# Patient Record
Sex: MALE | Race: WHITE | NOT HISPANIC OR LATINO | Employment: OTHER | URBAN - METROPOLITAN AREA
[De-identification: names, ages, dates, MRNs, and addresses within clinical notes are randomized per-mention and may not be internally consistent; named-entity substitution may affect disease eponyms.]

---

## 2017-01-23 ENCOUNTER — PATIENT OUTREACH (OUTPATIENT)
Dept: HEALTH INFORMATION MANAGEMENT | Facility: OTHER | Age: 67
End: 2017-01-23

## 2017-02-28 DIAGNOSIS — E11.42 DIABETIC POLYNEUROPATHY ASSOCIATED WITH TYPE 2 DIABETES MELLITUS (HCC): ICD-10-CM

## 2017-02-28 RX ORDER — GABAPENTIN 300 MG/1
300 CAPSULE ORAL 3 TIMES DAILY
Qty: 270 CAP | Refills: 3 | Status: SHIPPED | OUTPATIENT
Start: 2017-02-28 | End: 2017-03-27 | Stop reason: SDUPTHER

## 2017-03-09 ENCOUNTER — TELEPHONE (OUTPATIENT)
Dept: MEDICAL GROUP | Facility: MEDICAL CENTER | Age: 67
End: 2017-03-09

## 2017-03-09 NOTE — TELEPHONE ENCOUNTER
Future Appointments       Provider Department    3/27/2017 9:00 AM Augie Andrew M.D.; [facility]      ANNUAL WELLNESS VISIT PRE-VISIT PLANNING     1.  Reviewed last PCP office visit assessment and plan notes: Yes    2.  If any orders were placed last visit do we have Results/Consult Notes?        •  Labs? Yes completed       •  Imaging? Yes completed       •  Referrals? Yes, pt saw the Eye MD (Pt. Will bring the information)    3.  Patient Care Coordination Note was updated with diagnosis information:  Note sent to the PCC nurse    4.  Patient is due for these Health Maintenance Topics:   Health Maintenance Due   Topic Date Due   • Annual Wellness Visit  1950   • IMM DTaP/Tdap/Td Vaccine (1 - Tdap) 02/19/1970     5.  Immunizations were updated in [employer] using [employer]?: Yes       •  [employer] Recommendations:  Tdap       •  Is patient due for Tdap/Shingles? Yes.  If yes, was patient alerted of copay? Yes    6.  Patient has:       •   Diabetes: Yes       •   COPD: No       •   CHF: No       •   Depression: No    7.  Updated Care Team with [employer] and all specialists?        •   Gait devices, O2, CPAP, etc: no        •   Eye professional: yes       •   Other specialists (GYN, cardiology, endo, etc): yes    8.  Is patient in need of any refills prior to office visit? No       •    Separate refill encounter created?: N\A    9.  Patient was informed to arrive 15 min prior to their scheduled appointment and bring in their medication bottles? yes    10.  Patient was advised: “This is a free wellness visit. The provider will screen for medical conditions to help you stay healthy. If you have other concerns to address you may be asked to discuss these at a separate visit or there may be an additional fee.”  Yes

## 2017-03-15 ENCOUNTER — ANTICOAGULATION MONITORING (OUTPATIENT)
Dept: VASCULAR LAB | Facility: MEDICAL CENTER | Age: 67
End: 2017-03-15

## 2017-03-15 DIAGNOSIS — I48.0 PAROXYSMAL ATRIAL FIBRILLATION (HCC): ICD-10-CM

## 2017-03-15 NOTE — PROGRESS NOTES
Discharged from [facility].  Pt refusing anticoagulation 4-  Andree Reardon, PHARMD

## 2017-03-27 ENCOUNTER — OFFICE VISIT (OUTPATIENT)
Dept: MEDICAL GROUP | Facility: MEDICAL CENTER | Age: 67
End: 2017-03-27
Payer: MEDICARE

## 2017-03-27 VITALS
BODY MASS INDEX: 35.36 KG/M2 | OXYGEN SATURATION: 94 % | HEART RATE: 61 BPM | TEMPERATURE: 98.9 F | RESPIRATION RATE: 20 BRPM | DIASTOLIC BLOOD PRESSURE: 74 MMHG | WEIGHT: 247 LBS | SYSTOLIC BLOOD PRESSURE: 136 MMHG | HEIGHT: 70 IN

## 2017-03-27 DIAGNOSIS — Z12.5 ENCOUNTER FOR PROSTATE CANCER SCREENING: ICD-10-CM

## 2017-03-27 DIAGNOSIS — E78.5 DYSLIPIDEMIA: ICD-10-CM

## 2017-03-27 DIAGNOSIS — Z79.4 TYPE 2 DIABETES MELLITUS WITH DIABETIC NEUROPATHY, WITH LONG-TERM CURRENT USE OF INSULIN (HCC): ICD-10-CM

## 2017-03-27 DIAGNOSIS — Z23 NEED FOR TDAP VACCINATION: ICD-10-CM

## 2017-03-27 DIAGNOSIS — E11.40 TYPE 2 DIABETES MELLITUS WITH DIABETIC NEUROPATHY, WITH LONG-TERM CURRENT USE OF INSULIN (HCC): ICD-10-CM

## 2017-03-27 DIAGNOSIS — Z00.00 MEDICARE ANNUAL WELLNESS VISIT, INITIAL: ICD-10-CM

## 2017-03-27 DIAGNOSIS — I10 ESSENTIAL HYPERTENSION: ICD-10-CM

## 2017-03-27 DIAGNOSIS — I27.20 PULMONARY HYPERTENSION (HCC): ICD-10-CM

## 2017-03-27 DIAGNOSIS — I48.0 PAROXYSMAL ATRIAL FIBRILLATION (HCC): Chronic | ICD-10-CM

## 2017-03-27 DIAGNOSIS — E11.42 DIABETIC POLYNEUROPATHY ASSOCIATED WITH TYPE 2 DIABETES MELLITUS (HCC): ICD-10-CM

## 2017-03-27 PROCEDURE — G8510 SCR DEP NEG, NO PLAN REQD: HCPCS | Performed by: FAMILY MEDICINE

## 2017-03-27 PROCEDURE — 90715 TDAP VACCINE 7 YRS/> IM: CPT | Performed by: FAMILY MEDICINE

## 2017-03-27 PROCEDURE — G0438 PPPS, INITIAL VISIT: HCPCS | Mod: 25 | Performed by: FAMILY MEDICINE

## 2017-03-27 PROCEDURE — 3046F HEMOGLOBIN A1C LEVEL >9.0%: CPT | Mod: 8P | Performed by: FAMILY MEDICINE

## 2017-03-27 PROCEDURE — 90471 IMMUNIZATION ADMIN: CPT | Performed by: FAMILY MEDICINE

## 2017-03-27 PROCEDURE — 1036F TOBACCO NON-USER: CPT | Performed by: FAMILY MEDICINE

## 2017-03-27 RX ORDER — LISINOPRIL 10 MG/1
10 TABLET ORAL DAILY
Qty: 90 TAB | Refills: 3 | Status: SHIPPED | OUTPATIENT
Start: 2017-03-27 | End: 2018-04-12 | Stop reason: SDUPTHER

## 2017-03-27 RX ORDER — FENOFIBRATE 160 MG/1
160 TABLET ORAL DAILY
Qty: 90 TAB | Refills: 3 | Status: SHIPPED | OUTPATIENT
Start: 2017-03-27 | End: 2017-09-26 | Stop reason: SDUPTHER

## 2017-03-27 RX ORDER — GABAPENTIN 300 MG/1
1800 CAPSULE ORAL 2 TIMES DAILY
Qty: 540 CAP | Refills: 3 | Status: SHIPPED | OUTPATIENT
Start: 2017-03-27 | End: 2018-04-12 | Stop reason: SDUPTHER

## 2017-03-27 ASSESSMENT — PATIENT HEALTH QUESTIONNAIRE - PHQ9
CLINICAL INTERPRETATION OF PHQ2 SCORE: 0
SUM OF ALL RESPONSES TO PHQ QUESTIONS 1-9: 0

## 2017-03-27 NOTE — PROGRESS NOTES
Chief Complaint   Patient presents with   • Annual Wellness Visit         HPI:  Richard is a 67 y.o. male here for Medicare Annual Wellness Visit        Patient Active Problem List    Diagnosis Date Noted   • Primary osteoarthritis of left knee 11/21/2016   • Observed sleep apnea    • Type 2 diabetes mellitus with diabetic neuropathy (CMS-HCC) 03/07/2016   • Diabetes type 2, uncontrolled (CMS-HCC) 03/07/2016   • Knee osteomyelitis, left (CMS-HCC) 03/01/2016   • Pulmonary hypertension (CMS-HCC) 03/01/2016   • Normocytic anemia 03/01/2016   • Anticoagulated 03/01/2016   • S/P PICC central line placement 03/01/2016   • Arthritis, infective, knee (CMS-HCC) 02/28/2016   • Paroxysmal atrial fibrillation (CMS-HCC) 02/27/2016   • Other meniscus derangements, other medial meniscus, left knee 02/18/2016   • Localized primary osteoarthritis of left lower leg 02/18/2016   • Chronic pain of left knee 11/24/2015   • Hyperlipidemia 11/24/2015   • Essential hypertension 11/24/2015       Current Outpatient Prescriptions   Medication Sig Dispense Refill   • gabapentin (NEURONTIN) 300 MG Cap Take 1 Cap by mouth 3 times a day. (Patient taking differently: Take 1,800 mg by mouth 2 Times a Day.) 270 Cap 3   • aspirin  MG Tablet Delayed Response Take 1 Tab by mouth 2 Times a Day. (Patient taking differently: Take 81 mg by mouth every day.) 30 Tab 0   • lisinopril (PRINIVIL) 10 MG Tab Take 1 Tab by mouth every day. 90 Tab 1   • sitagliptin (JANUVIA) 100 MG Tab Take 1 Tab by mouth every day. 30 Tab 6   • metformin (GLUCOPHAGE) 1000 MG tablet Take 1 Tab by mouth 2 Times a Day. 180 Tab 3   • insulin glargine (LANTUS) 100 UNIT/ML Solution Pen-injector injection Inject 54 Units as instructed every evening. (Patient taking differently: Inject 35 Units as instructed every evening.) 15 mL 6   • fenofibrate (TRIGLIDE) 160 MG tablet Take 1 Tab by mouth every day. 30 Tab 6   • omeprazole (PRILOSEC) 20 MG delayed-release capsule Take 20 mg by mouth  every day.     • Potassium Gluconate 595 MG Cap Take 99 mg by mouth every day.     • oxycodone immediate-release (ROXICODONE) 5 MG Tab Take 1-2 Tabs by mouth every 6 hours as needed (for pain. Alternate with Tramadol for pain control). (Patient not taking: Reported on 3/27/2017) 60 Tab 0   • tramadol (ULTRAM) 50 MG Tab Take 1-2 Tabs by mouth every 6 hours as needed for Mild Pain or Moderate Pain (Alternate with Oxycodone for pain control.). 80 Tab 1   • docusate sodium 100 MG Cap Take 100 mg by mouth 2 times a day. 60 Cap 3   • diazepam (VALIUM) 5 MG Tab Take 1 Tab by mouth every 6 hours as needed (muscle spasms). (Patient not taking: Reported on 3/27/2017) 30 Tab 0   • meloxicam (MOBIC) 7.5 MG Tab Take 7.5 mg by mouth every evening.     • sildenafil citrate (VIAGRA) 100 MG tablet Take 1 Tab by mouth as needed for Erectile Dysfunction. 10 Tab 3     No current facility-administered medications for this visit.        Patient is taking medications as noted in medication list.  Current supplements as per medication list.   Chronic narcotic pain medicines: no    Allergies: Review of patient's allergies indicates no known allergies.    Current social contact/activities: Pt. Enjoys spending time with his wife, enjoys family, pt. Enjoys hunting and skiing,      Is patient current with immunizations?  No, due for TDAP. Patient is interested in receiving TDAP today.     He  reports that he has never smoked. He has never used smokeless tobacco. He reports that he drinks about 1.8 oz of alcohol per week. He reports that he does not use illicit drugs.  Counseling given: Yes        DPA/Advanced Directive:  Patient has Advanced Directive, but it is not on file. Instructed to bring in a copy to scan into their chart.    ROS:    Gait: Uses no assistive device   Ostomy: no   Other tubes: no   Amputations: no   Chronic oxygen use no   Last eye exam 2016   Wears hearing aids:No  : Denies incontinence.       Screening:    DIABETES  1.  Records requested for overdue HM topics specifically for diabetes? yes      a. If yes, specifically requested: Colonoscopy report     2. Has patient ever had diabetes education? Yes      a. If so, when? 2016      b. If not, is patient interested? Pt.    Depression Screening    Little interest or pleasure in doing things?  0 - not at all  Feeling down, depressed, or hopeless?  0 - not at all  Patient Health Questionnaire Score: 0  If depressive symptoms identified deferred to follow up visit unless specifically addressed in assessment and plan.    Screening for Cognitive Impairment    Three Minute Recall (banana, sunrise, fence)  3/3    Draw clock face with all 12 numbers set to the hand to show 10 minutes past 11 o'clock  1 5/5  If cognitive concerns identified deferred to follow up visit unless specifically addressed in assessment and plan.    Fall Risk Assessment    Has the patient had two or more falls in the last year or any fall with injury in the last year?  No  If Fall Risk identified deferred to follow up visit unless specifically addressed in assessment and plan.    Safety Assessment    Throw rugs on floor.  Yes  Handrails on all stairs.  Yes  Good lighting in all hallways.  Yes  Difficulty hearing.  Yes, Rt. Ear only  Patient counseled about all safety risks that were identified.    Functional Assessment ADLs    Are there any barriers preventing you from cooking for yourself or meeting nutritional needs?  No.    Are there any barriers preventing you from driving safely or obtaining transportation?  No.    Are there any barriers preventing you from using a telephone or calling for help?  No.    Are there any barriers preventing you from shopping?  No.    Are there any barriers preventing you from taking care of your own finances?  No.    Are there any barriers preventing you from managing your medications?  No.    Are currently engaging any exercise or physical activity?  Yes.  Pt. Walks 5 miles a day and   his bike a couple of miles a day.  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    Health Maintenance Summary                Annual Wellness Visit Overdue 1950     IMM DTaP/Tdap/Td Vaccine Overdue 2/19/1970     RETINAL SCREENING Postponed 1/23/2018 Originally 2/19/1969. Patient Refused    COLONOSCOPY Postponed 1/23/2018 Originally 6/20/2015. Patient Refused     Done 6/20/2008 REFERRAL TO GI FOR COLONOSCOPY    A1C SCREENING Next Due 5/14/2017      Done 11/14/2016 HEMOGLOBIN A1C (A)     Patient has more history with this topic...    DIABETES MONOFILAMENT / LE EXAM Next Due 6/27/2017      Done 6/27/2016 AMB DIABETIC MONOFILAMENT LOWER EXTREMITY EXAM     Patient has more history with this topic...    FASTING LIPID PROFILE Next Due 9/15/2017      Done 9/15/2016 LIPID PROFILE     Patient has more history with this topic...    URINE ACR / MICROALBUMIN Next Due 9/15/2017      Done 9/15/2016 MICROALBUMIN CREAT RATIO URINE    SERUM CREATININE Next Due 11/14/2017      Done 11/14/2016 BASIC METABOLIC PANEL (A)     Patient has more history with this topic...          Patient Care Team:  Augie Andrew M.D. as PCP - General (Family Medicine)  Manjit Pelletier M.D. as Consulting Physician (Cardiology)  Amparo James M.D. as Consulting Physician (Orthopaedics)  [facility] as Consulting Physician (Ophthalmology)    Social History   Substance Use Topics   • Smoking status: Never Smoker    • Smokeless tobacco: Never Used   • Alcohol Use: 1.8 oz/week     0 Standard drinks or equivalent, 3 Shots of liquor per week      Comment: 3 per day.     Family History   Problem Relation Age of Onset   • Diabetes     • Hypertension       He  has a past medical history of Hiatus hernia syndrome; Alcohol use ([facility]) (2/10/2016); Arthritis (2016); Diabetes (2005); Hypertension; High cholesterol; Paroxysmal atrial fibrillation (CMS-HCC) (2/27/2016); Dental disorder; Observed sleep apnea; Pain;  "and Heart burn.   Past Surgical History   Procedure Laterality Date   • Hand surgery Left 1970     trauma, amputation index and middle finger   • Cystoscopy  1986   • Tonsillectomy  as child   • Dental extraction(s)  1976     wisdom teeth   • Knee arthroscopy Left 2/18/2016     Procedure: KNEE ARTHROSCOPY, SAMUELS;  Surgeon: Marquise Cline M.D.;  Location: [facility];  Service:    • Medial meniscectomy  2/18/2016     Procedure: MEDIAL MENISCECTOMY - PARTIAL;  Surgeon: Marquise Cline M.D.;  Location: SURGERY [facility];  Service:    • Knee arthroscopy Left 2/27/2016     Procedure: KNEE ARTHROSCOPY- I&D KNEE;  Surgeon: Corby Reyes M.D.;  Location: [facility];  Service:    • Knee arthroplasty total Left 11/21/2016     Procedure: KNEE ARTHROPLASTY TOTAL;  Surgeon: Amparo James M.D.;  Location: [facility];  Service:    • Knee replacement, total Left 11/2016     Dr. James           Exam:     Blood pressure 136/74, pulse 61, temperature 37.2 °C (98.9 °F), resp. rate 20, height 1.784 m (5' 10.25\"), weight 112.038 kg (247 lb), SpO2 94 %. Body mass index is 35.2 kg/(m^2).    Hearing good.    Dentition good  Alert, oriented in no acute distress.  Eye contact is good, speech goal directed, affect calm      Assessment and Plan. The following treatment and monitoring plan is recommended:    Chief Complaint   Patient presents with   • Annual Wellness Visit         HISTORY OF PRESENT ILLNESS: Patient is a 67 y.o. male established patient who presents today for the following.  HRA      He  has a past medical history of Hiatus hernia syndrome; Alcohol use ([facility]) (2/10/2016); Arthritis (2016); Diabetes (2005); Hypertension; High cholesterol; Paroxysmal atrial fibrillation (CMS-[facility]) (2/27/2016); Dental disorder; Observed sleep apnea; Pain; and Heart burn.    Patient Active Problem List    Diagnosis Date Noted   • Primary osteoarthritis of left knee 11/21/2016   • Observed sleep " apnea    • Type 2 diabetes mellitus with diabetic neuropathy (CMS-HCC) 03/07/2016   • Diabetes type 2, uncontrolled (CMS-HCC) 03/07/2016   • Pulmonary hypertension (CMS-HCC) 03/01/2016   • Paroxysmal atrial fibrillation (CMS-HCC) 02/27/2016   • Hyperlipidemia 11/24/2015   • Essential hypertension 11/24/2015       Allergies:Review of patient's allergies indicates no known allergies.    Current Outpatient Prescriptions   Medication Sig Dispense Refill   • gabapentin (NEURONTIN) 300 MG Cap Take 6 Caps by mouth 2 Times a Day. 540 Cap 3   • lisinopril (PRINIVIL) 10 MG Tab Take 1 Tab by mouth every day. 90 Tab 3   • sitagliptin (JANUVIA) 100 MG Tab Take 1 Tab by mouth every day. 90 Tab 3   • metformin (GLUCOPHAGE) 1000 MG tablet Take 1 Tab by mouth 2 Times a Day. 180 Tab 3   • insulin glargine (LANTUS) 100 UNIT/ML Solution Pen-injector injection Inject 35 Units as instructed every evening. 15 mL 6   • fenofibrate (TRIGLIDE) 160 MG tablet Take 1 Tab by mouth every day. 90 Tab 3   • aspirin  MG Tablet Delayed Response Take 1 Tab by mouth 2 Times a Day. (Patient taking differently: Take 81 mg by mouth every day.) 30 Tab 0   • omeprazole (PRILOSEC) 20 MG delayed-release capsule Take 20 mg by mouth every day.     • Potassium Gluconate 595 MG Cap Take 99 mg by mouth every day.     • oxycodone immediate-release (ROXICODONE) 5 MG Tab Take 1-2 Tabs by mouth every 6 hours as needed (for pain. Alternate with Tramadol for pain control). (Patient not taking: Reported on 3/27/2017) 60 Tab 0   • tramadol (ULTRAM) 50 MG Tab Take 1-2 Tabs by mouth every 6 hours as needed for Mild Pain or Moderate Pain (Alternate with Oxycodone for pain control.). 80 Tab 1   • docusate sodium 100 MG Cap Take 100 mg by mouth 2 times a day. 60 Cap 3   • diazepam (VALIUM) 5 MG Tab Take 1 Tab by mouth every 6 hours as needed (muscle spasms). (Patient not taking: Reported on 3/27/2017) 30 Tab 0   • meloxicam (MOBIC) 7.5 MG Tab Take 7.5 mg by mouth every  "evening.     • sildenafil citrate (VIAGRA) 100 MG tablet Take 1 Tab by mouth as needed for Erectile Dysfunction. 10 Tab 3     No current facility-administered medications for this visit.       Social History   Substance Use Topics   • Smoking status: Never Smoker    • Smokeless tobacco: Never Used   • Alcohol Use: 1.8 oz/week     0 Standard drinks or equivalent, 3 Shots of liquor per week      Comment: 3 per day.       Family History   Problem Relation Age of Onset   • Diabetes     • Hypertension           Exam:  Blood pressure 136/74, pulse 61, temperature 37.2 °C (98.9 °F), resp. rate 20, height 1.784 m (5' 10.25\"), weight 112.038 kg (247 lb), SpO2 94 %. Body mass index is 35.2 kg/(m^2).  Constitutional:  NAD, well appearing.  HEENT:   NC/AT, PERRLA, EOMI, OP clear, no lymphadenopathy, no thyromegaly.    Cardiovascular: RRR.   No m/r/g. No carotid bruits.       Lungs:   CTAB, no w/r/r, no respiratory distress.  Abdomen: Soft, NT/ND + BS, no masses, no suprapubic tenderness, no hepatomegaly.  Extremities:  2+ DP and radial pulses bilaterally.  No c/c/e.  Skin:  Warm and dry.    Neurologic: Alert & oriented x 3, CN II-XII grossly intact, grossly intact.  No focal deficits noted.  Psychiatric:  Affect normal, mood normal, judgment normal.    Assessment/Plan:     1. Medicare annual wellness visit, initial    - Initial Wellness Visit - Includes PPPS ()    2. Need for Tdap vaccination    - TDAP VACCINE =>8YO IM  - Initial Wellness Visit - Includes PPPS ()    3. Type 2 diabetes mellitus with diabetic neuropathy, with long-term current use of insulin (CMS-[facility])  Reviewed labs with the patient. Diabetes is well controlled. Takes medications as prescribed. Positive for lower extremity tingling and pain with decreased sensation. Retinal exam up-to-date. No concerning sores on the feet. Gets regular foot exams.  Discussed medications along with risks of hypoglycemia and control of hypoglycemia.      - CBC WITHOUT " DIFFERENTIAL; Future  - COMP METABOLIC PANEL; Future  - HEMOGLOBIN A1C; Future  - MICROALBUMIN CREAT RATIO URINE; Future  - LIPID PROFILE; Future  - gabapentin (NEURONTIN) 300 MG Cap; Take 6 Caps by mouth 2 Times a Day.  Dispense: 540 Cap; Refill: 3  - sitagliptin (JANUVIA) 100 MG Tab; Take 1 Tab by mouth every day.  Dispense: 90 Tab; Refill: 3  - metformin (GLUCOPHAGE) 1000 MG tablet; Take 1 Tab by mouth 2 Times a Day.  Dispense: 180 Tab; Refill: 3  - Initial Wellness Visit - Includes PPPS ()    4. Diabetic polyneuropathy associated with type 2 diabetes mellitus (CMS-HCC)  Reviewed labs with the patient. Diabetes is well controlled. Takes medications as prescribed. Positive for lower extremity tingling and pain with decreased sensation. Retinal exam up-to-date. No concerning sores on the feet. Gets regular foot exams.  Discussed medications along with risks of hypoglycemia and control of hypoglycemia.    - Initial Wellness Visit - Includes PPPS ()    5. Essential hypertension  Well-controlled. Labs as indicated. Continue antihypertensive medications. Discussed decreasing salt intake. Discussed benefits of exercise and diet. Continue to monitor.  Followup as indicated    - lisinopril (PRINIVIL) 10 MG Tab; Take 1 Tab by mouth every day.  Dispense: 90 Tab; Refill: 3  - Initial Wellness Visit - Includes PPPS ()    6. Dyslipidemia  Well controlled. Labs as indicated. Continue statin medication. Continue to monitor.    - fenofibrate (TRIGLIDE) 160 MG tablet; Take 1 Tab by mouth every day.  Dispense: 90 Tab; Refill: 3  - Initial Wellness Visit - Includes PPPS ()    7. Encounter for prostate cancer screening    - PROSTATE SPECIFIC AG SCREENING; Future  - Initial Wellness Visit - Includes PPPS ()    8. Paroxysmal atrial fibrillation (CMS-HCC)  Stable. Taking medications as prescribed. No chest pain palpitations or shortness of breath.  No bleeding events.  Continue medications and  anticoagulation.    - Initial Wellness Visit - Includes PPPS ()    9. Pulmonary hypertension (CMS-HCC)  Chronic and ongoing problem. Continue to monitor.  - Initial Wellness Visit - Includes PPPS ()      Followup: Return in about 6 months (around 9/27/2017) for DM RN.    Please note that this dictation was created using voice recognition software. I have made every reasonable attempt to correct obvious errors, but I expect that there are errors of grammar and possibly content that I did not discover before finalizing the note.      1. Need for Tdap vaccination  TDAP VACCINE =>8YO IM         Services suggested: No services needed at this time  Health Care Screening recommendations as per orders if indicated.  Referrals offered: PT/OT/Nutrition counseling/Behavioral Health/Smoking cessation as per orders if indicated.    Discussion today about general wellness and lifestyle habits:    · Prevent falls and reduce trip hazards; Cautioned about securing or removing rugs.  · Have a working fire alarm and carbon monoxide detector;   · Engage in regular physical activity and social activities       Follow-up: No Follow-up on file.

## 2017-03-27 NOTE — MR AVS SNAPSHOT
"        Richard Chery   3/27/2017 9:00 AM   Office Visit   MRN: [id number]    Department:  [facility]   Dept Phone:  [phone]    Description:  Male : 1950   Provider:  Augie Andrew M.D.; [facility]            Reason for Visit     Annual Wellness Visit           Allergies as of 3/27/2017     No Known Allergies      You were diagnosed with     Need for Tdap vaccination   [121554]       Type 2 diabetes mellitus with diabetic neuropathy, with long-term current use of insulin (CMS-HCC)   [1084638]       Diabetic polyneuropathy associated with type 2 diabetes mellitus (CMS-HCC)   [1430489]       Essential hypertension   [0533210]       Type 2 diabetes mellitus without complication, with long-term current use of insulin (CMS-HCC)   [7260246]       Dyslipidemia   [968966]       Encounter for prostate cancer screening   [370719]         Vital Signs     Blood Pressure Pulse Temperature Respirations Height Weight    136/74 mmHg 61 37.2 °C (98.9 °F) 20 1.784 m (5' 10.25\") 112.038 kg (247 lb)    Body Mass Index Oxygen Saturation Smoking Status             35.20 kg/m2 94% Never Smoker          Basic Information     Date Of Birth Sex Race Ethnicity Preferred Language    1950 Male White Non- English      Your appointments     Sep 15, 2017  9:00 AM   Diabetes Care Visit with Augie Andrew M.D., [facility] RN   [facility] (--)    [address]  [address] [place] [id number] [phone]           You will be receiving a confirmation call a few days before your appointment from our automated call confirmation system.              Problem List              ICD-10-CM Priority Class Noted - Resolved    Chronic pain of left knee M25.562, G89.29   2015 - Present    Hyperlipidemia E78.5   2015 - Present    Essential hypertension I10   2015 - Present    Localized primary osteoarthritis of left lower leg M17.12   2016 - Present   "    Paroxysmal atrial fibrillation (CMS-HCC) (Chronic) I48.0   2/27/2016 - Present    Arthritis, infective, knee (CMS-HCC) M00.9   2/28/2016 - Present    Knee osteomyelitis, left (CMS-HCC) M86.9   3/1/2016 - Present    Pulmonary hypertension (CMS-HCC) I27.2   3/1/2016 - Present    Normocytic anemia D64.9   3/1/2016 - Present    Anticoagulated Z79.01   3/1/2016 - Present    S/P PICC central line placement Z95.828   3/1/2016 - Present    Type 2 diabetes mellitus with diabetic neuropathy (CMS-HCC) E11.40   3/7/2016 - Present    Diabetes type 2, uncontrolled (CMS-HCC) E11.65   3/7/2016 - Present    Observed sleep apnea (Chronic) G47.30   Unknown - Present    Primary osteoarthritis of left knee M17.12   11/21/2016 - Present      Health Maintenance        Date Due Completion Dates    IMM DTaP/Tdap/Td Vaccine (1 - Tdap) 2/19/1970 ---    RETINAL SCREENING 1/23/2018 (Originally 2/19/1969) ---    COLONOSCOPY 1/23/2018 (Originally 6/20/2015) 6/20/2008    A1C SCREENING 5/14/2017 11/14/2016, 9/15/2016, 6/27/2016, 4/20/2016, 2/27/2016    DIABETES MONOFILAMENT / LE EXAM 6/27/2017 6/27/2016, 2/1/2016    FASTING LIPID PROFILE 9/15/2017 9/15/2016, 4/20/2016    URINE ACR / MICROALBUMIN 9/15/2017 9/15/2016    SERUM CREATININE 11/14/2017 11/14/2016, 9/15/2016, 5/31/2016, 5/2/2016, 4/18/2016, 4/11/2016, 4/4/2016, 3/28/2016, 3/21/2016, 3/14/2016, 3/7/2016, 3/1/2016, 2/29/2016, 2/28/2016, 2/27/2016, 2/10/2016            Current Immunizations     13-VALENT PCV PREVNAR 3/1/2016  1:20 PM, 3/1/2016    Influenza Vaccine Adult HD 11/8/2016    Influenza Vaccine Quad Inj (Pf) 11/24/2014    Influenza Vaccine Quad Inj (Preserved) 10/1/2015, 10/1/2015    Pneumococcal polysaccharide vaccine (PPSV-23) 11/8/2016    SHINGLES VACCINE 6/27/2016, 6/27/2016    Tdap Vaccine  Incomplete      Below and/or attached are the medications your provider expects you to take. Review all of your home medications and newly ordered medications with your provider and/or  pharmacist. Follow medication instructions as directed by your provider and/or pharmacist. Please keep your medication list with you and share with your provider. Update the information when medications are discontinued, doses are changed, or new medications (including over-the-counter products) are added; and carry medication information at all times in the event of emergency situations     Allergies:  No Known Allergies          Medications  Valid as of: March 27, 2017 -  9:54 AM    Generic Name Brand Name Tablet Size Instructions for use    Aspirin (Tablet Delayed Response) Aspirin 325 MG Take 1 Tab by mouth 2 Times a Day.        DiazePAM (Tab) VALIUM 5 MG Take 1 Tab by mouth every 6 hours as needed (muscle spasms).        Docusate Sodium (Cap)  MG Take 100 mg by mouth 2 times a day.        Fenofibrate (Tab) TRIGLIDE 160 MG Take 1 Tab by mouth every day.        Gabapentin (Cap) NEURONTIN 300 MG Take 6 Caps by mouth 2 Times a Day.        Insulin Glargine (Solution Pen-injector) LANTUS 100 UNIT/ML Inject 35 Units as instructed every evening.        Lisinopril (Tab) PRINIVIL 10 MG Take 1 Tab by mouth every day.        Meloxicam (Tab) MOBIC 7.5 MG Take 7.5 mg by mouth every evening.        MetFORMIN HCl (Tab) GLUCOPHAGE 1000 MG Take 1 Tab by mouth 2 Times a Day.        Omeprazole (CAPSULE DELAYED RELEASE) PRILOSEC 20 MG Take 20 mg by mouth every day.        OxyCODONE HCl (Tab) ROXICODONE 5 MG Take 1-2 Tabs by mouth every 6 hours as needed (for pain. Alternate with Tramadol for pain control).        Potassium Gluconate (Cap) Potassium Gluconate 595 MG Take 99 mg by mouth every day.        Sildenafil Citrate (Tab) VIAGRA 100 MG Take 1 Tab by mouth as needed for Erectile Dysfunction.        SITagliptin Phosphate (Tab) JANUVIA 100 MG Take 1 Tab by mouth every day.        TraMADol HCl (Tab) ULTRAM 50 MG Take 1-2 Tabs by mouth every 6 hours as needed for Mild Pain or Moderate Pain (Alternate with Oxycodone for pain  control.).        .                 Medicines prescribed today were sent to:     [facility] [address] [place] [address] (), [place] [address]    [address] [place] [address] () [place] [address]    Phone: [phone] Fax: [phone]    Open 24 Hours?: No      Medication refill instructions:       If your prescription bottle indicates you have medication refills left, it is not necessary to call your provider’s office. Please contact your pharmacy and they will refill your medication.    If your prescription bottle indicates you do not have any refills left, you may request refills at any time through one of the following ways: The online Wellkeeper system (except Urgent Care), by calling your provider’s office, or by asking your pharmacy to contact your provider’s office with a refill request. Medication refills are processed only during regular business hours and may not be available until the next business day. Your provider may request additional information or to have a follow-up visit with you prior to refilling your medication.   *Please Note: Medication refills are assigned a new Rx number when refilled electronically. Your pharmacy may indicate that no refills were authorized even though a new prescription for the same medication is available at the pharmacy. Please request the medicine by name with the pharmacy before contacting your provider for a refill.        Your To Do List     Future Labs/Procedures Complete By Expires    CBC WITHOUT DIFFERENTIAL  As directed 9/27/2017    COMP METABOLIC PANEL  As directed 3/28/2018    HEMOGLOBIN A1C  As directed 3/28/2018    LIPID PROFILE  As directed 3/28/2018    MICROALBUMIN CREAT RATIO URINE  As directed 3/28/2018    PROSTATE SPECIFIC AG SCREENING  As directed 3/28/2018         Wellkeeper Access Code: [email or web address]  Expires: 4/24/2017 10:14 AM    Wellkeeper  A secure, online tool to manage your health information     [employer]’s Wellkeeper® is a secure, online tool that  connects you to your personalized health information from the privacy of your home -- day or night - making it very easy for you to manage your healthcare. Once the activation process is completed, you can even access your medical information using the [employer] andrew, which is available for free in the Apple Andrew store or Google Play store.     [employer] provides the following levels of access (as shown below):   My Chart Features   [facility] Doctor [facility]  Specialists [facility]  Urgent  Care Non-[facility]  Primary Care  Doctor   Email your healthcare team securely and privately 24/7 X X X    Manage appointments: schedule your next appointment; view details of past/upcoming appointments X      Request prescription refills. X      View recent personal medical records, including lab and immunizations X X X X   View health record, including health history, allergies, medications X X X X   Read reports about your outpatient visits, procedures, consult and ER notes X X X X   See your discharge summary, which is a recap of your hospital and/or ER visit that includes your diagnosis, lab results, and care plan. X X       How to register for [employer]:  1. Go to  [email or web address].  2. Click on the Sign Up Now box, which takes you to the New Member Sign Up page. You will need to provide the following information:  a. Enter your [employer] Access Code exactly as it appears at the top of this page. (You will not need to use this code after you’ve completed the sign-up process. If you do not sign up before the expiration date, you must request a new code.)   b. Enter your date of birth.   c. Enter your home email address.   d. Click Submit, and follow the next screen’s instructions.  3. Create a [employer] ID. This will be your [employer] login ID and cannot be changed, so think of one that is secure and easy to remember.  4. Create a [employer] password. You can change your password at any time.  5. Enter your Password Reset Question  and Answer. This can be used at a later time if you forget your password.   6. Enter your e-mail address. This allows you to receive e-mail notifications when new information is available in [employer].  7. Click Sign Up. You can now view your health information.    For assistance activating your [employer] account, call [phone]

## 2017-09-05 ENCOUNTER — OFFICE VISIT (OUTPATIENT)
Dept: MEDICAL GROUP | Facility: MEDICAL CENTER | Age: 67
End: 2017-09-05
Payer: MEDICARE

## 2017-09-05 ENCOUNTER — HOSPITAL ENCOUNTER (OUTPATIENT)
Dept: LAB | Facility: MEDICAL CENTER | Age: 67
End: 2017-09-05
Attending: FAMILY MEDICINE
Payer: MEDICARE

## 2017-09-05 VITALS
BODY MASS INDEX: 35.36 KG/M2 | HEART RATE: 64 BPM | DIASTOLIC BLOOD PRESSURE: 70 MMHG | OXYGEN SATURATION: 97 % | RESPIRATION RATE: 20 BRPM | TEMPERATURE: 98 F | SYSTOLIC BLOOD PRESSURE: 116 MMHG | WEIGHT: 247 LBS | HEIGHT: 70 IN

## 2017-09-05 DIAGNOSIS — I27.20 PULMONARY HYPERTENSION (HCC): ICD-10-CM

## 2017-09-05 DIAGNOSIS — Z79.4 TYPE 2 DIABETES MELLITUS WITH DIABETIC NEUROPATHY, WITH LONG-TERM CURRENT USE OF INSULIN (HCC): ICD-10-CM

## 2017-09-05 DIAGNOSIS — E78.01 FAMILIAL HYPERCHOLESTEROLEMIA: ICD-10-CM

## 2017-09-05 DIAGNOSIS — Z87.39 HISTORY OF OSTEOARTHRITIS: ICD-10-CM

## 2017-09-05 DIAGNOSIS — E11.40 TYPE 2 DIABETES MELLITUS WITH DIABETIC NEUROPATHY, WITH LONG-TERM CURRENT USE OF INSULIN (HCC): ICD-10-CM

## 2017-09-05 DIAGNOSIS — I48.0 PAROXYSMAL ATRIAL FIBRILLATION (HCC): Chronic | ICD-10-CM

## 2017-09-05 DIAGNOSIS — Z12.5 ENCOUNTER FOR PROSTATE CANCER SCREENING: ICD-10-CM

## 2017-09-05 DIAGNOSIS — G47.30 OBSERVED SLEEP APNEA: Chronic | ICD-10-CM

## 2017-09-05 DIAGNOSIS — I10 ESSENTIAL HYPERTENSION: ICD-10-CM

## 2017-09-05 DIAGNOSIS — E78.5 DYSLIPIDEMIA: ICD-10-CM

## 2017-09-05 LAB
ALBUMIN SERPL BCP-MCNC: 3.8 G/DL (ref 3.2–4.9)
ALBUMIN/GLOB SERPL: 1.5 G/DL
ALP SERPL-CCNC: 51 U/L (ref 30–99)
ALT SERPL-CCNC: 35 U/L (ref 2–50)
ANION GAP SERPL CALC-SCNC: 7 MMOL/L (ref 0–11.9)
AST SERPL-CCNC: 34 U/L (ref 12–45)
BILIRUB SERPL-MCNC: 0.6 MG/DL (ref 0.1–1.5)
BUN SERPL-MCNC: 22 MG/DL (ref 8–22)
CALCIUM SERPL-MCNC: 9.2 MG/DL (ref 8.5–10.5)
CHLORIDE SERPL-SCNC: 106 MMOL/L (ref 96–112)
CHOLEST SERPL-MCNC: 118 MG/DL (ref 100–199)
CO2 SERPL-SCNC: 27 MMOL/L (ref 20–33)
CREAT SERPL-MCNC: 0.96 MG/DL (ref 0.5–1.4)
CREAT UR-MCNC: 121.3 MG/DL
ERYTHROCYTE [DISTWIDTH] IN BLOOD BY AUTOMATED COUNT: 44.8 FL (ref 35.9–50)
EST. AVERAGE GLUCOSE BLD GHB EST-MCNC: 148 MG/DL
FASTING STATUS PATIENT QL REPORTED: NORMAL
GFR SERPL CREATININE-BSD FRML MDRD: >60 ML/MIN/1.73 M 2
GLOBULIN SER CALC-MCNC: 2.6 G/DL (ref 1.9–3.5)
GLUCOSE SERPL-MCNC: 84 MG/DL (ref 65–99)
HBA1C MFR BLD: 6.5 % (ref ?–5.8)
HBA1C MFR BLD: 6.8 % (ref 0–5.6)
HCT VFR BLD AUTO: 40.3 % (ref 42–52)
HDLC SERPL-MCNC: 39 MG/DL
HGB BLD-MCNC: 13.4 G/DL (ref 14–18)
INT CON NEG: NEGATIVE
INT CON POS: POSITIVE
LDLC SERPL CALC-MCNC: 57 MG/DL
MCH RBC QN AUTO: 31.5 PG (ref 27–33)
MCHC RBC AUTO-ENTMCNC: 33.3 G/DL (ref 33.7–35.3)
MCV RBC AUTO: 94.8 FL (ref 81.4–97.8)
MICROALBUMIN UR-MCNC: 1.1 MG/DL
MICROALBUMIN/CREAT UR: 9 MG/G (ref 0–30)
PLATELET # BLD AUTO: 255 K/UL (ref 164–446)
PMV BLD AUTO: 10.5 FL (ref 9–12.9)
POTASSIUM SERPL-SCNC: 4.4 MMOL/L (ref 3.6–5.5)
PROT SERPL-MCNC: 6.4 G/DL (ref 6–8.2)
PSA SERPL-MCNC: 0.39 NG/ML (ref 0–4)
RBC # BLD AUTO: 4.25 M/UL (ref 4.7–6.1)
SODIUM SERPL-SCNC: 140 MMOL/L (ref 135–145)
TRIGL SERPL-MCNC: 110 MG/DL (ref 0–149)
WBC # BLD AUTO: 5.5 K/UL (ref 4.8–10.8)

## 2017-09-05 PROCEDURE — 84153 ASSAY OF PSA TOTAL: CPT

## 2017-09-05 PROCEDURE — 36415 COLL VENOUS BLD VENIPUNCTURE: CPT

## 2017-09-05 PROCEDURE — 80061 LIPID PANEL: CPT

## 2017-09-05 PROCEDURE — 99214 OFFICE O/P EST MOD 30 MIN: CPT | Performed by: FAMILY MEDICINE

## 2017-09-05 PROCEDURE — 85027 COMPLETE CBC AUTOMATED: CPT

## 2017-09-05 PROCEDURE — 83036 HEMOGLOBIN GLYCOSYLATED A1C: CPT

## 2017-09-05 PROCEDURE — 83036 HEMOGLOBIN GLYCOSYLATED A1C: CPT | Performed by: FAMILY MEDICINE

## 2017-09-05 PROCEDURE — 80053 COMPREHEN METABOLIC PANEL: CPT

## 2017-09-05 PROCEDURE — 82043 UR ALBUMIN QUANTITATIVE: CPT

## 2017-09-05 PROCEDURE — 82570 ASSAY OF URINE CREATININE: CPT

## 2017-09-05 NOTE — ASSESSMENT & PLAN NOTE
The patient was last seen by cardiology on 10/6/2016. At that visit to the cardiologist described paroxysmal atrial fibrillation with runs seen on Holter monitor. They recommended anticoagulation, however, the patient elected to stop it. They also recommended that he continue diltiazem but the patient elected to stop it. Today the patient is only on a baby aspirin with no rate control or anticoagulation. He states that he is doing well. He denies chest pain, dyspnea on exertion and palpitations. We discussed the risk of stroke and arrhythmia. Despite this, the patient does not want to proceed with anticoagulation or rate control medication. He is amenable, however, to increasing his aspirin to full dose.

## 2017-09-05 NOTE — ASSESSMENT & PLAN NOTE
The patient is currently taking fenofibrate 160 mg daily. He does not believe he has ever been on a statin medicine. His 10 year ACC risk today is 20%. We did have a risk-benefit discussion regarding statin use and the prevention of heart attacks and strokes. Nonetheless, the patient would like to continue his fibrate medicine and not start a statin.

## 2017-09-05 NOTE — ASSESSMENT & PLAN NOTE
The patient currently takes aspirin, ACE inhibitor, metformin 1000 mg twice a day, Januvia 100 mg daily and Lantus 35 units in the evening. He states that his fasting blood sugars are in the low to mid 90s. He is never less than 80. She denies hypoglycemic events. Patient is not currently on a statin (see discussion regarding hyperlipidemia). Microalbumin to creatinine ratio is 9.

## 2017-09-05 NOTE — ASSESSMENT & PLAN NOTE
Patient isn't using CPAP. The patient also has pulmonary hypertension. Despite discussion of risks, the patient is not interested in going back on CPAP or seeing a pulmonologist.

## 2017-09-05 NOTE — ASSESSMENT & PLAN NOTE
Patient has a history of osteoarthritis of the left knee status post total knee replacement. He is now doing well.

## 2017-09-05 NOTE — PROGRESS NOTES
[facility]  Progress Note  Established Patient    Subjective:   Richard Chery is a 67 y.o. male here today with a chief complaint of  EMILY, DM II and issues discussed below.     Observed sleep apnea  Patient isn't using CPAP. The patient also has pulmonary hypertension. Despite discussion of risks, the patient is not interested in going back on CPAP or seeing a pulmonologist.    Type 2 diabetes mellitus with diabetic neuropathy  The patient currently takes aspirin, ACE inhibitor, metformin 1000 mg twice a day, Januvia 100 mg daily and Lantus 35 units in the evening. He states that his fasting blood sugars are in the low to mid 90s. He is never less than 80. She denies hypoglycemic events. Patient is not currently on a statin (see discussion regarding hyperlipidemia). Microalbumin to creatinine ratio is 9.    Paroxysmal atrial fibrillation  The patient was last seen by cardiology on 10/6/2016. At that visit to the cardiologist described paroxysmal atrial fibrillation with runs seen on Holter monitor. They recommended anticoagulation, however, the patient elected to stop it. They also recommended that he continue diltiazem but the patient elected to stop it. Today the patient is only on a baby aspirin with no rate control or anticoagulation. He states that he is doing well. He denies chest pain, dyspnea on exertion and palpitations. We discussed the risk of stroke and arrhythmia. Despite this, the patient does not want to proceed with anticoagulation or rate control medication. He is amenable, however, to increasing his aspirin to full dose.    Hyperlipidemia  The patient is currently taking fenofibrate 160 mg daily. He does not believe he has ever been on a statin medicine. His 10 year ACC risk today is 20%. We did have a risk-benefit discussion regarding statin use and the prevention of heart attacks and strokes. Nonetheless, the patient would like to continue his fibrate medicine and not start a  statin.    Essential hypertension  Patient's blood pressure is well controlled on lisinopril 10 mg daily.    Pulmonary hypertension (CMS-[facility])  Patient is not interested in continuing CPAP use, despite his pulmonary hypertension. He also would not like to see a pulmonologist or cardiologist at the present time. He denies dyspnea on exertion.    History of osteoarthritis  Patient has a history of osteoarthritis of the left knee status post total knee replacement. He is now doing well.      Current Outpatient Prescriptions on File Prior to Visit   Medication Sig Dispense Refill   • gabapentin (NEURONTIN) 300 MG Cap Take 6 Caps by mouth 2 Times a Day. 540 Cap 3   • lisinopril (PRINIVIL) 10 MG Tab Take 1 Tab by mouth every day. 90 Tab 3   • sitagliptin (JANUVIA) 100 MG Tab Take 1 Tab by mouth every day. 90 Tab 3   • metformin (GLUCOPHAGE) 1000 MG tablet Take 1 Tab by mouth 2 Times a Day. 180 Tab 3   • fenofibrate (TRIGLIDE) 160 MG tablet Take 1 Tab by mouth every day. 90 Tab 3   • sildenafil citrate (VIAGRA) 100 MG tablet Take 1 Tab by mouth as needed for Erectile Dysfunction. 10 Tab 3   • omeprazole (PRILOSEC) 20 MG delayed-release capsule Take 20 mg by mouth every day.     • Potassium Gluconate 595 MG Cap Take 99 mg by mouth every day.       No current facility-administered medications on file prior to visit.        Past Medical History:   Diagnosis Date   • Paroxysmal atrial fibrillation (CMS-[facility]) 2/27/2016 11/14/16-resolved now   • Alcohol use 2/10/2016    3 per day   • Arthritis 2016    left knee   • Diabetes 2005    insulin and oral agent   • Dental disorder     dentures; partial lower   • Heart burn    • Hiatus hernia syndrome    • High cholesterol    • Hypertension    • Observed sleep apnea     no study done yet   • Pain     left knee       Allergies: Review of patient's allergies indicates no known allergies.    Surgical History:  has a past surgical history that includes hand surgery (Left, 1970); cystoscopy  "(1986); tonsillectomy (as child); dental extraction(s) (1976); knee arthroscopy (Left, 2/18/2016); medial meniscectomy (2/18/2016); knee arthroscopy (Left, 2/27/2016); knee arthroplasty total (Left, 11/21/2016); and knee replacement, total (Left, 11/2016).    Family History: family history is not on file.    Social History:  reports that he has never smoked. He has never used smokeless tobacco. He reports that he drinks about 1.8 oz of alcohol per week . He reports that he does not use drugs.    ROS:  Denies dizziness, lightheadedness, RIGGS, CP, palpitations.        Objective:     Vitals:    09/05/17 1307   BP: 116/70   Pulse: 64   Resp: 20   Temp: 36.7 °C (98 °F)   SpO2: 97%   Weight: 112 kg (247 lb)   Height: 1.778 m (5' 10\")       Physical Exam:  General: alert in no apparent distress.   Cardio: regular rate and rhythm, no murmurs, rubs or gallops.   Resp: CTAB no w/r/r.     Monofilament testing with a 10 gram force: sensation intact: decreased bilaterally  Visual Inspection: Feet with maceration, ulcers, fissures. Very mild right great toe healing ulcer without associated redness, swelling or warmth and patient states they are healing.   Pedal pulses: intact bilaterally        Assessment and Plan:     1. Paroxysmal atrial fibrillation (CMS-HCC)  The patient denies chest pain, palpitations, dyspnea on exertion. We did have a conversation regarding the risks versus benefits of anticoagulation and rate control medicine, including hoping to decrease the risk of stroke. I did recommend that the patient be on anticoagulation and rate control medicine. He is not willing to do this. He is, however, willing to increase his aspirin dose to 325 mg daily from a baby aspirin. He is not interested in seeing cardiology.   - aspirin EC (ECOTRIN) 325 MG Tablet Delayed Response; Take 1 Tab by mouth every day.  Dispense: 40 Tab; Refill: 0    2. Observed sleep apnea  Patient not willing to see pulmonology or use CPAP despite pHTN. "     3. Familial hypercholesterolemia  Patient not interested in statin despite discussion of 10 yr ACC risk. Will continue fibrate.     4. Essential hypertension  Established, stable. Continue present regimen.     5. Pulmonary hypertension (CMS-HCC)  Patient not interested in CPAP, cardiology or pulmonology appointment despite my recommendation.     6. Type 2 diabetes mellitus with diabetic neuropathy, with long-term current use of insulin (CMS-HCC)  No reported hypoglycemia. Has neuropathy. Mild healing right great toe ulcer. Patient will watch and call if it doesn't continue to heal.   Continue ASA, ACE.   Patient declines statin.   Continue Januvia, metformin. Decrease lantus to 32 units nightly considering A1c.   - POCT Hemoglobin A1C  - Diabetic Monofilament Lower Extremity Exam    7. History of osteoarthritis  S/p TKR and doing well.     8. Dyslipidemia  Declines statin. Will continue fibrate.       Followup: Return in about 4 months (around 1/5/2018) for Diabetes, A fib.

## 2017-09-05 NOTE — ASSESSMENT & PLAN NOTE
Patient is not interested in continuing CPAP use, despite his pulmonary hypertension. He also would not like to see a pulmonologist or cardiologist at the present time. He denies dyspnea on exertion.

## 2017-09-06 ENCOUNTER — TELEPHONE (OUTPATIENT)
Dept: MEDICAL GROUP | Facility: MEDICAL CENTER | Age: 67
End: 2017-09-06

## 2017-09-06 NOTE — TELEPHONE ENCOUNTER
----- Message from Olesya Latif P.A.-C. sent at 9/6/2017  3:54 PM PDT -----  Please call patient. Lab results reviewed. HgA1C has improved slightly from last check and all other labs are stable. Please remind him to establish with a new PCP. He should be seen every 3 months. Thanks! Olesya Latif PA-C

## 2017-09-07 ENCOUNTER — TELEPHONE (OUTPATIENT)
Dept: MEDICAL GROUP | Facility: MEDICAL CENTER | Age: 67
End: 2017-09-07

## 2017-09-08 NOTE — TELEPHONE ENCOUNTER
Left pt's wife a mess to have pt give us a call reg test results. Pt.'s wife stated that Dr. Yuen asked that he returns in 4 mo for [facility]. I asked pt's wife to discuss with pt; she will.

## 2017-09-26 ENCOUNTER — TELEPHONE (OUTPATIENT)
Dept: MEDICAL GROUP | Facility: MEDICAL CENTER | Age: 67
End: 2017-09-26

## 2017-09-26 DIAGNOSIS — E78.5 DYSLIPIDEMIA: ICD-10-CM

## 2017-09-26 RX ORDER — FENOFIBRATE 160 MG/1
160 TABLET ORAL DAILY
Qty: 90 TAB | Refills: 3 | Status: SHIPPED | OUTPATIENT
Start: 2017-09-26 | End: 2018-09-12 | Stop reason: SDUPTHER

## 2017-09-26 NOTE — TELEPHONE ENCOUNTER
Was the patient seen in the last year in this department? Yes     Does patient have an active prescription for medications requested? no    Received Request Via: Pharmacy

## 2017-10-30 ENCOUNTER — OFFICE VISIT (OUTPATIENT)
Dept: MEDICAL GROUP | Facility: MEDICAL CENTER | Age: 67
End: 2017-10-30
Payer: MEDICARE

## 2017-10-30 VITALS
HEART RATE: 71 BPM | HEIGHT: 70 IN | DIASTOLIC BLOOD PRESSURE: 60 MMHG | TEMPERATURE: 98.6 F | SYSTOLIC BLOOD PRESSURE: 120 MMHG | RESPIRATION RATE: 16 BRPM | BODY MASS INDEX: 34.99 KG/M2 | OXYGEN SATURATION: 95 % | WEIGHT: 244.4 LBS

## 2017-10-30 DIAGNOSIS — E66.9 OBESITY (BMI 30-39.9): ICD-10-CM

## 2017-10-30 DIAGNOSIS — R42 VERTIGO: ICD-10-CM

## 2017-10-30 DIAGNOSIS — E11.40 TYPE 2 DIABETES MELLITUS WITH DIABETIC NEUROPATHY, WITH LONG-TERM CURRENT USE OF INSULIN (HCC): ICD-10-CM

## 2017-10-30 DIAGNOSIS — H91.92 ACQUIRED DEAFNESS OF LEFT EAR: ICD-10-CM

## 2017-10-30 DIAGNOSIS — Z79.4 TYPE 2 DIABETES MELLITUS WITH DIABETIC NEUROPATHY, WITH LONG-TERM CURRENT USE OF INSULIN (HCC): ICD-10-CM

## 2017-10-30 DIAGNOSIS — I10 ESSENTIAL HYPERTENSION: ICD-10-CM

## 2017-10-30 DIAGNOSIS — R42 DIZZINESS: ICD-10-CM

## 2017-10-30 LAB
GLUCOSE BLD-MCNC: 134 MG/DL (ref 70–100)
HBA1C MFR BLD: 6.9 % (ref ?–5.8)
INT CON NEG: NEGATIVE
INT CON POS: POSITIVE

## 2017-10-30 PROCEDURE — 82962 GLUCOSE BLOOD TEST: CPT | Performed by: PHYSICIAN ASSISTANT

## 2017-10-30 PROCEDURE — 99214 OFFICE O/P EST MOD 30 MIN: CPT | Performed by: PHYSICIAN ASSISTANT

## 2017-10-30 PROCEDURE — 83036 HEMOGLOBIN GLYCOSYLATED A1C: CPT | Performed by: PHYSICIAN ASSISTANT

## 2017-10-30 RX ORDER — MECLIZINE HYDROCHLORIDE 25 MG/1
25 TABLET ORAL 3 TIMES DAILY PRN
Qty: 30 TAB | Refills: 0 | Status: SHIPPED | OUTPATIENT
Start: 2017-10-30 | End: 2018-07-13

## 2017-10-30 NOTE — ASSESSMENT & PLAN NOTE
Pt c/o room spinning/dizzy sensation associated with nausea on and off,  for the past month. 2 really bad episodes, both times sleeping on left side, woke up to go to bathroom, getting up felt room spinning, had to hold on to wall, nausea but no vomiting. No chest pain, SOB or difficulty breathing. Lasted few minutes. Other than those 2 severe episodes he has a slight feeling of spinning when he moves his head quickly. No head injury. No headache. No vision change. No LOC. No recent illness. No weakness. No numbness/tingling of extremities.

## 2017-10-31 NOTE — PROGRESS NOTES
Subjective:   Richard Chery is a 67 y.o. male here today for same day appointment for h/o dizziness.    Vertigo  Pt c/o room spinning/dizzy sensation associated with nausea on and off,  for the past month. 2 really bad episodes, both times sleeping on left side, woke up to go to bathroom, getting up felt room spinning, had to hold on to wall, nausea but no vomiting. No chest pain, SOB or difficulty breathing. Lasted few minutes. Other than those 2 severe episodes he has a slight feeling of spinning when he moves his head quickly. No head injury. No headache. No vision change. No LOC. No recent illness. No weakness. No numbness/tingling of extremities.     Type 2 diabetes mellitus with diabetic neuropathy  Taking medications as prescribed. This morning fasting 233. Current HgA1C 6.9 today.    Acquired deafness of left ear  Chronic. 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 He denies that this is a problem. Says it happened once during hospitalization after he drank a bottle of Fireball whiskey. Had f/u with cardiology but declined medication or further evaluation. Denies that a fib is the cause of his dizziness.     No change in exercise tolerance. No leg swelling.     Current medicines (including changes today)  Current Outpatient Prescriptions   Medication Sig Dispense Refill   • meclizine (ANTIVERT) 25 MG Tab Take 1 Tab by mouth 3 times a day as needed. 30 Tab 0   • fenofibrate (TRIGLIDE) 160 MG tablet Take 1 Tab by mouth every day. 90 Tab 3   • insulin glargine (LANTUS) 100 UNIT/ML Solution Pen-injector injection Inject 32 Units as instructed every evening. 15 mL 6   • aspirin EC (ECOTRIN) 325 MG Tablet Delayed Response Take 1 Tab by mouth every day. 40 Tab 0   • gabapentin (NEURONTIN) 300 MG Cap Take 6 Caps by mouth 2 Times  "a Day. 540 Cap 3   • lisinopril (PRINIVIL) 10 MG Tab Take 1 Tab by mouth every day. 90 Tab 3   • sitagliptin (JANUVIA) 100 MG Tab Take 1 Tab by mouth every day. 90 Tab 3   • metformin (GLUCOPHAGE) 1000 MG tablet Take 1 Tab by mouth 2 Times a Day. 180 Tab 3   • sildenafil citrate (VIAGRA) 100 MG tablet Take 1 Tab by mouth as needed for Erectile Dysfunction. 10 Tab 3   • omeprazole (PRILOSEC) 20 MG delayed-release capsule Take 20 mg by mouth every day.     • Potassium Gluconate 595 MG Cap Take 99 mg by mouth every day.       No current facility-administered medications for this visit.      He  has a past medical history of Alcohol use (2/10/2016); Arthritis (2016); Dental disorder; Diabetes (2005); Heart burn; Hiatus hernia syndrome; High cholesterol; Hypertension; Observed sleep apnea; Pain; and Paroxysmal atrial fibrillation (CMS-HCC) (2/27/2016).    ROS   No fever/chills. No weight change.  No focal weakness. No sore throat, nasal congestion, ear pain. No chest pain, no shortness of breath, difficulty breathing. No n/v/d/c or abdominal pain. No urinary complaint. No rash or skin lesion. No joint pain or swelling.       Objective:     Blood pressure 120/60, pulse 71, temperature 37 °C (98.6 °F), resp. rate 16, height 1.778 m (5' 10\"), weight 110.9 kg (244 lb 6.4 oz), SpO2 95 %. Body mass index is 35.07 kg/m².   Physical Exam:  Constitutional: WDWN, NAD  Skin: Warm, dry, good turgor, no rashes in visible areas.  Eye: Equal, round and reactive, conjunctiva clear, lids normal. EOMs intact. I was unable to see nystagmus with patient looking far right or far left  ENMT: Lips without lesions, good dentition, oropharynx clear.  Neuro: cranial nerves 2-12 tested in office, intact. Gait normal. Romberg negative. Rapid alternating movements intact. Sensation of vertigo recreated in office with movement of head from neutral to left or right  Neck: Trachea midline, no masses, no thyromegaly. No bruit appreciated  Respiratory: " Unlabored respiratory effort, lungs clear to auscultation, no wheezes, no ronchi.  Cardiovascular: Normal S1, S2, no murmur, no leg edema.  Psych: Alert and oriented x3, normal affect and mood.    POCT glucose: 134  POCT HgA1C 6.9  EKG: see scanned. Normal sinus rhythm. No ST elevation. No T wave inversion.     Assessment and Plan:   The following treatment plan was discussed    1. Dizziness. Discussed with patient and wife multiple possible causes for dizziness  Paroxysmal a fib or other heart arrhythmia - patient denies this is a problem, declines further cardiac testing or f/u with cardiology  Stroke or carotid artery stenosis - patient declines carotid ultrasound at this point but agrees to consider if symptoms persist  CNS disorder - mass, or other neuro disorder  Peripheral vertigo - meniere's less likely, possible BPV or labryngthitis  DM2 - unlikely low BS given consistent medication, diet but advised patient to keep track of numbers  HTN - unlikely hypotension however discussed staying well hydrated, keep track of BP a t home    Followup: will try meclizine, will try PT for vertigo. ER precautions discussed at length. Requested patient return for follow up in one week

## 2017-10-31 NOTE — ASSESSMENT & PLAN NOTE
Chronic, stable on lisinopril 10mg daily, no hypotensive events known, no dizziness going from sitting to standing position

## 2017-11-07 ENCOUNTER — OFFICE VISIT (OUTPATIENT)
Dept: MEDICAL GROUP | Facility: MEDICAL CENTER | Age: 67
End: 2017-11-07
Payer: MEDICARE

## 2017-11-07 VITALS
HEIGHT: 70 IN | RESPIRATION RATE: 20 BRPM | SYSTOLIC BLOOD PRESSURE: 128 MMHG | TEMPERATURE: 97.5 F | DIASTOLIC BLOOD PRESSURE: 78 MMHG | HEART RATE: 78 BPM | OXYGEN SATURATION: 97 % | BODY MASS INDEX: 34.73 KG/M2 | WEIGHT: 242.6 LBS

## 2017-11-07 DIAGNOSIS — R42 VERTIGO: ICD-10-CM

## 2017-11-07 PROCEDURE — 99213 OFFICE O/P EST LOW 20 MIN: CPT | Performed by: FAMILY MEDICINE

## 2017-11-07 NOTE — ASSESSMENT & PLAN NOTE
On 11/30/2017 the patient presented to our clinic with complaints of dizziness. He was diagnosed with vertigo. The patient describes these symptoms as dizziness, as if the world is spinning, worsened with movement of his head. This dizziness was improved with meclizine use. The patient also used the Epley maneuver, which was taught to him by his son-in-law who is a physical therapist, with good affect. He has not had any episodes of dizziness for 5 days. He denies chest pain, shortness of breath, palpitations. He states that his lowest blood sugar has been in the high 90s. He does not get lightheaded from sitting to standing.

## 2017-11-07 NOTE — PROGRESS NOTES
[facility]  Progress Note  Established Patient    Subjective:   Richard Chery is a 67 y.o. male here today with a chief complaint of vertigo. The patient is alone.     Vertigo  On 11/30/2017 the patient presented to our clinic with complaints of dizziness. He was diagnosed with vertigo. The patient describes these symptoms as dizziness, as if the world is spinning, worsened with movement of his head. This dizziness was improved with meclizine use. The patient also used the Epley maneuver, which was taught to him by his son-in-law who is a physical therapist, with good affect. He has not had any episodes of dizziness for 5 days. He denies chest pain, shortness of breath, palpitations. He states that his lowest blood sugar has been in the high 90s. He does not get lightheaded from sitting to standing.      Current Outpatient Prescriptions on File Prior to Visit   Medication Sig Dispense Refill   • meclizine (ANTIVERT) 25 MG Tab Take 1 Tab by mouth 3 times a day as needed. 30 Tab 0   • fenofibrate (TRIGLIDE) 160 MG tablet Take 1 Tab by mouth every day. 90 Tab 3   • insulin glargine (LANTUS) 100 UNIT/ML Solution Pen-injector injection Inject 32 Units as instructed every evening. 15 mL 6   • aspirin EC (ECOTRIN) 325 MG Tablet Delayed Response Take 1 Tab by mouth every day. 40 Tab 0   • gabapentin (NEURONTIN) 300 MG Cap Take 6 Caps by mouth 2 Times a Day. 540 Cap 3   • lisinopril (PRINIVIL) 10 MG Tab Take 1 Tab by mouth every day. 90 Tab 3   • sitagliptin (JANUVIA) 100 MG Tab Take 1 Tab by mouth every day. 90 Tab 3   • metformin (GLUCOPHAGE) 1000 MG tablet Take 1 Tab by mouth 2 Times a Day. 180 Tab 3   • sildenafil citrate (VIAGRA) 100 MG tablet Take 1 Tab by mouth as needed for Erectile Dysfunction. 10 Tab 3   • omeprazole (PRILOSEC) 20 MG delayed-release capsule Take 20 mg by mouth every day.     • Potassium Gluconate 595 MG Cap Take 99 mg by mouth every day.       No current facility-administered  "medications on file prior to visit.        Past Medical History:   Diagnosis Date   • Alcohol use 2/10/2016    3 per day   • Arthritis 2016    left knee   • Dental disorder     dentures; partial lower   • Diabetes 2005    insulin and oral agent   • Heart burn    • Hiatus hernia syndrome    • High cholesterol    • Hypertension    • Observed sleep apnea     no study done yet   • Pain     left knee   • Paroxysmal atrial fibrillation (CMS-[facility]) 2/27/2016 11/14/16-resolved now       Allergies: Review of patient's allergies indicates no known allergies.    Surgical History:  has a past surgical history that includes hand surgery (Left, 1970); cystoscopy (1986); tonsillectomy (as child); dental extraction(s) (1976); knee arthroscopy (Left, 2/18/2016); medial meniscectomy (2/18/2016); knee arthroscopy (Left, 2/27/2016); knee arthroplasty total (Left, 11/21/2016); and knee replacement, total (Left, 11/2016).    Family History: family history is not on file.    Social History:  reports that he has never smoked. He has never used smokeless tobacco. He reports that he drinks about 1.8 oz of alcohol per week . He reports that he does not use drugs.    ROS: See HPI.        Objective:     Vitals:    11/07/17 1009   BP: 128/78   Pulse: 78   Resp: 20   Temp: 36.4 °C (97.5 °F)   SpO2: 97%   Weight: 110 kg (242 lb 9.6 oz)   Height: 1.778 m (5' 10\")       Physical Exam:  General: alert in no apparent distress.   Cardio: regular rate and rhythm, no murmurs, rubs or gallops.   Resp: CTAB no w/r/r.   Neck: no carotid bruit.   Neuro: CN II - XII intact, finger to nose normal, gait normal, strength 5/5 BUE.         Assessment and Plan:     1. Vertigo  The patient's description of his symptoms is, indeed, consistent with vertigo. He does have a history of paroxysmal atrial fibrillation the patient has refused rate control and the EKG at the last visit was normal. Rhythm normal on exam today. Exam does not support an intracranial lesion or " stroke. Hypertension does not seem to be contributing.  -Recommended use of the Epley maneuver.  -Advised the patient to call if he has any more episodes.  -Driving precautions discussed.    Followup: Return in about 2 months (around 1/7/2018), or if symptoms worsen or fail to improve. I offered to make an appointment for the patient, he states he would like to call to make this appointment.

## 2018-03-21 ENCOUNTER — OFFICE VISIT (OUTPATIENT)
Dept: MEDICAL GROUP | Facility: MEDICAL CENTER | Age: 68
End: 2018-03-21
Payer: MEDICARE

## 2018-03-21 VITALS
HEIGHT: 70 IN | WEIGHT: 252.1 LBS | RESPIRATION RATE: 16 BRPM | HEART RATE: 62 BPM | OXYGEN SATURATION: 94 % | SYSTOLIC BLOOD PRESSURE: 136 MMHG | BODY MASS INDEX: 36.09 KG/M2 | DIASTOLIC BLOOD PRESSURE: 68 MMHG | TEMPERATURE: 98.1 F

## 2018-03-21 DIAGNOSIS — D64.9 ANEMIA, UNSPECIFIED TYPE: ICD-10-CM

## 2018-03-21 DIAGNOSIS — Z12.11 COLON CANCER SCREENING: ICD-10-CM

## 2018-03-21 DIAGNOSIS — Z72.89 OTHER PROBLEMS RELATED TO LIFESTYLE: ICD-10-CM

## 2018-03-21 DIAGNOSIS — I10 ESSENTIAL HYPERTENSION: ICD-10-CM

## 2018-03-21 DIAGNOSIS — Z79.4 TYPE 2 DIABETES MELLITUS WITH DIABETIC NEUROPATHY, WITH LONG-TERM CURRENT USE OF INSULIN (HCC): ICD-10-CM

## 2018-03-21 DIAGNOSIS — R42 VERTIGO: ICD-10-CM

## 2018-03-21 DIAGNOSIS — E11.40 TYPE 2 DIABETES MELLITUS WITH DIABETIC NEUROPATHY, WITH LONG-TERM CURRENT USE OF INSULIN (HCC): ICD-10-CM

## 2018-03-21 DIAGNOSIS — Z00.00 HEALTHCARE MAINTENANCE: ICD-10-CM

## 2018-03-21 LAB
HBA1C MFR BLD: 7.1 % (ref ?–5.8)
INT CON NEG: NEGATIVE
INT CON POS: POSITIVE

## 2018-03-21 PROCEDURE — 83036 HEMOGLOBIN GLYCOSYLATED A1C: CPT | Performed by: FAMILY MEDICINE

## 2018-03-21 PROCEDURE — 99214 OFFICE O/P EST MOD 30 MIN: CPT | Performed by: FAMILY MEDICINE

## 2018-03-21 NOTE — ASSESSMENT & PLAN NOTE
The patient's vertigo has resolved. He denies any dizziness. He thinks this was related to melatonin.

## 2018-03-21 NOTE — PROGRESS NOTES
[facility]  Progress Note  Established Patient    Subjective:   Richard Chery is a 68 y.o. male here today with a chief complaint of diabetes. The patient is alone.     Healthcare maintenance  Lipids: patient warrants statin but declines. He would like to continue fibrate.   Fasting Glucose: has diabetes.   Hepatitis C Screen: ordered.  Colonoscopy: referred.    Immunizations up to date.    Anemia  Noted on past labs.     Essential hypertension  The patient's blood pressure is well controlled on lisinopril 10 mg daily.    Type 2 diabetes mellitus with diabetic neuropathy  Hypoglycemic therapy: Metformin 1000 mg twice daily, Januvia 100 mg daily.  Last A1c: 03/21/18 is 7.1.   Aspirin: taking.   Statin: declines.   ACE: taking.   Urine Microalbumin: normal September 2017.   Monofilament Exam: normal 03/21/18.   Retinal Screening: will discuss in f/u.   Pneumonia vaccine: UTD.    Vertigo  The patient's vertigo has resolved. He denies any dizziness. He thinks this was related to melatonin.      Current Outpatient Prescriptions on File Prior to Visit   Medication Sig Dispense Refill   • meclizine (ANTIVERT) 25 MG Tab Take 1 Tab by mouth 3 times a day as needed. 30 Tab 0   • fenofibrate (TRIGLIDE) 160 MG tablet Take 1 Tab by mouth every day. 90 Tab 3   • insulin glargine (LANTUS) 100 UNIT/ML Solution Pen-injector injection Inject 32 Units as instructed every evening. 15 mL 6   • aspirin EC (ECOTRIN) 325 MG Tablet Delayed Response Take 1 Tab by mouth every day. 40 Tab 0   • gabapentin (NEURONTIN) 300 MG Cap Take 6 Caps by mouth 2 Times a Day. 540 Cap 3   • lisinopril (PRINIVIL) 10 MG Tab Take 1 Tab by mouth every day. 90 Tab 3   • sitagliptin (JANUVIA) 100 MG Tab Take 1 Tab by mouth every day. 90 Tab 3   • metformin (GLUCOPHAGE) 1000 MG tablet Take 1 Tab by mouth 2 Times a Day. 180 Tab 3   • sildenafil citrate (VIAGRA) 100 MG tablet Take 1 Tab by mouth as needed for Erectile Dysfunction. 10 Tab 3   •  "omeprazole (PRILOSEC) 20 MG delayed-release capsule Take 20 mg by mouth every day.     • Potassium Gluconate 595 MG Cap Take 99 mg by mouth every day.       No current facility-administered medications on file prior to visit.        Past Medical History:   Diagnosis Date   • Alcohol use 2/10/2016    3 per day   • Arthritis 2016    left knee   • Dental disorder     dentures; partial lower   • Diabetes 2005    insulin and oral agent   • Heart burn    • Hiatus hernia syndrome    • High cholesterol    • Hypertension    • Observed sleep apnea     no study done yet   • Pain     left knee   • Paroxysmal atrial fibrillation (CMS-HCC) 2/27/2016 11/14/16-resolved now       Allergies: Patient has no known allergies.    Surgical History:  has a past surgical history that includes hand surgery (Left, 1970); cystoscopy (1986); tonsillectomy (as child); dental extraction(s) (1976); knee arthroscopy (Left, 2/18/2016); medial meniscectomy (2/18/2016); knee arthroscopy (Left, 2/27/2016); knee arthroplasty total (Left, 11/21/2016); and knee replacement, total (Left, 11/2016).    Family History: family history is not on file.    Social History:  reports that he has never smoked. He has never used smokeless tobacco. He reports that he drinks about 1.8 oz of alcohol per week . He reports that he does not use drugs.    ROS: no CP or SOB. No dizziness.        Objective:     Vitals:    03/21/18 0750   BP: 136/68   Pulse: 62   Resp: 16   Temp: 36.7 °C (98.1 °F)   SpO2: 94%   Weight: 114.4 kg (252 lb 1.5 oz)   Height: 1.778 m (5' 10\")       Physical Exam:  General: alert in no apparent distress.     Monofilament testing with a 10 gram force: sensation intact: intact bilaterally  Visual Inspection: Feet without maceration, ulcers, fissures.  Pedal pulses: intact bilaterally     Foot ulcer has resolved.     POC A1c: 7.1.     Assessment and Plan:     1. Essential hypertension  This is an established and stable problem.  - continue lisinopril. "   - BASIC METABOLIC PANEL; Future    2. Type 2 diabetes mellitus with diabetic neuropathy, with long-term current use of insulin (CMS-HCC)  This is an established and stable problem.  - Continue metformin, Januvia, lisinopril, aspirin.  - Patient declined statin, despite my recommendation.  - discussed diet and exercise.   - BASIC METABOLIC PANEL; Future    3. Vertigo  Resolved.     4. Healthcare maintenance  - see HPI.     5. Colon cancer screening  - REFERRAL TO GASTROENTEROLOGY    6. Anemia, unspecified type  - HEMOGLOBIN AND HEMATOCRIT; Future    7. Other problems related to lifestyle  - HEP C VIRUS ANTIBODY; Future        Followup: Return in about 3 months (around 6/21/2018), or if symptoms worsen or fail to improve, for DM.

## 2018-03-21 NOTE — ASSESSMENT & PLAN NOTE
Hypoglycemic therapy: Metformin 1000 mg twice daily, Januvia 100 mg daily.  Last A1c: 03/21/18 is 7.1.   Aspirin: taking.   Statin: declines.   ACE: taking.   Urine Microalbumin: normal September 2017.   Monofilament Exam: normal 03/21/18.   Retinal Screening: will discuss in f/u.   Pneumonia vaccine: UTD.

## 2018-03-21 NOTE — ASSESSMENT & PLAN NOTE
Lipids: patient warrants statin but declines. He would like to continue fibrate.   Fasting Glucose: has diabetes.   Hepatitis C Screen: ordered.  Colonoscopy: referred.    Immunizations up to date.

## 2018-04-12 DIAGNOSIS — Z79.4 TYPE 2 DIABETES MELLITUS WITH DIABETIC NEUROPATHY, WITH LONG-TERM CURRENT USE OF INSULIN (HCC): ICD-10-CM

## 2018-04-12 DIAGNOSIS — I10 ESSENTIAL HYPERTENSION: ICD-10-CM

## 2018-04-12 DIAGNOSIS — E11.40 TYPE 2 DIABETES MELLITUS WITH DIABETIC NEUROPATHY, WITH LONG-TERM CURRENT USE OF INSULIN (HCC): ICD-10-CM

## 2018-04-12 RX ORDER — GABAPENTIN 300 MG/1
CAPSULE ORAL
Qty: 270 CAP | Refills: 3 | Status: SHIPPED | OUTPATIENT
Start: 2018-04-12 | End: 2018-09-12 | Stop reason: SDUPTHER

## 2018-04-12 RX ORDER — LISINOPRIL 10 MG/1
10 TABLET ORAL DAILY
Qty: 90 TAB | Refills: 3 | Status: SHIPPED | OUTPATIENT
Start: 2018-04-12 | End: 2018-04-19 | Stop reason: SDUPTHER

## 2018-04-18 DIAGNOSIS — I10 ESSENTIAL HYPERTENSION: ICD-10-CM

## 2018-04-18 DIAGNOSIS — E11.40 TYPE 2 DIABETES MELLITUS WITH DIABETIC NEUROPATHY, WITH LONG-TERM CURRENT USE OF INSULIN (HCC): ICD-10-CM

## 2018-04-18 DIAGNOSIS — Z79.4 TYPE 2 DIABETES MELLITUS WITH DIABETIC NEUROPATHY, WITH LONG-TERM CURRENT USE OF INSULIN (HCC): ICD-10-CM

## 2018-04-18 NOTE — TELEPHONE ENCOUNTER
I called and spoke with patient and he does not know what rx needs to be sent in to pharmacy.  Requested I call his wife at [phone].    Phone Number Called: [phone]    Message: Grisel did not answer - LVM to call back.     Left Message for patient to call back: yes

## 2018-04-18 NOTE — TELEPHONE ENCOUNTER
VOICEMAIL  1. Caller Name: Grisel - wife                   Call Back Number: [phone] (home)     2. Message: Grisel states patient is missing rx at pharmacy and has been there 3 times.  She requested I call and speak with patient.     3. Patient approves office to leave a detailed voicemail/MyChart message: yes

## 2018-04-19 RX ORDER — LISINOPRIL 10 MG/1
10 TABLET ORAL DAILY
Qty: 90 TAB | Refills: 3 | Status: SHIPPED | OUTPATIENT
Start: 2018-04-19 | End: 2018-09-12 | Stop reason: SDUPTHER

## 2018-04-20 NOTE — TELEPHONE ENCOUNTER
I spoke with @ [facility] pharmacy and confirmed both rxs were received.  Confirmed with Marybeth they received rxs on 4/12/18 as well.  Pharmacy will notify patient when ready to .  In process of filling right now.

## 2018-05-05 ENCOUNTER — TELEPHONE (OUTPATIENT)
Dept: MEDICAL GROUP | Facility: MEDICAL CENTER | Age: 68
End: 2018-05-05

## 2018-05-05 DIAGNOSIS — Z12.11 SCREENING FOR COLON CANCER: ICD-10-CM

## 2018-05-05 NOTE — TELEPHONE ENCOUNTER
Dr. Yuen there was a VM that was saved on my phone. The patients wife called and states that he has left 3 Vm for his colonoscopy and no one is returning the call. She wants to know what else can they do for this. Please send response to Rom[facility]. Thank you

## 2018-05-07 NOTE — TELEPHONE ENCOUNTER
Please call the patient and provide him with [facility] number to schedule the colonoscopy. If they are having trouble scheduling with [facility], we can refer them to [facility].

## 2018-05-07 NOTE — TELEPHONE ENCOUNTER
Pt said that he would like a referral to [facility] if possible. He does not want to go to [facility].

## 2018-05-10 ENCOUNTER — OFFICE VISIT (OUTPATIENT)
Dept: MEDICAL GROUP | Facility: MEDICAL CENTER | Age: 68
End: 2018-05-10
Payer: MEDICARE

## 2018-05-10 VITALS
RESPIRATION RATE: 16 BRPM | DIASTOLIC BLOOD PRESSURE: 67 MMHG | HEART RATE: 64 BPM | TEMPERATURE: 98.4 F | BODY MASS INDEX: 35.48 KG/M2 | SYSTOLIC BLOOD PRESSURE: 120 MMHG | HEIGHT: 70 IN | WEIGHT: 247.8 LBS | OXYGEN SATURATION: 97 %

## 2018-05-10 DIAGNOSIS — L02.91 ABSCESS: ICD-10-CM

## 2018-05-10 PROCEDURE — 10060 I&D ABSCESS SIMPLE/SINGLE: CPT | Performed by: PHYSICIAN ASSISTANT

## 2018-05-10 RX ORDER — CEPHALEXIN 500 MG/1
500 CAPSULE ORAL 2 TIMES DAILY
Qty: 20 CAP | Refills: 0 | Status: SHIPPED | OUTPATIENT
Start: 2018-05-10 | End: 2018-05-20

## 2018-05-10 ASSESSMENT — PATIENT HEALTH QUESTIONNAIRE - PHQ9: CLINICAL INTERPRETATION OF PHQ2 SCORE: 0

## 2018-05-10 NOTE — PROGRESS NOTES
Subjective:      Richard Chery is a 68 y.o. male who presents with Cyst (on lower cheek, noticed it last week)            HPI    ROS    Active Ambulatory Problems     Diagnosis Date Noted   • Hyperlipidemia 11/24/2015   • Essential hypertension 11/24/2015   • Paroxysmal atrial fibrillation (HCC) 02/27/2016   • Pulmonary hypertension (HCC) 03/01/2016   • Type 2 diabetes mellitus with diabetic neuropathy (HCC) 03/07/2016   • Observed sleep apnea    • History of osteoarthritis 11/21/2016   • Vertigo 10/30/2017   • Acquired deafness of left ear 10/30/2017   • Obesity (BMI 30-39.9) 10/30/2017   • Healthcare maintenance 03/21/2018   • Anemia 03/21/2018     Resolved Ambulatory Problems     Diagnosis Date Noted   • Chronic pain of left knee 11/24/2015   • Type 2 diabetes mellitus without complication (HCC) 11/24/2015   • Other meniscus derangements, other medial meniscus, left knee 02/18/2016   • Localized primary osteoarthritis of left lower leg 02/18/2016   • Arthritis, infective, knee 02/28/2016   • Knee osteomyelitis, left (HCC) 03/01/2016   • Normocytic anemia 03/01/2016   • Anticoagulated 03/01/2016   • S/P PICC central line placement 03/01/2016   • Diabetes type 2, uncontrolled (CMS-[facility]) 03/07/2016   • Diabetic polyneuropathy associated with type 2 diabetes mellitus ([facility]) 03/27/2017     Past Medical History:   Diagnosis Date   • Alcohol use 2/10/2016   • Arthritis 2016   • Dental disorder    • Diabetes 2005   • Heart burn    • Hiatus hernia syndrome    • High cholesterol    • Hypertension    • Observed sleep apnea    • Pain    • Paroxysmal atrial fibrillation (HCC) 2/27/2016     Current Outpatient Prescriptions   Medication Sig Dispense Refill   • cephALEXin (KEFLEX) 500 MG Cap Take 1 Cap by mouth 2 times a day for 10 days. 20 Cap 0   • lisinopril (PRINIVIL) 10 MG Tab Take 1 Tab by mouth every day. 90 Tab 3   • metformin (GLUCOPHAGE) 1000 MG tablet Take 1 Tab by mouth 2 Times a Day. 180 Tab 3   • SITagliptin  "(JANUVIA) 100 MG Tab Take 1 Tab by mouth every day. 90 Tab 3   • gabapentin (NEURONTIN) 300 MG Cap Take 2 PO q am and 1 PO q pm 270 Cap 3   • meclizine (ANTIVERT) 25 MG Tab Take 1 Tab by mouth 3 times a day as needed. 30 Tab 0   • fenofibrate (TRIGLIDE) 160 MG tablet Take 1 Tab by mouth every day. 90 Tab 3   • insulin glargine (LANTUS) 100 UNIT/ML Solution Pen-injector injection Inject 32 Units as instructed every evening. 15 mL 6   • aspirin EC (ECOTRIN) 325 MG Tablet Delayed Response Take 1 Tab by mouth every day. 40 Tab 0   • sildenafil citrate (VIAGRA) 100 MG tablet Take 1 Tab by mouth as needed for Erectile Dysfunction. 10 Tab 3   • omeprazole (PRILOSEC) 20 MG delayed-release capsule Take 20 mg by mouth every day.     • Potassium Gluconate 595 MG Cap Take 99 mg by mouth every day.       No current facility-administered medications for this visit.    nkda  Non-smoker   Objective:     /67   Pulse 64   Temp 36.9 °C (98.4 °F)   Resp 16   Ht 1.778 m (5' 10\")   Wt 112.4 kg (247 lb 12.8 oz)   SpO2 97%   BMI 35.56 kg/m²      Physical Exam   Constitutional: He is oriented to person, place, and time. He appears well-developed and well-nourished. No distress.   Neurological: He is alert and oriented to person, place, and time.   Skin: Skin is warm and dry. He is not diaphoretic.        2X3CM abscess with central area of fluctuance   Psychiatric: He has a normal mood and affect. His behavior is normal. Judgment and thought content normal.   Vitals reviewed.              Assessment/Plan:     1. Abscess    - cephALEXin (KEFLEX) 500 MG Cap; Take 1 Cap by mouth 2 times a day for 10 days.  Dispense: 20 Cap; Refill: 0    Procedure: Incision and Drainage  -Risks, benefits, and alternatives discussed. Risks including infection, bleeding, nerve damage, and poor cosmetic outcome  -Sterile technique throughout  -Local anesthesia with 2% lidocaine with epinephrine  -Incision with #11 blade into fluctuant area with " purulent material expressed  -Cavity probed and any loculations bluntly taken down with hemostat  -Irrigated copiously with NS  -Minimal bleeding with good hemostasis achieved  -The patient tolerated the procedure well    After care instructions given. f/u prn

## 2018-05-10 NOTE — PATIENT INSTRUCTIONS
Abscess  Care After  An abscess (also called a boil or furuncle) is an infected area that contains a collection of pus. Signs and symptoms of an abscess include pain, tenderness, redness, or hardness, or you may feel a moveable soft area under your skin. An abscess can occur anywhere in the body. The infection may spread to surrounding tissues causing cellulitis. A cut (incision) by the surgeon was made over your abscess and the pus was drained out. Gauze may have been packed into the space to provide a drain that will allow the cavity to heal from the inside outwards. The boil may be painful for 5 to 7 days. Most people with a boil do not have high fevers. Your abscess, if seen early, may not have localized, and may not have been lanced. If not, another appointment may be required for this if it does not get better on its own or with medications.  HOME CARE INSTRUCTIONS   · Only take over-the-counter or prescription medicines for pain, discomfort, or fever as directed by your caregiver.  · When you bathe, soak and then remove gauze or iodoform packs at least daily or as directed by your caregiver. You may then wash the wound gently with mild soapy water. Repack with gauze or do as your caregiver directs.  SEEK IMMEDIATE MEDICAL CARE IF:   · You develop increased pain, swelling, redness, drainage, or bleeding in the wound site.  · You develop signs of generalized infection including muscle aches, chills, fever, or a general ill feeling.  · An oral temperature above 102° F (38.9° C) develops, not controlled by medication.  See your caregiver for a recheck if you develop any of the symptoms described above. If medications (antibiotics) were prescribed, take them as directed.  Document Released: 07/06/2006 Document Revised: 03/11/2013 Document Reviewed: 03/02/2009  [employer]® Patient Information ©2014 [employer].

## 2018-05-14 ENCOUNTER — PATIENT OUTREACH (OUTPATIENT)
Dept: HEALTH INFORMATION MANAGEMENT | Facility: OTHER | Age: 68
End: 2018-05-14

## 2018-05-14 NOTE — PROGRESS NOTES
1. Attempt #: 1    2. HealthConnect Verified: yes    3. Verify PCP: yes    4. Care Team Updated:       •   DME Company (gait device, O2, CPAP, etc.): N\A       •   Other Specialists (eye doctor, derm, GYN, cardiology, endo, etc): N\A    5.  Reviewed/Updated the following with patient:       •   Communication Preference Obtained? NO       •   Preferred Pharmacy? NO       •   Preferred Lab? NO       •   Family History (document living status of immediate family members and if + hx of cancer, diabetes, hypertension, hyperlipidemia, heart attack, stroke) NO    6. Pocketbook Activation: declined    7. Pocketbook Andrew: no    8. Annual Wellness Visit Scheduling  Scheduling Status:Not Scheduled. Patient states they are not interested - Wants to wait until after he has his Colonoscopy in July. Offered to schedule and he declined.      9. Care Gap Scheduling (Attempt to Schedule EACH Overdue Care Gap!)     Health Maintenance Due   Topic Date Due   • RETINAL SCREENING  02/19/1969   • COLONOSCOPY  06/20/2015   • Annual Wellness Visit  03/28/2018     10. Patient was NOT advised: “This is a free wellness visit. The provider will screen for medical conditions to help you stay healthy. If you have other concerns to address you may be asked to discuss these at a separate visit or there may be an additional fee.”     11. Patient was NOT informed to arrive 15 min prior to their scheduled appointment and bring in their medication bottles.

## 2018-07-10 ENCOUNTER — OFFICE VISIT (OUTPATIENT)
Dept: MEDICAL GROUP | Facility: MEDICAL CENTER | Age: 68
End: 2018-07-10
Payer: MEDICARE

## 2018-07-10 VITALS
SYSTOLIC BLOOD PRESSURE: 130 MMHG | HEART RATE: 84 BPM | HEIGHT: 70 IN | BODY MASS INDEX: 35.35 KG/M2 | WEIGHT: 246.91 LBS | DIASTOLIC BLOOD PRESSURE: 70 MMHG | RESPIRATION RATE: 20 BRPM | OXYGEN SATURATION: 97 % | TEMPERATURE: 97.3 F

## 2018-07-10 DIAGNOSIS — D64.9 ANEMIA, UNSPECIFIED TYPE: ICD-10-CM

## 2018-07-10 DIAGNOSIS — E11.40 TYPE 2 DIABETES MELLITUS WITH DIABETIC NEUROPATHY, WITH LONG-TERM CURRENT USE OF INSULIN (HCC): ICD-10-CM

## 2018-07-10 DIAGNOSIS — I27.20 PULMONARY HYPERTENSION (HCC): ICD-10-CM

## 2018-07-10 DIAGNOSIS — I10 ESSENTIAL HYPERTENSION: ICD-10-CM

## 2018-07-10 DIAGNOSIS — I48.0 PAROXYSMAL ATRIAL FIBRILLATION (HCC): Chronic | ICD-10-CM

## 2018-07-10 DIAGNOSIS — Z79.4 TYPE 2 DIABETES MELLITUS WITH DIABETIC NEUROPATHY, WITH LONG-TERM CURRENT USE OF INSULIN (HCC): ICD-10-CM

## 2018-07-10 DIAGNOSIS — E78.01 FAMILIAL HYPERCHOLESTEROLEMIA: ICD-10-CM

## 2018-07-10 PROCEDURE — 99214 OFFICE O/P EST MOD 30 MIN: CPT | Performed by: FAMILY MEDICINE

## 2018-07-10 NOTE — LETTER
July 10, 2018        To: Amparo James M.D.         Re:  Richard Chery          Dear Dr. James,     It was a pleasure seeing your patient, Richard Chery, on 7/10/2018 for a preoperative exam.     Please find enclosed a copy of this visit encounter which includes the history I obtained from Mr. Chery, my physical examination findings, my impression and recommendations.      Briefly, I would estimate that Mr. Chery has an intermediate risk of cardiac death and nonfatal MI from this procedure and the patient states he's willing to accept this risk.      Of note, the patient has not permitted adequate treatment of his sleep apnea or atrial fibrillation and has significant pulmonary hypertension on 2016 echocardiogram. For these reasons, I recommended that he see cardiology before any surgery. I have placed this consultation.     I also discussed with the patient that his age and comorbidities places him at higher risk of perioperative complications and recommended that he exhaust all non-surgical modalities before considering surgery.     Once again, it was a pleasure participating in your patient's care.  Please feel free to contact me if you have any questions or if I can be of any further assistance to your patients.        Sincerely,          Wayne Yuen M.D.

## 2018-07-11 ENCOUNTER — HOSPITAL ENCOUNTER (OUTPATIENT)
Dept: RADIOLOGY | Facility: MEDICAL CENTER | Age: 68
End: 2018-07-11
Attending: FAMILY MEDICINE
Payer: MEDICARE

## 2018-07-11 DIAGNOSIS — I27.20 PULMONARY HYPERTENSION (HCC): ICD-10-CM

## 2018-07-11 PROCEDURE — 71046 X-RAY EXAM CHEST 2 VIEWS: CPT

## 2018-07-11 NOTE — ASSESSMENT & PLAN NOTE
The patient was last seen by cardiology on 10/6/2016 where karan described paroxysmal atrial fibrillation with runs seen on Holter monitor. They recommended anticoagulation, however, the patient elected to stop it. They also recommended that he continue diltiazem but the patient elected to stop it. Today the patient is taking a full dose aspirin. He doesn't believe he has atrial fibrillation any longer. He has been counseled on the risk of stroke but continues to decline anticoagulation.

## 2018-07-11 NOTE — ASSESSMENT & PLAN NOTE
Hypoglycemic therapy: Metformin 1000 mg twice daily, Januvia 100 mg daily and Lantus 32 units nightly.   Last A1c: 03/21/18 is 7.1.   Aspirin: taking.   Statin: declines.   ACE: taking.   Urine Microalbumin: normal September 2017.   Monofilament Exam: normal 03/21/18.   Pneumonia vaccine: UTD.

## 2018-07-11 NOTE — ASSESSMENT & PLAN NOTE
The patient is currently taking fenofibrate 160 mg daily. He has declines stopping this and starting a statin, despite explanation of risks.

## 2018-07-11 NOTE — ASSESSMENT & PLAN NOTE
The patient has significant pulmonary HTN and has been unwilling to see either a pulmonologist or cardiologist. Echo from 2016 RVSP was 75 mm Hg.

## 2018-07-12 ENCOUNTER — TELEPHONE (OUTPATIENT)
Dept: MEDICAL GROUP | Facility: MEDICAL CENTER | Age: 68
End: 2018-07-12

## 2018-07-12 NOTE — TELEPHONE ENCOUNTER
----- Message from Wayne Yuen M.D. sent at 7/12/2018  8:01 AM PDT -----  Please call and inform this patient of the following:  His chest x-ray shows no lung issues and a prominent cardiovascular silhouette, possibly suggesting a slight enlargement of the heart. He should follow up with cardiology as scheduled.

## 2018-07-12 NOTE — TELEPHONE ENCOUNTER
Called pt regarding his test results but he is at the GI office now (Per his wife). I'll try to reach pt later on the day.

## 2018-07-13 ENCOUNTER — OFFICE VISIT (OUTPATIENT)
Dept: CARDIOLOGY | Facility: MEDICAL CENTER | Age: 68
End: 2018-07-13
Payer: MEDICARE

## 2018-07-13 VITALS
WEIGHT: 240 LBS | HEIGHT: 70 IN | SYSTOLIC BLOOD PRESSURE: 138 MMHG | BODY MASS INDEX: 34.36 KG/M2 | OXYGEN SATURATION: 97 % | DIASTOLIC BLOOD PRESSURE: 78 MMHG | HEART RATE: 94 BPM

## 2018-07-13 DIAGNOSIS — Z01.810 PRE-OPERATIVE CARDIOVASCULAR EXAMINATION: Primary | ICD-10-CM

## 2018-07-13 DIAGNOSIS — I27.20 PULMONARY HYPERTENSION (HCC): ICD-10-CM

## 2018-07-13 DIAGNOSIS — I48.0 PAROXYSMAL ATRIAL FIBRILLATION (HCC): Chronic | ICD-10-CM

## 2018-07-13 DIAGNOSIS — I10 ESSENTIAL HYPERTENSION: ICD-10-CM

## 2018-07-13 LAB — EKG IMPRESSION: NORMAL

## 2018-07-13 PROCEDURE — 99213 OFFICE O/P EST LOW 20 MIN: CPT | Mod: 25 | Performed by: INTERNAL MEDICINE

## 2018-07-13 PROCEDURE — 93000 ELECTROCARDIOGRAM COMPLETE: CPT | Performed by: INTERNAL MEDICINE

## 2018-07-13 ASSESSMENT — ENCOUNTER SYMPTOMS: CARDIOVASCULAR NEGATIVE: 1

## 2018-07-13 NOTE — LETTER
Name:          Richard Chery   YOB: 1950  Date:     07/13/2018      Wayne Yuen M.D.  [address]  [place] [address]     Dwayne Resendez MD  [address], [address]  [place] [address]  Phone: [phone]  Back Line: [phone]  Fax: [phone]  E-mail: [email or web address]   Dear Dr. Yuen,    We had the pleasure of seeing your patient, Richard Chery, in Cardiology Clinic at [facility] today.    As you know, he is a 68-year-old man followed in cardiology previously for paroxysmal atrial fibrillation having declined an oral anticoagulant, and pulmonary hypertension without overt right heart failure.    He has no cardiovascular complaints today, but comes in in rate controlled atrial fibrillation despite not being on romain blockers.    From the standpoint of his upcoming joint arthroplasty his only major risk factor is pulmonary hypertension previously seen on his last echocardiogram from 2016.  I did order repeat test of that.  Provided that he does not have severe right ventricular dilation and right ventricular systolic dysfunction I would proceed to surgery.  On the basis of his pulmonary hypertension I would estimate his risk to be intermediate, and again would not pursue further testing.  He of course will likely be in atrial fibrillation in the perioperative period.  I would recommend monitoring on telemetry, and cardiology consultation if he is not rate controlled at that time.    I discussed again his stroke risk and told him unequivocally that he does not fact have paroxysmal atrial fibrillation menstruating that on his EKG which I gave to him for his medical records.  I again recommended an oral anticoagulant, and we will continue the conversation at future visits.  I detailed with him his previously estimated stroke risk.    Return in about 6 months (around 1/13/2019).    Thank you for the referral and please do not hesitate to contact  me at any time. My contact information is listed above.    This note was dictated using Dragon speech recognition software.     A full note including my physical examination and a full list of rectified medications is available in our medical record, and can be faxed as well.    Dwayne Resendez MD  Cardiologist  [facility]    cc: Amparo James MD

## 2018-07-13 NOTE — PROGRESS NOTES
Chief Complaint   Patient presents with   • Hypertension       Subjective:   Richard Chery is a 68 -year-old man followed in cardiology previously for paroxysmal atrial fibrillation having declined an oral anticoagulant, and pulmonary hypertension without overt right heart failure.    He has no cardiovascular complaints today, and comes in for preoperative evaluation prior to knee arthroplasty planned with no specific date at this time awaiting this appointment.    He relates his concerns about polypharmacy mostly driving his decision to decline oral anticoagulation in the past, and seems to have been somewhat incredulous about the diagnosis of paroxysmal atrial fibrillation.    He gives a history consistent of inability to do 4 metabolic equivalents without limitation from the standpoint of dyspnea and has no chest discomfort.    Past Medical History:   Diagnosis Date   • Alcohol use 2/10/2016    3 per day   • Arthritis 2016    left knee   • Dental disorder     dentures; partial lower   • Diabetes 2005    insulin and oral agent   • Heart burn    • Hiatus hernia syndrome    • High cholesterol    • Hypertension    • Observed sleep apnea     no study done yet   • Pain     left knee   • Paroxysmal atrial fibrillation (HCC) 2/27/2016 11/14/16-resolved now     Past Surgical History:   Procedure Laterality Date   • KNEE ARTHROPLASTY TOTAL Left 11/21/2016    Procedure: KNEE ARTHROPLASTY TOTAL;  Surgeon: Amparo James M.D.;  Location: SURGERY [facility];  Service:    • KNEE REPLACEMENT, TOTAL Left 11/2016    Dr. James   • KNEE ARTHROSCOPY Left 2/27/2016    Procedure: KNEE ARTHROSCOPY- I&D KNEE;  Surgeon: Corby Reyes M.D.;  Location: [facility];  Service:    • KNEE ARTHROSCOPY Left 2/18/2016    Procedure: KNEE ARTHROSCOPY, SAMUELS;  Surgeon: Marquise Cline M.D.;  Location: [facility];  Service:    • MEDIAL MENISCECTOMY  2/18/2016    Procedure: MEDIAL MENISCECTOMY - PARTIAL;   Surgeon: Marquise Cline M.D.;  Location: SURGERY [facility];  Service:    • CYSTOSCOPY  1986   • DENTAL EXTRACTION(S)  1976    wisdom teeth   • HAND SURGERY Left 1970    trauma, amputation index and middle finger   • TONSILLECTOMY  as child     Family History   Problem Relation Age of Onset   • Diabetes     • Hypertension       Social History     Social History   • Marital status:      Spouse name: N/A   • Number of children: N/A   • Years of education: N/A     Occupational History   • Not on file.     Social History Main Topics   • Smoking status: Never Smoker   • Smokeless tobacco: Never Used   • Alcohol use 1.8 oz/week     3 Shots of liquor per week      Comment: 2-3 / day   • Drug use: No   • Sexual activity: Yes     Partners: Female     Other Topics Concern   • Not on file     Social History Narrative   • No narrative on file     No Known Allergies  Outpatient Encounter Prescriptions as of 7/13/2018   Medication Sig Dispense Refill   • aspirin EC (ECOTRIN) 81 MG Tablet Delayed Response Take 81 mg by mouth every day.     • lisinopril (PRINIVIL) 10 MG Tab Take 1 Tab by mouth every day. 90 Tab 3   • metformin (GLUCOPHAGE) 1000 MG tablet Take 1 Tab by mouth 2 Times a Day. 180 Tab 3   • SITagliptin (JANUVIA) 100 MG Tab Take 1 Tab by mouth every day. 90 Tab 3   • gabapentin (NEURONTIN) 300 MG Cap Take 2 PO q am and 1 PO q pm 270 Cap 3   • fenofibrate (TRIGLIDE) 160 MG tablet Take 1 Tab by mouth every day. 90 Tab 3   • insulin glargine (LANTUS) 100 UNIT/ML Solution Pen-injector injection Inject 32 Units as instructed every evening. 15 mL 6   • sildenafil citrate (VIAGRA) 100 MG tablet Take 1 Tab by mouth as needed for Erectile Dysfunction. 10 Tab 3   • omeprazole (PRILOSEC) 20 MG delayed-release capsule Take 20 mg by mouth every day.     • Potassium Gluconate 595 MG Cap Take 99 mg by mouth every day.     • [DISCONTINUED] meclizine (ANTIVERT) 25 MG Tab Take 1 Tab by mouth 3 times a day as needed. 30  "Tab 0   • [DISCONTINUED] aspirin EC (ECOTRIN) 325 MG Tablet Delayed Response Take 1 Tab by mouth every day. 40 Tab 0     No facility-administered encounter medications on file as of 7/13/2018.      Review of Systems   Cardiovascular: Negative.    Musculoskeletal: Positive for joint pain (Pending knee arthroplasty).   All other systems reviewed and are negative.       Objective:   /78   Pulse 94   Ht 1.778 m (5' 10\")   Wt 108.9 kg (240 lb)   SpO2 97%   BMI 34.44 kg/m²     Physical Exam   Constitutional: He is oriented to person, place, and time. He appears well-developed and well-nourished. No distress.   Pleasant, centrally obese, elderly man accompanied by his wife in no distress   Eyes: EOM are normal. Pupils are equal, round, and reactive to light.   Neck: No JVD present.   Cardiovascular: Normal rate.  An irregularly irregular rhythm present. Exam reveals no gallop and no friction rub.    No murmur heard.  Pulmonary/Chest: Effort normal and breath sounds normal. No respiratory distress. He has no wheezes. He has no rales.   Abdominal: Soft. Bowel sounds are normal. He exhibits no distension.   Musculoskeletal: He exhibits edema (1+ bilateral lower extremity edema).   Neurological: He is alert and oriented to person, place, and time.   Skin: Skin is warm and dry. No rash noted. He is not diaphoretic. No erythema. No pallor.   Psychiatric: He has a normal mood and affect. Judgment and thought content normal.   Vitals reviewed.    Lab Results   Component Value Date/Time    WBC 5.5 09/05/2017 06:33 AM    RBC 4.25 (L) 09/05/2017 06:33 AM    HEMOGLOBIN 13.4 (L) 09/05/2017 06:33 AM    HEMATOCRIT 40.3 (L) 09/05/2017 06:33 AM    MCV 94.8 09/05/2017 06:33 AM    MCH 31.5 09/05/2017 06:33 AM    MCHC 33.3 (L) 09/05/2017 06:33 AM    MPV 10.5 09/05/2017 06:33 AM        Lab Results   Component Value Date/Time    SODIUM 140 09/05/2017 06:33 AM    POTASSIUM 4.4 09/05/2017 06:33 AM    CHLORIDE 106 09/05/2017 06:33 AM    " CO2 27 2017 06:33 AM    GLUCOSE 84 2017 06:33 AM    BUN 22 2017 06:33 AM    CREATININE 0.96 2017 06:33 AM        Lab Results   Component Value Date/Time    ASTSGOT 34 2017 06:33 AM    ALTSGPT 35 2017 06:33 AM        Lab Results   Component Value Date/Time    CHOLSTRLTOT 118 2017 06:33 AM    LDL 57 2017 06:33 AM    HDL 39 (A) 2017 06:33 AM    TRIGLYCERIDE 110 2017 06:33 AM         Results for orders placed or performed in visit on 18   EKG   Result Value Ref Range    Report       [facility]    Test Date:  2018  Pt Name:    [facility]               Department: [facility]  MRN:        [id number]                      Room:  Gender:     Male                         Technician: KAVITA  :        1950                   Requested By:DWAYNE CHÁVEZ  Order #:    [id number]                    Reading MD: Dwayne Chávez MD    Measurements  Intervals                                Axis  Rate:       89                           P:  IN:                                      QRS:        64  QRSD:       78                           T:          54  QT:         376  QTc:        458    Interpretive Statements  ATRIAL FIBRILLATION  EARLY PRECORDIAL R/S TRANSITION  Compared to ECG 2016 09:03:59  Sinus rhythm no longer present  Electronically Signed On 2018 9:02:53 PDT by Dwayne Chávez MD     Results for orders placed or performed in visit on 16   EKG   Result Value Ref Range    Report       [facility]    Test Date:  2016  Pt Name:    [facility]               Department: [facility]  MRN:        [id number]                      Room:  Gender:     M                            Technician: FJ  :        1950                   Requested By:ANGELINE MONSALVE  Order #:    [id number]                    Reading MD: Conrado Britton MD    Measurements  Intervals                                Axis  Rate:       71               "             P:          64  WI:         172                          QRS:        60  QRSD:       78                           T:          52  QT:         380  QTc:        413    Interpretive Statements  SINUS RHYTHM  EARLY PRECORDIAL R/S TRANSITION  No previous ECG available for comparison    Electronically Signed On 5- 6:08:20 PDT by Conrado Britton MD       Echocardiogram, 2/29/2016:  \"CONCLUSIONS  Technically difficult study.   Tachycardic during the study with hyperdynamic left ventricular   systolic function.  Structurally normal mitral valve without significant stenosis or   regurgitation.  The aortic valve and tricuspid valves were not well visualized.  Moderate tricuspid regurgitation.  Right ventricular systolic pressure is estimated to be 75 mmHg.  Consider IRENE if endocarditis is clinically suspected\"    Assessment:     1. Pre-operative cardiovascular examination  Echocardiogram Comp w/o Cont   2. Essential hypertension  EKG   3. Pulmonary hypertension (HCC)  Echocardiogram Comp w/o Cont   4. Paroxysmal atrial fibrillation (HCC)         Medical Decision Making:  Today's Assessment / Status / Plan:     He has no cardiovascular complaints today, but comes in in rate controlled atrial fibrillation despite not being on romain blockers.    From the standpoint of his upcoming joint arthroplasty his only major risk factor is pulmonary hypertension previously seen on his last echocardiogram from 2016.  I did order repeat test of that.  Provided that he does not have severe right ventricular dilation and right ventricular systolic dysfunction I would proceed to surgery.  On the basis of his pulmonary hypertension I would estimate his risk to be intermediate, and again would not pursue further testing.  He of course will likely be in atrial fibrillation in the perioperative period.  I would recommend monitoring on telemetry, and cardiology consultation if he is not rate controlled at that time.    I " discussed again his stroke risk and told him unequivocally that he does not fact have paroxysmal atrial fibrillation menstruating that on his EKG which I gave to him for his medical records.  I again recommended an oral anticoagulant, and we will continue the conversation at future visits.  I detailed with him his previously estimated stroke risk.    Dwayne Resendez MD  Cardiologist, [facility] [place]     Return in about 6 months (around 1/13/2019).

## 2018-07-24 ENCOUNTER — HOSPITAL ENCOUNTER (OUTPATIENT)
Dept: CARDIOLOGY | Facility: MEDICAL CENTER | Age: 68
End: 2018-07-24
Attending: INTERNAL MEDICINE
Payer: MEDICARE

## 2018-07-24 DIAGNOSIS — I27.20 PULMONARY HYPERTENSION (HCC): ICD-10-CM

## 2018-07-24 DIAGNOSIS — Z01.810 PRE-OPERATIVE CARDIOVASCULAR EXAMINATION: ICD-10-CM

## 2018-07-24 PROCEDURE — 93306 TTE W/DOPPLER COMPLETE: CPT

## 2018-07-24 PROCEDURE — 93306 TTE W/DOPPLER COMPLETE: CPT | Mod: 26 | Performed by: INTERNAL MEDICINE

## 2018-07-25 ENCOUNTER — TELEPHONE (OUTPATIENT)
Dept: CARDIOLOGY | Facility: MEDICAL CENTER | Age: 68
End: 2018-07-25

## 2018-07-25 NOTE — TELEPHONE ENCOUNTER
----- Message from Isidoro Rapp sent at 7/25/2018  2:15 PM PDT -----  Regarding: calling for echo results   Contact: [phone]  Cara Rebolledo is calling for results of Echo. He can be reached at [phone].    ========================================================    To Dr Resendez, please review Echo and advice, Thank You!

## 2018-07-25 NOTE — LETTER
PROCEDURE/SURGERY CLEARANCE FORM      Encounter Date: 7/25/2018    Patient: Richard Chery  YOB: 1950    CARDIOLOGIST:  Dwayne Resendez M.D.   REFERRING DOCTOR:  Amparo James M.D.    Patient does not have AICD or PPM.     The above patient is low risk from cardiovascular standpoint to have the following procedure/surgery: Knee Surgery                                                                MD Signature Dwayne Resendez M.D.

## 2018-07-26 LAB
LV EJECT FRACT  99904: 60
LV EJECT FRACT MOD 2C 99903: 54.92
LV EJECT FRACT MOD 4C 99902: 51.01
LV EJECT FRACT MOD BP 99901: 51.77

## 2018-07-26 NOTE — TELEPHONE ENCOUNTER
Discussed w/ Dr Resendez, per Dr Resendez,pt's RSVP is actually improved compared to previous Echo. Please clear pt on his upcoming knee surgery, low risk, please send last OV notes also.     Clearance letter drafted w/ Dr Resnedez recommendations along with his last OV notes, faxed to Dr Soto, F#[phone]    Attempted to call pt to notify, no answer, left vm to call back

## 2018-07-26 NOTE — TELEPHONE ENCOUNTER
Pt called back, discussed Echo per Dr Resendez and he already cleared him for his upcoming surgery, pt appreciative and verbalizes understanding

## 2018-08-03 ENCOUNTER — HOSPITAL ENCOUNTER (OUTPATIENT)
Dept: LAB | Facility: MEDICAL CENTER | Age: 68
End: 2018-08-03
Attending: FAMILY MEDICINE
Payer: MEDICARE

## 2018-08-03 DIAGNOSIS — I10 ESSENTIAL HYPERTENSION: ICD-10-CM

## 2018-08-03 DIAGNOSIS — Z79.4 TYPE 2 DIABETES MELLITUS WITH DIABETIC NEUROPATHY, WITH LONG-TERM CURRENT USE OF INSULIN (HCC): ICD-10-CM

## 2018-08-03 DIAGNOSIS — Z01.812 PRE-OPERATIVE LABORATORY EXAMINATION: ICD-10-CM

## 2018-08-03 DIAGNOSIS — E11.40 TYPE 2 DIABETES MELLITUS WITH DIABETIC NEUROPATHY, WITH LONG-TERM CURRENT USE OF INSULIN (HCC): ICD-10-CM

## 2018-08-03 DIAGNOSIS — D64.9 ANEMIA, UNSPECIFIED TYPE: ICD-10-CM

## 2018-08-03 DIAGNOSIS — Z72.89 OTHER PROBLEMS RELATED TO LIFESTYLE: ICD-10-CM

## 2018-08-03 DIAGNOSIS — E78.01 FAMILIAL HYPERCHOLESTEROLEMIA: ICD-10-CM

## 2018-08-03 LAB
ALBUMIN SERPL BCP-MCNC: 4.6 G/DL (ref 3.2–4.9)
ALBUMIN SERPL BCP-MCNC: 4.6 G/DL (ref 3.2–4.9)
ALBUMIN/GLOB SERPL: 1.9 G/DL
ALBUMIN/GLOB SERPL: 1.9 G/DL
ALP SERPL-CCNC: 54 U/L (ref 30–99)
ALP SERPL-CCNC: 56 U/L (ref 30–99)
ALT SERPL-CCNC: 27 U/L (ref 2–50)
ALT SERPL-CCNC: 27 U/L (ref 2–50)
ANION GAP SERPL CALC-SCNC: 8 MMOL/L (ref 0–11.9)
ANION GAP SERPL CALC-SCNC: 9 MMOL/L (ref 0–11.9)
APPEARANCE UR: CLEAR
APPEARANCE UR: CLEAR
APTT PPP: 26.3 SEC (ref 24.7–36)
AST SERPL-CCNC: 24 U/L (ref 12–45)
AST SERPL-CCNC: 24 U/L (ref 12–45)
BASOPHILS # BLD AUTO: 0.6 % (ref 0–1.8)
BASOPHILS # BLD AUTO: 0.6 % (ref 0–1.8)
BASOPHILS # BLD: 0.05 K/UL (ref 0–0.12)
BASOPHILS # BLD: 0.05 K/UL (ref 0–0.12)
BILIRUB SERPL-MCNC: 0.5 MG/DL (ref 0.1–1.5)
BILIRUB SERPL-MCNC: 0.5 MG/DL (ref 0.1–1.5)
BILIRUB UR QL STRIP.AUTO: NEGATIVE
BILIRUB UR QL STRIP.AUTO: NEGATIVE
BUN SERPL-MCNC: 19 MG/DL (ref 8–22)
BUN SERPL-MCNC: 19 MG/DL (ref 8–22)
CALCIUM SERPL-MCNC: 9.6 MG/DL (ref 8.5–10.5)
CALCIUM SERPL-MCNC: 9.7 MG/DL (ref 8.5–10.5)
CHLORIDE SERPL-SCNC: 104 MMOL/L (ref 96–112)
CHLORIDE SERPL-SCNC: 104 MMOL/L (ref 96–112)
CO2 SERPL-SCNC: 24 MMOL/L (ref 20–33)
CO2 SERPL-SCNC: 25 MMOL/L (ref 20–33)
COLOR UR: YELLOW
COLOR UR: YELLOW
CREAT SERPL-MCNC: 0.95 MG/DL (ref 0.5–1.4)
CREAT SERPL-MCNC: 0.97 MG/DL (ref 0.5–1.4)
EOSINOPHIL # BLD AUTO: 0.12 K/UL (ref 0–0.51)
EOSINOPHIL # BLD AUTO: 0.15 K/UL (ref 0–0.51)
EOSINOPHIL NFR BLD: 1.5 % (ref 0–6.9)
EOSINOPHIL NFR BLD: 1.8 % (ref 0–6.9)
ERYTHROCYTE [DISTWIDTH] IN BLOOD BY AUTOMATED COUNT: 41.3 FL (ref 35.9–50)
ERYTHROCYTE [DISTWIDTH] IN BLOOD BY AUTOMATED COUNT: 41.5 FL (ref 35.9–50)
EST. AVERAGE GLUCOSE BLD GHB EST-MCNC: 160 MG/DL
GLOBULIN SER CALC-MCNC: 2.4 G/DL (ref 1.9–3.5)
GLOBULIN SER CALC-MCNC: 2.4 G/DL (ref 1.9–3.5)
GLUCOSE SERPL-MCNC: 181 MG/DL (ref 65–99)
GLUCOSE SERPL-MCNC: 186 MG/DL (ref 65–99)
GLUCOSE UR STRIP.AUTO-MCNC: NEGATIVE MG/DL
GLUCOSE UR STRIP.AUTO-MCNC: NEGATIVE MG/DL
HBA1C MFR BLD: 7.2 % (ref 0–5.6)
HCT VFR BLD AUTO: 42.5 % (ref 42–52)
HCT VFR BLD AUTO: 42.8 % (ref 42–52)
HCV AB SER QL: NEGATIVE
HGB BLD-MCNC: 14.6 G/DL (ref 14–18)
HGB BLD-MCNC: 14.8 G/DL (ref 14–18)
HIV 1+2 AB+HIV1 P24 AG SERPL QL IA: NON REACTIVE
IMM GRANULOCYTES # BLD AUTO: 0.03 K/UL (ref 0–0.11)
IMM GRANULOCYTES # BLD AUTO: 0.03 K/UL (ref 0–0.11)
IMM GRANULOCYTES NFR BLD AUTO: 0.4 % (ref 0–0.9)
IMM GRANULOCYTES NFR BLD AUTO: 0.4 % (ref 0–0.9)
INR PPP: 0.99 (ref 0.87–1.13)
KETONES UR STRIP.AUTO-MCNC: NEGATIVE MG/DL
KETONES UR STRIP.AUTO-MCNC: NEGATIVE MG/DL
LEUKOCYTE ESTERASE UR QL STRIP.AUTO: NEGATIVE
LEUKOCYTE ESTERASE UR QL STRIP.AUTO: NEGATIVE
LYMPHOCYTES # BLD AUTO: 2.95 K/UL (ref 1–4.8)
LYMPHOCYTES # BLD AUTO: 3.05 K/UL (ref 1–4.8)
LYMPHOCYTES NFR BLD: 37.2 % (ref 22–41)
LYMPHOCYTES NFR BLD: 37.8 % (ref 22–41)
MCH RBC QN AUTO: 31.1 PG (ref 27–33)
MCH RBC QN AUTO: 31.8 PG (ref 27–33)
MCHC RBC AUTO-ENTMCNC: 34.1 G/DL (ref 33.7–35.3)
MCHC RBC AUTO-ENTMCNC: 34.8 G/DL (ref 33.7–35.3)
MCV RBC AUTO: 91.1 FL (ref 81.4–97.8)
MCV RBC AUTO: 91.2 FL (ref 81.4–97.8)
MICRO URNS: NORMAL
MICRO URNS: NORMAL
MONOCYTES # BLD AUTO: 0.67 K/UL (ref 0–0.85)
MONOCYTES # BLD AUTO: 0.67 K/UL (ref 0–0.85)
MONOCYTES NFR BLD AUTO: 8.2 % (ref 0–13.4)
MONOCYTES NFR BLD AUTO: 8.6 % (ref 0–13.4)
NEUTROPHILS # BLD AUTO: 3.99 K/UL (ref 1.82–7.42)
NEUTROPHILS # BLD AUTO: 4.25 K/UL (ref 1.82–7.42)
NEUTROPHILS NFR BLD: 51.1 % (ref 44–72)
NEUTROPHILS NFR BLD: 51.8 % (ref 44–72)
NITRITE UR QL STRIP.AUTO: NEGATIVE
NITRITE UR QL STRIP.AUTO: NEGATIVE
NRBC # BLD AUTO: 0 K/UL
NRBC # BLD AUTO: 0 K/UL
NRBC BLD-RTO: 0 /100 WBC
NRBC BLD-RTO: 0 /100 WBC
PH UR STRIP.AUTO: 6 [PH]
PH UR STRIP.AUTO: 6 [PH]
PLATELET # BLD AUTO: 245 K/UL (ref 164–446)
PLATELET # BLD AUTO: 275 K/UL (ref 164–446)
PMV BLD AUTO: 10.5 FL (ref 9–12.9)
PMV BLD AUTO: 10.6 FL (ref 9–12.9)
POTASSIUM SERPL-SCNC: 4.3 MMOL/L (ref 3.6–5.5)
POTASSIUM SERPL-SCNC: 4.3 MMOL/L (ref 3.6–5.5)
PROT SERPL-MCNC: 7 G/DL (ref 6–8.2)
PROT SERPL-MCNC: 7 G/DL (ref 6–8.2)
PROT UR QL STRIP: NEGATIVE MG/DL
PROT UR QL STRIP: NEGATIVE MG/DL
PROTHROMBIN TIME: 12.8 SEC (ref 12–14.6)
RBC # BLD AUTO: 4.66 M/UL (ref 4.7–6.1)
RBC # BLD AUTO: 4.7 M/UL (ref 4.7–6.1)
RBC UR QL AUTO: NEGATIVE
RBC UR QL AUTO: NEGATIVE
SCCMEC + MECA PNL NOSE NAA+PROBE: NEGATIVE
SCCMEC + MECA PNL NOSE NAA+PROBE: POSITIVE
SODIUM SERPL-SCNC: 136 MMOL/L (ref 135–145)
SODIUM SERPL-SCNC: 138 MMOL/L (ref 135–145)
SP GR UR STRIP.AUTO: 1.01
SP GR UR STRIP.AUTO: 1.01
UROBILINOGEN UR STRIP.AUTO-MCNC: 0.2 MG/DL
UROBILINOGEN UR STRIP.AUTO-MCNC: 0.2 MG/DL
WBC # BLD AUTO: 7.8 K/UL (ref 4.8–10.8)
WBC # BLD AUTO: 8.2 K/UL (ref 4.8–10.8)

## 2018-08-03 PROCEDURE — 36415 COLL VENOUS BLD VENIPUNCTURE: CPT

## 2018-08-03 PROCEDURE — 80053 COMPREHEN METABOLIC PANEL: CPT

## 2018-08-03 PROCEDURE — 86803 HEPATITIS C AB TEST: CPT

## 2018-08-03 PROCEDURE — 85025 COMPLETE CBC W/AUTO DIFF WBC: CPT | Mod: 91

## 2018-08-03 PROCEDURE — 85730 THROMBOPLASTIN TIME PARTIAL: CPT

## 2018-08-03 PROCEDURE — 85610 PROTHROMBIN TIME: CPT

## 2018-08-03 PROCEDURE — 83036 HEMOGLOBIN GLYCOSYLATED A1C: CPT

## 2018-08-03 PROCEDURE — 81003 URINALYSIS AUTO W/O SCOPE: CPT

## 2018-08-03 PROCEDURE — 87640 STAPH A DNA AMP PROBE: CPT

## 2018-08-03 PROCEDURE — 87641 MR-STAPH DNA AMP PROBE: CPT

## 2018-08-03 PROCEDURE — 80053 COMPREHEN METABOLIC PANEL: CPT | Mod: 91

## 2018-08-03 PROCEDURE — 87389 HIV-1 AG W/HIV-1&-2 AB AG IA: CPT

## 2018-08-03 PROCEDURE — 85025 COMPLETE CBC W/AUTO DIFF WBC: CPT

## 2018-08-03 PROCEDURE — 81003 URINALYSIS AUTO W/O SCOPE: CPT | Mod: 91

## 2018-08-03 NOTE — OR NURSING
"TOTAL JOINT REPLACEMENT SURGERY   PreAdmit Appointment  Pre-Operative Education Note     Scheduled Procedure:    1) Did you take a Total Joint Replacement Pre-Operative Education class?  Where?  Answer: Yes, with previous total knee surgery 2016 [facility]    2) If you did not take a class, did you receive pre-op education in the form of a book or through an online class?   Answer:       For patients who answered \"No\" to the above questions:     3) Was patient given information on [facility]'s Pre-Op Education class through the Pre-Op Education Class flyer or the Alternative Pre-op Education flyer?   Answer:     Was a [facility] Total Joint Replacement Patient Guide binder handed out?   Answer:      "

## 2018-08-03 NOTE — OR NURSING
Pre admit apt: Pt. Instructed to continue regularly prescribed medications through day before surgery.  Instructed to take the following medications, the day of surgery, with a sip of water per anesthesia protocol: omeprazole, gabapentin

## 2018-08-09 ENCOUNTER — TELEPHONE (OUTPATIENT)
Dept: MEDICAL GROUP | Facility: MEDICAL CENTER | Age: 68
End: 2018-08-09

## 2018-08-09 NOTE — TELEPHONE ENCOUNTER
----- Message from Wayne Yuen M.D. sent at 8/6/2018  4:37 PM PDT -----  Please call and inform this patient of the following:  His labs are reassuring. His A1c is 7.2. He should continue his current medication regimen.

## 2018-08-10 ENCOUNTER — PATIENT OUTREACH (OUTPATIENT)
Dept: HEALTH INFORMATION MANAGEMENT | Facility: OTHER | Age: 68
End: 2018-08-10

## 2018-08-15 ENCOUNTER — HOSPITAL ENCOUNTER (OUTPATIENT)
Facility: MEDICAL CENTER | Age: 68
End: 2018-08-16
Attending: ORTHOPAEDIC SURGERY | Admitting: ORTHOPAEDIC SURGERY
Payer: MEDICARE

## 2018-08-15 ENCOUNTER — APPOINTMENT (OUTPATIENT)
Dept: RADIOLOGY | Facility: MEDICAL CENTER | Age: 68
End: 2018-08-15
Attending: ORTHOPAEDIC SURGERY
Payer: MEDICARE

## 2018-08-15 DIAGNOSIS — M17.11 PRIMARY OSTEOARTHRITIS OF RIGHT KNEE: ICD-10-CM

## 2018-08-15 DIAGNOSIS — G89.18 POST-OP PAIN: ICD-10-CM

## 2018-08-15 LAB
GLUCOSE BLD-MCNC: 159 MG/DL (ref 65–99)
GLUCOSE BLD-MCNC: 243 MG/DL (ref 65–99)
GLUCOSE BLD-MCNC: 343 MG/DL (ref 65–99)
GLUCOSE BLD-MCNC: 390 MG/DL (ref 65–99)
HBV SURFACE AG SER QL: NEGATIVE
HCV AB SER QL: NEGATIVE
HIV 1+2 AB+HIV1 P24 AG SERPL QL IA: NON REACTIVE

## 2018-08-15 PROCEDURE — 700111 HCHG RX REV CODE 636 W/ 250 OVERRIDE (IP)

## 2018-08-15 PROCEDURE — 96365 THER/PROPH/DIAG IV INF INIT: CPT

## 2018-08-15 PROCEDURE — 160009 HCHG ANES TIME/MIN: Performed by: ORTHOPAEDIC SURGERY

## 2018-08-15 PROCEDURE — 502000 HCHG MISC OR IMPLANTS RC 0278: Performed by: ORTHOPAEDIC SURGERY

## 2018-08-15 PROCEDURE — 700102 HCHG RX REV CODE 250 W/ 637 OVERRIDE(OP): Performed by: ORTHOPAEDIC SURGERY

## 2018-08-15 PROCEDURE — 82962 GLUCOSE BLOOD TEST: CPT | Mod: 91

## 2018-08-15 PROCEDURE — 700111 HCHG RX REV CODE 636 W/ 250 OVERRIDE (IP): Performed by: PHYSICIAN ASSISTANT

## 2018-08-15 PROCEDURE — A9270 NON-COVERED ITEM OR SERVICE: HCPCS | Performed by: PHYSICIAN ASSISTANT

## 2018-08-15 PROCEDURE — A9270 NON-COVERED ITEM OR SERVICE: HCPCS

## 2018-08-15 PROCEDURE — 87389 HIV-1 AG W/HIV-1&-2 AB AG IA: CPT

## 2018-08-15 PROCEDURE — 501838 HCHG SUTURE GENERAL: Performed by: ORTHOPAEDIC SURGERY

## 2018-08-15 PROCEDURE — 36415 COLL VENOUS BLD VENIPUNCTURE: CPT

## 2018-08-15 PROCEDURE — 700102 HCHG RX REV CODE 250 W/ 637 OVERRIDE(OP): Performed by: PHYSICIAN ASSISTANT

## 2018-08-15 PROCEDURE — 700105 HCHG RX REV CODE 258: Performed by: PHYSICIAN ASSISTANT

## 2018-08-15 PROCEDURE — 700101 HCHG RX REV CODE 250

## 2018-08-15 PROCEDURE — 160002 HCHG RECOVERY MINUTES (STAT): Performed by: ORTHOPAEDIC SURGERY

## 2018-08-15 PROCEDURE — 700101 HCHG RX REV CODE 250: Performed by: PHYSICIAN ASSISTANT

## 2018-08-15 PROCEDURE — 502579 HCHG PACK, TOTAL KNEE: Performed by: ORTHOPAEDIC SURGERY

## 2018-08-15 PROCEDURE — 96376 TX/PRO/DX INJ SAME DRUG ADON: CPT

## 2018-08-15 PROCEDURE — 73560 X-RAY EXAM OF KNEE 1 OR 2: CPT | Mod: RT

## 2018-08-15 PROCEDURE — A9272 DISP WOUND SUCT, DRSG/ACCESS: HCPCS | Performed by: PHYSICIAN ASSISTANT

## 2018-08-15 PROCEDURE — L8699 PROSTHETIC IMPLANT NOS: HCPCS | Performed by: ORTHOPAEDIC SURGERY

## 2018-08-15 PROCEDURE — 160041 HCHG SURGERY MINUTES - EA ADDL 1 MIN LEVEL 4: Performed by: ORTHOPAEDIC SURGERY

## 2018-08-15 PROCEDURE — 700112 HCHG RX REV CODE 229: Performed by: PHYSICIAN ASSISTANT

## 2018-08-15 PROCEDURE — 160035 HCHG PACU - 1ST 60 MINS PHASE I: Performed by: ORTHOPAEDIC SURGERY

## 2018-08-15 PROCEDURE — G0378 HOSPITAL OBSERVATION PER HR: HCPCS

## 2018-08-15 PROCEDURE — 94760 N-INVAS EAR/PLS OXIMETRY 1: CPT

## 2018-08-15 PROCEDURE — 160048 HCHG OR STATISTICAL LEVEL 1-5: Performed by: ORTHOPAEDIC SURGERY

## 2018-08-15 PROCEDURE — 96372 THER/PROPH/DIAG INJ SC/IM: CPT

## 2018-08-15 PROCEDURE — 302135 SEQUENTIAL COMPRESSION MACHINE: Performed by: ORTHOPAEDIC SURGERY

## 2018-08-15 PROCEDURE — 160022 HCHG BLOCK: Performed by: ORTHOPAEDIC SURGERY

## 2018-08-15 PROCEDURE — 700102 HCHG RX REV CODE 250 W/ 637 OVERRIDE(OP)

## 2018-08-15 PROCEDURE — 96375 TX/PRO/DX INJ NEW DRUG ADDON: CPT

## 2018-08-15 PROCEDURE — A9270 NON-COVERED ITEM OR SERVICE: HCPCS | Performed by: ORTHOPAEDIC SURGERY

## 2018-08-15 PROCEDURE — 160029 HCHG SURGERY MINUTES - 1ST 30 MINS LEVEL 4: Performed by: ORTHOPAEDIC SURGERY

## 2018-08-15 DEVICE — IMPLANTABLE DEVICE: Type: IMPLANTABLE DEVICE | Site: KNEE | Status: FUNCTIONAL

## 2018-08-15 DEVICE — CEMENT ORTHOPEDIC HV US  (10/PK): Type: IMPLANTABLE DEVICE | Site: KNEE | Status: FUNCTIONAL

## 2018-08-15 DEVICE — INSERT BCS XLPE ARIGHT JOUNEY II  SIZE 5-6 RIGHT 10MM (1EA): Type: IMPLANTABLE DEVICE | Site: KNEE | Status: FUNCTIONAL

## 2018-08-15 DEVICE — BASE TIBIAL NONPOROUS JOURNEY II RIGHT SIZE 5 (1EA): Type: IMPLANTABLE DEVICE | Site: KNEE | Status: FUNCTIONAL

## 2018-08-15 DEVICE — IMPLANT GII OVAL RESURFACING PAT 32MM (1EA): Type: IMPLANTABLE DEVICE | Site: KNEE | Status: FUNCTIONAL

## 2018-08-15 DEVICE — FEMUR BCS OXINIUM JOURNEY II RIGHT SIZE 6 (1EA): Type: IMPLANTABLE DEVICE | Site: KNEE | Status: FUNCTIONAL

## 2018-08-15 RX ORDER — TRANEXAMIC ACID
POWDER (GRAM) MISCELLANEOUS
Status: DISCONTINUED | OUTPATIENT
Start: 2018-08-15 | End: 2018-08-15 | Stop reason: HOSPADM

## 2018-08-15 RX ORDER — EPINEPHRINE 1 MG/ML(1)
AMPUL (ML) INJECTION
Status: DISCONTINUED | OUTPATIENT
Start: 2018-08-15 | End: 2018-08-15 | Stop reason: HOSPADM

## 2018-08-15 RX ORDER — LISINOPRIL 5 MG/1
10 TABLET ORAL DAILY
Status: DISCONTINUED | OUTPATIENT
Start: 2018-08-15 | End: 2018-08-16 | Stop reason: HOSPADM

## 2018-08-15 RX ORDER — KETOROLAC TROMETHAMINE 30 MG/ML
INJECTION, SOLUTION INTRAMUSCULAR; INTRAVENOUS
Status: DISCONTINUED | OUTPATIENT
Start: 2018-08-15 | End: 2018-08-15 | Stop reason: HOSPADM

## 2018-08-15 RX ORDER — INSULIN GLARGINE 100 [IU]/ML
32 INJECTION, SOLUTION SUBCUTANEOUS EVERY EVENING
Status: DISCONTINUED | OUTPATIENT
Start: 2018-08-15 | End: 2018-08-16 | Stop reason: HOSPADM

## 2018-08-15 RX ORDER — ACETAMINOPHEN 500 MG
500 TABLET ORAL EVERY 6 HOURS
Status: DISCONTINUED | OUTPATIENT
Start: 2018-08-15 | End: 2018-08-16 | Stop reason: HOSPADM

## 2018-08-15 RX ORDER — POLYETHYLENE GLYCOL 3350 17 G/17G
1 POWDER, FOR SOLUTION ORAL 2 TIMES DAILY PRN
Status: DISCONTINUED | OUTPATIENT
Start: 2018-08-15 | End: 2018-08-16 | Stop reason: HOSPADM

## 2018-08-15 RX ORDER — GABAPENTIN 300 MG/1
300 CAPSULE ORAL 2 TIMES DAILY
Status: DISCONTINUED | OUTPATIENT
Start: 2018-08-15 | End: 2018-08-16 | Stop reason: HOSPADM

## 2018-08-15 RX ORDER — DEXAMETHASONE SODIUM PHOSPHATE 4 MG/ML
8 INJECTION, SOLUTION INTRA-ARTICULAR; INTRALESIONAL; INTRAMUSCULAR; INTRAVENOUS; SOFT TISSUE ONCE
Status: COMPLETED | OUTPATIENT
Start: 2018-08-16 | End: 2018-08-16

## 2018-08-15 RX ORDER — ENEMA 19; 7 G/133ML; G/133ML
1 ENEMA RECTAL
Status: DISCONTINUED | OUTPATIENT
Start: 2018-08-15 | End: 2018-08-16 | Stop reason: HOSPADM

## 2018-08-15 RX ORDER — OMEPRAZOLE 20 MG/1
20 CAPSULE, DELAYED RELEASE ORAL DAILY
Status: DISCONTINUED | OUTPATIENT
Start: 2018-08-16 | End: 2018-08-16 | Stop reason: HOSPADM

## 2018-08-15 RX ORDER — KETOROLAC TROMETHAMINE 30 MG/ML
15 INJECTION, SOLUTION INTRAMUSCULAR; INTRAVENOUS EVERY 6 HOURS
Status: COMPLETED | OUTPATIENT
Start: 2018-08-15 | End: 2018-08-16

## 2018-08-15 RX ORDER — BISACODYL 10 MG
10 SUPPOSITORY, RECTAL RECTAL
Status: DISCONTINUED | OUTPATIENT
Start: 2018-08-15 | End: 2018-08-16 | Stop reason: HOSPADM

## 2018-08-15 RX ORDER — DEXAMETHASONE SODIUM PHOSPHATE 4 MG/ML
4 INJECTION, SOLUTION INTRA-ARTICULAR; INTRALESIONAL; INTRAMUSCULAR; INTRAVENOUS; SOFT TISSUE
Status: COMPLETED | OUTPATIENT
Start: 2018-08-15 | End: 2018-08-15

## 2018-08-15 RX ORDER — TRAMADOL HYDROCHLORIDE 50 MG/1
50 TABLET ORAL EVERY 4 HOURS PRN
Status: DISCONTINUED | OUTPATIENT
Start: 2018-08-15 | End: 2018-08-16 | Stop reason: HOSPADM

## 2018-08-15 RX ORDER — SODIUM CHLORIDE 9 MG/ML
INJECTION, SOLUTION INTRAVENOUS CONTINUOUS
Status: DISCONTINUED | OUTPATIENT
Start: 2018-08-15 | End: 2018-08-16 | Stop reason: HOSPADM

## 2018-08-15 RX ORDER — DIPHENHYDRAMINE HYDROCHLORIDE 50 MG/ML
25 INJECTION INTRAMUSCULAR; INTRAVENOUS EVERY 6 HOURS PRN
Status: DISCONTINUED | OUTPATIENT
Start: 2018-08-15 | End: 2018-08-16 | Stop reason: HOSPADM

## 2018-08-15 RX ORDER — ONDANSETRON 2 MG/ML
4 INJECTION INTRAMUSCULAR; INTRAVENOUS EVERY 4 HOURS PRN
Status: DISCONTINUED | OUTPATIENT
Start: 2018-08-15 | End: 2018-08-16 | Stop reason: HOSPADM

## 2018-08-15 RX ORDER — ROPIVACAINE HYDROCHLORIDE 5 MG/ML
INJECTION, SOLUTION EPIDURAL; INFILTRATION; PERINEURAL
Status: DISCONTINUED | OUTPATIENT
Start: 2018-08-15 | End: 2018-08-15 | Stop reason: HOSPADM

## 2018-08-15 RX ORDER — AMOXICILLIN 250 MG
1 CAPSULE ORAL NIGHTLY
Status: DISCONTINUED | OUTPATIENT
Start: 2018-08-15 | End: 2018-08-16 | Stop reason: HOSPADM

## 2018-08-15 RX ORDER — FENOFIBRATE 134 MG/1
134 CAPSULE ORAL DAILY
Status: DISCONTINUED | OUTPATIENT
Start: 2018-08-15 | End: 2018-08-16 | Stop reason: HOSPADM

## 2018-08-15 RX ORDER — TRANEXAMIC ACID 100 MG/ML
INJECTION, SOLUTION INTRAVENOUS
Status: COMPLETED
Start: 2018-08-15 | End: 2018-08-15

## 2018-08-15 RX ORDER — ACETAMINOPHEN 500 MG
TABLET ORAL
Status: COMPLETED
Start: 2018-08-15 | End: 2018-08-15

## 2018-08-15 RX ORDER — MIDAZOLAM HYDROCHLORIDE 1 MG/ML
INJECTION INTRAMUSCULAR; INTRAVENOUS
Status: COMPLETED
Start: 2018-08-15 | End: 2018-08-15

## 2018-08-15 RX ORDER — TAMSULOSIN HYDROCHLORIDE 0.4 MG/1
0.4 CAPSULE ORAL
Status: DISCONTINUED | OUTPATIENT
Start: 2018-08-15 | End: 2018-08-16 | Stop reason: HOSPADM

## 2018-08-15 RX ORDER — DEXTROSE MONOHYDRATE 25 G/50ML
25 INJECTION, SOLUTION INTRAVENOUS
Status: DISCONTINUED | OUTPATIENT
Start: 2018-08-15 | End: 2018-08-16 | Stop reason: HOSPADM

## 2018-08-15 RX ORDER — SODIUM CHLORIDE, SODIUM LACTATE, POTASSIUM CHLORIDE, CALCIUM CHLORIDE 600; 310; 30; 20 MG/100ML; MG/100ML; MG/100ML; MG/100ML
1000 INJECTION, SOLUTION INTRAVENOUS
Status: DISCONTINUED | OUTPATIENT
Start: 2018-08-15 | End: 2018-08-16 | Stop reason: HOSPADM

## 2018-08-15 RX ORDER — CELECOXIB 200 MG/1
200 CAPSULE ORAL 2 TIMES DAILY WITH MEALS
Status: DISCONTINUED | OUTPATIENT
Start: 2018-08-16 | End: 2018-08-16 | Stop reason: HOSPADM

## 2018-08-15 RX ORDER — OXYCODONE HYDROCHLORIDE 5 MG/1
5 TABLET ORAL
Status: DISCONTINUED | OUTPATIENT
Start: 2018-08-15 | End: 2018-08-16 | Stop reason: HOSPADM

## 2018-08-15 RX ORDER — OXYCODONE HYDROCHLORIDE 10 MG/1
10 TABLET ORAL
Status: DISCONTINUED | OUTPATIENT
Start: 2018-08-15 | End: 2018-08-16 | Stop reason: HOSPADM

## 2018-08-15 RX ORDER — ROPIVACAINE HYDROCHLORIDE 5 MG/ML
INJECTION, SOLUTION EPIDURAL; INFILTRATION; PERINEURAL
Status: COMPLETED
Start: 2018-08-15 | End: 2018-08-15

## 2018-08-15 RX ORDER — LIDOCAINE HYDROCHLORIDE 10 MG/ML
INJECTION, SOLUTION EPIDURAL; INFILTRATION; INTRACAUDAL; PERINEURAL
Status: COMPLETED
Start: 2018-08-15 | End: 2018-08-15

## 2018-08-15 RX ORDER — CELECOXIB 200 MG/1
CAPSULE ORAL
Status: COMPLETED
Start: 2018-08-15 | End: 2018-08-15

## 2018-08-15 RX ORDER — SCOLOPAMINE TRANSDERMAL SYSTEM 1 MG/1
1 PATCH, EXTENDED RELEASE TRANSDERMAL
Status: DISCONTINUED | OUTPATIENT
Start: 2018-08-15 | End: 2018-08-16 | Stop reason: HOSPADM

## 2018-08-15 RX ORDER — HYDROMORPHONE HYDROCHLORIDE 2 MG/ML
0.5 INJECTION, SOLUTION INTRAMUSCULAR; INTRAVENOUS; SUBCUTANEOUS
Status: DISCONTINUED | OUTPATIENT
Start: 2018-08-15 | End: 2018-08-16 | Stop reason: HOSPADM

## 2018-08-15 RX ORDER — AMOXICILLIN 250 MG
1 CAPSULE ORAL
Status: DISCONTINUED | OUTPATIENT
Start: 2018-08-15 | End: 2018-08-16 | Stop reason: HOSPADM

## 2018-08-15 RX ORDER — HALOPERIDOL 5 MG/ML
1 INJECTION INTRAMUSCULAR EVERY 6 HOURS PRN
Status: DISCONTINUED | OUTPATIENT
Start: 2018-08-15 | End: 2018-08-16 | Stop reason: HOSPADM

## 2018-08-15 RX ORDER — EMTRICITABINE AND TENOFOVIR DISOPROXIL FUMARATE 200; 300 MG/1; MG/1
1 TABLET, FILM COATED ORAL ONCE
Status: COMPLETED | OUTPATIENT
Start: 2018-08-15 | End: 2018-08-15

## 2018-08-15 RX ORDER — ONDANSETRON 4 MG/1
4 TABLET, ORALLY DISINTEGRATING ORAL EVERY 4 HOURS PRN
Status: DISCONTINUED | OUTPATIENT
Start: 2018-08-15 | End: 2018-08-16 | Stop reason: HOSPADM

## 2018-08-15 RX ORDER — DIPHENHYDRAMINE HCL 25 MG
25 TABLET ORAL NIGHTLY PRN
Status: DISCONTINUED | OUTPATIENT
Start: 2018-08-16 | End: 2018-08-16 | Stop reason: HOSPADM

## 2018-08-15 RX ORDER — DOCUSATE SODIUM 100 MG/1
100 CAPSULE, LIQUID FILLED ORAL 2 TIMES DAILY
Status: DISCONTINUED | OUTPATIENT
Start: 2018-08-15 | End: 2018-08-16 | Stop reason: HOSPADM

## 2018-08-15 RX ADMIN — OXYCODONE HYDROCHLORIDE 10 MG: 10 TABLET ORAL at 21:27

## 2018-08-15 RX ADMIN — LIDOCAINE HYDROCHLORIDE: 10 INJECTION, SOLUTION EPIDURAL; INFILTRATION; INTRACAUDAL; PERINEURAL at 06:15

## 2018-08-15 RX ADMIN — ACETAMINOPHEN 500 MG: 500 TABLET, FILM COATED ORAL at 12:13

## 2018-08-15 RX ADMIN — OXYCODONE HYDROCHLORIDE 10 MG: 10 TABLET ORAL at 15:27

## 2018-08-15 RX ADMIN — ASPIRIN 81 MG: 81 TABLET, COATED ORAL at 18:02

## 2018-08-15 RX ADMIN — SODIUM CHLORIDE, SODIUM LACTATE, POTASSIUM CHLORIDE, CALCIUM CHLORIDE 1000 ML: 600; 310; 30; 20 INJECTION, SOLUTION INTRAVENOUS at 06:15

## 2018-08-15 RX ADMIN — INSULIN GLARGINE 32 UNITS: 100 INJECTION, SOLUTION SUBCUTANEOUS at 21:31

## 2018-08-15 RX ADMIN — METFORMIN HYDROCHLORIDE 1000 MG: 500 TABLET, FILM COATED ORAL at 12:13

## 2018-08-15 RX ADMIN — SODIUM CHLORIDE: 9 INJECTION, SOLUTION INTRAVENOUS at 11:11

## 2018-08-15 RX ADMIN — ACETAMINOPHEN 1000 MG: 500 TABLET, COATED ORAL at 06:15

## 2018-08-15 RX ADMIN — GABAPENTIN 300 MG: 300 CAPSULE ORAL at 18:02

## 2018-08-15 RX ADMIN — Medication 1 G: at 07:19

## 2018-08-15 RX ADMIN — LISINOPRIL 10 MG: 5 TABLET ORAL at 12:13

## 2018-08-15 RX ADMIN — DEXAMETHASONE SODIUM PHOSPHATE 4 MG: 4 INJECTION, SOLUTION INTRAMUSCULAR; INTRAVENOUS at 10:56

## 2018-08-15 RX ADMIN — KETOROLAC TROMETHAMINE 15 MG: 30 INJECTION, SOLUTION INTRAMUSCULAR at 11:14

## 2018-08-15 RX ADMIN — INSULIN HUMAN 6 UNITS: 100 INJECTION, SOLUTION PARENTERAL at 17:54

## 2018-08-15 RX ADMIN — FENOFIBRATE 134 MG: 134 CAPSULE ORAL at 12:12

## 2018-08-15 RX ADMIN — DOCUSATE SODIUM 100 MG: 100 CAPSULE, LIQUID FILLED ORAL at 18:01

## 2018-08-15 RX ADMIN — EMTRICITABINE AND TENOFOVIR DISOPROXIL FUMARATE 1 TABLET: 200; 300 TABLET, FILM COATED ORAL at 18:05

## 2018-08-15 RX ADMIN — INSULIN HUMAN 8 UNITS: 100 INJECTION, SOLUTION PARENTERAL at 21:31

## 2018-08-15 RX ADMIN — STANDARDIZED SENNA CONCENTRATE AND DOCUSATE SODIUM 1 TABLET: 8.6; 5 TABLET, FILM COATED ORAL at 21:27

## 2018-08-15 RX ADMIN — INSULIN HUMAN 3 UNITS: 100 INJECTION, SOLUTION PARENTERAL at 11:19

## 2018-08-15 RX ADMIN — CELECOXIB 200 MG: 200 CAPSULE ORAL at 06:15

## 2018-08-15 RX ADMIN — ACETAMINOPHEN 500 MG: 500 TABLET, FILM COATED ORAL at 18:02

## 2018-08-15 RX ADMIN — TRANEXAMIC ACID 1000 MG: 100 INJECTION, SOLUTION INTRAVENOUS at 09:46

## 2018-08-15 RX ADMIN — METFORMIN HYDROCHLORIDE 1000 MG: 500 TABLET, FILM COATED ORAL at 18:02

## 2018-08-15 RX ADMIN — TAMSULOSIN HYDROCHLORIDE 0.4 MG: 0.4 CAPSULE ORAL at 12:12

## 2018-08-15 RX ADMIN — SODIUM CHLORIDE 2 G: 9 INJECTION, SOLUTION INTRAVENOUS at 15:28

## 2018-08-15 RX ADMIN — DOCUSATE SODIUM 100 MG: 100 CAPSULE, LIQUID FILLED ORAL at 12:12

## 2018-08-15 RX ADMIN — RALTEGRAVIR 400 MG: 400 TABLET, FILM COATED ORAL at 18:05

## 2018-08-15 RX ADMIN — KETOROLAC TROMETHAMINE 15 MG: 30 INJECTION, SOLUTION INTRAMUSCULAR at 18:03

## 2018-08-15 ASSESSMENT — COGNITIVE AND FUNCTIONAL STATUS - GENERAL
HELP NEEDED FOR BATHING: A LITTLE
WALKING IN HOSPITAL ROOM: A LITTLE
DRESSING REGULAR LOWER BODY CLOTHING: A LITTLE
SUGGESTED CMS G CODE MODIFIER DAILY ACTIVITY: CK
TOILETING: A LITTLE
CLIMB 3 TO 5 STEPS WITH RAILING: A LOT
DAILY ACTIVITIY SCORE: 17
EATING MEALS: A LOT
MOBILITY SCORE: 17
PERSONAL GROOMING: A LITTLE
MOVING TO AND FROM BED TO CHAIR: A LITTLE
STANDING UP FROM CHAIR USING ARMS: A LITTLE
DRESSING REGULAR UPPER BODY CLOTHING: A LITTLE
TURNING FROM BACK TO SIDE WHILE IN FLAT BAD: A LITTLE
SUGGESTED CMS G CODE MODIFIER MOBILITY: CK
MOVING FROM LYING ON BACK TO SITTING ON SIDE OF FLAT BED: A LITTLE

## 2018-08-15 ASSESSMENT — PAIN SCALES - GENERAL
PAINLEVEL_OUTOF10: 4
PAINLEVEL_OUTOF10: 3
PAINLEVEL_OUTOF10: 5
PAINLEVEL_OUTOF10: ASSUMED PAIN PRESENT
PAINLEVEL_OUTOF10: ASSUMED PAIN PRESENT
PAINLEVEL_OUTOF10: 5
PAINLEVEL_OUTOF10: 2
PAINLEVEL_OUTOF10: 3
PAINLEVEL_OUTOF10: 5
PAINLEVEL_OUTOF10: 2
PAINLEVEL_OUTOF10: 2
PAINLEVEL_OUTOF10: 5

## 2018-08-15 ASSESSMENT — LIFESTYLE VARIABLES
EVER_SMOKED: NEVER
TOTAL SCORE: 0
TOTAL SCORE: 0
ALCOHOL_USE: YES
HAVE PEOPLE ANNOYED YOU BY CRITICIZING YOUR DRINKING: NO
CONSUMPTION TOTAL: NEGATIVE
ON A TYPICAL DAY WHEN YOU DRINK ALCOHOL HOW MANY DRINKS DO YOU HAVE: 2
EVER HAD A DRINK FIRST THING IN THE MORNING TO STEADY YOUR NERVES TO GET RID OF A HANGOVER: NO
HAVE YOU EVER FELT YOU SHOULD CUT DOWN ON YOUR DRINKING: NO
TOTAL SCORE: 0
AVERAGE NUMBER OF DAYS PER WEEK YOU HAVE A DRINK CONTAINING ALCOHOL: 4
EVER FELT BAD OR GUILTY ABOUT YOUR DRINKING: NO
HOW MANY TIMES IN THE PAST YEAR HAVE YOU HAD 5 OR MORE DRINKS IN A DAY: 0

## 2018-08-15 ASSESSMENT — PATIENT HEALTH QUESTIONNAIRE - PHQ9
SUM OF ALL RESPONSES TO PHQ9 QUESTIONS 1 AND 2: 0
1. LITTLE INTEREST OR PLEASURE IN DOING THINGS: NOT AT ALL
2. FEELING DOWN, DEPRESSED, IRRITABLE, OR HOPELESS: NOT AT ALL

## 2018-08-15 NOTE — FLOWSHEET NOTE
08/15/18 1355   Incentive Spirometry Group   Incentive Spirometry Instruction Yes   Incentive Spirometer Volume 2000 mL   Chest Exam   Respiration 16   Pulse (!) 104   Breath Sounds   RUL Breath Sounds Clear   RML Breath Sounds Clear   RLL Breath Sounds Diminished   MARGARETH Breath Sounds Clear   LLL Breath Sounds Diminished   Oximetry   Continuous Oximetry Yes   Oxygen   Home O2 Use Prior To Admission? No   Pulse Oximetry 94 %   O2 (LPM) 0   O2 Daily Delivery Respiratory  Room Air with O2 Available

## 2018-08-15 NOTE — OP REPORT
Date of Surgery:  8-15-18  Pre-operative Diagnosis:  right knee arthritis    Post-operative Diagnosis:  right knee arthritis    Procedure:  right knee replacement    Implants used:  A [employer] II BCS total knee arthroplasty system with the following components  Femur: 6 right femur  Tibia: 5 right  Polyethylene: 10 mm BCS  Patella: 32 oval  Fixation: 2 packages simplex HV    Surgeon:  Amparo James MD    1st Assistant:   GREGORIO Dunn    Anesthesiologist:   CARLOS Story    EBL:  minimal    Specimen:  none    Findings:   Severe tricompartmental cartilage wear     Indications for procedure:  This patient is a 68 y.o. male with a long standing history of right knee arthritis. The patient had failed conservative therapy including anti-inflammatory medications, activity modifications, injections, physical therapy and assistive devices. He was indicated for a right total knee replacement. The patient expressed an understanding of the risks of pain, bleeding, infection, dislocation, malalignment, need for further surgery, damage to surrounding structures, neurovascular compromise, blood clots, leg-length discrepancy both apparent and actual, anesthetic complications, and serious medical consequences including but not limited to heart attack, stroke or even death. It was explained that the implants are man-made products and thus subject to possible failure.  The patient expressed an understanding and wished to proceed. Specific risks based on the patient's medical history were addressed. A clearance was obtained by the medical physicians and the patient was taken to the operating room on the day of surgery for the above named procedure.     Description of procedure:  The patient was met in the pre-operative holding area. The right knee was marked as the appropriate surgical site with indelible ink. They were taken to the operating room where the anesthesia department started a adductor for intraoperative and  "post-operative anesthesia and pain control. The patient was placed on the OR table and a tourniquet was placed high on the operative thigh. All bony prominences were padded appropriately. The right knee was prepped and draped in a standard sterile surgical fashion. A time out procedure was called to verify the side and site of surgery, the proposed surgical procedure, and the administration of pre-operative antibiotics. After successful completion of the time out procedure, our attention was turned to the right knee.  The tourniquet was inflated after exsanguination with an Esmarch bandage. The knee was flexed and a straight incision was made just medial to the midline. The capsule of the knee was cleared and a midvastus arthrotomy was performed. The patella was subluxated laterally and a medial release was performed to properly visualize the posteromedial aspect of the tibial plateau. The knee was extended, the patellar tendon was protected and the infrapatellar fat pad was excised. A lateral release was performed. The patella was turned on its side and held in place with two penetrating towel clips. A free-hand patellar cut was made, the patella was sized and the appropriate lug holes were drilled. The patella was subluxed, the knee was flexed. The tourniquet was released. Hemostasis was obtained. Each hemijoint was cleared of soft tissue. The ACL was removed. The femoral canal was entered with the step drill and the distal femoral cutting guide was pinned in place in 5 degrees of valgus. The distal cut was made and the bony pieces were removed. The femur was sized to a size 6 and the appropriate cutting jig was pinned in place in 3 degrees of external rotation. The bony cuts were made, we noted a \"grand piano sign\" anteriorly. The box for the PS post was cut in the standard fashion.    Now the femur was retracted posteriorly with a lap sponge to protect the intercondylar area. The proximal tibia was exposed, the " depth of our resection was based on taking 2 mm off the low side of the bone. We used the intramedullary drill to enter the tibial canal and set our alignment based on the patient's anatomy.  We first set our depth and then our checked our varus/valgus alignment with the extramedullary guide shade. We made our bony cuts and removed the tibial piece en bloc. The laminar spreaders were placed and the hypercurved osteotomes were used to remove posterior femoral osteophytes. The ligaments were balanced in flexion and extension.   The tibia was then sized to a size 5 and the trial component was placed in the proper amount of external rotation. A trial femoral component was placed and with the 10 mm BCS polyethylene we noted full extension without recurvatum and greater than 125 degrees of flexion with appropriate patellar tracking. At this point we removed the trial components and thoroughly irrigated the wound. Osteophytes were removed. The tourniquet was reinflated.  The real components were opened in a sterile fashion on the back table and then cemented into place sequentially, first the tibia, then the femur, then the patella. The cement was allowed to cure with the trial polyethylene component in place. After the cement had hardened and all excess cement was removed, the knee was taken through a range of motion. Again we noted full extension and greater than 125 degrees of flexion with appropriate varus/valgus stability and patellar tracking. Therefore the real polyethylene was opened and inserted into the tibial tray. An audible click was heard as it engaged the tibia. Now a dilute betadine solution with 3 grams of tranexamic acid was instilled into the knee for 3 minutes before being pulse-lavaged out. The knee was flexed, the arthrotomy was closed with #1 Quill, followed by 2-0 Vicryl in the deep dermal layer in a buried interrupted fashion. The skin was closed with 3-0 monocryl in a running subcuticular fashion,  and a sterile Prevena dressing was applied. The patient is currently in the operating room awaiting reversal of anesthesia.

## 2018-08-15 NOTE — PROGRESS NOTES
2 RN skin assessment complete, skin not WDL. See wound flowsheet. Surgical incision R knee, proveena dressing in place, dressing CDI

## 2018-08-15 NOTE — PROGRESS NOTES
Received report from Rosanne CANELA, assumed pt care. Pt transferred from PACU to [facility] room 217. Pt A&Ox4, reports pain in R knee. Dressing to knee CDI, +CMS, cap refill less than 3 seconds, pt able to move leg w/o difficulty. Polar ice to knee. Pt d/t void post-op by 1620. Pt taught to inform nurse if experiencing pain above comfort goal, SOB, chest pain, ambulating out of bed, or for any further assistance. Fall precautions in place, call light within reach. Will continue with care.

## 2018-08-15 NOTE — OR NURSING
"0909 Pt to PACU from OR via hospital bed, respirations spontaneous and non-labored via nasal airway. Right knee surgical sites clean, dry and intact, pt arousable on calling. Ice pack placed on right knee surgical sight, 2+ right pedal pulse noted and cap refill < 2 seconds to right toes. Will keep pt for 1 hour in recovery per [facility] protocol.     0920 Pt calm resting, VSS.   0921 Nasal airway D/C'd   0935 Pt denies nausea reports mild pain declines analgesia at this time.   0950 Pt resting denies nausea and reports mild pain, declines analgesia, VSS.  1005 Pt reports being \"warm\" ice pack and cold towels applied for comfort. Pt denies chest pain, anxiety, pain and nausea.     1009 Pt meets criteria for transfer to hospital room.   1014 Report to ROLA Yusuf.  1019 Pt transported to hospital room.         "

## 2018-08-15 NOTE — CARE PLAN
Problem: Safety  Goal: Will remain free from injury    Intervention: Provide assistance with mobility  Bed in low position, traded socks on, call light within reach. Pt instructed to call nurse for any further needs.         Problem: Knowledge Deficit  Goal: Knowledge of disease process/condition, treatment plan, diagnostic tests, and medications will improve    Intervention: Assess knowledge level of disease process/condition, treatment plan, diagnostic tests, and medications  POC discussed with pt, pt verbalized understanding.        Problem: Pain Management  Goal: Pain level will decrease to patient's comfort goal    Intervention: Follow pain managment plan developed in collaboration with patient and Interdisciplinary Team  Pain assessed throughout shift, medicated as needed per MD order, see MAR.

## 2018-08-16 VITALS
OXYGEN SATURATION: 98 % | SYSTOLIC BLOOD PRESSURE: 120 MMHG | HEIGHT: 71 IN | TEMPERATURE: 98 F | HEART RATE: 93 BPM | BODY MASS INDEX: 34.86 KG/M2 | RESPIRATION RATE: 18 BRPM | DIASTOLIC BLOOD PRESSURE: 57 MMHG | WEIGHT: 249 LBS

## 2018-08-16 PROBLEM — M17.11 PRIMARY OSTEOARTHRITIS OF RIGHT KNEE: Status: ACTIVE | Noted: 2018-08-16

## 2018-08-16 LAB
HCT VFR BLD AUTO: 34.6 % (ref 42–52)
HGB BLD-MCNC: 11.7 G/DL (ref 14–18)

## 2018-08-16 PROCEDURE — 700105 HCHG RX REV CODE 258: Performed by: PHYSICIAN ASSISTANT

## 2018-08-16 PROCEDURE — 85018 HEMOGLOBIN: CPT

## 2018-08-16 PROCEDURE — 36415 COLL VENOUS BLD VENIPUNCTURE: CPT

## 2018-08-16 PROCEDURE — 700112 HCHG RX REV CODE 229: Performed by: PHYSICIAN ASSISTANT

## 2018-08-16 PROCEDURE — 85014 HEMATOCRIT: CPT

## 2018-08-16 PROCEDURE — A9270 NON-COVERED ITEM OR SERVICE: HCPCS | Performed by: PHYSICIAN ASSISTANT

## 2018-08-16 PROCEDURE — 700111 HCHG RX REV CODE 636 W/ 250 OVERRIDE (IP): Performed by: PHYSICIAN ASSISTANT

## 2018-08-16 PROCEDURE — G0378 HOSPITAL OBSERVATION PER HR: HCPCS

## 2018-08-16 PROCEDURE — 96372 THER/PROPH/DIAG INJ SC/IM: CPT

## 2018-08-16 PROCEDURE — 700102 HCHG RX REV CODE 250 W/ 637 OVERRIDE(OP): Performed by: PHYSICIAN ASSISTANT

## 2018-08-16 PROCEDURE — 96376 TX/PRO/DX INJ SAME DRUG ADON: CPT

## 2018-08-16 PROCEDURE — 82962 GLUCOSE BLOOD TEST: CPT

## 2018-08-16 RX ORDER — ASPIRIN 81 MG/1
81 TABLET ORAL 2 TIMES DAILY
Qty: 60 TAB | Refills: 0 | Status: SHIPPED | OUTPATIENT
Start: 2018-08-16 | End: 2018-09-12 | Stop reason: SDUPTHER

## 2018-08-16 RX ORDER — PSEUDOEPHEDRINE HCL 30 MG
100 TABLET ORAL 2 TIMES DAILY
Qty: 60 CAP | Refills: 0 | Status: SHIPPED | OUTPATIENT
Start: 2018-08-16 | End: 2018-09-12 | Stop reason: SDUPTHER

## 2018-08-16 RX ORDER — ACETAMINOPHEN 500 MG
500 TABLET ORAL EVERY 6 HOURS
Qty: 30 TAB | Refills: 0 | Status: SHIPPED | OUTPATIENT
Start: 2018-08-16 | End: 2019-07-30

## 2018-08-16 RX ORDER — OXYCODONE HYDROCHLORIDE 5 MG/1
5 TABLET ORAL EVERY 6 HOURS PRN
Qty: 60 TAB | Refills: 0 | Status: SHIPPED | OUTPATIENT
Start: 2018-08-16 | End: 2018-08-30

## 2018-08-16 RX ORDER — TRAMADOL HYDROCHLORIDE 50 MG/1
50-100 TABLET ORAL EVERY 6 HOURS PRN
Qty: 80 TAB | Refills: 1 | Status: SHIPPED | OUTPATIENT
Start: 2018-08-16 | End: 2018-08-30

## 2018-08-16 RX ADMIN — DOCUSATE SODIUM 100 MG: 100 CAPSULE, LIQUID FILLED ORAL at 05:16

## 2018-08-16 RX ADMIN — GABAPENTIN 300 MG: 300 CAPSULE ORAL at 05:16

## 2018-08-16 RX ADMIN — FENOFIBRATE 134 MG: 134 CAPSULE ORAL at 05:16

## 2018-08-16 RX ADMIN — DEXAMETHASONE SODIUM PHOSPHATE 8 MG: 4 INJECTION, SOLUTION INTRAMUSCULAR; INTRAVENOUS at 05:15

## 2018-08-16 RX ADMIN — ACETAMINOPHEN 500 MG: 500 TABLET, FILM COATED ORAL at 00:20

## 2018-08-16 RX ADMIN — KETOROLAC TROMETHAMINE 15 MG: 30 INJECTION, SOLUTION INTRAMUSCULAR at 05:16

## 2018-08-16 RX ADMIN — ASPIRIN 81 MG: 81 TABLET, COATED ORAL at 05:17

## 2018-08-16 RX ADMIN — OMEPRAZOLE 20 MG: 20 CAPSULE, DELAYED RELEASE ORAL at 05:17

## 2018-08-16 RX ADMIN — KETOROLAC TROMETHAMINE 15 MG: 30 INJECTION, SOLUTION INTRAMUSCULAR at 00:20

## 2018-08-16 RX ADMIN — SODIUM CHLORIDE 2 G: 9 INJECTION, SOLUTION INTRAVENOUS at 00:20

## 2018-08-16 RX ADMIN — ACETAMINOPHEN 500 MG: 500 TABLET, FILM COATED ORAL at 05:17

## 2018-08-16 RX ADMIN — INSULIN HUMAN 2 UNITS: 100 INJECTION, SOLUTION PARENTERAL at 05:23

## 2018-08-16 RX ADMIN — OXYCODONE HYDROCHLORIDE 10 MG: 10 TABLET ORAL at 07:02

## 2018-08-16 ASSESSMENT — PAIN SCALES - GENERAL: PAINLEVEL_OUTOF10: 1

## 2018-08-16 NOTE — DISCHARGE SUMMARY
Richard Chery was admitted on 8/15/2018 for UNILATERAL PRIMARY OA AND PAIN RIGHT KNEE  Osteoarthritis of right knee  Osteoarthritis of right knee  Patient was diagnosed with severe degenerative arthritis in the right knee and underwent a right total knee arthroplasty by Dr. Amparo James on the date of admission. Please see dictated operative note for further information.    Hospital course: standard    The patient has done well, with no complications.  Patient denies chest pain, calf pain or shortness of breath.   Pain is well-controlled at present.  Patient is ambulating well with the use of an assistive device, and progressing in physical therapy.   Patient is neurologically and vascularly intact with palpable pedal pulses bilaterally.   Narcotic dosages were chosen by taking into account the the patient's previous history of opoid use and to ensure proper pain control after surgery    Discharge date: 8-16-18    Patient is being discharged to home after am physical therapy.     Allergies:  Patient has no known allergies.       Medication List      START taking these medications      Instructions   acetaminophen 500 MG Tabs  Commonly known as:  TYLENOL   Take 1 Tab by mouth every 6 hours.  Dose:  500 mg     docusate sodium 100 MG Caps   Take 100 mg by mouth 2 Times a Day.  Dose:  100 mg     oxyCODONE immediate-release 5 MG Tabs  Commonly known as:  ROXICODONE   Take 1 Tab by mouth every 6 hours as needed (for pain; can be alternated with Tramadol for pain control) for up to 14 days.  Dose:  5 mg     tramadol 50 MG Tabs  Commonly known as:  ULTRAM   Take 1-2 Tabs by mouth every 6 hours as needed (for pain; can be alternated with Oxycodone for pain control) for up to 14 days.  Dose:   mg        CHANGE how you take these medications      Instructions   aspirin 81 MG EC tablet  What changed:  when to take this   Take 1 Tab by mouth 2 times a day.  Dose:  81 mg        CONTINUE taking these medications       Instructions   fenofibrate 160 MG tablet  Commonly known as:  TRIGLIDE   Take 1 Tab by mouth every day.  Dose:  160 mg     gabapentin 300 MG Caps  Commonly known as:  NEURONTIN   Take 2 PO q am and 1 PO q pm     insulin glargine 100 UNIT/ML Sopn injection  Commonly known as:  LANTUS   Inject 32 Units as instructed every evening.  Dose:  32 Units     lisinopril 10 MG Tabs  Commonly known as:  PRINIVIL   Take 1 Tab by mouth every day.  Dose:  10 mg     metformin 1000 MG tablet  Commonly known as:  GLUCOPHAGE   Take 1 Tab by mouth 2 Times a Day.  Dose:  1000 mg     omeprazole 20 MG delayed-release capsule  Commonly known as:  PRILOSEC   Take 20 mg by mouth every day.  Dose:  20 mg     Potassium Gluconate 595 MG Caps   Take 99 mg by mouth every day.  Dose:  99 mg     SITagliptin 100 MG Tabs  Commonly known as:  JANUVIA   Take 1 Tab by mouth every day.  Dose:  100 mg            Discharge Instructions:     Patient is instructed to ambulate and weight bear as tolerated with the use of an assistive device, and to continue physical therapy exercises given during this hospital stay. Strict posterior hip precautions are to be observed for patients who underwent a total hip replacement.   Patient is to ice and elevate the surgical leg regularly, with pillows under the ankle, nothing is to be placed under the knee.   Patient was given detailed wound care instructions, and will leave the silver dressing on until first post-op visit.   ASA twice daily for DVT prophylaxis.  Patient is to follow up with Dr. James's office in 1-2 weeks.

## 2018-08-16 NOTE — PROGRESS NOTES
"Received report from Paramjit at 1855. Patient awake, alert, oriented with clear speech, sitting in cardiac chair. He is pleasant and conversational regarding his care for tonight. He expressed readiness to get up from chair and back to bed \"when you all have time, no rush.\" Assured him we would return shortly to help him back to bed. Patient has already walked, voided and sat in chair. He is saving his dinner plate for a bit later.   "

## 2018-08-16 NOTE — PROGRESS NOTES
Patient will be discharged today per Dr. James. Patient asked to be able to discharge without working with PT/OT since he has his appts set up already as well as a recent history of previous knee replacement. Dr. James gave the patient permission to leave the hospital without working with therapies.

## 2018-08-16 NOTE — CARE PLAN
Problem: Safety  Goal: Will remain free from falls  Outcome: PROGRESSING AS EXPECTED  Patient educated and compliant in use of call light when needing assistance.     Problem: Pain Management  Goal: Pain level will decrease to patient's comfort goal  Outcome: PROGRESSING AS EXPECTED  Patient reporting pain as tolerable, not requiring any pain medication for breakthrough pain. Block still providing good coverage.

## 2018-08-16 NOTE — PROGRESS NOTES
Bedside report received by NOC RN. Pt A&OX4, VS stable. CMS intact to RLE. Pt able to wiggle toes, dorsi/plantar flex. Denies numbness/tingling. Dressing CDI. Pt in no acute distress. Bed rails up X 2, call light and belongings within reach, patient encouraged to call for assistance. No needs at this time. Per Dr. Anton and RADHA RN, pt may d/c w/out therapy. Pt reminded and educated that PT/OT would be beneficial. Pt still refuses and states has a PT appointment tomorrow. Will d/c.

## 2018-08-16 NOTE — PROGRESS NOTES
S:  s/p right TKA  Pain controlled  No N/V  No Chest Pain/SOB  Afebrile  Exam:    NAD A& O x3    RLE: +DF/PF/EHL SILT L4/L5/S1  LLE:  +DF/PF/EHL SILT L4/L5/S1    Plan:  Continue standard plan of care  Weight bearing: as tolerated with walker/cane  DVT prophylaxis: SCD/Teds + ASA  Dispo: home today

## 2018-08-16 NOTE — DISCHARGE INSTRUCTIONS
Dr. James's Discharge Instructions:   The first week after your joint replacement is a time to recover from the surgery. We expect light exercise to keep you active and mobile. Dr. James requires a walker or cane for most patients in the first few days following surgery to try to prevent falls or complications.     Most patients are prescribed two medications for pain control: a narcotic such as Oxycodone or Hydrocodone and a milder medication called Tramadol. These can be alternated for pain control, and the priority is to decrease use of the stronger narcotic as soon as tolerated.   Take your pain medication as appropriate to ensure that your pain is not limiting your recovery. You'll be seen in clinic in 7-10 days (this appointment has already been made, call our office if you're unsure of the time). At that time, we'll prescribe your physical therapy to help with the recovery phase.     -Keep dressing clean and dry. Leave dressing in place until follow up. If you have an incisional vac dressing, the battery may die before your first post operative appointment, but you can leave the surgical dressing in place and it will be removed at your clinic visit. Call the office if you notice drainage from the surgical dressing.     -OK to shower, keep dressing in place. Pat dressing dry, do not rub incision     -Do not soak incision in bath, hot tub or pool     -Follow up with Dr. James at regularly scheduled time     -Call [facility] office at [phone] for questions     -Weight bearing status: as tolerated with walker/cane for support     -Take medication as prescribed for DVT prophylaxis  Discharge Instructions    Discharged to home by car with relative. Discharged via wheelchair, hospital escort: Yes.  Special equipment needed: Walker    Be sure to schedule a follow-up appointment with your primary care doctor or any specialists as instructed.     Discharge Plan:   Influenza Vaccine Indication: Not indicated:  Previously immunized this influenza season and > 8 years of age    I understand that a diet low in cholesterol, fat, and sodium is recommended for good health. Unless I have been given specific instructions below for another diet, I accept this instruction as my diet prescription.   Other diet: reg    Special Instructions: Discharge instructions for the Orthopedic Patient    Follow up with Primary Care Physician within 2 weeks of discharge to home, regarding:  Review of medications and diagnostic testing.  Surveillance for medical complications.  Workup and treatment of osteoporosis, if appropriate.     -Is this a Joint Replacement patient? Yes Total Joint Knee Replacement Discharge Instructions    Pain  - The goal is to slowly wean off the prescription pain medicine.  - Ice can be used for pain control.  20 minutes at a time is recommended, and never directly against your skin or incision.  - Most patients are off the pain pills by 3 weeks; others may require a low level of pain medications for many months.  If your pain continues to be severe, follow up with your physician.  Infection    Knee joint infections; occur in fewer than 2% of patients. The most common causes of infection following total knee replacement surgery are from bacteria that enter the bloodstream during dental procedures, urinary tract infections, or skin infections. These bacteria can lodge around your knee replacement and cause an infection.  - Keep the incision as clean and dry as possible.  - Always wash your hands before touching your incision.  - Skin infections tend to develop around 7-10 days after surgery; most can be treated with oral antibiotics.  - Dental Care should be delayed for 3 months after surgery, your surgeon recommends taking a dose of antibiotics 1 hour prior to any dental procedure. After 2 years, most surgeons recommend antibiotics only before an extensive procedure.  Ask your surgeon what he recommends.  - Signs and  symptoms of infection can include:  low grade fever, redness, pain, swelling and drainage from your incision.  Notify your surgeon immediately if you develop any of these symptoms.  Other instructions  - Bowel habits - constipation is extremely common and is caused by a combination of anesthesia, lack of mobility and pain medicine.  Use stool softeners or laxatives if necessary. It is important not to ignore this problem, as bowel obstructions can be a serious complication after joint replacement surgery.  - Mood/Energy Level - Many patients experience a lack of energy and endurance for up to 2-3 months after surgery.  Some may also feel down and can even become depressed.  This is likely due to the postoperative anemia, change in activity level, lack of sleep, pain medicine and just the emotional reaction to the surgery itself that is a big disruption in a person’s life.  This usually passes.  If symptoms persist, follow up with your primary physician.  - Returning to work - Your surgeon will give you more specific instructions. Depending on the type of activities you perform, it may be 6 to 8 weeks before you return to work.   Generally, if you work a sedentary job requiring little standing or walking, most patients may return within 2-6 weeks.  Manual labor jobs involving walking, lifting and standing may take longer. Your surgeon’s office can provide a release to part-time or light duty work early on in your recovery and progress you to full duty as able.    - Driving - If your left knee was replaced and you have an automatic transmission, you may be able to begin driving in a week or so, provided you are no longer taking narcotic pain medication. If your right knee was replaced, avoid driving for 6 to 8 weeks. Remember that your reflexes may not be as sharp as before your surgery. Ask your surgeon for specific instructions.   - Avoiding falls - A fall during the first few weeks after surgery can damage your new  knee and may result in a need for further surgery.   throw rugs and tack down loose carpeting.  Be aware of floor hazards such as pets, small objects or uneven surfaces.    - [employer] Metal Detectors - The sensitivity of metal detectors varies and it is likely that your prosthesis will cause an alarm.  Inform the  of your artificial joint.  Diet  - Resume your normal diet as tolerated.  - It is important to achieve a healthy nutritional status by eating a well balanced diet on a regular basis.  - Your physician may recommend that you take iron and vitamin supplements.   - Continue to drink plenty of fluids.  Shower/Bathing  - You may shower as soon as you get home from the hospital unless otherwise instructed.  - Keep your incision out of water.  To keep the incision dry when showering, cover it with a plastic bag or plastic wrap.  - Pat incision dry if it gets wet.  Don’t rub.  - Do not submerge in a bath until staples are out and the incision is completely healed. (Approximately 6-8 weeks)  Dressing Change:  Procedure (if recommended by your physician)  - Wash hands.  - Open all new dressing change materials.  - Remove old dressing and discard.  - Inspect incision for redness, increase in clear drainage, yellow/green drainage, odor and surrounding skin hot to touch.  -  ABD (large gauze) pad or “island dressing” by one corner and lay over the incision.  Be careful not to touch the inside of the dressing that will lay over the incision.  - Secure in place as instructed (Ace wrap or tape).    Swelling/Bruising    - Swelling can last from 3-6 months.  - Elevate your leg higher than your heart while reclining.   The first week you are home you should elevate your leg an equal amount of time, as you are active.    - Anti-inflammatory pills can be taken once you have stopped the blood thinners.  - The swelling is usually worse after you go home since you are upright for longer periods of  time.  - Bruising is common and can involve the entire leg including the thigh, calf and even foot. Bruising often does not appear until after you arrive home and it can be quite dramatic- purple, black, and green.  The bruising you can see is not usually concerning and will subside without any treatment.      Blood Clot Prevention  Blood clots in the legs and the less common, but frightening, clots that travel to the lungs are a real focus of our preventative. Most patients are at standard risk for them, but those patients who are at higher risk include people who have had previous clots, a family history of clotting, smoking, diabetes, obesity, advanced age, use of estrogen and a sedentary lifestyle.    - Signs of blood clots in legs - Swelling in thigh, calf or ankle that does not go down with elevation.  Pain, heat and tenderness in calf, back of calf or groin area.  NOTE: blood clots can occur in either leg.  - You have been receiving anticoagulant therapy (blood thinners) in the hospital and you may be instructed to continue at home depending on your risk factors.  - Your risk for developing a clot continues for up to 2-3 months after surgery.  You should avoid prolonged sitting and dehydration during that time (long air trips and car trips).  If you do take a trip during this time, please get up and move around every 1- 1.5 hours.  - If you are prescribed blood-thinning medication for home, follow instructions as directed. (Handouts provided if applicable).      Activity  Once home, you should continue to stay active. The key is to remember not to overdo it! While you can expect some good days and some bad days, you should notice a gradual improvement and a gradual increase in your endurance over the next 6 to 12 months. Exercise is a critical component of home care, particularly during the first few weeks after surgery.     - Normal activities of daily living You should be able to resume most within 3 to 6  weeks following surgery. Some pain with activity and at night is common for several weeks after surgery  -  Physical Therapy Exercises - Continue to do the exercises prescribed for at least two months after surgery. Riding a stationary bicycle can help maintain muscle tone and keep your knee flexible. Try to achieve the maximum degree of bending and extension possible. (handout provided by Therapist).  - Sexual Activity -. Your surgeon can tell you when it safe to resume sexual activity.    - Sleeping Positions - You can safely sleep on your back, on either side, or on your stomach.   - Other Activities - Walk as much as you like, but remember that walking is no substitute for the exercises your doctor and physical therapist will prescribe. Lower impact activities are preferred.  If you have specific questions, consult your Surgeon.    When to Call the Doctor   Call the physician if:   - Fever over 100.5? F  - Increased pain, drainage, redness, odor or heat around the incision area  - Shaking chills  - Increased knee pain with activity and rest  - Increased pain in calf, tenderness or redness above or below the knee  - Increased swelling of calf, ankle, foot  - Sudden increased shortness of breath, sudden onset of chest pain, localized chest pain with coughing  - Incision opening  Or, if there are any questions or concerns about medications or care.       -Is this patient being discharged with medication to prevent blood clots?  Yes, Aspirin Aspirin, ASA oral tablets  What is this medicine?  ASPIRIN (AS pir in) is a pain reliever. It is used to treat mild pain and fever. This medicine is also used as directed by a doctor to prevent and to treat heart attacks, to prevent strokes, and to treat arthritis or inflammation.  This medicine may be used for other purposes; ask your health care provider or pharmacist if you have questions.  COMMON BRAND NAME(S): Aspir-Low, Aspir-Katheryn, Aspirtab, Lupillo Advanced Aspirin, Lupillo  Aspirin, Lupillo Aspirin Extra Strength, Lupillo Aspirin Plus, Lupillo Extra Strength, Lupillo Extra Strength Plus, Lupillo Genuine Aspirin, Lupillo Womens Aspirin, Bufferin, Bufferin Extra Strength, Bufferin Low Dose  What should I tell my health care provider before I take this medicine?  They need to know if you have any of these conditions:  -anemia  -asthma  -bleeding problems  -child with chickenpox, the flu, or other viral infection  -diabetes  -gout  -if you frequently drink alcohol containing drinks  -kidney disease  -liver disease  -low level of vitamin K  -lupus  -smoke tobacco  -stomach ulcers or other problems  -an unusual or allergic reaction to aspirin, tartrazine dye, other medicines, dyes, or preservatives  -pregnant or trying to get pregnant  -breast-feeding  How should I use this medicine?  Take this medicine by mouth with a glass of water. Follow the directions on the package or prescription label. You can take this medicine with or without food. If it upsets your stomach, take it with food. Do not take your medicine more often than directed.  Talk to your pediatrician regarding the use of this medicine in children. While this drug may be prescribed for children as young as 12 years of age for selected conditions, precautions do apply. Children and teenagers should not use this medicine to treat chicken pox or flu symptoms unless directed by a doctor.  Patients over 65 years old may have a stronger reaction and need a smaller dose.  Overdosage: If you think you have taken too much of this medicine contact a poison control center or emergency room at once.  NOTE: This medicine is only for you. Do not share this medicine with others.  What if I miss a dose?  If you are taking this medicine on a regular schedule and miss a dose, take it as soon as you can. If it is almost time for your next dose, take only that dose. Do not take double or extra doses.  What may interact with this medicine?  Do not take this  medicine with any of the following medications:  -cidofovir  -ketorolac  -probenecid  This medicine may also interact with the following medications:  -alcohol  -alendronate  -bismuth subsalicylate  -flavocoxid  -herbal supplements like feverfew, garlic, sarah, ginkgo biloba, horse chestnut  -medicines for diabetes or glaucoma like acetazolamide, methazolamide  -medicines for gout  -medicines that treat or prevent blood clots like enoxaparin, heparin, ticlopidine, warfarin  -other aspirin and aspirin-like medicines  -NSAIDs, medicines for pain and inflammation, like ibuprofen or naproxen  -pemetrexed  -sulfinpyrazone  -varicella live vaccine  This list may not describe all possible interactions. Give your health care provider a list of all the medicines, herbs, non-prescription drugs, or dietary supplements you use. Also tell them if you smoke, drink alcohol, or use illegal drugs. Some items may interact with your medicine.  What should I watch for while using this medicine?  If you are treating yourself for pain, tell your doctor or health care professional if the pain lasts more than 10 days, if it gets worse, or if there is a new or different kind of pain. Tell your doctor if you see redness or swelling. Also, check with your doctor if you have a fever that lasts for more than 3 days. Only take this medicine to prevent heart attacks or blood clotting if prescribed by your doctor or health care professional.  Do not take aspirin or aspirin-like medicines with this medicine. Too much aspirin can be dangerous. Always read the labels carefully.  This medicine can irritate your stomach or cause bleeding problems. Do not smoke cigarettes or drink alcohol while taking this medicine. Do not lie down for 30 minutes after taking this medicine to prevent irritation to your throat.  If you are scheduled for any medical or dental procedure, tell your healthcare provider that you are taking this medicine. You may need to stop  taking this medicine before the procedure.  This medicine may be used to treat migraines. If you take migraine medicines for 10 or more days a month, your migraines may get worse. Keep a diary of headache days and medicine use. Contact your healthcare professional if your migraine attacks occur more frequently.  What side effects may I notice from receiving this medicine?  Side effects that you should report to your doctor or health care professional as soon as possible:  -allergic reactions like skin rash, itching or hives, swelling of the face, lips, or tongue  -breathing problems  -changes in hearing, ringing in the ears  -confusion  -general ill feeling or flu-like symptoms  -pain on swallowing  -redness, blistering, peeling or loosening of the skin, including inside the mouth or nose  -signs and symptoms of bleeding such as bloody or black, tarry stools; red or dark-brown urine; spitting up blood or brown material that looks like coffee grounds; red spots on the skin; unusual bruising or bleeding from the eye, gums, or nose  -trouble passing urine or change in the amount of urine  -unusually weak or tired  -yellowing of the eyes or skin  Side effects that usually do not require medical attention (report to your doctor or health care professional if they continue or are bothersome):  -diarrhea or constipation  -headache  -nausea, vomiting  -stomach gas, heartburn  This list may not describe all possible side effects. Call your doctor for medical advice about side effects. You may report side effects to FDA at 6-951-FDA-6834.  Where should I keep my medicine?  Keep out of the reach of children.  Store at room temperature between 15 and 30 degrees C (59 and 86 degrees F). Protect from heat and moisture. Do not use this medicine if it has a strong vinegar smell. Throw away any unused medicine after the expiration date.  NOTE: This sheet is a summary. It may not cover all possible information. If you have questions  about this medicine, talk to your doctor, pharmacist, or health care provider.  © 2018 [employer]/Gold Standard (2014-08-19 11:30:31)      · Is patient discharged on Warfarin / Coumadin?   No     Depression / Suicide Risk    As you are discharged from this [facility] facility, it is important to learn how to keep safe from harming yourself.    Recognize the warning signs:  · Abrupt changes in personality, positive or negative- including increase in energy   · Giving away possessions  · Change in eating patterns- significant weight changes-  positive or negative  · Change in sleeping patterns- unable to sleep or sleeping all the time   · Unwillingness or inability to communicate  · Depression  · Unusual sadness, discouragement and loneliness  · Talk of wanting to die  · Neglect of personal appearance   · Rebelliousness- reckless behavior  · Withdrawal from people/activities they love  · Confusion- inability to concentrate     If you or a loved one observes any of these behaviors or has concerns about self-harm, here's what you can do:  · Talk about it- your feelings and reasons for harming yourself  · Remove any means that you might use to hurt yourself (examples: pills, rope, extension cords, firearm)  · Get professional help from the community (Mental Health, Substance Abuse, psychological counseling)  · Do not be alone:Call your Safe Contact- someone whom you trust who will be there for you.  · Call your local CRISIS HOTLINE 666-1985 or 162-197-4087  · Call your local Children's Mobile Crisis Response Team [place] (974) 548-5487 or www.iRx Reminder  · Call the toll free National Suicide Prevention Hotlines   · National Suicide Prevention Lifeline 488-474-FIAN (7491)  · National Hope Line Network 800-SUICIDE (854-2359)

## 2018-08-16 NOTE — PROGRESS NOTES
Paged [facility] for Dr. James, pt states they take lantus 32units at night at home. Pt BS in 300's, new orders received to order pt home dose of lantus for bedtime. Also okay to give anti-virals.

## 2018-08-16 NOTE — PROGRESS NOTES
"Total Joint Replacement Program Rounding     Inpatient bedside rounding completed to address quality of care provided by total joint replacement program IDT and overall patient experience at [facility].    Patient Satisfaction addressed. Patient/family updated on POC during hospital stay.  Thorough safety education completed including use of call light prior to all mobility throughout the entirety of the hospital stay. Also educated on ankle pumps and incentive inspirometry use to prevent post-op complications.    Patient very eager to DC to home ASAP. He and his spouse both adamantly refuse need for acute PT/OT services as he states no acute needs; he has all home equipment, is up ambulating with the FWW, steady, per nursing report and has a history of L TKA with good outcome. Pt witnessed as up steady with FWW in room during rounding. Adjusted pt's FWW for correct height. Dr. James okayed him discharging without therapy earlier this morning per report. He has his OP PT set up at [facility].  He is able to verbalize correct sequencing of stairs when asked and has a rail and cane to use to ascend/descend safely. His spouse will be with him at all times as well for 3 wks. Educated pt and spouse on home safety. No further questions/concerns. Pt requested WC and RN sent in to complete discharge.     No further questions/concerns at this time. Please see \"MyRounding\" for further details of rounding discussion. RN updated.         "

## 2018-08-17 LAB — GLUCOSE BLD-MCNC: 175 MG/DL (ref 65–99)

## 2018-09-12 ENCOUNTER — OFFICE VISIT (OUTPATIENT)
Dept: MEDICAL GROUP | Facility: MEDICAL CENTER | Age: 68
End: 2018-09-12
Payer: MEDICARE

## 2018-09-12 VITALS
WEIGHT: 240 LBS | RESPIRATION RATE: 20 BRPM | HEART RATE: 76 BPM | BODY MASS INDEX: 34.36 KG/M2 | DIASTOLIC BLOOD PRESSURE: 56 MMHG | SYSTOLIC BLOOD PRESSURE: 118 MMHG | HEIGHT: 70 IN | TEMPERATURE: 97.3 F | OXYGEN SATURATION: 95 %

## 2018-09-12 DIAGNOSIS — I10 ESSENTIAL HYPERTENSION: ICD-10-CM

## 2018-09-12 DIAGNOSIS — E78.5 DYSLIPIDEMIA: ICD-10-CM

## 2018-09-12 DIAGNOSIS — E11.40 TYPE 2 DIABETES MELLITUS WITH DIABETIC NEUROPATHY, WITH LONG-TERM CURRENT USE OF INSULIN (HCC): ICD-10-CM

## 2018-09-12 DIAGNOSIS — E78.01 FAMILIAL HYPERCHOLESTEROLEMIA: ICD-10-CM

## 2018-09-12 DIAGNOSIS — Z23 NEED FOR VACCINATION: ICD-10-CM

## 2018-09-12 DIAGNOSIS — G62.9 NEUROPATHY: ICD-10-CM

## 2018-09-12 DIAGNOSIS — K21.9 GASTROESOPHAGEAL REFLUX DISEASE WITHOUT ESOPHAGITIS: ICD-10-CM

## 2018-09-12 DIAGNOSIS — Z79.4 TYPE 2 DIABETES MELLITUS WITH DIABETIC NEUROPATHY, WITH LONG-TERM CURRENT USE OF INSULIN (HCC): ICD-10-CM

## 2018-09-12 PROCEDURE — 90662 IIV NO PRSV INCREASED AG IM: CPT | Performed by: FAMILY MEDICINE

## 2018-09-12 PROCEDURE — 99214 OFFICE O/P EST MOD 30 MIN: CPT | Mod: 25 | Performed by: FAMILY MEDICINE

## 2018-09-12 PROCEDURE — G0008 ADMIN INFLUENZA VIRUS VAC: HCPCS | Performed by: FAMILY MEDICINE

## 2018-09-12 RX ORDER — LISINOPRIL 10 MG/1
10 TABLET ORAL DAILY
Qty: 90 TAB | Refills: 4 | Status: SHIPPED | OUTPATIENT
Start: 2018-09-12 | End: 2019-08-05 | Stop reason: SDUPTHER

## 2018-09-12 RX ORDER — PSEUDOEPHEDRINE HCL 30 MG
100 TABLET ORAL 2 TIMES DAILY PRN
Qty: 180 CAP | Refills: 4 | Status: SHIPPED | OUTPATIENT
Start: 2018-09-12 | End: 2019-07-30

## 2018-09-12 RX ORDER — GABAPENTIN 300 MG/1
600 CAPSULE ORAL 2 TIMES DAILY
Qty: 360 CAP | Refills: 4 | Status: SHIPPED | OUTPATIENT
Start: 2018-09-12 | End: 2019-08-05 | Stop reason: SDUPTHER

## 2018-09-12 RX ORDER — OMEPRAZOLE 20 MG/1
20 CAPSULE, DELAYED RELEASE ORAL DAILY
Qty: 90 CAP | Refills: 4 | Status: SHIPPED | OUTPATIENT
Start: 2018-09-12 | End: 2019-08-05 | Stop reason: SDUPTHER

## 2018-09-12 RX ORDER — FENOFIBRATE 160 MG/1
160 TABLET ORAL DAILY
Qty: 90 TAB | Refills: 4 | Status: SHIPPED | OUTPATIENT
Start: 2018-09-12 | End: 2019-02-15

## 2018-09-12 RX ORDER — CAPSAICIN 0.75 MG/G
CREAM TOPICAL
Qty: 1 TUBE | Refills: 3 | Status: SHIPPED | OUTPATIENT
Start: 2018-09-12 | End: 2019-07-30

## 2018-09-12 RX ORDER — ASPIRIN 81 MG/1
81 TABLET ORAL 2 TIMES DAILY
Qty: 180 TAB | Refills: 4 | Status: SHIPPED | OUTPATIENT
Start: 2018-09-12 | End: 2019-04-09

## 2018-09-12 NOTE — ASSESSMENT & PLAN NOTE
The patient has diabetic peripheral neuropathy. He is maintained on gabapentin but has been using this more frequently lately because his symptoms have worsened. He notes sharp, shooting pains in his bilateral feet, worse in the right foot. Gabapentin does help. Past TSH, Hep C and Hep B testing was normal. He has used up to 5 tablets of gabapentin per day.

## 2018-09-12 NOTE — ASSESSMENT & PLAN NOTE
Patient is maintained on Januvia, metformin and 32 units of Lantus at night for his diabetes. His last A1c was 7.2.

## 2018-09-18 NOTE — PROGRESS NOTES
Outcome: no answer no vm    Please transfer to Patient Outreach Team at [phone] when patient returns call.    Attempt # 3

## 2018-09-19 NOTE — PROGRESS NOTES
Outcome: no answer no VM/ person hung up    Please transfer to Patient Outreach Team at [phone] when patient returns call.    WebIZ Checked & Epic Updated:  yes    [facility] Verified: yes    Attempt # 4

## 2018-09-27 NOTE — PROGRESS NOTES
Outcome: hung up    Please transfer to Patient Outreach Team at [phone] when patient returns call.    Attempt # final

## 2018-12-10 ENCOUNTER — OFFICE VISIT (OUTPATIENT)
Dept: MEDICAL GROUP | Facility: MEDICAL CENTER | Age: 68
End: 2018-12-10
Payer: MEDICARE

## 2018-12-10 VITALS
DIASTOLIC BLOOD PRESSURE: 74 MMHG | BODY MASS INDEX: 34.88 KG/M2 | RESPIRATION RATE: 18 BRPM | WEIGHT: 249.12 LBS | HEART RATE: 82 BPM | SYSTOLIC BLOOD PRESSURE: 122 MMHG | HEIGHT: 71 IN | OXYGEN SATURATION: 96 % | TEMPERATURE: 98.1 F

## 2018-12-10 DIAGNOSIS — E11.40 TYPE 2 DIABETES MELLITUS WITH DIABETIC NEUROPATHY, WITH LONG-TERM CURRENT USE OF INSULIN (HCC): ICD-10-CM

## 2018-12-10 DIAGNOSIS — Z79.4 TYPE 2 DIABETES MELLITUS WITH DIABETIC NEUROPATHY, WITH LONG-TERM CURRENT USE OF INSULIN (HCC): ICD-10-CM

## 2018-12-10 DIAGNOSIS — I10 ESSENTIAL HYPERTENSION: ICD-10-CM

## 2018-12-10 DIAGNOSIS — M54.32 SCIATICA OF LEFT SIDE: ICD-10-CM

## 2018-12-10 DIAGNOSIS — G62.9 NEUROPATHY: ICD-10-CM

## 2018-12-10 PROCEDURE — 99214 OFFICE O/P EST MOD 30 MIN: CPT | Performed by: FAMILY MEDICINE

## 2018-12-10 RX ORDER — MELOXICAM 7.5 MG/1
7.5 TABLET ORAL DAILY
Qty: 7 TAB | Refills: 0 | Status: SHIPPED | OUTPATIENT
Start: 2018-12-10 | End: 2018-12-20 | Stop reason: SDUPTHER

## 2018-12-10 NOTE — PROGRESS NOTES
[facility]  Progress Note  Established Patient    Subjective:   Richard Chery is a 68 y.o. male here today with a chief complaint of sciatica. The patient is alone.     Sciatica of left side  Patient describes a 3-week history of left back, buttock and radiating leg pain that comes and goes but can be severe.  He is unable to describe it as either sharp or dull.  He states that stretching does not alleviate his pain.  He takes Tylenol, but is not sure if this helps.  He states that the weather is actually making the pain worse.  Overall, things are getting a little bit better.  Denies bowel or bladder incontinence or retention.  He denies perineal anesthesia.    Essential hypertension  Patient's blood pressure is at goal on his current medication regimen.    Neuropathy (HCC)  Patient states that his neuropathy pain has resolved.  He is currently taking gabapentin 600 mg twice daily and as needed capsicum.    Type 2 diabetes mellitus with diabetic neuropathy  Patient's diabetes is controlled with Januvia, metformin and Lantus.      Current Outpatient Prescriptions on File Prior to Visit   Medication Sig Dispense Refill   • gabapentin (NEURONTIN) 300 MG Cap Take 2 Caps by mouth 2 Times a Day. 360 Cap 4   • SITagliptin (JANUVIA) 100 MG Tab Take 1 Tab by mouth every day. 90 Tab 3   • omeprazole (PRILOSEC) 20 MG delayed-release capsule Take 1 Cap by mouth every day. 90 Cap 4   • metformin (GLUCOPHAGE) 1000 MG tablet Take 1 Tab by mouth 2 Times a Day. 180 Tab 4   • lisinopril (PRINIVIL) 10 MG Tab Take 1 Tab by mouth every day. 90 Tab 4   • insulin glargine (LANTUS SOLOSTAR) 100 UNIT/ML Solution Pen-injector injection Inject 32 Units as instructed every evening. 15 PEN 4   • fenofibrate (TRIGLIDE) 160 MG tablet Take 1 Tab by mouth every day. 90 Tab 4   • docusate sodium 100 MG Cap Take 100 mg by mouth 2 times a day as needed (constipation). 180 Cap 4   • aspirin 81 MG EC tablet Take 1 Tab by mouth 2 times  "a day. 180 Tab 4   • capsicum (ZOSTRIX) 0.075 % topical cream Apply a thin layer to skin QID PRN nerve pain. 1 Tube 3   • Potassium Gluconate 595 MG Cap Take 1 Cap by mouth every day. 90 Cap 4   • acetaminophen (TYLENOL) 500 MG Tab Take 1 Tab by mouth every 6 hours. 30 Tab 0     No current facility-administered medications on file prior to visit.        Past Medical History:   Diagnosis Date   • Alcohol use 2/10/2016    3 per day   • Arthritis 2016    left knee   • Dental disorder     upper lower dentures   • Diabetes 2005    insulin and oral agent   • Heart burn    • Hiatus hernia syndrome    • High cholesterol    • Hypertension    • Observed sleep apnea     no study done yet   • Pain     left knee   • Paroxysmal atrial fibrillation (HCC) 2/27/2016 11/14/16-resolved now   • Snoring        Allergies: Patient has no known allergies.    Surgical History:  has a past surgical history that includes hand surgery (Left, 1970); cystoscopy (1986); tonsillectomy (as child); dental extraction(s) (1976); knee arthroscopy (Left, 2/18/2016); medial meniscectomy (2/18/2016); knee arthroscopy (Left, 2/27/2016); knee arthroplasty total (Left, 11/21/2016); knee replacement, total (Left, 11/2016); and knee arthroplasty total (Right, 8/15/2018).    Family History: family history includes Diabetes in his unknown relative; Hypertension in his unknown relative.    Social History:  reports that he has never smoked. He has never used smokeless tobacco. He reports that he drinks about 1.8 oz of alcohol per week . He reports that he does not use drugs.    ROS: see HPI.        Objective:     Vitals:    12/10/18 0801   BP: 122/74   BP Location: Left arm   Patient Position: Sitting   BP Cuff Size: Large adult   Pulse: 82   Resp: 18   Temp: 36.7 °C (98.1 °F)   TempSrc: Temporal   SpO2: 96%   Weight: 113 kg (249 lb 1.9 oz)   Height: 1.803 m (5' 11\")       Physical Exam:  General: alert in no apparent distress.   MSK: Sensation intact in " bilateral lower extremities, though with some diminishment on R, 2+ DP pulses bilaterally.  Straight leg raise negative bilaterally.  No tenderness to palpation over the spine or hips.    Monofilament testing with a 10 gram force: sensation intact: intact bilaterally, though diminished on R.   Visual Inspection: Feet without maceration, ulcers, fissures.  Pedal pulses: intact bilaterally      Assessment and Plan:     1. Sciatica of left side  No red flags. Will Rx mobic x 1 week, discussed stretches. Advised patient to get x-rays in 3 weeks if no resolution.   - meloxicam (MOBIC) 7.5 MG Tab; Take 1 Tab by mouth every day for 7 days. Take with food.  Dispense: 7 Tab; Refill: 0  - DX-LUMBAR SPINE-2 OR 3 VIEWS; Future  - DX-HIP-BILATERAL-WITH PELVIS-2 VIEWS; Future    2. Type 2 diabetes mellitus with diabetic neuropathy, with long-term current use of insulin (HCC)  - continue current.   - f/u 4 weeks for dedicated visit.   - Diabetic Monofilament LE Exam    3. Essential hypertension  - continue current regimen.     4. Neuropathy (HCC)  Decreased pain.   - continue gabapentin and as-needed capsicum.         Followup: Return in about 4 weeks (around 1/7/2019), or if symptoms worsen or fail to improve, for DM.

## 2018-12-10 NOTE — ASSESSMENT & PLAN NOTE
Patient states that his neuropathy pain has resolved.  He is currently taking gabapentin 600 mg twice daily and as needed capsicum.

## 2018-12-10 NOTE — ASSESSMENT & PLAN NOTE
Patient describes a 3-week history of left back, buttock and radiating leg pain that comes and goes but can be severe.  He is unable to describe it as either sharp or dull.  He states that stretching does not alleviate his pain.  He takes Tylenol, but is not sure if this helps.  He states that the weather is actually making the pain worse.  Overall, things are getting a little bit better.  Denies bowel or bladder incontinence or retention.  He denies perineal anesthesia.

## 2018-12-20 DIAGNOSIS — M54.32 SCIATICA OF LEFT SIDE: ICD-10-CM

## 2018-12-20 RX ORDER — MELOXICAM 7.5 MG/1
7.5 TABLET ORAL DAILY
Qty: 30 TAB | Refills: 0 | Status: SHIPPED | OUTPATIENT
Start: 2018-12-20 | End: 2019-01-19

## 2018-12-27 ENCOUNTER — HOSPITAL ENCOUNTER (OUTPATIENT)
Dept: RADIOLOGY | Facility: MEDICAL CENTER | Age: 68
End: 2018-12-27
Attending: FAMILY MEDICINE
Payer: MEDICARE

## 2018-12-27 ENCOUNTER — TELEPHONE (OUTPATIENT)
Dept: MEDICAL GROUP | Facility: MEDICAL CENTER | Age: 68
End: 2018-12-27

## 2018-12-27 DIAGNOSIS — M54.32 SCIATICA OF LEFT SIDE: ICD-10-CM

## 2018-12-27 PROCEDURE — 72100 X-RAY EXAM L-S SPINE 2/3 VWS: CPT

## 2018-12-27 PROCEDURE — 73521 X-RAY EXAM HIPS BI 2 VIEWS: CPT

## 2018-12-27 NOTE — TELEPHONE ENCOUNTER
Pt left a mess stating that he got his x-ray results.   Dr. Yuen, would you please interpret, thanks,

## 2018-12-31 ENCOUNTER — TELEPHONE (OUTPATIENT)
Dept: MEDICAL GROUP | Facility: MEDICAL CENTER | Age: 68
End: 2018-12-31

## 2018-12-31 DIAGNOSIS — M54.32 SCIATICA OF LEFT SIDE: ICD-10-CM

## 2018-12-31 NOTE — TELEPHONE ENCOUNTER
----- Message from Wayne Yuen M.D. sent at 12/31/2018 10:14 AM PST -----  Please call and inform this patient of the following:  His x-rays show arthritis and some forward movement of the spine. I have placed a referral for him to see a physiatrist (a nonsurgical pain specialist).

## 2019-01-07 ENCOUNTER — TELEPHONE (OUTPATIENT)
Dept: MEDICAL GROUP | Facility: MEDICAL CENTER | Age: 69
End: 2019-01-07

## 2019-01-07 NOTE — TELEPHONE ENCOUNTER
----- Message from Wayne Yuen M.D. sent at 12/10/2018  8:22 AM PST -----  Regarding: F/u  Needs appt for diabets.

## 2019-01-07 NOTE — TELEPHONE ENCOUNTER
Spoke with pt and he was able to get a 40 minute vilma on:  Future Appointments       Provider Department Center    2/15/2019 10:40 AM Wayne Yuen M.D. [facility]      Let me know if pt is ok to wait this long.

## 2019-01-15 DIAGNOSIS — M54.32 SCIATICA OF LEFT SIDE: ICD-10-CM

## 2019-01-15 RX ORDER — MELOXICAM 7.5 MG/1
7.5 TABLET ORAL DAILY
Qty: 30 TAB | Refills: 1 | OUTPATIENT
Start: 2019-01-15 | End: 2019-02-14

## 2019-01-15 NOTE — TELEPHONE ENCOUNTER
Was the patient seen in the last year in this department? Yes Pt with an vilma with the pain specialist on Thursday  Does patient have an active prescription for medications requested? No   Received Request Via: Patient

## 2019-01-15 NOTE — TELEPHONE ENCOUNTER
Let's see what pain management has to say before continuing. He should have enough medicine to get him to that appointment.

## 2019-02-13 ENCOUNTER — TELEPHONE (OUTPATIENT)
Dept: MEDICAL GROUP | Facility: MEDICAL CENTER | Age: 69
End: 2019-02-13

## 2019-02-13 NOTE — TELEPHONE ENCOUNTER
ESTABLISHED PATIENT PRE-VISIT PLANNING     Patient was NOT contacted to complete PVP.     Note: Patient will not be contacted if there is no indication to call.     1.  Reviewed notes from the last few office visits within the medical group: Yes    2.  If any orders were placed at last visit or intended to be done for this visit (i.e. 6 mos follow-up), do we have Results/Consult Notes?        •  Labs - Labs were not ordered at last office visit.   Note: If patient appointment is for lab review and patient did not complete labs, check with provider if OK to reschedule patient until labs completed.       •  Imaging - Imaging ordered, completed and results are in chart.       •  Referrals - Unable to get a hold of [facility] to see if pt. Has been seen.    3. Is this appointment scheduled as a Hospital Follow-Up? No    4.  Immunizations were updated in Epic using WebIZ?: Epic matches WebIZ       •  Web Iz Recommendations: TD and SHINGRIX (Shingles)    5.  Patient is due for the following Health Maintenance Topics:   Health Maintenance Due   Topic Date Due   • IMM HEP B VACCINE (1 of 3 - Risk 3-dose series) [birth year]1970   • IMM ZOSTER VACCINES (2 of 3) 08/22/2016   • Annual Wellness Visit  03/28/2018   • FASTING LIPID PROFILE  09/05/2018   • URINE ACR / MICROALBUMIN  09/05/2018   • A1C SCREENING  02/03/2019       6. Orders for overdue Health Maintenance topics pended in Pre-Charting? YES    7.  AHA (MDX) form printed for Provider? YES    8.  Patient was NOT informed to arrive 15 min prior to their scheduled appointment and bring in their medication bottles.

## 2019-02-15 ENCOUNTER — OFFICE VISIT (OUTPATIENT)
Dept: MEDICAL GROUP | Facility: MEDICAL CENTER | Age: 69
End: 2019-02-15
Payer: MEDICARE

## 2019-02-15 VITALS
BODY MASS INDEX: 34.85 KG/M2 | TEMPERATURE: 97.6 F | WEIGHT: 248.9 LBS | DIASTOLIC BLOOD PRESSURE: 68 MMHG | OXYGEN SATURATION: 98 % | RESPIRATION RATE: 16 BRPM | HEIGHT: 71 IN | SYSTOLIC BLOOD PRESSURE: 120 MMHG | HEART RATE: 84 BPM

## 2019-02-15 DIAGNOSIS — E11.40 TYPE 2 DIABETES MELLITUS WITH DIABETIC NEUROPATHY, WITH LONG-TERM CURRENT USE OF INSULIN (HCC): ICD-10-CM

## 2019-02-15 DIAGNOSIS — I10 ESSENTIAL HYPERTENSION: ICD-10-CM

## 2019-02-15 DIAGNOSIS — Z79.4 TYPE 2 DIABETES MELLITUS WITH DIABETIC NEUROPATHY, WITH LONG-TERM CURRENT USE OF INSULIN (HCC): ICD-10-CM

## 2019-02-15 DIAGNOSIS — I27.20 PULMONARY HYPERTENSION (HCC): ICD-10-CM

## 2019-02-15 DIAGNOSIS — Z23 NEED FOR VACCINATION: ICD-10-CM

## 2019-02-15 DIAGNOSIS — E78.01 FAMILIAL HYPERCHOLESTEROLEMIA: ICD-10-CM

## 2019-02-15 DIAGNOSIS — D64.9 ANEMIA, UNSPECIFIED TYPE: ICD-10-CM

## 2019-02-15 DIAGNOSIS — I48.0 PAROXYSMAL ATRIAL FIBRILLATION (HCC): Chronic | ICD-10-CM

## 2019-02-15 LAB
HBA1C MFR BLD: 7.6 % (ref ?–5.8)
INT CON NEG: NEGATIVE
INT CON POS: POSITIVE

## 2019-02-15 PROCEDURE — 83036 HEMOGLOBIN GLYCOSYLATED A1C: CPT | Performed by: FAMILY MEDICINE

## 2019-02-15 PROCEDURE — 8041 PR SCP AHA: Performed by: FAMILY MEDICINE

## 2019-02-15 PROCEDURE — 99214 OFFICE O/P EST MOD 30 MIN: CPT | Mod: 25 | Performed by: FAMILY MEDICINE

## 2019-02-15 RX ORDER — SIMVASTATIN 20 MG
20 TABLET ORAL EVERY EVENING
Qty: 90 TAB | Refills: 3 | Status: SHIPPED | OUTPATIENT
Start: 2019-02-15 | End: 2019-05-15 | Stop reason: SDUPTHER

## 2019-02-15 ASSESSMENT — PATIENT HEALTH QUESTIONNAIRE - PHQ9: CLINICAL INTERPRETATION OF PHQ2 SCORE: 0

## 2019-02-15 NOTE — ASSESSMENT & PLAN NOTE
Patient has paroxysmal atrial fibrillation.  He has seen cardiology who recommended anticoagulation.  The patient declined at that time.  He continues to decline today, despite understanding the risks of stroke.  He is rate controlled.

## 2019-02-15 NOTE — ASSESSMENT & PLAN NOTE
Hypoglycemic therapy: Lantus 32 units hs, metformin 1000 BID and januvia.   Last A1c: 02/15/19 was 7.6.   Aspirin: taking.   Statin: starting.  ACE: taking.   Urine Microalbumin: ordered.  Monofilament Exam:  Done 12/2019 and has neuropathy.   Retinal Screening:  Done 8/2018.   Pneumonia vaccine: received.

## 2019-02-15 NOTE — ASSESSMENT & PLAN NOTE
Patient has dyslipidemia.  He was previously unwilling to transition from his fibrate to a statin medicine.  He is now amenable to this change.

## 2019-02-15 NOTE — PROGRESS NOTES
Subjective:     Richard Chery is a 68 y.o. male here today for diabetes and Annual Health Assessment.    Type 2 diabetes mellitus with diabetic neuropathy  Hypoglycemic therapy: Lantus 32 units hs, metformin 1000 BID and januvia.   Last A1c: 02/15/19 was 7.6.   Aspirin: taking.   Statin: starting.  ACE: taking.   Urine Microalbumin: ordered.  Monofilament Exam:  Done 12/2019 and has neuropathy.   Retinal Screening:  Done 8/2018.   Pneumonia vaccine: received.    Anemia  Noted on labs while he was in the hospital.    Essential hypertension  Patient's blood pressure is at goal on his current medication regimen.    Hyperlipidemia  Patient has dyslipidemia.  He was previously unwilling to transition from his fibrate to a statin medicine.  He is now amenable to this change.    Paroxysmal atrial fibrillation  Patient has paroxysmal atrial fibrillation.  He has seen cardiology who recommended anticoagulation.  The patient declined at that time.  He continues to decline today, despite understanding the risks of stroke.  He is rate controlled.     Pulmonary hypertension (CMS-HCC)  Patient follows with cardiology for this issue.      Health Maintenance Summary                IMM HEP B VACCINE Overdue 2[birth year]19/1970     IMM ZOSTER VACCINES Overdue 8/22/2016      Done 6/27/2016 Imm Admin: Zoster Vaccine Live (ZVL) (Zostavax)    Annual Wellness Visit Overdue 3/28/2018      Done 3/27/2017 Visit Dx: Medicare annual wellness visit, initial    FASTING LIPID PROFILE Overdue 9/5/2018      Done 9/5/2017 LIPID PROFILE      Patient has more history with this topic...    URINE ACR / MICROALBUMIN Overdue 9/5/2018      Done 9/5/2017 MICROALBUMIN CREAT RATIO URINE     Patient has more history with this topic...    A1C SCREENING Overdue 2/3/2019      Done 8/3/2018 HEMOGLOBIN A1C      Patient has more history with this topic...    SERUM CREATININE Next Due 8/3/2019      Done 8/3/2018 COMP METABOLIC PANEL      Patient has more history with  this topic...    RETINAL SCREENING Next Due 8/8/2019      Done 8/8/2018 REFERRAL FOR RETINAL SCREENING EXAM    DIABETES MONOFILAMENT / LE EXAM Next Due 12/10/2019      Done 12/10/2018 AMB DIABETIC MONOFILAMENT LOWER EXTREMITY EXAM     Patient has more history with this topic...    IMM DTaP/Tdap/Td Vaccine Next Due 3/27/2027      Done 3/27/2017 Imm Admin: Tdap Vaccine    COLONOSCOPY Next Due 7/12/2028      Done 7/12/2018 REFERRAL TO GI FOR COLONOSCOPY     Patient has more history with this topic...           Annual Health Assessment Questions:     1.  Are you currently engaging in any exercise or physical activity? Yes     2.  How would you describe your mood or emotional well-being today? good    3.  Have you had any falls in the last year? No    4.  Have you noticed any problems with your balance or had difficulty walking? No    5.  In the last six months have you experienced any leakage of urine? Yes    6. DPA/Advanced Directive: Patient has Advanced Directive, but it is not on file. Instructed to bring in a copy to scan into their chart.    Current medicines (including changes today)  Current Outpatient Prescriptions   Medication Sig Dispense Refill   • simvastatin (ZOCOR) 20 MG Tab Take 1 Tab by mouth every evening. 90 Tab 3   • gabapentin (NEURONTIN) 300 MG Cap Take 2 Caps by mouth 2 Times a Day. 360 Cap 4   • SITagliptin (JANUVIA) 100 MG Tab Take 1 Tab by mouth every day. 90 Tab 3   • omeprazole (PRILOSEC) 20 MG delayed-release capsule Take 1 Cap by mouth every day. 90 Cap 4   • metformin (GLUCOPHAGE) 1000 MG tablet Take 1 Tab by mouth 2 Times a Day. 180 Tab 4   • lisinopril (PRINIVIL) 10 MG Tab Take 1 Tab by mouth every day. 90 Tab 4   • insulin glargine (LANTUS SOLOSTAR) 100 UNIT/ML Solution Pen-injector injection Inject 32 Units as instructed every evening. 15 PEN 4   • docusate sodium 100 MG Cap Take 100 mg by mouth 2 times a day as needed (constipation). 180 Cap 4   • aspirin 81 MG EC tablet Take 1 Tab by  "mouth 2 times a day. 180 Tab 4   • capsicum (ZOSTRIX) 0.075 % topical cream Apply a thin layer to skin QID PRN nerve pain. 1 Tube 3   • Potassium Gluconate 595 MG Cap Take 1 Cap by mouth every day. 90 Cap 4   • acetaminophen (TYLENOL) 500 MG Tab Take 1 Tab by mouth every 6 hours. 30 Tab 0     No current facility-administered medications for this visit.        He  has a past medical history of Alcohol use (2/10/2016); Arthritis (2016); Dental disorder; Diabetes (2005); Heart burn; Hiatus hernia syndrome; High cholesterol; Hypertension; Observed sleep apnea; Pain; Paroxysmal atrial fibrillation (HCC) (2/27/2016); and Snoring.    Patient has no known allergies.    He  reports that he has never smoked. He has never used smokeless tobacco. He reports that he drinks about 1.8 oz of alcohol per week . He reports that he does not use drugs.  Counseling given: Not Answered      ROS   No fever, no nausea     Objective:     Physical Exam:  Blood pressure 120/68, pulse 84, temperature 36.4 °C (97.6 °F), temperature source Temporal, resp. rate 16, height 1.803 m (5' 11\"), weight 112.9 kg (248 lb 14.4 oz), SpO2 98 %. Body mass index is 34.71 kg/m².     Cardio: irregularly irregular.   Resp: CTAB no w/r/r.     Assessment and Plan:     1. Pulmonary hypertension (HCC)  - f/u cardiology.     2. Type 2 diabetes mellitus with diabetic neuropathy, with long-term current use of insulin (HCC)  A1c not at goal. Reasonable goal for this patient would be < 7.5.   - continue januvia, metformin and insulin.   - discussed diet and exercise, will recheck in f/u.   - MICROALBUMIN CREAT RATIO URINE; Future  - Comp Metabolic Panel; Future    3. Anemia, unspecified type  - CBC WITH DIFFERENTIAL; Future    4. Essential hypertension  - continue current regimen.   - Comp Metabolic Panel; Future    5. Familial hypercholesterolemia  - stop fibrate, start simvastatin.   - Comp Metabolic Panel; Future  - Lipid Profile; Future  - simvastatin (ZOCOR) 20 MG " Tab; Take 1 Tab by mouth every evening.  Dispense: 90 Tab; Refill: 3    6. Paroxysmal atrial fibrillation (HCC)  I discussed with the patient his risk of stroke and recommended anticoagulation.  He declines.    Discussion today about general wellness and lifestyle habits:    · Engage in regular physical activity and social activities.  · Prevent falls and reduce trip hazards; using ambulatory aides, hearing and vision testing if appropriate.  · Steps to improve urinary incontinence.  · Advanced care planning.    Follow-Up: Return in about 4 months (around 6/15/2019), or if symptoms worsen or fail to improve, for DM.         PLEASE NOTE: This dictation was created using voice recognition software. I have made every reasonable attempt to correct obvious errors, but I expect that there are errors of grammar and possibly content that I did not discover before finalizing the note.

## 2019-04-09 ENCOUNTER — OFFICE VISIT (OUTPATIENT)
Dept: CARDIOLOGY | Facility: MEDICAL CENTER | Age: 69
End: 2019-04-09
Payer: MEDICARE

## 2019-04-09 VITALS
HEIGHT: 71 IN | HEART RATE: 88 BPM | SYSTOLIC BLOOD PRESSURE: 118 MMHG | OXYGEN SATURATION: 93 % | WEIGHT: 246 LBS | BODY MASS INDEX: 34.44 KG/M2 | DIASTOLIC BLOOD PRESSURE: 78 MMHG

## 2019-04-09 DIAGNOSIS — I27.20 PULMONARY HYPERTENSION (HCC): ICD-10-CM

## 2019-04-09 DIAGNOSIS — I48.20 CHRONIC ATRIAL FIBRILLATION (HCC): Primary | ICD-10-CM

## 2019-04-09 DIAGNOSIS — E78.5 DYSLIPIDEMIA: ICD-10-CM

## 2019-04-09 DIAGNOSIS — I10 ESSENTIAL HYPERTENSION: ICD-10-CM

## 2019-04-09 PROCEDURE — 99214 OFFICE O/P EST MOD 30 MIN: CPT | Performed by: INTERNAL MEDICINE

## 2019-04-09 RX ORDER — AMOXICILLIN 500 MG/1
CAPSULE ORAL
COMMUNITY
Start: 2019-02-05 | End: 2019-04-09

## 2019-04-09 RX ORDER — MELOXICAM 7.5 MG/1
TABLET ORAL
Refills: 3 | COMMUNITY
Start: 2019-03-30 | End: 2019-07-30

## 2019-04-09 ASSESSMENT — ENCOUNTER SYMPTOMS
BACK PAIN: 1
CARDIOVASCULAR NEGATIVE: 1

## 2019-04-09 NOTE — PROGRESS NOTES
Chief Complaint   Patient presents with   • Atrial Fibrillation       Subjective:   Richard Chery is a 68 -year-old man followed in cardiology for paroxysmal atrial fibrillation, and pulmonary hypertension without overt right heart failure.    Continues to do quite well with no cardiovascular complaints.  He reminds me of his difficult time with warfarin in the past.  He did quite well with Eliquis though states expense.    He comes in with his wife, both are very pleasant as usual.    He does continue to struggle quite a bit with sciatica pain mostly in his buttocks but radiating to his big toe.    Past Medical History:   Diagnosis Date   • Alcohol use 2/10/2016    3 per day   • Arthritis 2016    left knee   • Dental disorder     upper lower dentures   • Diabetes 2005    insulin and oral agent   • Heart burn    • Hiatus hernia syndrome    • High cholesterol    • Hypertension    • Observed sleep apnea     no study done yet   • Pain     left knee   • Paroxysmal atrial fibrillation (HCC) 2/27/2016 11/14/16-resolved now   • Snoring      Past Surgical History:   Procedure Laterality Date   • KNEE ARTHROPLASTY TOTAL Right 8/15/2018    Procedure: KNEE ARTHROPLASTY TOTAL;  Surgeon: Amparo James M.D.;  Location: [facility];  Service: Orthopedics   • KNEE ARTHROPLASTY TOTAL Left 11/21/2016    Procedure: KNEE ARTHROPLASTY TOTAL;  Surgeon: Amparo James M.D.;  Location: [facility];  Service:    • KNEE REPLACEMENT, TOTAL Left 11/2016    Dr. James   • KNEE ARTHROSCOPY Left 2/27/2016    Procedure: KNEE ARTHROSCOPY- I&D KNEE;  Surgeon: Corby Reyes M.D.;  Location: [facility];  Service:    • KNEE ARTHROSCOPY Left 2/18/2016    Procedure: KNEE ARTHROSCOPY, SAMUELS;  Surgeon: Marquise Cline M.D.;  Location: [facility];  Service:    • MEDIAL MENISCECTOMY  2/18/2016    Procedure: MEDIAL MENISCECTOMY - PARTIAL;  Surgeon: Marquise Cline M.D.;  Location: [facility];  Service:    • CYSTOSCOPY  1986   • DENTAL EXTRACTION(S)  1976    wisdom teeth   • HAND SURGERY Left 1970    trauma, amputation index and middle finger   • TONSILLECTOMY  as child     Family History   Problem Relation Age of Onset   • Diabetes Unknown    • Hypertension Unknown      Social History     Social History   • Marital status:      Spouse name: N/A   • Number of children: N/A   • Years of education: N/A     Occupational History   • Not on file.     Social History Main Topics   • Smoking status: Never Smoker   • Smokeless tobacco: Never Used   • Alcohol use 1.8 oz/week     3 Shots of liquor per week      Comment: 10 per week   • Drug use: No   • Sexual activity: Yes     Partners: Female     Other Topics Concern   • Not on file     Social History Narrative   • No narrative on file     No Known Allergies  Outpatient Encounter Prescriptions as of 4/9/2019   Medication Sig Dispense Refill   • meloxicam (MOBIC) 7.5 MG Tab   3   • rivaroxaban (XARELTO) 20 MG Tab tablet Take 1 Tab by mouth with dinner. 30 Tab 11   • simvastatin (ZOCOR) 20 MG Tab Take 1 Tab by mouth every evening. 90 Tab 3   • gabapentin (NEURONTIN) 300 MG Cap Take 2 Caps by mouth 2 Times a Day. 360 Cap 4   • SITagliptin (JANUVIA) 100 MG Tab Take 1 Tab by mouth every day. 90 Tab 3   • omeprazole (PRILOSEC) 20 MG delayed-release capsule Take 1 Cap by mouth every day. 90 Cap 4   • metformin (GLUCOPHAGE) 1000 MG tablet Take 1 Tab by mouth 2 Times a Day. 180 Tab 4   • lisinopril (PRINIVIL) 10 MG Tab Take 1 Tab by mouth every day. 90 Tab 4   • insulin glargine (LANTUS SOLOSTAR) 100 UNIT/ML Solution Pen-injector injection Inject 32 Units as instructed every evening. 15 PEN 4   • docusate sodium 100 MG Cap Take 100 mg by mouth 2 times a day as needed (constipation). 180 Cap 4   • capsicum (ZOSTRIX) 0.075 % topical cream Apply a thin layer to skin QID PRN nerve pain. 1 Tube 3   • Potassium Gluconate 595 MG Cap Take 1 Cap by mouth  "every day. 90 Cap 4   • acetaminophen (TYLENOL) 500 MG Tab Take 1 Tab by mouth every 6 hours. 30 Tab 0   • [DISCONTINUED] amoxicillin (AMOXIL) 500 MG Cap      • [DISCONTINUED] aspirin 81 MG EC tablet Take 1 Tab by mouth 2 times a day. 180 Tab 4     No facility-administered encounter medications on file as of 4/9/2019.      Review of Systems   Cardiovascular: Negative.    Musculoskeletal: Positive for back pain. Negative for joint pain.   All other systems reviewed and are negative.       Objective:   /78 (BP Location: Right arm, Patient Position: Sitting, BP Cuff Size: Adult)   Pulse 88   Ht 1.803 m (5' 11\")   Wt 111.6 kg (246 lb)   SpO2 93%   BMI 34.31 kg/m²     Physical Exam   Constitutional: He is oriented to person, place, and time. He appears well-developed and well-nourished. No distress.   Pleasant, centrally obese, elderly man accompanied by his wife in no distress.  Physical examination is unchanged compared to my previous on 7/13/2018 except where specified.   Eyes: Pupils are equal, round, and reactive to light. EOM are normal.   Neck: No JVD present.   Cardiovascular: Normal rate.  An irregularly irregular rhythm present. Exam reveals no gallop and no friction rub.    No murmur heard.  Pulmonary/Chest: Effort normal and breath sounds normal. No respiratory distress. He has no wheezes. He has no rales.   Abdominal: Soft. Bowel sounds are normal. He exhibits no distension.   Musculoskeletal: He exhibits edema (1+ bilateral lower extremity edema).   Neurological: He is alert and oriented to person, place, and time.   Skin: Skin is warm and dry. No rash noted. He is not diaphoretic. No erythema. No pallor.   Psychiatric: He has a normal mood and affect. Judgment and thought content normal.   Vitals reviewed.    Lab Results   Component Value Date/Time    WBC 8.2 08/03/2018 10:18 AM    RBC 4.70 08/03/2018 10:18 AM    HEMOGLOBIN 11.7 (L) 08/16/2018 07:19 AM    HEMATOCRIT 34.6 (L) 08/16/2018 07:19 AM "    MCV 91.1 2018 10:18 AM    MCH 31.1 2018 10:18 AM    MCHC 34.1 2018 10:18 AM    MPV 10.5 2018 10:18 AM        Lab Results   Component Value Date/Time    SODIUM 138 2018 10:18 AM    POTASSIUM 4.3 2018 10:18 AM    CHLORIDE 104 2018 10:18 AM    CO2 25 2018 10:18 AM    GLUCOSE 181 (H) 2018 10:18 AM    BUN 19 2018 10:18 AM    CREATININE 0.97 2018 10:18 AM        Lab Results   Component Value Date/Time    ASTSGOT 24 2018 10:18 AM    ALTSGPT 27 2018 10:18 AM        Lab Results   Component Value Date/Time    CHOLSTRLTOT 118 2017 06:33 AM    LDL 57 2017 06:33 AM    HDL 39 (A) 2017 06:33 AM    TRIGLYCERIDE 110 2017 06:33 AM         Results for orders placed or performed in visit on 18   EKG   Result Value Ref Range    Report       [facility]    Test Date:  2018  Pt Name:    [facility]               Department: [facility]  MRN:        [id number]                      Room:  Gender:     Male                         Technician: [facility]  :        1950                   Requested By:DWAYNE CHÁVEZ  Order #:    [id number]                    Reading MD: Dwayne Cháevz MD    Measurements  Intervals                                Axis  Rate:       89                           P:  FL:                                      QRS:        64  QRSD:       78                           T:          54  QT:         376  QTc:        458    Interpretive Statements  ATRIAL FIBRILLATION  EARLY PRECORDIAL R/S TRANSITION  Compared to ECG 2016 09:03:59  Sinus rhythm no longer present  Electronically Signed On 2018 9:02:53 PDT by Dwayne Chávez MD     Results for orders placed or performed in visit on 16   EKG   Result Value Ref Range    Report       [facility]    Test Date:  2016  Pt Name:    [facility]               Department: [facility]  MRN:        [id number]                       "Room:  Gender:     M                            Technician: TENZIN  :        1950                   Requested By:ANGELINERAMYA MONSALVE  Order #:    [id number]                    Reading MD: Conrado Britton MD    Measurements  Intervals                                Axis  Rate:       71                           P:          64  GA:         172                          QRS:        60  QRSD:       78                           T:          52  QT:         380  QTc:        413    Interpretive Statements  SINUS RHYTHM  EARLY PRECORDIAL R/S TRANSITION  No previous ECG available for comparison    Electronically Signed On 2016 6:08:20 PDT by Conrado Britton MD       Echocardiogram, 2016:  \"CONCLUSIONS  Technically difficult study.   Tachycardic during the study with hyperdynamic left ventricular   systolic function.  Structurally normal mitral valve without significant stenosis or   regurgitation.  The aortic valve and tricuspid valves were not well visualized.  Moderate tricuspid regurgitation.  Right ventricular systolic pressure is estimated to be 75 mmHg.  Consider IRENE if endocarditis is clinically suspected\"    Assessment:     1. Chronic atrial fibrillation (HCC)  rivaroxaban (XARELTO) 20 MG Tab tablet   2. Essential hypertension     3. Pulmonary hypertension (HCC)     4. Dyslipidemia         Medical Decision Making:  Today's Assessment / Status / Plan:     He is doing well today, and euvolemic on physical examination.  He continues to be asymptomatic and is chronic atrial fibrillation.  He was amenable at this point to initiate an oral anticoagulant and I prescribed Xarelto 20 mg p.o. nightly.  Otherwise, his blood pressure and cholesterol are reasonably controlled and I made no changes to his other medical regimen.    Dwayne Resendez MD  Cardiologist, [facility] [place]     Return in about 6 months (around 10/9/2019).      "

## 2019-04-09 NOTE — LETTER
Name:          Richard Chery   YOB: 1950  Date:     04/09/2019      Wayne Yuen M.D.  [address]  [place] [address]     Dwayne Resendez MD  [address], [address]  [place] [address]  Phone: [phone]  Back Line: [phone]  Fax: [phone]  E-mail: [email or web address]   Dear Dr. Yuen,    We had the pleasure of seeing your patient, Richard Chery, in Cardiology Clinic at [facility] today.    As you know, he is a 69-year-old man followed in cardiology for paroxysmal atrial fibrillation, and pulmonary hypertension without overt right heart failure.    He is doing well today, and euvolemic on physical examination.  He continues to be asymptomatic and is chronic atrial fibrillation.  He was amenable at this point to initiate an oral anticoagulant and I prescribed Xarelto 20 mg p.o. nightly.  Otherwise, his blood pressure and cholesterol are reasonably controlled and I made no changes to his other medical regimen.    Return in about 6 months (around 10/9/2019).    Thank you for the referral and please do not hesitate to contact me at any time. My contact information is listed above.    This note was dictated using Dragon speech recognition software.     A full note including my physical examination and a full list of rectified medications is available in our medical record, and can be faxed as well.    Dwayne Resendez MD  Cardiologist  [facility]

## 2019-04-22 ENCOUNTER — TELEPHONE (OUTPATIENT)
Dept: MEDICAL GROUP | Facility: MEDICAL CENTER | Age: 69
End: 2019-04-22

## 2019-04-22 ENCOUNTER — HOSPITAL ENCOUNTER (OUTPATIENT)
Dept: LAB | Facility: MEDICAL CENTER | Age: 69
End: 2019-04-22
Attending: FAMILY MEDICINE
Payer: MEDICARE

## 2019-04-22 DIAGNOSIS — E78.01 FAMILIAL HYPERCHOLESTEROLEMIA: ICD-10-CM

## 2019-04-22 DIAGNOSIS — Z79.4 TYPE 2 DIABETES MELLITUS WITH DIABETIC NEUROPATHY, WITH LONG-TERM CURRENT USE OF INSULIN (HCC): ICD-10-CM

## 2019-04-22 DIAGNOSIS — D64.9 ANEMIA, UNSPECIFIED TYPE: ICD-10-CM

## 2019-04-22 DIAGNOSIS — E11.40 TYPE 2 DIABETES MELLITUS WITH DIABETIC NEUROPATHY, WITH LONG-TERM CURRENT USE OF INSULIN (HCC): ICD-10-CM

## 2019-04-22 DIAGNOSIS — I10 ESSENTIAL HYPERTENSION: ICD-10-CM

## 2019-04-22 LAB
BASOPHILS # BLD AUTO: 0.5 % (ref 0–1.8)
BASOPHILS # BLD: 0.03 K/UL (ref 0–0.12)
CREAT UR-MCNC: 152.3 MG/DL
EOSINOPHIL # BLD AUTO: 0.16 K/UL (ref 0–0.51)
EOSINOPHIL NFR BLD: 2.4 % (ref 0–6.9)
ERYTHROCYTE [DISTWIDTH] IN BLOOD BY AUTOMATED COUNT: 46.8 FL (ref 35.9–50)
HCT VFR BLD AUTO: 45 % (ref 42–52)
HGB BLD-MCNC: 14.9 G/DL (ref 14–18)
IMM GRANULOCYTES # BLD AUTO: 0.01 K/UL (ref 0–0.11)
IMM GRANULOCYTES NFR BLD AUTO: 0.2 % (ref 0–0.9)
LYMPHOCYTES # BLD AUTO: 2.78 K/UL (ref 1–4.8)
LYMPHOCYTES NFR BLD: 41.8 % (ref 22–41)
MCH RBC QN AUTO: 31.7 PG (ref 27–33)
MCHC RBC AUTO-ENTMCNC: 33.1 G/DL (ref 33.7–35.3)
MCV RBC AUTO: 95.7 FL (ref 81.4–97.8)
MICROALBUMIN UR-MCNC: 0.7 MG/DL
MICROALBUMIN/CREAT UR: 5 MG/G (ref 0–30)
MONOCYTES # BLD AUTO: 0.61 K/UL (ref 0–0.85)
MONOCYTES NFR BLD AUTO: 9.2 % (ref 0–13.4)
NEUTROPHILS # BLD AUTO: 3.06 K/UL (ref 1.82–7.42)
NEUTROPHILS NFR BLD: 45.9 % (ref 44–72)
NRBC # BLD AUTO: 0 K/UL
NRBC BLD-RTO: 0 /100 WBC
PLATELET # BLD AUTO: 220 K/UL (ref 164–446)
PMV BLD AUTO: 10.4 FL (ref 9–12.9)
RBC # BLD AUTO: 4.7 M/UL (ref 4.7–6.1)
WBC # BLD AUTO: 6.7 K/UL (ref 4.8–10.8)

## 2019-04-22 PROCEDURE — 85025 COMPLETE CBC W/AUTO DIFF WBC: CPT

## 2019-04-22 PROCEDURE — 36415 COLL VENOUS BLD VENIPUNCTURE: CPT

## 2019-04-22 PROCEDURE — 80061 LIPID PANEL: CPT

## 2019-04-22 PROCEDURE — 82570 ASSAY OF URINE CREATININE: CPT

## 2019-04-22 PROCEDURE — 82043 UR ALBUMIN QUANTITATIVE: CPT

## 2019-04-22 PROCEDURE — 80053 COMPREHEN METABOLIC PANEL: CPT

## 2019-04-22 NOTE — TELEPHONE ENCOUNTER
ESTABLISHED PATIENT PRE-VISIT PLANNING     Patient was NOT contacted to complete PVP.     Note: Patient will not be contacted if there is no indication to call.     1.  Reviewed notes from the last few office visits within the medical group: Yes    2.  If any orders were placed at last visit or intended to be done for this visit (i.e. 6 mos follow-up), do we have Results/Consult Notes?        •  Labs - Labs ordered, completed on 04/22/2019 and results are in chart.   Note: If patient appointment is for lab review and patient did not complete labs, check with provider if OK to reschedule patient until labs completed.       •  Imaging - Imaging was not ordered at last office visit.       •  Referrals - No referrals were ordered at last office visit.    3. Is this appointment scheduled as a Hospital Follow-Up? No    4.  Immunizations were updated in Epic using WebIZ?: Epic matches WebIZ       •  Web Iz Recommendations: TD, VARICELLA (Chicken Pox)  and SHINGRIX (Shingles)    5.  Patient is due for the following Health Maintenance Topics:   Health Maintenance Due   Topic Date Due   • IMM HEP B VACCINE (1 of 3 - Risk 3-dose series) 02/19/1970   • IMM ZOSTER VACCINES (2 of 3) 08/22/2016   • Annual Wellness Visit  03/28/2018   • FASTING LIPID PROFILE  09/05/2018   • URINE ACR / MICROALBUMIN  09/05/2018       6. Orders for overdue Health Maintenance topics pended in Pre-Charting? NO    7.  AHA (MDX) form printed for Provider? No, already completed    8.  Patient was NOT informed to arrive 15 min prior to their scheduled appointment and bring in their medication bottles.

## 2019-04-23 LAB
ALBUMIN SERPL BCP-MCNC: 4.2 G/DL (ref 3.2–4.9)
ALBUMIN/GLOB SERPL: 1.6 G/DL
ALP SERPL-CCNC: 59 U/L (ref 30–99)
ALT SERPL-CCNC: 21 U/L (ref 2–50)
ANION GAP SERPL CALC-SCNC: 9 MMOL/L (ref 0–11.9)
AST SERPL-CCNC: 20 U/L (ref 12–45)
BILIRUB SERPL-MCNC: 0.7 MG/DL (ref 0.1–1.5)
BUN SERPL-MCNC: 16 MG/DL (ref 8–22)
CALCIUM SERPL-MCNC: 9.4 MG/DL (ref 8.5–10.5)
CHLORIDE SERPL-SCNC: 104 MMOL/L (ref 96–112)
CHOLEST SERPL-MCNC: 104 MG/DL (ref 100–199)
CO2 SERPL-SCNC: 27 MMOL/L (ref 20–33)
CREAT SERPL-MCNC: 0.92 MG/DL (ref 0.5–1.4)
FASTING STATUS PATIENT QL REPORTED: NORMAL
GLOBULIN SER CALC-MCNC: 2.7 G/DL (ref 1.9–3.5)
GLUCOSE SERPL-MCNC: 111 MG/DL (ref 65–99)
HDLC SERPL-MCNC: 52 MG/DL
LDLC SERPL CALC-MCNC: 25 MG/DL
POTASSIUM SERPL-SCNC: 4.7 MMOL/L (ref 3.6–5.5)
PROT SERPL-MCNC: 6.9 G/DL (ref 6–8.2)
SODIUM SERPL-SCNC: 140 MMOL/L (ref 135–145)
TRIGL SERPL-MCNC: 133 MG/DL (ref 0–149)

## 2019-04-25 ENCOUNTER — OFFICE VISIT (OUTPATIENT)
Dept: MEDICAL GROUP | Facility: MEDICAL CENTER | Age: 69
End: 2019-04-25
Payer: MEDICARE

## 2019-04-25 VITALS
RESPIRATION RATE: 16 BRPM | TEMPERATURE: 98.5 F | DIASTOLIC BLOOD PRESSURE: 64 MMHG | SYSTOLIC BLOOD PRESSURE: 106 MMHG | HEIGHT: 71 IN | BODY MASS INDEX: 34.3 KG/M2 | HEART RATE: 94 BPM | OXYGEN SATURATION: 96 % | WEIGHT: 245 LBS

## 2019-04-25 DIAGNOSIS — I10 ESSENTIAL HYPERTENSION: ICD-10-CM

## 2019-04-25 DIAGNOSIS — D64.9 ANEMIA, UNSPECIFIED TYPE: ICD-10-CM

## 2019-04-25 DIAGNOSIS — E78.01 FAMILIAL HYPERCHOLESTEROLEMIA: ICD-10-CM

## 2019-04-25 DIAGNOSIS — E11.40 TYPE 2 DIABETES MELLITUS WITH DIABETIC NEUROPATHY, WITH LONG-TERM CURRENT USE OF INSULIN (HCC): ICD-10-CM

## 2019-04-25 DIAGNOSIS — Z79.4 TYPE 2 DIABETES MELLITUS WITH DIABETIC NEUROPATHY, WITH LONG-TERM CURRENT USE OF INSULIN (HCC): ICD-10-CM

## 2019-04-25 PROCEDURE — 99214 OFFICE O/P EST MOD 30 MIN: CPT | Performed by: FAMILY MEDICINE

## 2019-04-25 NOTE — ASSESSMENT & PLAN NOTE
Patient's blood pressure is at goal on his current medication regimen.  He denies dizziness and lightheadedness.    negative...

## 2019-04-25 NOTE — ASSESSMENT & PLAN NOTE
Patient states his fasting blood sugars are in the high 90s.    Hypoglycemic therapy: Lantus 32 units hs, metformin 1000 BID and januvia.  Last A1c: 02/15/19 was 7.6.   Aspirin: taking.   Statin: Taking.  ACE: taking.   Urine Microalbumin: Done 2018 and normal.  Monofilament Exam:  Done 12/2019 and has neuropathy.   Retinal Screening:  Done 8/2018.   Pneumonia vaccine: received.

## 2019-04-25 NOTE — PROGRESS NOTES
[facility]  Progress Note  Established Patient    Subjective:   Richard Chery is a 69 y.o. male here today with a chief complaint of diabetes. The patient is alone.     Hyperlipidemia  Patient's cholesterol is under excellent control on simvastatin.    Essential hypertension  Patient's blood pressure is at goal on his current medication regimen.  He denies dizziness and lightheadedness.     Type 2 diabetes mellitus with diabetic neuropathy  Patient states his fasting blood sugars are in the high 90s.    Hypoglycemic therapy: Lantus 32 units hs, metformin 1000 BID and januvia.  Last A1c: 02/15/19 was 7.6.   Aspirin: taking.   Statin: Taking.  ACE: taking.   Urine Microalbumin: Done 2018 and normal.  Monofilament Exam:  Done 12/2019 and has neuropathy.   Retinal Screening:  Done 8/2018.   Pneumonia vaccine: received.    Anemia  This has resolved.      Current Outpatient Prescriptions on File Prior to Visit   Medication Sig Dispense Refill   • meloxicam (MOBIC) 7.5 MG Tab   3   • rivaroxaban (XARELTO) 20 MG Tab tablet Take 1 Tab by mouth with dinner. 30 Tab 11   • simvastatin (ZOCOR) 20 MG Tab Take 1 Tab by mouth every evening. 90 Tab 3   • gabapentin (NEURONTIN) 300 MG Cap Take 2 Caps by mouth 2 Times a Day. 360 Cap 4   • SITagliptin (JANUVIA) 100 MG Tab Take 1 Tab by mouth every day. 90 Tab 3   • omeprazole (PRILOSEC) 20 MG delayed-release capsule Take 1 Cap by mouth every day. 90 Cap 4   • metformin (GLUCOPHAGE) 1000 MG tablet Take 1 Tab by mouth 2 Times a Day. 180 Tab 4   • lisinopril (PRINIVIL) 10 MG Tab Take 1 Tab by mouth every day. 90 Tab 4   • insulin glargine (LANTUS SOLOSTAR) 100 UNIT/ML Solution Pen-injector injection Inject 32 Units as instructed every evening. 15 PEN 4   • Potassium Gluconate 595 MG Cap Take 1 Cap by mouth every day. 90 Cap 4   • docusate sodium 100 MG Cap Take 100 mg by mouth 2 times a day as needed (constipation). 180 Cap 4   • capsicum (ZOSTRIX) 0.075 % topical cream  "Apply a thin layer to skin QID PRN nerve pain. 1 Tube 3   • acetaminophen (TYLENOL) 500 MG Tab Take 1 Tab by mouth every 6 hours. 30 Tab 0     No current facility-administered medications on file prior to visit.        Past Medical History:   Diagnosis Date   • Alcohol use 2/10/2016    3 per day   • Arthritis 2016    left knee   • Dental disorder     upper lower dentures   • Diabetes 2005    insulin and oral agent   • Heart burn    • Hiatus hernia syndrome    • High cholesterol    • Hypertension    • Observed sleep apnea     no study done yet   • Pain     left knee   • Paroxysmal atrial fibrillation (HCC) 2/27/2016 11/14/16-resolved now   • Snoring        Allergies: Patient has no known allergies.    Surgical History:  has a past surgical history that includes hand surgery (Left, 1970); cystoscopy (1986); tonsillectomy (as child); dental extraction(s) (1976); knee arthroscopy (Left, 2/18/2016); medial meniscectomy (2/18/2016); knee arthroscopy (Left, 2/27/2016); knee arthroplasty total (Left, 11/21/2016); knee replacement, total (Left, 11/2016); and knee arthroplasty total (Right, 8/15/2018).    Family History: family history includes Diabetes in his unknown relative; Hypertension in his unknown relative.    Social History:  reports that he has never smoked. He has never used smokeless tobacco. He reports that he drinks about 1.8 oz of alcohol per week . He reports that he does not use drugs.    ROS: no fever or nausea.        Objective:     Vitals:    04/25/19 1545   BP: 106/64   BP Location: Left arm   Patient Position: Sitting   BP Cuff Size: Large adult   Pulse: 94   Resp: 16   Temp: 36.9 °C (98.5 °F)   TempSrc: Temporal   SpO2: 96%   Weight: 111.1 kg (245 lb)   Height: 1.803 m (5' 11\")       Physical Exam:  General: alert in no apparent distress.   Gait: normal.         Assessment and Plan:     1. Familial hypercholesterolemia  - continue statin.    2. Essential hypertension   - continue current regimen. "     3. Type 2 diabetes mellitus with diabetic neuropathy, with long-term current use of insulin (HCC)  - A1c in 2 months, continue current regimen.  - HEMOGLOBIN A1C; Future    4. Anemia, unspecified type  - resolved.         Followup: Return in about 6 months (around 10/25/2019), or if symptoms worsen or fail to improve, for DM.

## 2019-05-06 DIAGNOSIS — I48.20 CHRONIC ATRIAL FIBRILLATION (HCC): ICD-10-CM

## 2019-05-15 DIAGNOSIS — E78.01 FAMILIAL HYPERCHOLESTEROLEMIA: ICD-10-CM

## 2019-05-16 RX ORDER — SIMVASTATIN 20 MG
20 TABLET ORAL EVERY EVENING
Qty: 100 TAB | Refills: 4 | Status: SHIPPED | OUTPATIENT
Start: 2019-05-16 | End: 2019-12-10 | Stop reason: SDUPTHER

## 2019-06-14 ENCOUNTER — HOSPITAL ENCOUNTER (OUTPATIENT)
Dept: RADIOLOGY | Facility: MEDICAL CENTER | Age: 69
End: 2019-06-14
Attending: PHYSICAL MEDICINE & REHABILITATION
Payer: MEDICARE

## 2019-06-14 DIAGNOSIS — M54.16 LUMBAR RADICULOPATHY: ICD-10-CM

## 2019-06-14 PROCEDURE — 72148 MRI LUMBAR SPINE W/O DYE: CPT

## 2019-06-24 ENCOUNTER — TELEPHONE (OUTPATIENT)
Dept: CARDIOLOGY | Facility: MEDICAL CENTER | Age: 69
End: 2019-06-24

## 2019-06-24 NOTE — TELEPHONE ENCOUNTER
Received clearance request from [facility] for pt to be off Xarelto x 3 days prior to pt's L L5-S1 Select Nerve Root Block scheduled on 6/26/19.     Attempted to call Delfina Burgess MA from [facility] to clarify clearance as procedure is scheduled on 6/26/19 and they are requesting to hold Xarelto x 3 days.     Discussed w/ Dr Paul-ADD, per Dr Paul please confirm that pt does not have hx of CVA or blood clots, if not, he may hold Xarelto x 3 days prior to procedure.     Called pt, discussed above information, he denies hx of CVA or blood clots, he will hold Xarelto starting today.     Clearance form faxed back to [facility] w/ AA recommendations, F#[phone]    Copy of clearance to scanning

## 2019-07-29 ENCOUNTER — HOSPITAL ENCOUNTER (OUTPATIENT)
Facility: MEDICAL CENTER | Age: 69
End: 2019-07-30
Attending: EMERGENCY MEDICINE | Admitting: INTERNAL MEDICINE
Payer: MEDICARE

## 2019-07-29 DIAGNOSIS — R33.9 URINARY RETENTION: ICD-10-CM

## 2019-07-29 LAB
APPEARANCE UR: CLEAR
BILIRUB UR QL STRIP.AUTO: NEGATIVE
COLOR UR: YELLOW
GLUCOSE UR STRIP.AUTO-MCNC: 100 MG/DL
KETONES UR STRIP.AUTO-MCNC: NEGATIVE MG/DL
LEUKOCYTE ESTERASE UR QL STRIP.AUTO: NEGATIVE
MICRO URNS: ABNORMAL
NITRITE UR QL STRIP.AUTO: NEGATIVE
PH UR STRIP.AUTO: 6 [PH] (ref 5–8)
PROT UR QL STRIP: NEGATIVE MG/DL
RBC UR QL AUTO: NEGATIVE
SP GR UR STRIP.AUTO: 1.01
UROBILINOGEN UR STRIP.AUTO-MCNC: 1 MG/DL

## 2019-07-29 PROCEDURE — 302140 GU CART: Performed by: EMERGENCY MEDICINE

## 2019-07-29 PROCEDURE — 51702 INSERT TEMP BLADDER CATH: CPT

## 2019-07-29 PROCEDURE — 303105 HCHG CATHETER EXTRA

## 2019-07-29 PROCEDURE — 99285 EMERGENCY DEPT VISIT HI MDM: CPT

## 2019-07-29 PROCEDURE — 81003 URINALYSIS AUTO W/O SCOPE: CPT

## 2019-07-30 ENCOUNTER — HOSPITAL ENCOUNTER (OUTPATIENT)
Dept: RADIOLOGY | Facility: MEDICAL CENTER | Age: 69
End: 2019-07-30
Attending: UROLOGY
Payer: MEDICARE

## 2019-07-30 ENCOUNTER — ANESTHESIA EVENT (OUTPATIENT)
Dept: SURGERY | Facility: MEDICAL CENTER | Age: 69
End: 2019-07-30
Payer: MEDICARE

## 2019-07-30 ENCOUNTER — ANESTHESIA (OUTPATIENT)
Dept: SURGERY | Facility: MEDICAL CENTER | Age: 69
End: 2019-07-30
Payer: MEDICARE

## 2019-07-30 VITALS
WEIGHT: 242.51 LBS | RESPIRATION RATE: 18 BRPM | TEMPERATURE: 97.1 F | OXYGEN SATURATION: 95 % | HEART RATE: 93 BPM | BODY MASS INDEX: 34.72 KG/M2 | DIASTOLIC BLOOD PRESSURE: 88 MMHG | SYSTOLIC BLOOD PRESSURE: 132 MMHG | HEIGHT: 70 IN

## 2019-07-30 PROBLEM — R79.1 ELEVATED INR: Status: ACTIVE | Noted: 2019-07-30

## 2019-07-30 PROBLEM — R33.9 URINARY RETENTION: Status: ACTIVE | Noted: 2019-07-30

## 2019-07-30 LAB
ANION GAP SERPL CALC-SCNC: 9 MMOL/L (ref 0–11.9)
BASOPHILS # BLD AUTO: 0.5 % (ref 0–1.8)
BASOPHILS # BLD: 0.04 K/UL (ref 0–0.12)
BUN SERPL-MCNC: 18 MG/DL (ref 8–22)
CALCIUM SERPL-MCNC: 8.7 MG/DL (ref 8.5–10.5)
CHLORIDE SERPL-SCNC: 105 MMOL/L (ref 96–112)
CO2 SERPL-SCNC: 25 MMOL/L (ref 20–33)
CREAT SERPL-MCNC: 0.93 MG/DL (ref 0.5–1.4)
EOSINOPHIL # BLD AUTO: 0.08 K/UL (ref 0–0.51)
EOSINOPHIL NFR BLD: 0.9 % (ref 0–6.9)
ERYTHROCYTE [DISTWIDTH] IN BLOOD BY AUTOMATED COUNT: 41.8 FL (ref 35.9–50)
GLUCOSE BLD-MCNC: 101 MG/DL (ref 65–99)
GLUCOSE SERPL-MCNC: 141 MG/DL (ref 65–99)
HCT VFR BLD AUTO: 40.7 % (ref 42–52)
HGB BLD-MCNC: 14.1 G/DL (ref 14–18)
IMM GRANULOCYTES # BLD AUTO: 0.02 K/UL (ref 0–0.11)
IMM GRANULOCYTES NFR BLD AUTO: 0.2 % (ref 0–0.9)
INR PPP: 2.01 (ref 0.87–1.13)
LYMPHOCYTES # BLD AUTO: 2.93 K/UL (ref 1–4.8)
LYMPHOCYTES NFR BLD: 33.8 % (ref 22–41)
MCH RBC QN AUTO: 31.7 PG (ref 27–33)
MCHC RBC AUTO-ENTMCNC: 34.6 G/DL (ref 33.7–35.3)
MCV RBC AUTO: 91.5 FL (ref 81.4–97.8)
MONOCYTES # BLD AUTO: 0.8 K/UL (ref 0–0.85)
MONOCYTES NFR BLD AUTO: 9.2 % (ref 0–13.4)
NEUTROPHILS # BLD AUTO: 4.81 K/UL (ref 1.82–7.42)
NEUTROPHILS NFR BLD: 55.4 % (ref 44–72)
NRBC # BLD AUTO: 0 K/UL
NRBC BLD-RTO: 0 /100 WBC
PLATELET # BLD AUTO: 201 K/UL (ref 164–446)
PMV BLD AUTO: 10 FL (ref 9–12.9)
POTASSIUM SERPL-SCNC: 4 MMOL/L (ref 3.6–5.5)
PROTHROMBIN TIME: 23.5 SEC (ref 12–14.6)
RBC # BLD AUTO: 4.45 M/UL (ref 4.7–6.1)
SODIUM SERPL-SCNC: 139 MMOL/L (ref 135–145)
WBC # BLD AUTO: 8.7 K/UL (ref 4.8–10.8)

## 2019-07-30 PROCEDURE — 700111 HCHG RX REV CODE 636 W/ 250 OVERRIDE (IP): Performed by: ANESTHESIOLOGY

## 2019-07-30 PROCEDURE — 82962 GLUCOSE BLOOD TEST: CPT

## 2019-07-30 PROCEDURE — 160028 HCHG SURGERY MINUTES - 1ST 30 MINS LEVEL 3: Performed by: UROLOGY

## 2019-07-30 PROCEDURE — 700101 HCHG RX REV CODE 250: Performed by: ANESTHESIOLOGY

## 2019-07-30 PROCEDURE — 303105 HCHG CATHETER EXTRA

## 2019-07-30 PROCEDURE — 160048 HCHG OR STATISTICAL LEVEL 1-5: Performed by: UROLOGY

## 2019-07-30 PROCEDURE — 501329 HCHG SET, CYSTO IRRIG Y TUR: Performed by: UROLOGY

## 2019-07-30 PROCEDURE — 160025 RECOVERY II MINUTES (STATS): Performed by: UROLOGY

## 2019-07-30 PROCEDURE — C1769 GUIDE WIRE: HCPCS | Performed by: UROLOGY

## 2019-07-30 PROCEDURE — G0378 HOSPITAL OBSERVATION PER HR: HCPCS

## 2019-07-30 PROCEDURE — 99220 PR INITIAL OBSERVATION CARE,LEVL III: CPT | Performed by: INTERNAL MEDICINE

## 2019-07-30 PROCEDURE — 500880 HCHG PACK, CYSTO W/SEP LEGGINGS: Performed by: UROLOGY

## 2019-07-30 PROCEDURE — 700111 HCHG RX REV CODE 636 W/ 250 OVERRIDE (IP): Mod: JG | Performed by: UROLOGY

## 2019-07-30 PROCEDURE — 160002 HCHG RECOVERY MINUTES (STAT): Performed by: UROLOGY

## 2019-07-30 PROCEDURE — 160046 HCHG PACU - 1ST 60 MINS PHASE II: Performed by: UROLOGY

## 2019-07-30 PROCEDURE — 80048 BASIC METABOLIC PNL TOTAL CA: CPT

## 2019-07-30 PROCEDURE — 85610 PROTHROMBIN TIME: CPT

## 2019-07-30 PROCEDURE — 160009 HCHG ANES TIME/MIN: Performed by: UROLOGY

## 2019-07-30 PROCEDURE — 160035 HCHG PACU - 1ST 60 MINS PHASE I: Performed by: UROLOGY

## 2019-07-30 PROCEDURE — 51702 INSERT TEMP BLADDER CATH: CPT

## 2019-07-30 PROCEDURE — 700105 HCHG RX REV CODE 258: Performed by: ANESTHESIOLOGY

## 2019-07-30 PROCEDURE — 85025 COMPLETE CBC W/AUTO DIFF WBC: CPT

## 2019-07-30 PROCEDURE — C1725 CATH, TRANSLUMIN NON-LASER: HCPCS | Performed by: UROLOGY

## 2019-07-30 PROCEDURE — C9132 KCENTRA, PER I.U.: HCPCS | Mod: JG | Performed by: UROLOGY

## 2019-07-30 PROCEDURE — 51798 US URINE CAPACITY MEASURE: CPT

## 2019-07-30 RX ORDER — GABAPENTIN 300 MG/1
600 CAPSULE ORAL 2 TIMES DAILY
Status: DISCONTINUED | OUTPATIENT
Start: 2019-07-30 | End: 2019-07-30 | Stop reason: HOSPADM

## 2019-07-30 RX ORDER — HALOPERIDOL 5 MG/ML
1 INJECTION INTRAMUSCULAR
Status: DISCONTINUED | OUTPATIENT
Start: 2019-07-30 | End: 2019-07-30 | Stop reason: HOSPADM

## 2019-07-30 RX ORDER — ONDANSETRON 2 MG/ML
4 INJECTION INTRAMUSCULAR; INTRAVENOUS EVERY 4 HOURS PRN
Status: DISCONTINUED | OUTPATIENT
Start: 2019-07-30 | End: 2019-07-30 | Stop reason: HOSPADM

## 2019-07-30 RX ORDER — OXYCODONE HCL 5 MG/5 ML
10 SOLUTION, ORAL ORAL
Status: DISCONTINUED | OUTPATIENT
Start: 2019-07-30 | End: 2019-07-30 | Stop reason: HOSPADM

## 2019-07-30 RX ORDER — OMEPRAZOLE 20 MG/1
20 CAPSULE, DELAYED RELEASE ORAL DAILY
Status: DISCONTINUED | OUTPATIENT
Start: 2019-07-30 | End: 2019-07-30 | Stop reason: HOSPADM

## 2019-07-30 RX ORDER — OXYCODONE HCL 5 MG/5 ML
5 SOLUTION, ORAL ORAL
Status: DISCONTINUED | OUTPATIENT
Start: 2019-07-30 | End: 2019-07-30 | Stop reason: HOSPADM

## 2019-07-30 RX ORDER — SODIUM CHLORIDE, SODIUM LACTATE, POTASSIUM CHLORIDE, CALCIUM CHLORIDE 600; 310; 30; 20 MG/100ML; MG/100ML; MG/100ML; MG/100ML
INJECTION, SOLUTION INTRAVENOUS
Status: DISCONTINUED | OUTPATIENT
Start: 2019-07-30 | End: 2019-07-30 | Stop reason: SURG

## 2019-07-30 RX ORDER — AMOXICILLIN 250 MG
2 CAPSULE ORAL 2 TIMES DAILY
Status: DISCONTINUED | OUTPATIENT
Start: 2019-07-30 | End: 2019-07-30 | Stop reason: HOSPADM

## 2019-07-30 RX ORDER — LISINOPRIL 10 MG/1
10 TABLET ORAL DAILY
Status: DISCONTINUED | OUTPATIENT
Start: 2019-07-30 | End: 2019-07-30 | Stop reason: HOSPADM

## 2019-07-30 RX ORDER — CEFAZOLIN SODIUM 1 G/3ML
INJECTION, POWDER, FOR SOLUTION INTRAMUSCULAR; INTRAVENOUS PRN
Status: DISCONTINUED | OUTPATIENT
Start: 2019-07-30 | End: 2019-07-30 | Stop reason: SURG

## 2019-07-30 RX ORDER — SODIUM CHLORIDE, SODIUM LACTATE, POTASSIUM CHLORIDE, CALCIUM CHLORIDE 600; 310; 30; 20 MG/100ML; MG/100ML; MG/100ML; MG/100ML
INJECTION, SOLUTION INTRAVENOUS CONTINUOUS
Status: DISCONTINUED | OUTPATIENT
Start: 2019-07-30 | End: 2019-07-30 | Stop reason: HOSPADM

## 2019-07-30 RX ORDER — POLYETHYLENE GLYCOL 3350 17 G/17G
1 POWDER, FOR SOLUTION ORAL
Status: DISCONTINUED | OUTPATIENT
Start: 2019-07-30 | End: 2019-07-30 | Stop reason: HOSPADM

## 2019-07-30 RX ORDER — KETOROLAC TROMETHAMINE 30 MG/ML
INJECTION, SOLUTION INTRAMUSCULAR; INTRAVENOUS PRN
Status: DISCONTINUED | OUTPATIENT
Start: 2019-07-30 | End: 2019-07-30 | Stop reason: SURG

## 2019-07-30 RX ORDER — ONDANSETRON 2 MG/ML
4 INJECTION INTRAMUSCULAR; INTRAVENOUS
Status: DISCONTINUED | OUTPATIENT
Start: 2019-07-30 | End: 2019-07-30 | Stop reason: HOSPADM

## 2019-07-30 RX ORDER — BISACODYL 10 MG
10 SUPPOSITORY, RECTAL RECTAL
Status: DISCONTINUED | OUTPATIENT
Start: 2019-07-30 | End: 2019-07-30 | Stop reason: HOSPADM

## 2019-07-30 RX ORDER — LABETALOL HYDROCHLORIDE 5 MG/ML
5 INJECTION, SOLUTION INTRAVENOUS
Status: DISCONTINUED | OUTPATIENT
Start: 2019-07-30 | End: 2019-07-30 | Stop reason: HOSPADM

## 2019-07-30 RX ORDER — ONDANSETRON 2 MG/ML
INJECTION INTRAMUSCULAR; INTRAVENOUS PRN
Status: DISCONTINUED | OUTPATIENT
Start: 2019-07-30 | End: 2019-07-30 | Stop reason: SURG

## 2019-07-30 RX ORDER — HYDRALAZINE HYDROCHLORIDE 20 MG/ML
5 INJECTION INTRAMUSCULAR; INTRAVENOUS
Status: DISCONTINUED | OUTPATIENT
Start: 2019-07-30 | End: 2019-07-30 | Stop reason: HOSPADM

## 2019-07-30 RX ORDER — DEXAMETHASONE SODIUM PHOSPHATE 4 MG/ML
INJECTION, SOLUTION INTRA-ARTICULAR; INTRALESIONAL; INTRAMUSCULAR; INTRAVENOUS; SOFT TISSUE PRN
Status: DISCONTINUED | OUTPATIENT
Start: 2019-07-30 | End: 2019-07-30 | Stop reason: SURG

## 2019-07-30 RX ORDER — DIPHENHYDRAMINE HYDROCHLORIDE 50 MG/ML
12.5 INJECTION INTRAMUSCULAR; INTRAVENOUS
Status: DISCONTINUED | OUTPATIENT
Start: 2019-07-30 | End: 2019-07-30 | Stop reason: HOSPADM

## 2019-07-30 RX ORDER — SIMVASTATIN 40 MG
20 TABLET ORAL EVERY EVENING
Status: DISCONTINUED | OUTPATIENT
Start: 2019-07-30 | End: 2019-07-30 | Stop reason: HOSPADM

## 2019-07-30 RX ORDER — ACETAMINOPHEN 325 MG/1
650 TABLET ORAL EVERY 6 HOURS PRN
Status: DISCONTINUED | OUTPATIENT
Start: 2019-07-30 | End: 2019-07-30 | Stop reason: HOSPADM

## 2019-07-30 RX ORDER — ONDANSETRON 4 MG/1
4 TABLET, ORALLY DISINTEGRATING ORAL EVERY 4 HOURS PRN
Status: DISCONTINUED | OUTPATIENT
Start: 2019-07-30 | End: 2019-07-30 | Stop reason: HOSPADM

## 2019-07-30 RX ADMIN — KETOROLAC TROMETHAMINE 30 MG: 30 INJECTION, SOLUTION INTRAMUSCULAR at 08:24

## 2019-07-30 RX ADMIN — FENTANYL CITRATE 50 MCG: 50 INJECTION, SOLUTION INTRAMUSCULAR; INTRAVENOUS at 08:21

## 2019-07-30 RX ADMIN — PROTHROMBIN, COAGULATION FACTOR VII HUMAN, COAGULATION FACTOR IX HUMAN, COAGULATION FACTOR X HUMAN, PROTEIN C, PROTEIN S HUMAN, AND WATER 5055 UNITS: KIT at 08:01

## 2019-07-30 RX ADMIN — ONDANSETRON 4 MG: 2 INJECTION INTRAMUSCULAR; INTRAVENOUS at 08:24

## 2019-07-30 RX ADMIN — PROPOFOL 250 MG: 10 INJECTION, EMULSION INTRAVENOUS at 08:12

## 2019-07-30 RX ADMIN — CEFAZOLIN 2 G: 330 INJECTION, POWDER, FOR SOLUTION INTRAMUSCULAR; INTRAVENOUS at 08:15

## 2019-07-30 RX ADMIN — DEXAMETHASONE SODIUM PHOSPHATE 8 MG: 4 INJECTION, SOLUTION INTRA-ARTICULAR; INTRALESIONAL; INTRAMUSCULAR; INTRAVENOUS; SOFT TISSUE at 08:17

## 2019-07-30 RX ADMIN — SODIUM CHLORIDE, POTASSIUM CHLORIDE, SODIUM LACTATE AND CALCIUM CHLORIDE: 600; 310; 30; 20 INJECTION, SOLUTION INTRAVENOUS at 08:07

## 2019-07-30 ASSESSMENT — ENCOUNTER SYMPTOMS
ABDOMINAL PAIN: 0
SPUTUM PRODUCTION: 0
MYALGIAS: 0
SEIZURES: 0
FOCAL WEAKNESS: 0
NAUSEA: 0
FLANK PAIN: 0
DIARRHEA: 0
PALPITATIONS: 0
BLOOD IN STOOL: 0
VOMITING: 0
COUGH: 0
BRUISES/BLEEDS EASILY: 0
WHEEZING: 0
BLURRED VISION: 0
BACK PAIN: 0
HEADACHES: 0
FEVER: 0
NECK PAIN: 0
SORE THROAT: 0
SHORTNESS OF BREATH: 0
CHILLS: 0
DIAPHORESIS: 0
DIZZINESS: 0

## 2019-07-30 ASSESSMENT — COPD QUESTIONNAIRES
COPD SCREENING SCORE: 2
HAVE YOU SMOKED AT LEAST 100 CIGARETTES IN YOUR ENTIRE LIFE: NO/DON'T KNOW
DURING THE PAST 4 WEEKS HOW MUCH DID YOU FEEL SHORT OF BREATH: NONE/LITTLE OF THE TIME
DO YOU EVER COUGH UP ANY MUCUS OR PHLEGM?: NO/ONLY WITH OCCASIONAL COLDS OR INFECTIONS

## 2019-07-30 ASSESSMENT — LIFESTYLE VARIABLES: EVER_SMOKED: NEVER

## 2019-07-30 NOTE — ANESTHESIA TIME REPORT
Anesthesia Start and Stop Event Times     Date Time Event    7/30/2019 0807 Anesthesia Start     0839 Anesthesia Stop        Responsible Staff  07/30/19    Name Role Begin End    Lila Garcia M.D. Anesth 0807 0839        Preop Diagnosis (Free Text):  Pre-op Diagnosis     urethral stricture        Preop Diagnosis (Codes):  Diagnosis Information     Diagnosis Code(s):         Post op Diagnosis  Urethral stricture      Premium Reason  Non-Premium    Comments:

## 2019-07-30 NOTE — ED NOTES
Pt presents to the ED with complaints of urinary retention. Pt states he has been unable to urinate x24 hours. Pt denies any other symptoms. Pt has hx of diabetes, afib, and hypertension. Pt states he takes Xarelto daily.

## 2019-07-30 NOTE — ED PROVIDER NOTES
ED PROVIDER NOTE    Scribed for Anthony Sr M.D. by Yogesh Underwood. 7/30/2019, 2:37 AM.    This is an addendum to the note on Richard Chery. For further details and full chart entry, see the previously signed ED Provider Note written by Dr. Choudhury (ERP).      1:18 AM - I discussed the patient's case with Dr. Choudhury (ERP) who will transfer care of the patient to me at this time.    Discussed with Dr. Manning and unfortunately the urology cart is not fully stocked.  The cystoscope is not available despite multiple calls to central supply in the OR.  For this reason, Dr. Manning has recommended a suprapubic catheter placement by IR.  The patient is currently on Xarelto so will require reversal prior to procedure.  For this reason he will be admitted to the hospitalist.       2:37 AM I discussed the patient's case and the above findings with Dr. Villarreal (Hospitalist) who agrees to admit the patient. He was updated with plan to take patient to IR for suprapubic catheter after Dr. Manning (Urology) did not have the material available to him. Dr. Villarreal was informed the patient is currently on Xarelto and will need to have the medication reversed before the procedure.     DISPOSITION:  Patient will be admitted to Dr. Villarreal (Hospitalist) in guarded condition.    FINAL IMPRESSION   1. Urinary retention       Yogesh BEACH (Rubén), am scribing for, and in the presence of, Anthony Sr M.D..    Electronically signed by: Yogesh Underwood (Rubén), 7/30/2019    Anthony BEACH M.D. personally performed the services described in this documentation, as scribed by Yogesh Underwood in my presence, and it is both accurate and complete.    The note accurately reflects work and decisions made by me.  Anthony Sr  7/30/2019  5:37 AM

## 2019-07-30 NOTE — OP REPORT
Preop diagnosis urinary retention, inability of the staff to pass a Petty catheter and history of urethral stricture disease.  Postoperative diagnosis-urinary retention secondary to a 7 [place]ibian bulbar urethral stricture  Procedure performed-cystoscopy balloon dilatation of urethral stricture and catheter placement  Surgeon-Anthony Manning MD  Anesthesiologist Lila Garcia MD  Anesthesia type-General anesthesia  Drains 18 [place]ibian [place] tip catheter  Estimated blood loss 0 mL  Specimen-none  Complication-none    Procedure in detail:  Jay is brought to the operating room on a gurney and transferred from the gurney to the OR table.  He was then given an excellent general anesthetic by Dr. Garcia.  He is then placed into the dorsal lithotomy position.  He is prepped and draped in usual manner.  Care was taken to be sure all pressure points were well padded and that there was no strain placed on his knees.    A timeout was then performed to confirm patient identification, procedure to be performed, review patient allergies and review preoperative antibiotics given.  Once timeout was completed and agreed upon the case was started.  A 22 Namibian rigid cystoscope with a 30 degree angle lenses white balance and lubricated.  Is passed through the anterior urethra.  The posterior navicularis has a narrowing but I am able to pass a 22 Namibian scope passed this without much difficulty.  The rest the anterior urethra was unremarkable until the bulbar urethra is examined.  There is a very tight stricture that looks about 7 [place]ibian almost completely occluding the bulbar urethra.    I passed a 035 sensor guidewire through the scope and through the stricture.  I then passed a high pressure balloon over the guidewire.  The balloon is 8 mm diameter 4 cm long.  Once the balloon traverses a stricture it is dilated to 14 coral pressure.  This opens up the stricture nicely.  I then tried to pass a 22 [place]ibian scope and it really would not  pass.  I then swapped out the 22 Romanian sheath for a 20 Romanian sheath.  I was able to successfully pass that through the stricture.  The prostatic urethra does not look terribly obstructing.  The bladder shows heavy trabeculation no tumors stones or diverticular noted.  Ureteral orifice ease are in orthotopic position with clear reflux bilaterally.  I then removed the scope leaving the guidewire in the urinary bladder.  I then passed an 18 Romanian [place] tip catheter over the guidewire passes through the urethra and in the urinary bladder without difficulty.  10 cc of sterile water is placed into the balloon.  The catheter was then set up to a gravity drainage bag.    The prep was then cleaned off the patient.  Is taken out of the lithotomy position.  Transferred back to a gurney and to the recovery area in stable condition.

## 2019-07-30 NOTE — OR SURGEON
Immediate Post OP Note    PreOp Diagnosis: Urinary retention with a history of urethral stricture disease    PostOp Diagnosis: Urinary retention secondary to urethral stricture disease    Procedure(s):  CYSTOSCOPY- DILATION OF STRICTURE - Wound Class: Clean Contaminated    Surgeon(s):  Anthony Mannnig M.D.    Anesthesiologist/Type of Anesthesia:  Anesthesiologist: Lila Garcia M.D./General    Surgical Staff:  Circulator: Tessa Cisneros R.N.  Scrub Person: Tristen Tejeda    Specimens removed if any:  * No specimens in log *    Estimated Blood Loss: 0 mL    Findings: Approximately 7 Danish stricture, bulbar urethra    Complications: None        7/30/2019 8:34 AM Anthony Manning M.D.

## 2019-07-30 NOTE — DISCHARGE INSTRUCTIONS
ACTIVITY: Rest and take it easy for the first 24 hours.  A responsible adult is recommended to remain with you during that time.  It is normal to feel sleepy.  We encourage you to not do anything that requires balance, judgment or coordination.    MILD FLU-LIKE SYMPTOMS ARE NORMAL. YOU MAY EXPERIENCE GENERALIZED MUSCLE ACHES, THROAT IRRITATION, HEADACHE AND/OR SOME NAUSEA.    FOR 24 HOURS DO NOT:  Drive, operate machinery or run household appliances.  Drink beer or alcoholic beverages.   Make important decisions or sign legal documents.    SPECIAL INSTRUCTIONS:   1. Irrigate goss catheter as needed for clots or poor drainage.  2. Call Dr. Manning's office [phone] to arrange follow-up appointment in 1 week.    DIET: To avoid nausea, slowly advance diet as tolerated, avoiding spicy or greasy foods for the first day.  Add more substantial food to your diet according to your physician's instructions.  INCREASE FLUIDS AND FIBER TO AVOID CONSTIPATION.    SURGICAL DRESSING/BATHING: You may shower    FOLLOW-UP APPOINTMENT:  A follow-up appointment should be arranged with your doctor; call to schedule.    You should CALL YOUR PHYSICIAN if you develop:  Fever greater than 101 degrees F.  Pain not relieved by medication, or persistent nausea or vomiting.  Excessive bleeding (blood soaking through dressing) or unexpected drainage from the wound.  Extreme redness or swelling around the incision site, drainage of pus or foul smelling drainage.  Inability to urinate or empty your bladder within 8 hours.  Problems with breathing or chest pain.    You should call [phone] if you develop problems with breathing or chest pain.  If you are unable to contact your doctor or surgical center, you should go to the nearest emergency room or urgent care center.  Physician's telephone #: [phone]    If any questions arise, call your doctor.  If your doctor is not available, please feel free to call the Surgical Center at [phone].  The  Center is open Monday through Friday from 7AM to 7PM.  You can also call the HEALTH HOTLINE open 24 hours/day, 7 days/week and speak to a nurse at [phone], or toll free at [phone].    A registered nurse may call you a few days after your surgery to see how you are doing after your procedure.    MEDICATIONS: Resume taking daily medication.  Take prescribed pain medication with food.  If no medication is prescribed, you may take non-aspirin pain medication if needed.  PAIN MEDICATION CAN BE VERY CONSTIPATING.  Take a stool softener or laxative such as senokot, pericolace, or milk of magnesia if needed.    If your physician has prescribed pain medication that includes Acetaminophen (Tylenol), do not take additional Acetaminophen (Tylenol) while taking the prescribed medication.    Depression / Suicide Risk    As you are discharged from this [facility] facility, it is important to learn how to keep safe from harming yourself.    Recognize the warning signs:  · Abrupt changes in personality, positive or negative- including increase in energy   · Giving away possessions  · Change in eating patterns- significant weight changes-  positive or negative  · Change in sleeping patterns- unable to sleep or sleeping all the time   · Unwillingness or inability to communicate  · Depression  · Unusual sadness, discouragement and loneliness  · Talk of wanting to die  · Neglect of personal appearance   · Rebelliousness- reckless behavior  · Withdrawal from people/activities they love  · Confusion- inability to concentrate     If you or a loved one observes any of these behaviors or has concerns about self-harm, here's what you can do:  · Talk about it- your feelings and reasons for harming yourself  · Remove any means that you might use to hurt yourself (examples: pills, rope, extension cords, firearm)  · Get professional help from the community (Mental Health, Substance Abuse, psychological counseling)  · Do not be  alone:Call your Safe Contact- someone whom you trust who will be there for you.  · Call your local CRISIS HOTLINE 571-6593 or 543-661-4976  · Call your local Children's Mobile Crisis Response Team [place] (859) 859-2290 or www.Sigasi  · Call the toll free National Suicide Prevention Hotlines   · National Suicide Prevention Lifeline 062-219-PGUX (2556)  · National GetMeMedia Line Network 800-SUICIDE (012-2915)

## 2019-07-30 NOTE — ED NOTES
Per  pt is scheduled for procedure at 0730; Hospitalist notified to receive Xarelto reversal agent and print consent for surgery.

## 2019-07-30 NOTE — ANESTHESIA PREPROCEDURE EVALUATION
Relevant Problems   (+) Chronic atrial fibrillation (HCC)   (+) Essential hypertension   (+) GERD (gastroesophageal reflux disease)   (+) Observed sleep apnea   (+) Type 2 diabetes mellitus with diabetic neuropathy (HCC)   (+) Urinary retention       Physical Exam    Airway   Mallampati: II  TM distance: >3 FB  Neck ROM: full       Cardiovascular - normal exam  Rhythm: regular  Rate: normal  (-) murmur     Dental - normal exam  (+) upper dentures, lower dentures         Pulmonary - normal exam  Breath sounds clear to auscultation     Abdominal    Neurological - normal exam                 Anesthesia Plan    ASA 3 (Afib, HTN, EMILY, DM-II) - emergent (hydronephrosis due to obstruction)       Plan - general       Airway plan will be LMA        Induction: intravenous    Postoperative Plan: Postoperative administration of opioids is intended.    Pertinent diagnostic labs and testing reviewed    Informed Consent:    Anesthetic plan and risks discussed with patient.    Use of blood products discussed with: patient whom consented to blood products.       giving Kcentra per surgeon to reverse INR 2

## 2019-07-30 NOTE — CONSULTS
UROLOGY Consult Note:    Anthony Manning  Date & Time note created:    7/30/2019   1:55 AM     Referring MD:  Dr. Choudhury    Patient ID:   Name:             Richard Chery   YOB: 1950  Age:                 69 y.o.  male   MRN:               [id number]                                                             Reason for Consult:      Urinary retention    History of Present Illness:    As above since yesterday morning. Came to ER at 10:00 pm. Staff unable to pass urinary CATHETER.    Review of Systems:      Constitutional: Denies fevers, Denies weight changes  Eyes: Denies changes in vision, no eye pain  Ears/Nose/Throat/Mouth: Denies nasal congestion or sore throat   Cardiovascular: no chest pain, no palpitations   Respiratory: no shortness of breath , Denies cough  Gastrointestinal/Hepatic: Denies abdominal pain, nausea, vomiting, diarrhea, constipation or GI bleeding   Genitourinary: HISTORY OF URETHRAL STRICTURE. Has not followed up with urology.  Musculoskeletal/Rheum: Denies  joint pain and swelling, no edema  Skin: Denies rash  Neurological: Denies headache, confusion, memory loss or focal weakness/parasthesias  Psychiatric: denies mood disorder   Endocrine: Padmini thyroid problems  Heme/Oncology/Lymph Nodes: Denies enlarged lymph nodes, denies brusing or known bleeding disorder  All other systems were reviewed and are negative (AMA/CMS criteria)                Past Medical History:   Past Medical History:   Diagnosis Date   • Alcohol use 2/10/2016    3 per day   • Arthritis 2016    left knee   • Dental disorder     upper lower dentures   • Diabetes 2005    insulin and oral agent   • Heart burn    • Hiatus hernia syndrome    • High cholesterol    • Hypertension    • Observed sleep apnea     no study done yet   • Pain     left knee   • Paroxysmal atrial fibrillation (HCC) 2/27/2016 11/14/16-resolved now   • Snoring      There are no active hospital problems to display for this  patient.      Past Surgical History:  Past Surgical History:   Procedure Laterality Date   • KNEE ARTHROPLASTY TOTAL Right 8/15/2018    Procedure: KNEE ARTHROPLASTY TOTAL;  Surgeon: Amparo James M.D.;  Location: SURGERY [facility];  Service: Orthopedics   • KNEE ARTHROPLASTY TOTAL Left 11/21/2016    Procedure: KNEE ARTHROPLASTY TOTAL;  Surgeon: Amparo James M.D.;  Location: [facility];  Service:    • KNEE REPLACEMENT, TOTAL Left 11/2016    Dr. James   • KNEE ARTHROSCOPY Left 2/27/2016    Procedure: KNEE ARTHROSCOPY- I&D KNEE;  Surgeon: Corby Reyes M.D.;  Location: [facility];  Service:    • KNEE ARTHROSCOPY Left 2/18/2016    Procedure: KNEE ARTHROSCOPY, SAMUELS;  Surgeon: Marquise Cline M.D.;  Location: [facility];  Service:    • MEDIAL MENISCECTOMY  2/18/2016    Procedure: MEDIAL MENISCECTOMY - PARTIAL;  Surgeon: Marquise Cline M.D.;  Location: [facility];  Service:    • CYSTOSCOPY  1986   • DENTAL EXTRACTION(S)  1976    wisdom teeth   • HAND SURGERY Left 1970    trauma, amputation index and middle finger   • TONSILLECTOMY  as child       Hospital Medications:  No current facility-administered medications for this encounter.     Current Outpatient Prescriptions:   •  simvastatin (ZOCOR) 20 MG Tab, Take 1 Tab by mouth every evening., Disp: 100 Tab, Rfl: 4  •  rivaroxaban (XARELTO) 20 MG Tab tablet, Take 1 Tab by mouth with dinner., Disp: 30 Tab, Rfl: 11  •  meloxicam (MOBIC) 7.5 MG Tab, , Disp: , Rfl: 3  •  gabapentin (NEURONTIN) 300 MG Cap, Take 2 Caps by mouth 2 Times a Day., Disp: 360 Cap, Rfl: 4  •  SITagliptin (JANUVIA) 100 MG Tab, Take 1 Tab by mouth every day., Disp: 90 Tab, Rfl: 3  •  omeprazole (PRILOSEC) 20 MG delayed-release capsule, Take 1 Cap by mouth every day., Disp: 90 Cap, Rfl: 4  •  metformin (GLUCOPHAGE) 1000 MG tablet, Take 1 Tab by mouth 2 Times a Day., Disp: 180 Tab, Rfl: 4  •  lisinopril (PRINIVIL) 10 MG Tab, Take 1  "Tab by mouth every day., Disp: 90 Tab, Rfl: 4  •  insulin glargine (LANTUS SOLOSTAR) 100 UNIT/ML Solution Pen-injector injection, Inject 32 Units as instructed every evening., Disp: 15 PEN, Rfl: 4  •  docusate sodium 100 MG Cap, Take 100 mg by mouth 2 times a day as needed (constipation)., Disp: 180 Cap, Rfl: 4  •  capsicum (ZOSTRIX) 0.075 % topical cream, Apply a thin layer to skin QID PRN nerve pain., Disp: 1 Tube, Rfl: 3  •  Potassium Gluconate 595 MG Cap, Take 1 Cap by mouth every day., Disp: 90 Cap, Rfl: 4  •  acetaminophen (TYLENOL) 500 MG Tab, Take 1 Tab by mouth every 6 hours., Disp: 30 Tab, Rfl: 0    Current Outpatient Medications:    (Not in a hospital admission)    Medication Allergy:  No Known Allergies    Family History:  Family History   Problem Relation Age of Onset   • Diabetes Unknown    • Hypertension Unknown        Social History:  Social History     Social History   • Marital status:      Spouse name: N/A   • Number of children: N/A   • Years of education: N/A     Occupational History   • Not on file.     Social History Main Topics   • Smoking status: Never Smoker   • Smokeless tobacco: Never Used   • Alcohol use 1.8 oz/week     3 Shots of liquor per week      Comment: 10 per week   • Drug use: No   • Sexual activity: Yes     Partners: Female     Other Topics Concern   • Not on file     Social History Narrative   • No narrative on file         Physical Exam:  Vitals/ General Appearance:   Weight/BMI: Body mass index is 34.8 kg/m².  /85   Pulse 82   Temp 36 °C (96.8 °F) (Temporal)   Resp 16   Ht 1.778 m (5' 10\")   Wt 110 kg (242 lb 8.1 oz)   SpO2 98%   Vitals:    07/29/19 2204 07/29/19 2209 07/30/19 0100   BP: 143/85     Pulse: 83  82   Resp: 18  16   Temp: 36 °C (96.8 °F)     TempSrc: Temporal     SpO2: 97%  98%   Weight:  110 kg (242 lb 8.1 oz)    Height:  1.778 m (5' 10\")      Oxygen Therapy:  Pulse Oximetry: 98 %, O2 (LPM): 0, O2 Delivery: None (Room Air)    Constitutional:  "  Well developed, Well nourished, No acute distress  HENMT:  Normocephalic, Atraumatic, Oropharynx moist mucous membranes, No oral exudates, Nose normal.  No thyromegaly.  Eyes:  EOMI, Conjunctiva normal, No discharge.  Neck:  Normal range of motion, No cervical tenderness,  no JVD.  Cardiovascular:  Normal heart rate, Normal rhythm, No murmurs, No rubs, No gallops.   Extremitites with intact distal pulses, no cyanosis, or edema.  Lungs:  Normal breath sounds, breath sounds clear to auscultation bilaterally,  no crackles, no wheezing.   Abdomen: Bowel sounds normal, Soft, No tenderness, No guarding, No rebound, No masses, No hepatosplenomegaly.  : Normal external genitalia without lesion.   Skin: Warm, Dry, No erythema, No rash, no induration.  Neurologic: Alert & oriented x 3, No focal deficits noted, cranial nerves II through X are grossly intact.  Psychiatric: Affect normal, Judgment normal, Mood normal.      MDM (Data Review):     Records reviewed and summarized in current documentation    Lab Data Review:  Recent Results (from the past 24 hour(s))   URINALYSIS CULTURE, IF INDICATED    Collection Time: 07/29/19 11:41 PM   Result Value Ref Range    Color Yellow     Character Clear     Specific Gravity 1.009 <1.035    Ph 6.0 5.0 - 8.0    Glucose 100 (A) Negative mg/dL    Ketones Negative Negative mg/dL    Protein Negative Negative mg/dL    Bilirubin Negative Negative    Urobilinogen, Urine 1.0 Negative    Nitrite Negative Negative    Leukocyte Esterase Negative Negative    Occult Blood Negative Negative    Micro Urine Req see below    CBC WITH DIFFERENTIAL    Collection Time: 07/30/19 12:15 AM   Result Value Ref Range    WBC 8.7 4.8 - 10.8 K/uL    RBC 4.45 (L) 4.70 - 6.10 M/uL    Hemoglobin 14.1 14.0 - 18.0 g/dL    Hematocrit 40.7 (L) 42.0 - 52.0 %    MCV 91.5 81.4 - 97.8 fL    MCH 31.7 27.0 - 33.0 pg    MCHC 34.6 33.7 - 35.3 g/dL    RDW 41.8 35.9 - 50.0 fL    Platelet Count 201 164 - 446 K/uL    MPV 10.0 9.0 -  12.9 fL    Neutrophils-Polys 55.40 44.00 - 72.00 %    Lymphocytes 33.80 22.00 - 41.00 %    Monocytes 9.20 0.00 - 13.40 %    Eosinophils 0.90 0.00 - 6.90 %    Basophils 0.50 0.00 - 1.80 %    Immature Granulocytes 0.20 0.00 - 0.90 %    Nucleated RBC 0.00 /100 WBC    Neutrophils (Absolute) 4.81 1.82 - 7.42 K/uL    Lymphs (Absolute) 2.93 1.00 - 4.80 K/uL    Monos (Absolute) 0.80 0.00 - 0.85 K/uL    Eos (Absolute) 0.08 0.00 - 0.51 K/uL    Baso (Absolute) 0.04 0.00 - 0.12 K/uL    Immature Granulocytes (abs) 0.02 0.00 - 0.11 K/uL    NRBC (Absolute) 0.00 K/uL   BASIC METABOLIC PANEL    Collection Time: 07/30/19 12:15 AM   Result Value Ref Range    Sodium 139 135 - 145 mmol/L    Potassium 4.0 3.6 - 5.5 mmol/L    Chloride 105 96 - 112 mmol/L    Co2 25 20 - 33 mmol/L    Glucose 141 (H) 65 - 99 mg/dL    Bun 18 8 - 22 mg/dL    Creatinine 0.93 0.50 - 1.40 mg/dL    Calcium 8.7 8.5 - 10.5 mg/dL    Anion Gap 9.0 0.0 - 11.9   Prothrombin Time    Collection Time: 07/30/19 12:15 AM   Result Value Ref Range    PT 23.5 (H) 12.0 - 14.6 sec    INR 2.01 (H) 0.87 - 1.13   ESTIMATED GFR    Collection Time: 07/30/19 12:15 AM   Result Value Ref Range    GFR If African American >60 >60 mL/min/1.73 m 2    GFR If Non African American >60 >60 mL/min/1.73 m 2       Imaging/Procedures Review:    Reviewed    MDM (Assessment and Plan):     AUR  Plan  Cystoscopy and catheter placement.

## 2019-07-30 NOTE — ED NOTES
Patients clothing and retainer removed and placed in belongings bag. Patient being transported to surgery at this time

## 2019-07-30 NOTE — H&P
Hospital Medicine History & Physical Note    Date of Service  7/30/2019    Primary Care Physician  Wayne Yuen M.D.    Consultants  Urology Dr. Manning    Code Status  Full code    Chief Complaint  Urinary retention    History of Presenting Illness  69 y.o. male with a past medical history of atrial fibrillation on Xarelto, hypertension, hyperlipidemia, diabetes who presented 7/29/2019 with inability to urinate since yesterday.  Patient reports he has been unable to urinate more than a few drops of urine and is having pain and pressure over the bladder area.  He denies any fevers, chills, chest pain, nausea, vomiting dysuria or constipation.  He reports a previous history of ureteral stricture which was repaired 20 years ago and has not had any issues since then.  Several attempts to place a Petty catheter in the ER which were unsuccessful.  He was evaluated by urology in the ER however due to in availability of urology tray a cystoscopy could not be performed.  He will be scheduled for IR guided suprapubic catheter placement versus cystoscopy with Petty placement if the urology tray becomes available.  His Xarelto will be reversed with Kcentra.    Review of Systems  Review of Systems   Constitutional: Negative for chills, diaphoresis and fever.   HENT: Negative for hearing loss and sore throat.    Eyes: Negative for blurred vision.   Respiratory: Negative for cough, sputum production, shortness of breath and wheezing.    Cardiovascular: Negative for chest pain, palpitations and leg swelling.   Gastrointestinal: Negative for abdominal pain, blood in stool, diarrhea, nausea and vomiting.   Genitourinary: Negative for dysuria, flank pain and urgency.        Urinary retention  Suprapubic pressure   Musculoskeletal: Negative for back pain, joint pain, myalgias and neck pain.   Skin: Negative for rash.   Neurological: Negative for dizziness, focal weakness, seizures and headaches.   Endo/Heme/Allergies: Does not  bruise/bleed easily.   Psychiatric/Behavioral: Negative for suicidal ideas.   All other systems reviewed and are negative.      Past Medical History   has a past medical history of Alcohol use (2/10/2016); Arthritis (2016); Dental disorder; Diabetes (2005); Heart burn; Hiatus hernia syndrome; High cholesterol; Hypertension; Observed sleep apnea; Pain; Paroxysmal atrial fibrillation (HCC) (2/27/2016); and Snoring.    Surgical History   has a past surgical history that includes hand surgery (Left, 1970); cystoscopy (1986); tonsillectomy (as child); dental extraction(s) (1976); knee arthroscopy (Left, 2/18/2016); medial meniscectomy (2/18/2016); knee arthroscopy (Left, 2/27/2016); knee arthroplasty total (Left, 11/21/2016); knee replacement, total (Left, 11/2016); and knee arthroplasty total (Right, 8/15/2018).     Family History  family history includes Diabetes in his unknown relative; Hypertension in his unknown relative.     Social History   reports that he has never smoked. He has never used smokeless tobacco. He reports that he drinks about 1.8 oz of alcohol per week . He reports that he does not use drugs.    Allergies  No Known Allergies    Medications  Prior to Admission Medications   Prescriptions Last Dose Informant Patient Reported? Taking?   Potassium Gluconate 595 MG Cap   No No   Sig: Take 1 Cap by mouth every day.   SITagliptin (JANUVIA) 100 MG Tab   No No   Sig: Take 1 Tab by mouth every day.   acetaminophen (TYLENOL) 500 MG Tab   No No   Sig: Take 1 Tab by mouth every 6 hours.   capsicum (ZOSTRIX) 0.075 % topical cream   No No   Sig: Apply a thin layer to skin QID PRN nerve pain.   docusate sodium 100 MG Cap   No No   Sig: Take 100 mg by mouth 2 times a day as needed (constipation).   gabapentin (NEURONTIN) 300 MG Cap   No No   Sig: Take 2 Caps by mouth 2 Times a Day.   insulin glargine (LANTUS SOLOSTAR) 100 UNIT/ML Solution Pen-injector injection   No No   Sig: Inject 32 Units as instructed every  evening.   lisinopril (PRINIVIL) 10 MG Tab   No No   Sig: Take 1 Tab by mouth every day.   meloxicam (MOBIC) 7.5 MG Tab   Yes No   metformin (GLUCOPHAGE) 1000 MG tablet   No No   Sig: Take 1 Tab by mouth 2 Times a Day.   omeprazole (PRILOSEC) 20 MG delayed-release capsule   No No   Sig: Take 1 Cap by mouth every day.   rivaroxaban (XARELTO) 20 MG Tab tablet   No No   Sig: Take 1 Tab by mouth with dinner.   simvastatin (ZOCOR) 20 MG Tab   No No   Sig: Take 1 Tab by mouth every evening.      Facility-Administered Medications: None       Physical Exam  Temp:  [36 °C (96.8 °F)] 36 °C (96.8 °F)  Pulse:  [74-83] 74  Resp:  [16-18] 16  BP: (143)/(85) 143/85  SpO2:  [97 %-98 %] 98 %    Physical Exam   Constitutional: He is oriented to person, place, and time. He appears well-developed and well-nourished. No distress.   HENT:   Head: Normocephalic and atraumatic.   Mouth/Throat: Oropharynx is clear and moist.   Eyes: Pupils are equal, round, and reactive to light. Conjunctivae are normal. No scleral icterus.   Neck: Normal range of motion. Neck supple.   Cardiovascular: Normal rate, regular rhythm and normal heart sounds.    Pulmonary/Chest: Effort normal and breath sounds normal. No respiratory distress. He has no wheezes. He has no rales.   Abdominal: Soft. Bowel sounds are normal. He exhibits no distension. There is no tenderness. There is no rebound.   Genitourinary:   Genitourinary Comments: Suprapubic fullness and tenderness   Musculoskeletal: Normal range of motion. He exhibits no edema or tenderness.   Lymphadenopathy:     He has no cervical adenopathy.   Neurological: He is alert and oriented to person, place, and time. No cranial nerve deficit. Coordination normal.   Skin: Skin is warm. No rash noted.   Psychiatric: He has a normal mood and affect. His behavior is normal.   Nursing note and vitals reviewed.      Laboratory:  Recent Labs      07/30/19   0015   WBC  8.7   RBC  4.45*   HEMOGLOBIN  14.1   HEMATOCRIT   40.7*   MCV  91.5   MCH  31.7   MCHC  34.6   RDW  41.8   PLATELETCT  201   MPV  10.0     Recent Labs      07/30/19   0015   SODIUM  139   POTASSIUM  4.0   CHLORIDE  105   CO2  25   GLUCOSE  141*   BUN  18   CREATININE  0.93   CALCIUM  8.7     Recent Labs      07/30/19   0015   GLUCOSE  141*     Recent Labs      07/30/19   0015   INR  2.01*     No results for input(s): NTPROBNP in the last 72 hours.      No results for input(s): TROPONINT in the last 72 hours.    Urinalysis:    Recent Labs      07/29/19   2341   SPECGRAVITY  1.009   GLUCOSEUR  100*   KETONES  Negative   NITRITE  Negative   LEUKESTERAS  Negative        Imaging:  IR-DRAIN-RETROPERITONEAL    (Results Pending)         Assessment/Plan:  I anticipate this patient is appropriate for observation status at this time.    Urinary retention- (present on admission)   Assessment & Plan    Likely secondary to ureteral stricture  No evidence of kidney injury at this time  Urology has been consulted, plan for cystoscopy in the morning       Elevated INR- (present on admission)   Assessment & Plan    Xarelto will be reversed with Kcentra prior to cystoscopy     Type 2 diabetes mellitus with diabetic neuropathy (HCC)- (present on admission)   Assessment & Plan    Continue Lantus  Start on insulin sliding scale with serial Accu-Checks  Hypoglycemic protocol in place         Chronic atrial fibrillation (HCC)- (present on admission)   Assessment & Plan    Hold Xarelto at this time       Essential hypertension- (present on admission)   Assessment & Plan    Continue lisinopril     Hyperlipidemia- (present on admission)   Assessment & Plan    Continue Zocor         VTE prophylaxis: SCD

## 2019-07-30 NOTE — OR NURSING
0930- Patient arrived in PACU II alert and oriented. Vital signs are within normal limits for the patient. Patient reports pain 0/10. Discussed discharge plan of care and patient expressed understanding. All needs met at this time. Wife, Grisel, is at bedside.    0945- Provided patient with discharge education and goss care with wife at bedside. All questions answered.    1010- Patient feels ready to be discharged and meets discharge criteria set by Dr. Manning. Patient is going to get dressed.    1022- PIV removed and patient discharged home via wheelchair.

## 2019-07-30 NOTE — ED TRIAGE NOTES
"Richard T[facility]  69 y.o. male  Chief Complaint   Patient presents with   • Urinary Retention     \"About 40 years ago I had this before, they did a procedure and I haven't had a problem since. I haven't been able to urinate all day, I have a lot of pressure.\"       Pt amb to triage with steady gait for above complaint.   Pt is alert and oriented, speaking in full sentences, follows commands and responds appropriately to questions. Pt appears uncomfortable. Charge called.  "

## 2019-07-30 NOTE — ASSESSMENT & PLAN NOTE
Continue Lantus  Start on insulin sliding scale with serial Accu-Checks  Hypoglycemic protocol in place

## 2019-07-30 NOTE — ED NOTES
Pt does not know what medications he takes at home; Pt does not recall if he took his medications before coming to the hospital last night.

## 2019-07-30 NOTE — ANESTHESIA PROCEDURE NOTES
Airway  Date/Time: 7/30/2019 8:12 AM  Performed by: JOLYNN MCCORMICK  Authorized by: JOLYNN MCCORMICK     Location:  OR  Urgency:  Elective  Indications for Airway Management:  Anesthesia  Spontaneous Ventilation: absent    Sedation Level:  Deep  Preoxygenated: Yes    Mask Difficulty Assessment:  0 - not attempted  Final Airway Type:  Supraglottic airway  Final Supraglottic Airway:  Standard LMA  SGA Size:  4  Airway Seal Pressure (cm H2O):  18  Number of Attempts at Approach:  1

## 2019-07-30 NOTE — ED NOTES
Pt resting on gurney; Pt updated on plan of care; pt awaiting urologist for catheter placement. Pt and family have no questions at this time.

## 2019-07-30 NOTE — ANESTHESIA QCDR
2019 [facility] Clinical Data Registry (for Quality Improvement)     Postoperative nausea/vomiting risk protocol (Adult = 18 yrs and Pediatric 3-17 yrs)- (430 and 463)  General inhalation anesthetic (NOT TIVA) with PONV risk factors: Yes  Provision of anti-emetic therapy with at least 2 different classes of agents: Yes   Patient DID NOT receive anti-emetic therapy and reason is documented in Medical Record:  N/A    Multimodal Pain Management- (AQI59)  Patient undergoing Elective Surgery (i.e. Outpatient, or ASC, or Prescheduled Surgery prior to Hospital Admission): Yes  Use of Multimodal Pain Management, two or more drugs and/or interventions, NOT including systemic opioids: Yes   Exception: Documented allergy to multiple classes of analgesics:  N/A    PACU assessment of acute postoperative pain prior to Anesthesia Care End- Applies to Patients Age = 18- (ABG7)  Initial PACU pain score is which of the following: < 7/10  Patient unable to report pain score: N/A    Post-anesthetic transfer of care checklist/protocol to PACU/ICU- (426 and 427)  Upon conclusion of case, patient transferred to which of the following locations: PACU/Non-ICU  Use of transfer checklist/protocol: Yes  Exclusion: Service Performed in Patient Hospital Room (and thus did not require transfer): N/A    PACU Reintubation- (AQI31)  General anesthesia requiring endotracheal intubation (ETT) along with subsequent extubation in OR or PACU: No  Required reintubation in the PACU: N/A  Extubation was a planned trial documented in the medical record prior to removal of the original airway device: N/A    Unplanned admission to ICU related to anesthesia service up through end of PACU care- (MD51)  Unplanned admission to ICU (not initially anticipated at anesthesia start time): No

## 2019-07-30 NOTE — ASSESSMENT & PLAN NOTE
Likely secondary to ureteral stricture  No evidence of kidney injury at this time  Urology has been consulted, plan for cystoscopy in the morning

## 2019-07-30 NOTE — PROGRESS NOTES
The urology tray unfortunately is not available at this time.  Apparently several scopes are broken and there is no flexible scope available on the urology cart.  Plan  Reverse this patient's Xarelto and have interventional radiology pass a temporary suprapubic catheter.  I will have the patient follow-up in the office and we will schedule a definitive treatment for his probable urethral stricture disease.

## 2019-07-30 NOTE — ED PROVIDER NOTES
"ED Provider Note    CHIEF COMPLAINT  Chief Complaint   Patient presents with   • Urinary Retention     \"About 40 years ago I had this before, they did a procedure and I haven't had a problem since. I haven't been able to urinate all day, I have a lot of pressure.\"       HPI  Richard Chery is a 69 y.o. male who presents to emergency room today with inability to urinate.  Patient states he has not been able to urinate more than a few drops for last 24 hours worse tonight with pain and pressure over the bladder area.  Denies any fever chills no chest pain or shortness of breath.  He has had previous history of stricture when he was 21 years old and had a surgically repaired no problems since that time.  He is on Xarelto for atrial fibrillation denies any bleeding.  Pain is described as pressure over his bladder area.    REVIEW OF SYSTEMS  See HPI for further details. All other systems are negative.      PAST MEDICAL HISTORY  Past Medical History:   Diagnosis Date   • Paroxysmal atrial fibrillation (HCC) 2/27/2016 11/14/16-resolved now   • Alcohol use 2/10/2016    3 per day   • Arthritis 2016    left knee   • Diabetes 2005    insulin and oral agent   • Dental disorder     upper lower dentures   • Heart burn    • Hiatus hernia syndrome    • High cholesterol    • Hypertension    • Observed sleep apnea     no study done yet   • Pain     left knee   • Snoring        FAMILY HISTORY  Family History   Problem Relation Age of Onset   • Diabetes Unknown    • Hypertension Unknown        SOCIAL HISTORY  Social History     Social History   • Marital status:      Spouse name: N/A   • Number of children: N/A   • Years of education: N/A     Social History Main Topics   • Smoking status: Never Smoker   • Smokeless tobacco: Never Used   • Alcohol use 1.8 oz/week     3 Shots of liquor per week      Comment: 10 per week   • Drug use: No   • Sexual activity: Yes     Partners: Female     Other Topics Concern   • Not on file " "    Social History Narrative   • No narrative on file       SURGICAL HISTORY  Past Surgical History:   Procedure Laterality Date   • KNEE ARTHROPLASTY TOTAL Right 8/15/2018    Procedure: KNEE ARTHROPLASTY TOTAL;  Surgeon: Amparo James M.D.;  [facility];  Service: Orthopedics   • KNEE ARTHROPLASTY TOTAL Left 11/21/2016    Procedure: KNEE ARTHROPLASTY TOTAL;  Surgeon: Amparo James M.D.;  Location: [facility];  Service:    • KNEE REPLACEMENT, TOTAL Left 11/2016    Dr. James   • KNEE ARTHROSCOPY Left 2/27/2016    Procedure: KNEE ARTHROSCOPY- I&D KNEE;  Surgeon: Corby Reyes M.D.;  Location: [facility];  Service:    • KNEE ARTHROSCOPY Left 2/18/2016    Procedure: KNEE ARTHROSCOPY, SAMUELS;  Surgeon: Marquise Cline M.D.;  Location: [facility];  Service:    • MEDIAL MENISCECTOMY  2/18/2016    Procedure: MEDIAL MENISCECTOMY - PARTIAL;  Surgeon: Marquise Cline M.D.;  Location: [facility];  Service:    • CYSTOSCOPY  1986   • DENTAL EXTRACTION(S)  1976    wisdom teeth   • HAND SURGERY Left 1970    trauma, amputation index and middle finger   • TONSILLECTOMY  as child       CURRENT MEDICATIONS  Home Medications    **Home medications have not yet been reviewed for this encounter**         ALLERGIES  No Known Allergies    PHYSICAL EXAM  VITAL SIGNS: /85   Pulse 83   Temp 36 °C (96.8 °F) (Temporal)   Resp 18   Ht 1.778 m (5' 10\")   Wt 110 kg (242 lb 8.1 oz)   SpO2 97%   BMI 34.80 kg/m²       Constitutional: Well developed, Well nourished,  acute distress, Non-toxic appearance.   HENT: Normocephalic, Atraumatic, Bilateral external ears normal, Oropharynx moist, No oral exudates, Nose normal.   Eyes: PERRLA, EOMI, Conjunctiva normal, No discharge.   Neck: Normal range of motion, No tenderness, Supple, No stridor.   Lymphatic: No lymphadenopathy noted.   Cardiovascular: Normal heart rate, Normal rhythm, No murmurs, No rubs, No " gallops.   Thorax & Lungs: Normal breath sounds, No respiratory distress, No wheezing, No chest tenderness.   Abdomen: Bowel sounds normal, Soft, suprapubic fullness and tenderness, No masses, No pulsatile masses.   Genitalia: Uncircumcised there is no active bleeding  Skin: Warm, Dry, No erythema, No rash.   Back: No tenderness, No CVA tenderness.     RADIOLOGY/PROCEDURES  Bladder scan showed greater than 800 cc post void.    COURSE & MEDICAL DECISION MAKING  Pertinent Labs & Imaging studies reviewed. (See chart for details)  Urine was negative blood test pending patient had attempted several insertions of Petty catheter without able to pass it through the prostate.  Even using coudé tipped and larger catheter is unable to do this.  Discussed with Dr. Manning we will obtain a urological cart and contact him for insertion of catheter if this is successfully be able to go home with follow-up with urology.    FINAL IMPRESSION  1.  Acute urinary retention  2.   3.      Electronically signed by: Marquise Choudhury, 7/30/2019 12:39 AM

## 2019-07-30 NOTE — PROGRESS NOTES
Will take the patient to the or this am and place a urinary drain catheter. Probable stricture dilitation.

## 2019-08-01 ENCOUNTER — PATIENT OUTREACH (OUTPATIENT)
Dept: HEALTH INFORMATION MANAGEMENT | Facility: OTHER | Age: 69
End: 2019-08-01

## 2019-08-02 ENCOUNTER — TELEPHONE (OUTPATIENT)
Dept: MEDICAL GROUP | Facility: MEDICAL CENTER | Age: 69
End: 2019-08-02

## 2019-08-02 NOTE — TELEPHONE ENCOUNTER
ESTABLISHED PATIENT PRE-VISIT PLANNING     Patient was NOT contacted to complete PVP.     Note: Patient will not be contacted if there is no indication to call.     1.  Reviewed notes from the last few office visits within the medical group: Yes    2.  If any orders were placed at last visit or intended to be done for this visit (i.e. 6 mos follow-up), do we have Results/Consult Notes?        •  Labs - Labs were not ordered at last office visit.   Note: If patient appointment is for lab review and patient did not complete labs, check with provider if OK to reschedule patient until labs completed.       •  Imaging - Imaging was not ordered at last office visit.       •  Referrals - No referrals were ordered at last office visit.    3. Is this appointment scheduled as a Hospital Follow-Up? No    4.  Immunizations were updated in Epic using WebIZ?: Epic matches WebIZ       •  Web Iz Recommendations: VARICELLA (Chicken Pox)  and SHINGRIX (Shingles)    5.  Patient is due for the following Health Maintenance Topics:   Health Maintenance Due   Topic Date Due   • Annual Wellness Visit  1950   • IMM HEP B VACCINE (1 of 3 - Risk 3-dose series) 02/19/1970   • IMM ZOSTER VACCINES (2 of 3) 08/22/2016   • RETINAL SCREENING  08/08/2019   • A1C SCREENING  08/15/2019       6. Orders for overdue Health Maintenance topics pended in Pre-Charting? NO    7.  AHA (MDX) form printed for Provider? No, already completed    8.  Patient was NOT informed to arrive 15 min prior to their scheduled appointment and bring in their medication bottles.

## 2019-08-05 ENCOUNTER — OFFICE VISIT (OUTPATIENT)
Dept: MEDICAL GROUP | Facility: MEDICAL CENTER | Age: 69
End: 2019-08-05
Payer: MEDICARE

## 2019-08-05 VITALS
TEMPERATURE: 98.5 F | RESPIRATION RATE: 14 BRPM | BODY MASS INDEX: 33.23 KG/M2 | OXYGEN SATURATION: 94 % | HEART RATE: 98 BPM | WEIGHT: 237.4 LBS | DIASTOLIC BLOOD PRESSURE: 70 MMHG | HEIGHT: 71 IN | SYSTOLIC BLOOD PRESSURE: 112 MMHG

## 2019-08-05 DIAGNOSIS — K21.9 GASTROESOPHAGEAL REFLUX DISEASE WITHOUT ESOPHAGITIS: ICD-10-CM

## 2019-08-05 DIAGNOSIS — Z79.4 TYPE 2 DIABETES MELLITUS WITH DIABETIC NEUROPATHY, WITH LONG-TERM CURRENT USE OF INSULIN (HCC): ICD-10-CM

## 2019-08-05 DIAGNOSIS — I10 ESSENTIAL HYPERTENSION: ICD-10-CM

## 2019-08-05 DIAGNOSIS — L91.8 SKIN TAG: ICD-10-CM

## 2019-08-05 DIAGNOSIS — R33.9 URINARY RETENTION: ICD-10-CM

## 2019-08-05 DIAGNOSIS — G62.9 NEUROPATHY: ICD-10-CM

## 2019-08-05 DIAGNOSIS — E11.40 TYPE 2 DIABETES MELLITUS WITH DIABETIC NEUROPATHY, WITH LONG-TERM CURRENT USE OF INSULIN (HCC): ICD-10-CM

## 2019-08-05 PROCEDURE — 99214 OFFICE O/P EST MOD 30 MIN: CPT | Mod: 25 | Performed by: FAMILY MEDICINE

## 2019-08-05 PROCEDURE — 11200 RMVL SKIN TAGS UP TO&INC 15: CPT | Performed by: FAMILY MEDICINE

## 2019-08-05 RX ORDER — GABAPENTIN 300 MG/1
600 CAPSULE ORAL 2 TIMES DAILY
Qty: 360 CAP | Refills: 4 | Status: SHIPPED | OUTPATIENT
Start: 2019-08-05 | End: 2019-12-10 | Stop reason: SDUPTHER

## 2019-08-05 RX ORDER — OMEPRAZOLE 20 MG/1
20 CAPSULE, DELAYED RELEASE ORAL DAILY
Qty: 100 CAP | Refills: 4 | Status: SHIPPED | OUTPATIENT
Start: 2019-08-05 | End: 2019-12-10 | Stop reason: SDUPTHER

## 2019-08-05 RX ORDER — LISINOPRIL 10 MG/1
10 TABLET ORAL DAILY
Qty: 100 TAB | Refills: 4 | Status: SHIPPED | OUTPATIENT
Start: 2019-08-05 | End: 2019-12-10 | Stop reason: SDUPTHER

## 2019-08-05 NOTE — ASSESSMENT & PLAN NOTE
Patient was recently in the hospital for urinary retention caused by urethral stricture.  He underwent a dilation and now has a Petty catheter in place.  He has an appointment with urology on Wednesday.  He denies abdominal pain.

## 2019-08-05 NOTE — ASSESSMENT & PLAN NOTE
Hypoglycemic therapy: Lantus 32 units hs, metformin 1000 BID and januvia.  Last A1c: 08/05/19 is 6.8.   Aspirin: not taking because he's on Xarelto.    Statin: Taking.  ACE: taking.   Urine Microalbumin: Done 2018 and normal.  Monofilament Exam:  Done 12/2019 and has neuropathy.   Retinal Screening:  Done 8/2018.   Pneumonia vaccine: received.

## 2019-08-05 NOTE — PROGRESS NOTES
[facility]  Progress Note  Established Patient    Subjective:   Richard Chery is a 69 y.o. male here today with a chief complaint of diabetes. The patient is alone.     Essential hypertension  Patient's blood pressure is under excellent control on his current medication regimen.    GERD (gastroesophageal reflux disease)  Patient would like to stay on omeprazole.  He is doing well.    Neuropathy (HCC)  Patient is taking gabapentin for his neuropathy which is working well.  He is requesting refill.    Type 2 diabetes mellitus with diabetic neuropathy (HCC)  Hypoglycemic therapy: Lantus 32 units hs, metformin 1000 BID and januvia.  Last A1c: 08/05/19 is 6.8.   Aspirin: not taking because he's on Xarelto.    Statin: Taking.  ACE: taking.   Urine Microalbumin: Done 2018 and normal.  Monofilament Exam:  Done 12/2019 and has neuropathy.   Retinal Screening:  Done 8/2018.   Pneumonia vaccine: received.    Urinary retention  Patient was recently in the hospital for urinary retention caused by urethral stricture.  He underwent a dilation and now has a Petty catheter in place.  He has an appointment with urology on Wednesday.  He denies abdominal pain.    Skin tag  Patient has a bothersome skin tag on the left anterior neck.  He tends to pick at it and it is itchy.      Current Outpatient Medications on File Prior to Visit   Medication Sig Dispense Refill   • simvastatin (ZOCOR) 20 MG Tab Take 1 Tab by mouth every evening. 100 Tab 4   • rivaroxaban (XARELTO) 20 MG Tab tablet Take 1 Tab by mouth with dinner. 30 Tab 11     No current facility-administered medications on file prior to visit.        Past Medical History:   Diagnosis Date   • Alcohol use 2/10/2016    3 per day   • Arthritis 2016    left knee   • Dental disorder     upper lower dentures   • Diabetes 2005    insulin and oral agent   • Heart burn    • Hiatus hernia syndrome    • High cholesterol    • Hypertension    • Observed sleep apnea     no study  "done yet   • Pain     left knee   • Paroxysmal atrial fibrillation (HCC) 2/27/2016 11/14/16-resolved now   • Snoring        Allergies: Patient has no known allergies.    Surgical History:  has a past surgical history that includes hand surgery (Left, 1970); cystoscopy (1986); tonsillectomy (as child); dental extraction(s) (1976); knee arthroscopy (Left, 2/18/2016); medial meniscectomy (2/18/2016); knee arthroscopy (Left, 2/27/2016); knee arthroplasty total (Left, 11/21/2016); knee replacement, total (Left, 11/2016); knee arthroplasty total (Right, 8/15/2018); and cystoscopy (N/A, 7/30/2019).    Family History: family history includes Diabetes in his unknown relative; Hypertension in his unknown relative.    Social History:  reports that he has never smoked. He has never used smokeless tobacco. He reports that he drinks about 1.8 oz of alcohol per week. He reports that he does not use drugs.    ROS: no fever or nausea.        Objective:     Vitals:    08/05/19 1434   BP: 112/70   BP Location: Left arm   Patient Position: Sitting   BP Cuff Size: Adult   Pulse: 98   Resp: 14   Temp: 36.9 °C (98.5 °F)   TempSrc: Temporal   SpO2: 94%   Weight: 107.7 kg (237 lb 6.4 oz)   Height: 1.803 m (5' 11\")       Physical Exam:  General: alert in no apparent distress.   Abd: soft, NTND.  Skin: inflamed skin tag on neck.     Cryotherapy Procedure Note:  I discussed the risks and benefits of cryotherapy with the patient who gave verbal consent for the procedure. Three applications of cryotherapy were applied to the skin lesion described above. The patient tolerated the procedure well and there were no complications. Aftercare was discussed.    Assessment and Plan:     1. Type 2 diabetes mellitus with diabetic neuropathy, with long-term current use of insulin (HCC)  At goal.   - SITagliptin (JANUVIA) 100 MG Tab; Take 1 Tab by mouth every day.  Dispense: 30 Tab; Refill: 3  - metformin (GLUCOPHAGE) 1000 MG tablet; Take 1 Tab by mouth 2 " Times a Day.  Dispense: 200 Tab; Refill: 4  - insulin glargine (LANTUS SOLOSTAR) 100 UNIT/ML Solution Pen-injector injection; Inject 32 Units as instructed every evening.  Dispense: 15 PEN; Refill: 12    2. Essential hypertension  - lisinopril (PRINIVIL) 10 MG Tab; Take 1 Tab by mouth every day.  Dispense: 100 Tab; Refill: 4    3. Urinary retention  - f/u urology.     4. Neuropathy (HCC)  - gabapentin (NEURONTIN) 300 MG Cap; Take 2 Caps by mouth 2 Times a Day.  Dispense: 360 Cap; Refill: 4    5. Gastroesophageal reflux disease without esophagitis  - omeprazole (PRILOSEC) 20 MG delayed-release capsule; Take 1 Cap by mouth every day.  Dispense: 100 Cap; Refill: 4    6. Skin tag  - cryotherapy (see procedure note above).     Followup: Return in about 6 months (around 2/5/2020), or if symptoms worsen or fail to improve.

## 2019-08-05 NOTE — ASSESSMENT & PLAN NOTE
Patient has a bothersome skin tag on the left anterior neck.  He tends to pick at it and it is itchy.

## 2019-08-29 ENCOUNTER — PATIENT OUTREACH (OUTPATIENT)
Dept: HEALTH INFORMATION MANAGEMENT | Facility: OTHER | Age: 69
End: 2019-08-29

## 2019-09-10 NOTE — PROGRESS NOTES
A 69-year-old male was an emergent admission to [facility] from 7/30/2019 to 7/30/2019 to treat Retention of Urine. [facility] did not visit the patient bedside. The patient was discharged Home. The patient was no discharged with any medication orders.     The patient was ordered to follow-up with PCP. The patient had the following appointments:     1) 8/5/2019 @ 2:40 Wayne Yuen, general/family practice - CONFIRMED AS KEPT     2) 8/7/2019 @ 9:00 Anthony Manning, urology - CONFIRMED AS KEPT  The patient has no future appointments scheduled.     [facility] followed the patient for a total of 30 days and Patient was receptive to [facility] services. In summary, [facility] Coordinated physician appointments and Provided education to patient (health edu, benefits, resources, etc.). As a result, Patient adhered with Discharge Orders, Patient attended follow up appointments, and Patient successfully recovered or avoided further complications. PPS screening was conducted at 80%.

## 2019-09-23 ENCOUNTER — TELEPHONE (OUTPATIENT)
Dept: MEDICAL GROUP | Facility: MEDICAL CENTER | Age: 69
End: 2019-09-23

## 2019-09-23 DIAGNOSIS — E11.40 TYPE 2 DIABETES MELLITUS WITH DIABETIC NEUROPATHY, WITH LONG-TERM CURRENT USE OF INSULIN (HCC): ICD-10-CM

## 2019-09-23 DIAGNOSIS — Z79.4 TYPE 2 DIABETES MELLITUS WITH DIABETIC NEUROPATHY, WITH LONG-TERM CURRENT USE OF INSULIN (HCC): ICD-10-CM

## 2019-09-23 RX ORDER — INSULIN GLARGINE 100 [IU]/ML
INJECTION, SOLUTION SUBCUTANEOUS
Qty: 15 PEN | Refills: 4 | Status: SHIPPED | OUTPATIENT
Start: 2019-09-23 | End: 2019-12-10

## 2019-09-24 ENCOUNTER — HOSPITAL ENCOUNTER (OUTPATIENT)
Dept: RADIOLOGY | Facility: MEDICAL CENTER | Age: 69
End: 2019-09-24
Attending: NEUROLOGICAL SURGERY
Payer: MEDICARE

## 2019-09-24 DIAGNOSIS — M54.5 LOW BACK PAIN, UNSPECIFIED BACK PAIN LATERALITY, UNSPECIFIED CHRONICITY, WITH SCIATICA PRESENCE UNSPECIFIED: ICD-10-CM

## 2019-09-24 PROCEDURE — 72100 X-RAY EXAM L-S SPINE 2/3 VWS: CPT

## 2019-10-11 ENCOUNTER — HOSPITAL ENCOUNTER (OUTPATIENT)
Dept: RADIOLOGY | Facility: MEDICAL CENTER | Age: 69
End: 2019-10-11
Attending: NEUROLOGICAL SURGERY
Payer: MEDICARE

## 2019-10-11 DIAGNOSIS — M79.605 LEFT LEG PAIN: ICD-10-CM

## 2019-10-11 PROCEDURE — 93922 UPR/L XTREMITY ART 2 LEVELS: CPT

## 2019-10-11 PROCEDURE — 93922 UPR/L XTREMITY ART 2 LEVELS: CPT | Mod: 26,GZ | Performed by: INTERNAL MEDICINE

## 2019-10-16 ENCOUNTER — IMMUNIZATION (OUTPATIENT)
Dept: SOCIAL WORK | Facility: CLINIC | Age: 69
End: 2019-10-16
Payer: MEDICARE

## 2019-10-16 DIAGNOSIS — Z23 NEED FOR VACCINATION: ICD-10-CM

## 2019-10-16 PROCEDURE — 90662 IIV NO PRSV INCREASED AG IM: CPT | Performed by: REGISTERED NURSE

## 2019-10-16 PROCEDURE — G0008 ADMIN INFLUENZA VIRUS VAC: HCPCS | Performed by: REGISTERED NURSE

## 2019-12-10 ENCOUNTER — OFFICE VISIT (OUTPATIENT)
Dept: MEDICAL GROUP | Facility: MEDICAL CENTER | Age: 69
End: 2019-12-10
Payer: MEDICARE

## 2019-12-10 VITALS
HEART RATE: 61 BPM | HEIGHT: 71 IN | TEMPERATURE: 98.1 F | WEIGHT: 242.6 LBS | SYSTOLIC BLOOD PRESSURE: 122 MMHG | BODY MASS INDEX: 33.96 KG/M2 | RESPIRATION RATE: 18 BRPM | OXYGEN SATURATION: 97 % | DIASTOLIC BLOOD PRESSURE: 68 MMHG

## 2019-12-10 DIAGNOSIS — I10 ESSENTIAL HYPERTENSION: ICD-10-CM

## 2019-12-10 DIAGNOSIS — R25.2 LEG CRAMPS: ICD-10-CM

## 2019-12-10 DIAGNOSIS — Z79.4 TYPE 2 DIABETES MELLITUS WITHOUT COMPLICATION, WITH LONG-TERM CURRENT USE OF INSULIN (HCC): ICD-10-CM

## 2019-12-10 DIAGNOSIS — E11.9 TYPE 2 DIABETES MELLITUS WITHOUT COMPLICATION, WITH LONG-TERM CURRENT USE OF INSULIN (HCC): ICD-10-CM

## 2019-12-10 DIAGNOSIS — E78.01 FAMILIAL HYPERCHOLESTEROLEMIA: ICD-10-CM

## 2019-12-10 DIAGNOSIS — E11.40 TYPE 2 DIABETES MELLITUS WITH DIABETIC NEUROPATHY, WITH LONG-TERM CURRENT USE OF INSULIN (HCC): ICD-10-CM

## 2019-12-10 DIAGNOSIS — Z79.4 TYPE 2 DIABETES MELLITUS WITH DIABETIC NEUROPATHY, WITH LONG-TERM CURRENT USE OF INSULIN (HCC): ICD-10-CM

## 2019-12-10 DIAGNOSIS — G62.9 NEUROPATHY: ICD-10-CM

## 2019-12-10 DIAGNOSIS — I48.20 CHRONIC ATRIAL FIBRILLATION (HCC): ICD-10-CM

## 2019-12-10 DIAGNOSIS — K21.9 GASTROESOPHAGEAL REFLUX DISEASE WITHOUT ESOPHAGITIS: ICD-10-CM

## 2019-12-10 LAB
HBA1C MFR BLD: 6.5 % (ref 0–5.6)
INT CON NEG: NEGATIVE
INT CON POS: POSITIVE

## 2019-12-10 PROCEDURE — 83036 HEMOGLOBIN GLYCOSYLATED A1C: CPT | Performed by: FAMILY MEDICINE

## 2019-12-10 PROCEDURE — 99214 OFFICE O/P EST MOD 30 MIN: CPT | Performed by: FAMILY MEDICINE

## 2019-12-10 RX ORDER — LISINOPRIL 10 MG/1
10 TABLET ORAL DAILY
Qty: 100 TAB | Refills: 1 | Status: SHIPPED
Start: 2019-12-10 | End: 2020-05-05 | Stop reason: SDUPTHER

## 2019-12-10 RX ORDER — SIMVASTATIN 20 MG
20 TABLET ORAL EVERY EVENING
Qty: 100 TAB | Refills: 1 | Status: SHIPPED
Start: 2019-12-10 | End: 2020-05-05 | Stop reason: SDUPTHER

## 2019-12-10 RX ORDER — OMEPRAZOLE 20 MG/1
20 CAPSULE, DELAYED RELEASE ORAL DAILY
Qty: 90 CAP | Refills: 1 | Status: SHIPPED
Start: 2019-12-10 | End: 2020-05-05 | Stop reason: SDUPTHER

## 2019-12-10 RX ORDER — GABAPENTIN 300 MG/1
600 CAPSULE ORAL 2 TIMES DAILY
Qty: 360 CAP | Refills: 1 | Status: SHIPPED
Start: 2019-12-10 | End: 2020-09-16

## 2019-12-10 NOTE — ASSESSMENT & PLAN NOTE
The patient's A1c is 6.5 today. He doesn't have any lows. He doesn't check his fasting sugar regularly.

## 2019-12-10 NOTE — ASSESSMENT & PLAN NOTE
Patient has paroxysmal atrial fibrillation and follows with cardiology. He is requesting a refill on his Xarelto.

## 2019-12-10 NOTE — ASSESSMENT & PLAN NOTE
The patient describes a few months of nocturnal leg cramps that tend to respond to potassium. He is taking up to 8 tablets of OTC potassium per day. It doesn't appear that he feels the need to move his legs at night. He recently had an CHANTEL which was reassuring.

## 2019-12-10 NOTE — PROGRESS NOTES
[facility]  Progress Note  Established Patient    Subjective:   Richard Chery is a 69 y.o. male here today with a chief complaint of diabetes. The patient is accompanied by his wife.     Type 2 diabetes mellitus with diabetic neuropathy ([facility])  The patient's A1c is 6.5 today. He doesn't have any lows. He doesn't check his fasting sugar regularly.     Neuropathy ([facility])  Patient is taking gabapentin for his neuropathy which is working well.  He is requesting refill.    Leg cramps  The patient describes a few months of nocturnal leg cramps that tend to respond to potassium. He is taking up to 8 tablets of OTC potassium per day. It doesn't appear that he feels the need to move his legs at night. He recently had an CHANTEL which was reassuring.     Hyperlipidemia  Controlled on simvastatin.     GERD (gastroesophageal reflux disease)  Patient would like to stay on omeprazole.  He is doing well and requesting a refill.     Essential hypertension  Patient's blood pressure is under excellent control on his current medication regimen.    Chronic atrial fibrillation ([facility])  Patient has paroxysmal atrial fibrillation and follows with cardiology. He is requesting a refill on his Xarelto.      No current outpatient medications on file prior to visit.     No current facility-administered medications on file prior to visit.        Past Medical History:   Diagnosis Date   • Alcohol use 2/10/2016    3 per day   • Arthritis 2016    left knee   • Dental disorder     upper lower dentures   • Diabetes 2005    insulin and oral agent   • Heart burn    • Hiatus hernia syndrome    • High cholesterol    • Hypertension    • Observed sleep apnea     no study done yet   • Pain     left knee   • Paroxysmal atrial fibrillation ([facility]) 2/27/2016 11/14/16-resolved now   • Snoring        Allergies: Patient has no known allergies.    Surgical History:  has a past surgical history that includes hand surgery (Left, 1970); cystoscopy (1986);  "tonsillectomy (as child); dental extraction(s) (1976); knee arthroscopy (Left, 2/18/2016); medial meniscectomy (2/18/2016); knee arthroscopy (Left, 2/27/2016); knee arthroplasty total (Left, 11/21/2016); knee replacement, total (Left, 11/2016); knee arthroplasty total (Right, 8/15/2018); and cystoscopy (N/A, 7/30/2019).    Family History: family history includes Diabetes in an other family member; Hypertension in an other family member.    Social History:  reports that he has never smoked. He has never used smokeless tobacco. He reports current alcohol use of about 1.8 oz of alcohol per week. He reports that he does not use drugs.    ROS: no fever, some sciatica.        Objective:     Vitals:    12/10/19 1025   BP: 122/68   BP Location: Left arm   Patient Position: Sitting   BP Cuff Size: Adult   Pulse: 61   Resp: 18   Temp: 36.7 °C (98.1 °F)   TempSrc: Temporal   SpO2: 97%   Weight: 110 kg (242 lb 9.6 oz)   Height: 1.803 m (5' 11\")       Physical Exam:  General: alert in no apparent distress.   MSK: no TTP of calves bilaterally, negative Watson bilaterally.     A1c: 6.5.     Assessment and Plan:     1. Type 2 diabetes mellitus with diabetic neuropathy, with long-term current use of insulin (HCC)  Reduce lantus to 28 units daily, reminded him he needs to check fasting labs daily.   - REFERRAL TO OPHTHALMOLOGY  - insulin glargine (LANTUS SOLOSTAR) 100 UNIT/ML Solution Pen-injector injection; Inject 28 Units as instructed every evening.  Dispense: 15 PEN; Refill: 1  - metformin (GLUCOPHAGE) 1000 MG tablet; Take 1 Tab by mouth 2 Times a Day.  Dispense: 200 Tab; Refill: 1  - SITagliptin (JANUVIA) 100 MG Tab; Take 1 Tab by mouth every day.  Dispense: 90 Tab; Refill: 1    2. Essential hypertension  - lisinopril (PRINIVIL) 10 MG Tab; Take 1 Tab by mouth every day.  Dispense: 100 Tab; Refill: 1    3. Familial hypercholesterolemia  - simvastatin (ZOCOR) 20 MG Tab; Take 1 Tab by mouth every evening.  Dispense: 100 Tab; Refill: " 1    4. Leg cramps  Informed him I do not recommend he take so much OTC potassium.   - stop OTC potassium with labs in 2 weeks. May use B complex 1-2 tablets nightly for leg cramps in the mean time.   - Basic Metabolic Panel; Future  - FERRITIN; Future  - CBC WITH DIFFERENTIAL; Future    5. Neuropathy  - gabapentin (NEURONTIN) 300 MG Cap; Take 2 Caps by mouth 2 Times a Day.  Dispense: 360 Cap; Refill: 1    6. Gastroesophageal reflux disease without esophagitis  - omeprazole (PRILOSEC) 20 MG delayed-release capsule; Take 1 Cap by mouth every day.  Dispense: 90 Cap; Refill: 1    7. Chronic atrial fibrillation  - rivaroxaban (XARELTO) 20 MG Tab tablet; Take 1 Tab by mouth with dinner.  Dispense: 90 Tab; Refill: 1      Followup: Return if symptoms worsen or fail to improve.

## 2019-12-19 ENCOUNTER — OFFICE VISIT (OUTPATIENT)
Dept: CARDIOLOGY | Facility: MEDICAL CENTER | Age: 69
End: 2019-12-19
Payer: MEDICARE

## 2019-12-19 VITALS
HEART RATE: 90 BPM | OXYGEN SATURATION: 92 % | HEIGHT: 71 IN | BODY MASS INDEX: 34.18 KG/M2 | SYSTOLIC BLOOD PRESSURE: 120 MMHG | WEIGHT: 244.16 LBS | DIASTOLIC BLOOD PRESSURE: 80 MMHG

## 2019-12-19 DIAGNOSIS — I10 HYPERTENSION, ESSENTIAL: ICD-10-CM

## 2019-12-19 DIAGNOSIS — Z79.01 CHRONIC ANTICOAGULATION: ICD-10-CM

## 2019-12-19 DIAGNOSIS — I27.20 PULMONARY HYPERTENSION (HCC): ICD-10-CM

## 2019-12-19 DIAGNOSIS — I48.19 OTHER PERSISTENT ATRIAL FIBRILLATION (HCC): ICD-10-CM

## 2019-12-19 PROCEDURE — 99213 OFFICE O/P EST LOW 20 MIN: CPT | Performed by: INTERNAL MEDICINE

## 2019-12-19 NOTE — PROGRESS NOTES
"CARDIOLOGY OUTPATIENT FOLLOWUP    PCP: Wayne Yuen M.D.    1. Other persistent atrial fibrillation  No symptoms.  Plan is rate control and anticoagulation.  Rate remains controlled without metoprolol though he is not monitoring I recommended closer observation to this at home and reporting back to me if the average heart rate is greater than 90 bpm  -Continue Xarelto    2. Hypertension, essential  Well-controlled.  No changes advised    3. Pulmonary hypertension (HCC)  Normalization by 2018 echocardiogram though the limited TR jet makes this unreliable    4. Chronic anticoagulation  Stable without any complications of Xarelto.  He is appropriately dosed with normal renal function      Follow up with Wayne Mckeon M.D. in 1 year    Chief Complaint   Patient presents with   • HTN (Controlled)   • Hyperlipidemia   • Atrial Fibrillation   • Pulmonary Hypertension       History: Richard Chery is a 69 y.o. male with a past medical history of hypertension, hyperlipidemia, diabetes and persistent atrial fibrillation presenting for follow up of atrial fibrillation.  Patient has been diagnosed with a condition for several years, all without having any outward symptoms of the condition.  He swims 3-4 times weekly and is otherwise active; having gone skiing yesterday.  He does not get any chest pain, palpitations, shortness of breath, orthopnea or edema.  He reports usual good heart rate control between 60 and 80 bpm though admittedly has not been measuring this in the recent past.  His health is otherwise stable and his health    ROS:  All other systems reviewed and negative except as per the HPI    PE:  /80 (BP Location: Left arm, Patient Position: Sitting, BP Cuff Size: Adult)   Pulse 90   Ht 1.803 m (5' 11\")   Wt 110.7 kg (244 lb 2.6 oz)   SpO2 92%   BMI 34.05 kg/m²   Gen: Well appearing  HEENT: Symmetric face. Anicteric sclerae. Moist mucus membranes  NECK: No JVD. No lymphadenopathy  CARDIAC: " Normal PMI, regular, normal S1, S2.  VASCULATURE: Normal carotid amplitude without bruit.   RESP: Clear to auscultation bilaterally  ABD: Soft, non-tender, non-distended  EXT: No edema, no clubbing or cyanosis  SKIN: Warm and dry  NEURO: No gross deficits  PSYCH: Appropriate affect, participates in conversation    Past Medical History:   Diagnosis Date   • Alcohol use 2/10/2016    3 per day   • Arthritis 2016    left knee   • Dental disorder     upper lower dentures   • Diabetes 2005    insulin and oral agent   • Heart burn    • Hiatus hernia syndrome    • High cholesterol    • Hypertension    • Observed sleep apnea     no study done yet   • Pain     left knee   • Paroxysmal atrial fibrillation (HCC) 2/27/2016 11/14/16-resolved now   • Snoring      No Known Allergies  Outpatient Encounter Medications as of 12/19/2019   Medication Sig Dispense Refill   • gabapentin (NEURONTIN) 300 MG Cap Take 2 Caps by mouth 2 Times a Day. 360 Cap 1   • insulin glargine (LANTUS SOLOSTAR) 100 UNIT/ML Solution Pen-injector injection Inject 28 Units as instructed every evening. 15 PEN 1   • lisinopril (PRINIVIL) 10 MG Tab Take 1 Tab by mouth every day. 100 Tab 1   • metformin (GLUCOPHAGE) 1000 MG tablet Take 1 Tab by mouth 2 Times a Day. 200 Tab 1   • omeprazole (PRILOSEC) 20 MG delayed-release capsule Take 1 Cap by mouth every day. 90 Cap 1   • rivaroxaban (XARELTO) 20 MG Tab tablet Take 1 Tab by mouth with dinner. 90 Tab 1   • simvastatin (ZOCOR) 20 MG Tab Take 1 Tab by mouth every evening. 100 Tab 1   • SITagliptin (JANUVIA) 100 MG Tab Take 1 Tab by mouth every day. 90 Tab 1     No facility-administered encounter medications on file as of 12/19/2019.      Social History     Socioeconomic History   • Marital status:      Spouse name: Not on file   • Number of children: Not on file   • Years of education: Not on file   • Highest education level: Not on file   Occupational History   • Not on file   Social Needs   • Financial  resource strain: Not on file   • Food insecurity:     Worry: Not on file     Inability: Not on file   • Transportation needs:     Medical: Not on file     Non-medical: Not on file   Tobacco Use   • Smoking status: Never Smoker   • Smokeless tobacco: Never Used   Substance and Sexual Activity   • Alcohol use: Yes     Alcohol/week: 1.8 oz     Types: 3 Shots of liquor per week     Comment: 10 per week   • Drug use: No   • Sexual activity: Yes     Partners: Female   Lifestyle   • Physical activity:     Days per week: Not on file     Minutes per session: Not on file   • Stress: Not on file   Relationships   • Social connections:     Talks on phone: Not on file     Gets together: Not on file     Attends [employer] service: Not on file     Active member of club or organization: Not on file     Attends meetings of clubs or organizations: Not on file     Relationship status: Not on file   • Intimate partner violence:     Fear of current or ex partner: Not on file     Emotionally abused: Not on file     Physically abused: Not on file     Forced sexual activity: Not on file   Other Topics Concern   • Not on file   Social History Narrative   • Not on file       Studies  Lab Results   Component Value Date/Time    CHOLSTRLTOT 104 04/22/2019 07:14 AM    LDL 25 04/22/2019 07:14 AM    HDL 52 04/22/2019 07:14 AM    TRIGLYCERIDE 133 04/22/2019 07:14 AM       Lab Results   Component Value Date/Time    SODIUM 139 07/30/2019 12:15 AM    POTASSIUM 4.0 07/30/2019 12:15 AM    CHLORIDE 105 07/30/2019 12:15 AM    CO2 25 07/30/2019 12:15 AM    GLUCOSE 141 (H) 07/30/2019 12:15 AM    BUN 18 07/30/2019 12:15 AM    CREATININE 0.93 07/30/2019 12:15 AM     Lab Results   Component Value Date/Time    ALKPHOSPHAT 59 04/22/2019 07:14 AM    ASTSGOT 20 04/22/2019 07:14 AM    ALTSGPT 21 04/22/2019 07:14 AM    TBILIRUBIN 0.7 04/22/2019 07:14 AM        For this encounter I directly reviewed ECG tracings and medical records I agree with the interpretations in  the electronic health record

## 2019-12-27 ENCOUNTER — HOSPITAL ENCOUNTER (OUTPATIENT)
Dept: LAB | Facility: MEDICAL CENTER | Age: 69
End: 2019-12-27
Attending: FAMILY MEDICINE
Payer: MEDICARE

## 2019-12-27 DIAGNOSIS — R25.2 LEG CRAMPS: ICD-10-CM

## 2019-12-27 DIAGNOSIS — Z79.4 TYPE 2 DIABETES MELLITUS WITHOUT COMPLICATION, WITH LONG-TERM CURRENT USE OF INSULIN (HCC): ICD-10-CM

## 2019-12-27 DIAGNOSIS — E11.9 TYPE 2 DIABETES MELLITUS WITHOUT COMPLICATION, WITH LONG-TERM CURRENT USE OF INSULIN (HCC): ICD-10-CM

## 2019-12-27 LAB
ANION GAP SERPL CALC-SCNC: 9 MMOL/L (ref 0–11.9)
BASOPHILS # BLD AUTO: 0.8 % (ref 0–1.8)
BASOPHILS # BLD: 0.07 K/UL (ref 0–0.12)
BUN SERPL-MCNC: 12 MG/DL (ref 8–22)
CALCIUM SERPL-MCNC: 10.3 MG/DL (ref 8.5–10.5)
CHLORIDE SERPL-SCNC: 103 MMOL/L (ref 96–112)
CO2 SERPL-SCNC: 26 MMOL/L (ref 20–33)
CREAT SERPL-MCNC: 0.88 MG/DL (ref 0.5–1.4)
EOSINOPHIL # BLD AUTO: 0.15 K/UL (ref 0–0.51)
EOSINOPHIL NFR BLD: 1.8 % (ref 0–6.9)
ERYTHROCYTE [DISTWIDTH] IN BLOOD BY AUTOMATED COUNT: 43.1 FL (ref 35.9–50)
FERRITIN SERPL-MCNC: 24.2 NG/ML (ref 22–322)
GLUCOSE SERPL-MCNC: 130 MG/DL (ref 65–99)
HCT VFR BLD AUTO: 46.4 % (ref 42–52)
HGB BLD-MCNC: 15.7 G/DL (ref 14–18)
IMM GRANULOCYTES # BLD AUTO: 0.01 K/UL (ref 0–0.11)
IMM GRANULOCYTES NFR BLD AUTO: 0.1 % (ref 0–0.9)
LYMPHOCYTES # BLD AUTO: 3.64 K/UL (ref 1–4.8)
LYMPHOCYTES NFR BLD: 43.5 % (ref 22–41)
MCH RBC QN AUTO: 32 PG (ref 27–33)
MCHC RBC AUTO-ENTMCNC: 33.8 G/DL (ref 33.7–35.3)
MCV RBC AUTO: 94.5 FL (ref 81.4–97.8)
MONOCYTES # BLD AUTO: 0.67 K/UL (ref 0–0.85)
MONOCYTES NFR BLD AUTO: 8 % (ref 0–13.4)
NEUTROPHILS # BLD AUTO: 3.83 K/UL (ref 1.82–7.42)
NEUTROPHILS NFR BLD: 45.8 % (ref 44–72)
NRBC # BLD AUTO: 0 K/UL
NRBC BLD-RTO: 0 /100 WBC
PLATELET # BLD AUTO: 241 K/UL (ref 164–446)
PMV BLD AUTO: 10 FL (ref 9–12.9)
POTASSIUM SERPL-SCNC: 4 MMOL/L (ref 3.6–5.5)
RBC # BLD AUTO: 4.91 M/UL (ref 4.7–6.1)
SODIUM SERPL-SCNC: 138 MMOL/L (ref 135–145)
WBC # BLD AUTO: 8.4 K/UL (ref 4.8–10.8)

## 2019-12-27 PROCEDURE — 82728 ASSAY OF FERRITIN: CPT

## 2019-12-27 PROCEDURE — 85025 COMPLETE CBC W/AUTO DIFF WBC: CPT

## 2019-12-27 PROCEDURE — 36415 COLL VENOUS BLD VENIPUNCTURE: CPT

## 2019-12-27 PROCEDURE — 80048 BASIC METABOLIC PNL TOTAL CA: CPT

## 2019-12-30 ENCOUNTER — TELEPHONE (OUTPATIENT)
Dept: MEDICAL GROUP | Facility: MEDICAL CENTER | Age: 69
End: 2019-12-30

## 2019-12-30 NOTE — TELEPHONE ENCOUNTER
Phone Number Called: [phone] (home)     Call outcome: spoke to patient regarding message below    Message: PT notified printed out results so pt can  as they requested

## 2019-12-30 NOTE — TELEPHONE ENCOUNTER
----- Message from Wayne Yuen M.D. sent at 12/30/2019  9:13 AM PST -----  Please call and inform this patient of the following:  His labs look good.

## 2020-02-10 ENCOUNTER — OFFICE VISIT (OUTPATIENT)
Dept: MEDICAL GROUP | Facility: MEDICAL CENTER | Age: 70
End: 2020-02-10
Payer: MEDICARE

## 2020-02-10 VITALS
SYSTOLIC BLOOD PRESSURE: 112 MMHG | BODY MASS INDEX: 33.94 KG/M2 | HEART RATE: 87 BPM | DIASTOLIC BLOOD PRESSURE: 86 MMHG | HEIGHT: 71 IN | RESPIRATION RATE: 14 BRPM | OXYGEN SATURATION: 93 % | WEIGHT: 242.4 LBS | TEMPERATURE: 98.6 F

## 2020-02-10 DIAGNOSIS — Z79.4 TYPE 2 DIABETES MELLITUS WITH DIABETIC NEUROPATHY, WITH LONG-TERM CURRENT USE OF INSULIN (HCC): ICD-10-CM

## 2020-02-10 DIAGNOSIS — G62.9 NEUROPATHY: ICD-10-CM

## 2020-02-10 DIAGNOSIS — I10 ESSENTIAL HYPERTENSION: ICD-10-CM

## 2020-02-10 DIAGNOSIS — E11.40 TYPE 2 DIABETES MELLITUS WITH DIABETIC NEUROPATHY, WITH LONG-TERM CURRENT USE OF INSULIN (HCC): ICD-10-CM

## 2020-02-10 DIAGNOSIS — I27.20 PULMONARY HYPERTENSION (HCC): ICD-10-CM

## 2020-02-10 DIAGNOSIS — E78.2 MIXED HYPERLIPIDEMIA: ICD-10-CM

## 2020-02-10 DIAGNOSIS — I48.20 CHRONIC ATRIAL FIBRILLATION (HCC): ICD-10-CM

## 2020-02-10 DIAGNOSIS — E78.5 DYSLIPIDEMIA: ICD-10-CM

## 2020-02-10 DIAGNOSIS — G47.30 OBSERVED SLEEP APNEA: Chronic | ICD-10-CM

## 2020-02-10 DIAGNOSIS — K21.9 GASTROESOPHAGEAL REFLUX DISEASE, ESOPHAGITIS PRESENCE NOT SPECIFIED: ICD-10-CM

## 2020-02-10 DIAGNOSIS — E66.09 CLASS 1 OBESITY DUE TO EXCESS CALORIES WITH SERIOUS COMORBIDITY AND BODY MASS INDEX (BMI) OF 33.0 TO 33.9 IN ADULT: ICD-10-CM

## 2020-02-10 PROBLEM — R33.9 URINARY RETENTION: Status: RESOLVED | Noted: 2019-07-30 | Resolved: 2020-02-10

## 2020-02-10 PROBLEM — E66.811 CLASS 1 OBESITY DUE TO EXCESS CALORIES WITH SERIOUS COMORBIDITY AND BODY MASS INDEX (BMI) OF 33.0 TO 33.9 IN ADULT: Status: ACTIVE | Noted: 2017-10-30

## 2020-02-10 PROBLEM — R79.1 ELEVATED INR: Status: RESOLVED | Noted: 2019-07-30 | Resolved: 2020-02-10

## 2020-02-10 PROCEDURE — 99214 OFFICE O/P EST MOD 30 MIN: CPT | Performed by: FAMILY MEDICINE

## 2020-02-10 SDOH — HEALTH STABILITY: MENTAL HEALTH: HOW OFTEN DO YOU HAVE A DRINK CONTAINING ALCOHOL?: 2-3 TIMES A WEEK

## 2020-02-10 ASSESSMENT — PATIENT HEALTH QUESTIONNAIRE - PHQ9: CLINICAL INTERPRETATION OF PHQ2 SCORE: 0

## 2020-02-10 NOTE — ASSESSMENT & PLAN NOTE
This is a chronic problem, secondary to type 2 diabetes mellitus.  Patient reports his symptoms are well controlled with gabapentin 600 mg twice daily. He completes nightly foot checks, wears white socks, and consistently wears shoes to protect his feet.

## 2020-02-10 NOTE — ASSESSMENT & PLAN NOTE
This is a chronic problem.  The patient is noncompliant with CPAP.  He is not interested in using a CPAP or following with a pulmonologist despite the increased risks.  He is working on weight loss.

## 2020-02-10 NOTE — PROGRESS NOTES
Subjective:     CC:  Diagnoses of Type 2 diabetes mellitus with diabetic neuropathy, with long-term current use of insulin (HCC), Essential hypertension, Dyslipidemia, Observed sleep apnea, Mixed hyperlipidemia, Class 1 obesity due to excess calories with serious comorbidity and body mass index (BMI) of 33.0 to 33.9 in adult, Neuropathy (HCC), Chronic atrial fibrillation, Gastroesophageal reflux disease, esophagitis presence not specified, and Pulmonary hypertension (HCC) were pertinent to this visit.    HISTORY OF THE PRESENT ILLNESS: Patient is a 69 y.o. male. He is here today to establish care and discuss the following:  Prior PCP: Dr. Dontrell Yuen.    Observed sleep apnea  This is a chronic problem.  The patient is noncompliant with CPAP.  He is not interested in using a CPAP or following with a pulmonologist despite the increased risks.  He is working on weight loss.    Hyperlipidemia  This is a chronic problem.  The patient is compliant with simvastatin 20 mg nightly, is working on improving his diet, is swimming 3-4 times per week, and has lost 30 pounds over the last year.    Essential hypertension  This is a chronic problem.  The patient's blood pressure is well controlled on lisinopril 10 mg daily in addition to regular exercise and a healthy diet.    Type 2 diabetes mellitus with diabetic neuropathy (HCC)  This is a chronic problem.  The patient's hemoglobin A1c was 6.5 on 12/10/2019.  He reports compliance with Lantus 28 units nightly, metformin 1000 mg twice daily, and Januvia 100 mg daily.  He is working on improving his diet, getting regular exercise, and has lost 30 pounds over the last year.    Class 1 obesity due to excess calories with serious comorbidity and body mass index (BMI) of 33.0 to 33.9 in adult  This is a chronic problem.  The patient is working on weight loss and has lost 30 pounds over the last year.  He is swimming 3-4 times per week and is working on improving his diet.  He is not  interested in a referral to the health improvement program at this time.    Neuropathy (HCC)  This is a chronic problem, secondary to type 2 diabetes mellitus.  Patient reports his symptoms are well controlled with gabapentin 600 mg twice daily. He completes nightly foot checks, wears white socks, and consistently wears shoes to protect his feet.    Chronic atrial fibrillation (HCC)  This is a chronic problem.  The patient follows with cardiology and is on Xarelto for anticoagulation.  He denies palpitations, chest pain, shortness of breath, lightheadedness, or syncope.    GERD (gastroesophageal reflux disease)  This is a chronic problem.  The patient reports GERD x40 years.  He states his symptoms are well controlled with omeprazole 20 mg daily.  He denies dysphagia, odynophagia, melena, hematochezia, or unintentional weight loss.  He states he had an EGD over 10 years ago.  He declines referral to GI for repeat EGD.    Pulmonary hypertension (CMS-HCC)  Patient follows with cardiology.  He was seen by Dr. Mckeon on 12/19/19. Per 2018 echo, PAH has normalized. The patient denies shortness of breath, orthopnea, paroxysmal nocturnal dyspnea, or lower extremity edema.          Allergies: Patient has no known allergies.    Current Outpatient Medications Ordered in Epic   Medication Sig Dispense Refill   • gabapentin (NEURONTIN) 300 MG Cap Take 2 Caps by mouth 2 Times a Day. 360 Cap 1   • insulin glargine (LANTUS SOLOSTAR) 100 UNIT/ML Solution Pen-injector injection Inject 28 Units as instructed every evening. 15 PEN 1   • lisinopril (PRINIVIL) 10 MG Tab Take 1 Tab by mouth every day. 100 Tab 1   • metformin (GLUCOPHAGE) 1000 MG tablet Take 1 Tab by mouth 2 Times a Day. 200 Tab 1   • omeprazole (PRILOSEC) 20 MG delayed-release capsule Take 1 Cap by mouth every day. 90 Cap 1   • rivaroxaban (XARELTO) 20 MG Tab tablet Take 1 Tab by mouth with dinner. 90 Tab 1   • simvastatin (ZOCOR) 20 MG Tab Take 1 Tab by mouth every  evening. 100 Tab 1   • SITagliptin (JANUVIA) 100 MG Tab Take 1 Tab by mouth every day. 90 Tab 1     No current Epic-ordered facility-administered medications on file.        Past Medical History:   Diagnosis Date   • Alcohol use 2/10/2016    3 per day   • Arthritis 2016    left knee   • Dental disorder     upper lower dentures   • Diabetes 2005    insulin and oral agent   • Elevated INR 7/30/2019   • Heart burn    • Hiatus hernia syndrome    • High cholesterol    • Hypertension    • Observed sleep apnea     no study done yet   • Pain     left knee   • Paroxysmal atrial fibrillation (HCC) 2/27/2016 11/14/16-resolved now   • Snoring    • Urinary retention 7/30/2019       Past Surgical History:   Procedure Laterality Date   • CYSTOSCOPY N/A 7/30/2019    Procedure: CYSTOSCOPY- DILATION OF STRICTURE;  Surgeon: Anthony Manning M.D.;  Location: [facility];  Service: Urology   • KNEE ARTHROPLASTY TOTAL Right 8/15/2018    Procedure: KNEE ARTHROPLASTY TOTAL;  Surgeon: Amparo James M.D.;  Location: [facility];  Service: Orthopedics   • KNEE ARTHROPLASTY TOTAL Left 11/21/2016    Procedure: KNEE ARTHROPLASTY TOTAL;  Surgeon: Amparo James M.D.;  Location: [facility];  Service:    • KNEE REPLACEMENT, TOTAL Left 11/2016    Dr. James   • KNEE ARTHROSCOPY Left 2/27/2016    Procedure: KNEE ARTHROSCOPY- I&D KNEE;  Surgeon: Corby Reyes M.D.;  Location: [facility];  Service:    • KNEE ARTHROSCOPY Left 2/18/2016    Procedure: KNEE ARTHROSCOPY, SAMUELS;  Surgeon: Marquise Cline M.D.;  Location: [facility];  Service:    • MEDIAL MENISCECTOMY  2/18/2016    Procedure: MEDIAL MENISCECTOMY - PARTIAL;  Surgeon: Marquise Cline M.D.;  Location: [facility];  Service:    • CYSTOSCOPY  1986   • DENTAL EXTRACTION(S)  1976    wisdom teeth   • HAND SURGERY Left 1970    trauma, amputation index and middle finger   • TONSILLECTOMY  as child       Social  "History     Tobacco Use   • Smoking status: Never Smoker   • Smokeless tobacco: Never Used   Substance Use Topics   • Alcohol use: Yes     Alcohol/week: 1.8 oz     Types: 3 Shots of liquor per week     Frequency: 2-3 times a week     Comment: 10 per week   • Drug use: No       Social History     Patient does not qualify to have social determinant information on file (likely too young).   Social History Narrative   • Not on file       Family History   Problem Relation Age of Onset   • Diabetes Other    • Hypertension Other        Health Maintenance: Patient will get vaccinations at pharmacy as insurance does not cover in clinic administration.  He is up-to-date on his retinal screening, monofilament completed today, labs ordered today.    ROS:   Gen: no fevers/chills, no unintentional changes in weight  Eyes: no changes in vision  ENT: no sore throat or nasal congestion  Pulm: no sob, no cough  CV: no chest pain  GI: no nausea/vomiting, no diarrhea or constipation  : no dysuria  MSk: Intermittent, chronic back pain  Skin: no rash  Neuro: no headaches, no numbness/tingling  Immuno: no seasonal allergies  Heme/Lymph: no easy bruising  Psych: no anxiety of depression      Objective:     Exam: /86 (BP Location: Left arm, Patient Position: Sitting, BP Cuff Size: Adult long)   Pulse 87   Temp 37 °C (98.6 °F) (Temporal)   Resp 14   Ht 1.803 m (5' 11\")   Wt 110 kg (242 lb 6.4 oz)   SpO2 93%  Body mass index is 33.81 kg/m².    General: Obese; Well-developed, well-nourished. Appears stated age.  Eyes: Normocephalic. Conjunctiva clear without injection or scleral icterus. Pupils equal and reactive to light.   HENT: Normocephalic, atraumatic. Ears normal shape and contour, canals are clear bilaterally, tympanic membranes pearly, oropharynx is clear without erythema, edema or exudates.   Neck: Supple; no obvious thyromegaly or nodules appreciated  Pulmonary: Clear to ausculation bilaterally.  No increased work of " breathing. No rales, ronchi, or wheezing.  Cardiovascular: Regular rate and rhythm without murmur.  Abdomen: Soft, nontender, nondistended. Normal bowel sounds. No guarding or rebound tenderness.  Neurologic: CN III-XII intact.  Lymph: No cervical or supraclavicular lymphadenopathy palpated.  Skin: Warm and dry.  No obvious lesions.  Musculoskeletal: Normal gait. No extremity cyanosis, clubbing, or edema.  Psych: Euthymic affect. Alert and oriented x 3. Judgment and insight is normal.    Monofilament testing with a 10 gram force: sensation intact: decreased bilaterally  Visual Inspection: Feet with cut on lateral aspect of left fifth toe s/p stubbing his toe 4 days ago; no surrounding erythema, edema, warmth; no drainage  Pedal pulses: intact bilaterally      Assessment & Plan:   69 y.o. male with the following -    1. Type 2 diabetes mellitus with diabetic neuropathy, with long-term current use of insulin (HCC)  This is a chronic condition.  Current medications: Lantus 28 units nightly, metformin 1000 mg twice daily, Januvia 100 mg daily  Last A1c: 6.5 12/10/2019  Last Microalb/Cr ratio: Within normal limits 4/22/2019  Last diabetic foot exam: Completed today  Last retinal eye exam: Completed within the last month, requesting records  ACEi/ARB: Lisinopril 10 mg daily   Statin: Simvastatin 20 mg daily   Aspirin: None, patient is on Xarelto for PAF   Nightly foot checks: Yes, the patient wears shoes consistently; currently has a cut on his left fifth toe which does not appear infected; long discussion regarding importance of close monitoring; return precautions discussed  - Long discussion regarding importance of diet, exercise, and weight loss  - Comp Metabolic Panel; Future  - Lipid Profile; Future  - MICROALBUMIN CREAT RATIO URINE; Future  - HEMOGLOBIN A1C; Future    2. Essential hypertension  This is a chronic condition.  The patient is well controlled on lisinopril 10 mg daily in addition to a healthy diet and  regular exercise.  He has lost 30 pounds over the past year.  - Continue healthy lifestyle; diet and exercise recommendations discussed  - Comp Metabolic Panel; Future  - Lipid Profile; Future  - MICROALBUMIN CREAT RATIO URINE; Future  - HEMOGLOBIN A1C; Future    3. Dyslipidemia  This is a chronic condition.  The patient is on simvastatin 20 mg nightly and is working on a healthy lifestyle per above.  - Continue statin  - Comp Metabolic Panel; Future  - Lipid Profile; Future  - HEMOGLOBIN A1C; Future    4. Observed sleep apnea  This is a chronic condition.  The patient is noncompliant with his CPAP and declined CPAP use despite risks.  - Encouraged patient to continue healthy lifestyle and weight loss    5. Class 1 obesity due to excess calories with serious comorbidity and body mass index (BMI) of 33.0 to 33.9 in adult  This is a chronic condition.  The patient is working on lifestyle modification.  He has lost 30 pounds over the last year, is swimming 3-4 times weekly, and is working on decreasing his processed food intake.  - Offered referral to health improvement program, patient declines at this time  - Discussed recommendation for diet rich in vegetables, fruits, fiber, minimal processed/packaged foods, and 150 minutes of moderate exercise per week.    6. Neuropathy (HCC)  This is a chronic condition.  The patient reports his symptoms are well controlled with gabapentin 600 mg twice daily.  - Continue gabapentin    7. Chronic atrial fibrillation  This is a chronic condition.  The patient follows with Dr. Mckeon.  He is asymptomatic.  His last echocardiogram was in 2018.  He denies chest pain, palpitations, orthopnea, paroxysmal nocturnal dyspnea, lower extremity edema.  - Continue Xarelto for anticoagulation  - Follow up with cardiology annually     8. Gastroesophageal reflux disease, esophagitis presence not specified  This is a chronic problem.  The patient reports he has had GERD for over 40 years and has  not had an EGD for over 10 years.  He declines referral to GI at this time.  -Continue omeprazole 20 mg daily and reflux precautions      Return in about 6 months (around 8/10/2020).    Please note this dictation was created using voice recognition software. I have made every reasonable attempt to correct obvious errors, but I expect there may be errors of grammar, and possibly content, that I did not discover before finalizing the note.

## 2020-02-10 NOTE — ASSESSMENT & PLAN NOTE
This is a chronic problem.  The patient is working on weight loss and has lost 30 pounds over the last year.  He is swimming 3-4 times per week and is working on improving his diet.  He is not interested in a referral to the health improvement program at this time.

## 2020-02-10 NOTE — ASSESSMENT & PLAN NOTE
This is a chronic problem.  The patient's blood pressure is well controlled on lisinopril 10 mg daily in addition to regular exercise and a healthy diet.

## 2020-02-10 NOTE — ASSESSMENT & PLAN NOTE
This is a chronic problem.  The patient is compliant with simvastatin 20 mg nightly, is working on improving his diet, is swimming 3-4 times per week, and has lost 30 pounds over the last year.

## 2020-02-10 NOTE — ASSESSMENT & PLAN NOTE
This is a chronic problem.  The patient's hemoglobin A1c was 6.5 on 12/10/2019.  He reports compliance with Lantus 28 units nightly, metformin 1000 mg twice daily, and Januvia 100 mg daily.  He is working on improving his diet, getting regular exercise, and has lost 30 pounds over the last year.

## 2020-02-10 NOTE — ASSESSMENT & PLAN NOTE
This is a chronic problem.  The patient follows with cardiology and is on Xarelto for anticoagulation.  He denies palpitations, chest pain, shortness of breath, lightheadedness, or syncope.

## 2020-02-10 NOTE — LETTER
[facility]  Fax: [phone]   Authorization for Release/Disclosure of   Protected Health Information   Name: RICHARD WESTON : 1950 SSN: [id number]   Address: [address] [address] Phone:    [phone] (home)    I authorize the entity listed below to release/disclose the PHI below to:   [facility]/Irene Drummond M.D.   Provider or Entity Name:     Address   City, State, [facility]   Phone:      Fax:     Reason for request: continuity of care   Information to be released:    [  ] LAST COLONOSCOPY,  including any PATH REPORT and follow-up  [  ] LAST FIT/COLOGUARD RESULT [  ] LAST DEXA  [  ] LAST MAMMOGRAM  [  ] LAST PAP  [  ] LAST LABS [  ] RETINA EXAM REPORT  [  ] IMMUNIZATION RECORDS  [  ] Release all info      [  ] Check here and initial the line next to each item to release ALL health information INCLUDING  _____ Care and treatment for drug and / or alcohol abuse  _____ HIV testing, infection status, or AIDS  _____ Genetic Testing    DATES OF SERVICE OR TIME PERIOD TO BE DISCLOSED: _____________  I understand and acknowledge that:  * This Authorization may be revoked at any time by you in writing, except if your health information has already been used or disclosed.  * Your health information that will be used or disclosed as a result of you signing this authorization could be re-disclosed by the recipient. If this occurs, your re-disclosed health information may no longer be protected by State or Federal laws.  * You may refuse to sign this Authorization. Your refusal will not affect your ability to obtain treatment.  * This Authorization becomes effective upon signing and will  on (date) __________.      If no date is indicated, this Authorization will  one (1) year from the signature date.    Name: Richard Weston    Signature:   Date:     2/10/2020       PLEASE FAX REQUESTED RECORDS BACK TO: [phone]

## 2020-02-10 NOTE — ASSESSMENT & PLAN NOTE
Patient follows with cardiology.  He was seen by Dr. Mckeon on 12/19/19. Per 2018 echo, PAH has normalized. The patient denies shortness of breath, orthopnea, paroxysmal nocturnal dyspnea, or lower extremity edema.

## 2020-02-10 NOTE — ASSESSMENT & PLAN NOTE
This is a chronic problem.  The patient reports GERD x40 years.  He states his symptoms are well controlled with omeprazole 20 mg daily.  He denies dysphagia, odynophagia, melena, hematochezia, or unintentional weight loss.  He states he had an EGD over 10 years ago.  He declines referral to GI for repeat EGD.

## 2020-02-19 ENCOUNTER — PATIENT OUTREACH (OUTPATIENT)
Dept: HEALTH INFORMATION MANAGEMENT | Facility: OTHER | Age: 70
End: 2020-02-19

## 2020-05-05 ENCOUNTER — OFFICE VISIT (OUTPATIENT)
Dept: MEDICAL GROUP | Facility: MEDICAL CENTER | Age: 70
End: 2020-05-05
Payer: MEDICARE

## 2020-05-05 VITALS
SYSTOLIC BLOOD PRESSURE: 126 MMHG | HEART RATE: 87 BPM | TEMPERATURE: 97.7 F | HEIGHT: 71 IN | BODY MASS INDEX: 33.54 KG/M2 | WEIGHT: 239.6 LBS | OXYGEN SATURATION: 95 % | DIASTOLIC BLOOD PRESSURE: 72 MMHG

## 2020-05-05 DIAGNOSIS — G62.9 NEUROPATHY: ICD-10-CM

## 2020-05-05 DIAGNOSIS — K21.9 GASTROESOPHAGEAL REFLUX DISEASE WITHOUT ESOPHAGITIS: ICD-10-CM

## 2020-05-05 DIAGNOSIS — Z79.4 TYPE 2 DIABETES MELLITUS WITH DIABETIC NEUROPATHY, WITH LONG-TERM CURRENT USE OF INSULIN (HCC): ICD-10-CM

## 2020-05-05 DIAGNOSIS — E11.621 DIABETIC ULCER OF TOE OF LEFT FOOT ASSOCIATED WITH TYPE 2 DIABETES MELLITUS, UNSPECIFIED ULCER STAGE (HCC): ICD-10-CM

## 2020-05-05 DIAGNOSIS — E11.40 TYPE 2 DIABETES MELLITUS WITH DIABETIC NEUROPATHY, WITH LONG-TERM CURRENT USE OF INSULIN (HCC): ICD-10-CM

## 2020-05-05 DIAGNOSIS — E78.01 FAMILIAL HYPERCHOLESTEROLEMIA: ICD-10-CM

## 2020-05-05 DIAGNOSIS — I10 ESSENTIAL HYPERTENSION: ICD-10-CM

## 2020-05-05 DIAGNOSIS — I48.20 CHRONIC ATRIAL FIBRILLATION (HCC): ICD-10-CM

## 2020-05-05 DIAGNOSIS — L97.529 DIABETIC ULCER OF TOE OF LEFT FOOT ASSOCIATED WITH TYPE 2 DIABETES MELLITUS, UNSPECIFIED ULCER STAGE (HCC): ICD-10-CM

## 2020-05-05 PROBLEM — I48.19 OTHER PERSISTENT ATRIAL FIBRILLATION (HCC): Status: RESOLVED | Noted: 2019-12-19 | Resolved: 2020-05-05

## 2020-05-05 PROBLEM — R25.2 LEG CRAMPS: Status: RESOLVED | Noted: 2019-12-10 | Resolved: 2020-05-05

## 2020-05-05 PROBLEM — L91.8 SKIN TAG: Status: RESOLVED | Noted: 2019-08-05 | Resolved: 2020-05-05

## 2020-05-05 PROCEDURE — 99214 OFFICE O/P EST MOD 30 MIN: CPT | Performed by: FAMILY MEDICINE

## 2020-05-05 RX ORDER — SIMVASTATIN 20 MG
20 TABLET ORAL EVERY EVENING
Qty: 100 TAB | Refills: 1 | Status: SHIPPED | OUTPATIENT
Start: 2020-05-05 | End: 2020-09-29 | Stop reason: SDUPTHER

## 2020-05-05 RX ORDER — LISINOPRIL 10 MG/1
10 TABLET ORAL DAILY
Qty: 100 TAB | Refills: 1 | Status: SHIPPED | OUTPATIENT
Start: 2020-05-05 | End: 2020-09-29 | Stop reason: SDUPTHER

## 2020-05-05 RX ORDER — CEPHALEXIN 500 MG/1
500 CAPSULE ORAL 4 TIMES DAILY
Qty: 28 CAP | Refills: 0 | Status: SHIPPED | OUTPATIENT
Start: 2020-05-05 | End: 2020-05-12

## 2020-05-05 RX ORDER — OMEPRAZOLE 20 MG/1
20 CAPSULE, DELAYED RELEASE ORAL DAILY
Qty: 90 CAP | Refills: 1 | Status: SHIPPED | OUTPATIENT
Start: 2020-05-05 | End: 2021-03-29

## 2020-05-05 RX ORDER — INSULIN GLARGINE 100 [IU]/ML
28 INJECTION, SOLUTION SUBCUTANEOUS EVERY EVENING
Qty: 15 PEN | Refills: 1 | Status: SHIPPED | OUTPATIENT
Start: 2020-05-05 | End: 2020-09-29 | Stop reason: SDUPTHER

## 2020-05-05 RX ORDER — SULFAMETHOXAZOLE AND TRIMETHOPRIM 400; 80 MG/1; MG/1
2 TABLET ORAL 2 TIMES DAILY
Qty: 28 TAB | Refills: 0 | Status: SHIPPED | OUTPATIENT
Start: 2020-05-05 | End: 2020-05-12

## 2020-05-05 ASSESSMENT — FIBROSIS 4 INDEX: FIB4 SCORE: 1.25

## 2020-05-05 NOTE — ASSESSMENT & PLAN NOTE
This is a chronic problem, secondary to type 2 diabetes mellitus.  Patient reports his symptoms are well controlled with gabapentin 600 mg twice daily. He completes nightly foot checks and wears white socks. He tries to wear shoes that protect his feet however tried new shoes recently and has ulcers per above.

## 2020-05-05 NOTE — ASSESSMENT & PLAN NOTE
This is a new problem. The patient reports intermittent redness and swelling of his left fifth toe X 6 months. He reports it was improving until about a week ago when he wore a new pair of shoes which rubbed the side of his toe. He reports the redness and swelling are now worse. He denies drainage, fevers, chills, nausea, vomiting, or malaise. He also reports raw areas on his right first and fifth toes; no associated erythema, edema, or drainage.

## 2020-05-05 NOTE — ASSESSMENT & PLAN NOTE
This is a chronic problem.  The patient's hemoglobin A1c was 6.5 on 12/10/2019.  He reports compliance with Lantus 28 units nightly, metformin 1000 mg twice daily, and Januvia 100 mg daily.  He is working on improving his diet, getting regular exercise, and has lost 30 pounds over the last year. His fasting blood sugars are around 120s.   Last A1c: 6.5 12/10/2019  Last Microalb/Cr ratio: Within normal limits 4/22/2019  Last diabetic foot exam: 2/10/20  Last retinal eye exam: Completed within last year  ACEi/ARB: Lisinopril 10 mg daily   Statin: Simvastatin 20 mg daily   Aspirin: None, patient is on Xarelto for PAF

## 2020-05-05 NOTE — ASSESSMENT & PLAN NOTE
This is a chronic problem.  The patient follows with cardiology and is on Xarelto for anticoagulation.  He denies palpitations, chest pain, shortness of breath, lightheadedness, or syncope. He requests a refill of Xarelto.

## 2020-05-05 NOTE — ASSESSMENT & PLAN NOTE
This is a chronic problem.  The patient's blood pressure is well controlled on lisinopril 10 mg daily in addition to regular exercise and a healthy diet. The patient denies chest pain, shortness of breath, headaches, vision changes, lightheadedness, dizziness, or medication side effects.

## 2020-05-05 NOTE — PROGRESS NOTES
Subjective:     CC: toe wound    HPI:   Richard presents today with     Diabetic ulcer of toe of left foot associated with type 2 diabetes mellitus (HCC)  This is a new problem. The patient reports intermittent redness and swelling of his left fifth toe X 6 months. He reports it was improving until about a week ago when he wore a new pair of shoes which rubbed the side of his toe. He reports the redness and swelling are now worse. He denies drainage, fevers, chills, nausea, vomiting, or malaise. He also reports raw areas on his right first and fifth toes; no associated erythema, edema, or drainage.     Type 2 diabetes mellitus with diabetic neuropathy (HCC)  This is a chronic problem.  The patient's hemoglobin A1c was 6.5 on 12/10/2019.  He reports compliance with Lantus 28 units nightly, metformin 1000 mg twice daily, and Januvia 100 mg daily.  He is working on improving his diet, getting regular exercise, and has lost 30 pounds over the last year. His fasting blood sugars are around 120s.   Last A1c: 6.5 12/10/2019  Last Microalb/Cr ratio: Within normal limits 4/22/2019  Last diabetic foot exam: 2/10/20  Last retinal eye exam: Completed within last year  ACEi/ARB: Lisinopril 10 mg daily   Statin: Simvastatin 20 mg daily   Aspirin: None, patient is on Xarelto for PAF       Chronic atrial fibrillation (HCC)  This is a chronic problem.  The patient follows with cardiology and is on Xarelto for anticoagulation.  He denies palpitations, chest pain, shortness of breath, lightheadedness, or syncope. He requests a refill of Xarelto.    Essential hypertension  This is a chronic problem.  The patient's blood pressure is well controlled on lisinopril 10 mg daily in addition to regular exercise and a healthy diet. The patient denies chest pain, shortness of breath, headaches, vision changes, lightheadedness, dizziness, or medication side effects.      Neuropathy (HCC)  This is a chronic problem, secondary to type 2 diabetes  mellitus.  Patient reports his symptoms are well controlled with gabapentin 600 mg twice daily. He completes nightly foot checks and wears white socks. He tries to wear shoes that protect his feet however tried new shoes recently and has ulcers per above.      Past Medical History:   Diagnosis Date   • Alcohol use 2/10/2016    3 per day   • Arthritis 2016    left knee   • Dental disorder     upper lower dentures   • Diabetes 2005    insulin and oral agent   • Elevated INR 7/30/2019   • Heart burn    • Hiatus hernia syndrome    • High cholesterol    • Hypertension    • Observed sleep apnea     no study done yet   • Other persistent atrial fibrillation (HCC) 12/19/2019   • Pain     left knee   • Paroxysmal atrial fibrillation (HCC) 2/27/2016 11/14/16-resolved now   • Pulmonary hypertension (HCC) 3/1/2016   • Snoring    • Urinary retention 7/30/2019       Social History     Tobacco Use   • Smoking status: Never Smoker   • Smokeless tobacco: Never Used   Substance Use Topics   • Alcohol use: Yes     Alcohol/week: 1.8 oz     Types: 3 Shots of liquor per week     Frequency: 2-3 times a week     Comment: 10 per week   • Drug use: No       Current Outpatient Medications Ordered in Epic   Medication Sig Dispense Refill   • sulfamethoxazole-trimethoprim (BACTRIM) 400-80 MG Tab Take 2 Tabs by mouth 2 times a day for 7 days. 28 Tab 0   • cephALEXin (KEFLEX) 500 MG Cap Take 1 Cap by mouth 4 times a day for 7 days. 28 Cap 0   • insulin glargine (LANTUS SOLOSTAR) 100 UNIT/ML Solution Pen-injector injection Inject 28 Units as instructed every evening. 15 PEN 1   • lisinopril (PRINIVIL) 10 MG Tab Take 1 Tab by mouth every day. 100 Tab 1   • metformin (GLUCOPHAGE) 1000 MG tablet Take 1 Tab by mouth 2 Times a Day. 200 Tab 1   • omeprazole (PRILOSEC) 20 MG delayed-release capsule Take 1 Cap by mouth every day. 90 Cap 1   • rivaroxaban (XARELTO) 20 MG Tab tablet Take 1 Tab by mouth with dinner. 90 Tab 1   • simvastatin (ZOCOR) 20 MG  "Tab Take 1 Tab by mouth every evening. 100 Tab 1   • SITagliptin (JANUVIA) 100 MG Tab Take 1 Tab by mouth every day. 100 Tab 1   • gabapentin (NEURONTIN) 300 MG Cap Take 2 Caps by mouth 2 Times a Day. 360 Cap 1     No current Epic-ordered facility-administered medications on file.        Allergies:  Patient has no known allergies.    ROS:  Gen: no fevers/chills, no changes in weight  Eyes: no changes in vision  ENT: no sore throat, no hearing loss, no bloody nose  Pulm: no SOB  CV: no chest pain  GI: no nausea/vomiting, no diarrhea  : no dysuria  MSk: no myalgias  Skin: toe ulcers, see HPI  Neuro: no headaches, no numbness/tingling  Heme/Lymph: no easy bruising      Objective:       Exam:  /72 (BP Location: Left arm, Patient Position: Sitting, BP Cuff Size: Adult)   Pulse 87   Temp 36.5 °C (97.7 °F) (Temporal)   Ht 1.803 m (5' 11\")   Wt 108.7 kg (239 lb 9.6 oz)   SpO2 95%   BMI 33.42 kg/m²  Body mass index is 33.42 kg/m².    Gen: Alert and oriented, No apparent distress  Neck: Neck is supple without lymphadenopathy  Lungs: Normal effort, CTA bilaterally, no wheezes, rhonchi, or rales  CV: Irregularly irregular, no murmurs, rubs, or gallops  Feet:    Left fifth toe - erythema and very mild edema extending from distal toe to MTP joint, 5X7mm ulcer with scab on lateral aspect of distal toe; no purulent drainage. Right fifth toe - scab on lateral, distal aspect; no drainage, erythema, or warmth. Right first toe - small scab on medial aspect, no drainage, erythema, or warmth    Assessment & Plan:     70 y.o. male with the following -     1. Diabetic ulcer of toe of left foot associated with type 2 diabetes mellitus, unspecified ulcer stage (HCC)  This is a new problem. Ulcer on left fifth toe with signs of mild infection. Patient reports intermittent irritation and erythema of left fifth toe X 6 months with recent worsening. No systemic signs of infection. Non-infected lesions on right first and fifth toes. "   - Bactrim 400-80mg two tabs BID X 7 days (insurance does not cover Bactrim DS)   - Keflex 500mg 1 tab QID X 7 days  - F/u in 7 days, if no improvement will consider bone scan or MRI of left toe to evaluate for osteo  - Strict ED precautions discussed  - Recommended referral to wound care, patient declines  - Discussed importance of good footwear with wide toe box  - REFERRAL TO PODIATRY    2. Type 2 diabetes mellitus with diabetic neuropathy, with long-term current use of insulin (HCC)  This is a chronic condition.  Current medications: Lantus 28 units nightly, metformin 1000 mg twice daily, Januvia 100 mg daily  Last A1c: 6.5 12/10/2019, will repeat at next visit  Last Microalb/Cr ratio: Within normal limits 4/22/2019, order placed last appointment  Last diabetic foot exam: Completed today  Last retinal eye exam: Completed within last year  ACEi/ARB: Lisinopril 10 mg daily   Statin: Simvastatin 20 mg daily   Aspirin: None, patient is on Xarelto for PAF   - insulin glargine (LANTUS SOLOSTAR) 100 UNIT/ML Solution Pen-injector injection; Inject 28 Units as instructed every evening.  Dispense: 15 PEN; Refill: 1  - metformin (GLUCOPHAGE) 1000 MG tablet; Take 1 Tab by mouth 2 Times a Day.  Dispense: 200 Tab; Refill: 1  - SITagliptin (JANUVIA) 100 MG Tab; Take 1 Tab by mouth every day.  Dispense: 100 Tab; Refill: 1    3. Essential hypertension  This is a chronic problem, well-controlled on lisinopril 10mg daily.  - lisinopril (PRINIVIL) 10 MG Tab; Take 1 Tab by mouth every day.  Dispense: 100 Tab; Refill: 1    4. Chronic atrial fibrillation (HCC)  This is a chronic condition.  The patient follows with Dr. Mckeon.  He is asymptomatic.  His last echocardiogram was in 2018.  He denies chest pain, palpitations, orthopnea, paroxysmal nocturnal dyspnea, lower extremity edema.  - Continue Xarelto for anticoagulation  - Follow up with cardiology annually   - rivaroxaban (XARELTO) 20 MG Tab tablet; Take 1 Tab by mouth with  dinner.  Dispense: 90 Tab; Refill: 1    5. Neuropathy  This is a chronic condition.  Due to DMII. The patient reports his symptoms are well controlled with gabapentin 600 mg twice daily.  - Continue gabapentin    Other orders  - sulfamethoxazole-trimethoprim (BACTRIM) 400-80 MG Tab; Take 2 Tabs by mouth 2 times a day for 7 days.  Dispense: 28 Tab; Refill: 0  - cephALEXin (KEFLEX) 500 MG Cap; Take 1 Cap by mouth 4 times a day for 7 days.  Dispense: 28 Cap; Refill: 0      Return in about 1 week (around 5/12/2020).    Please note this dictation was created using voice recognition software. I have made every reasonable attempt to correct obvious errors, but I expect there may be errors of grammar, and possibly content, that I did not discover before finalizing the note.

## 2020-05-12 ENCOUNTER — OFFICE VISIT (OUTPATIENT)
Dept: MEDICAL GROUP | Facility: MEDICAL CENTER | Age: 70
End: 2020-05-12
Payer: MEDICARE

## 2020-05-12 VITALS
TEMPERATURE: 97.5 F | OXYGEN SATURATION: 94 % | RESPIRATION RATE: 20 BRPM | WEIGHT: 243 LBS | BODY MASS INDEX: 34.02 KG/M2 | SYSTOLIC BLOOD PRESSURE: 122 MMHG | HEART RATE: 91 BPM | DIASTOLIC BLOOD PRESSURE: 70 MMHG | HEIGHT: 71 IN

## 2020-05-12 DIAGNOSIS — L97.529 DIABETIC ULCER OF TOE OF LEFT FOOT ASSOCIATED WITH TYPE 2 DIABETES MELLITUS, UNSPECIFIED ULCER STAGE (HCC): ICD-10-CM

## 2020-05-12 DIAGNOSIS — E11.40 TYPE 2 DIABETES MELLITUS WITH DIABETIC NEUROPATHY, WITH LONG-TERM CURRENT USE OF INSULIN (HCC): ICD-10-CM

## 2020-05-12 DIAGNOSIS — Z79.4 TYPE 2 DIABETES MELLITUS WITHOUT COMPLICATION, WITH LONG-TERM CURRENT USE OF INSULIN (HCC): ICD-10-CM

## 2020-05-12 DIAGNOSIS — Z79.4 TYPE 2 DIABETES MELLITUS WITH DIABETIC NEUROPATHY, WITH LONG-TERM CURRENT USE OF INSULIN (HCC): ICD-10-CM

## 2020-05-12 DIAGNOSIS — E11.9 TYPE 2 DIABETES MELLITUS WITHOUT COMPLICATION, WITH LONG-TERM CURRENT USE OF INSULIN (HCC): ICD-10-CM

## 2020-05-12 DIAGNOSIS — E11.621 DIABETIC ULCER OF TOE OF LEFT FOOT ASSOCIATED WITH TYPE 2 DIABETES MELLITUS, UNSPECIFIED ULCER STAGE (HCC): ICD-10-CM

## 2020-05-12 LAB
HBA1C MFR BLD: 7.2 % (ref 0–5.6)
INT CON NEG: NEGATIVE
INT CON POS: POSITIVE

## 2020-05-12 PROCEDURE — 83036 HEMOGLOBIN GLYCOSYLATED A1C: CPT | Performed by: FAMILY MEDICINE

## 2020-05-12 PROCEDURE — 99213 OFFICE O/P EST LOW 20 MIN: CPT | Performed by: FAMILY MEDICINE

## 2020-05-12 ASSESSMENT — FIBROSIS 4 INDEX: FIB4 SCORE: 1.25

## 2020-05-12 NOTE — ASSESSMENT & PLAN NOTE
The patient was seen last week for mild infection of his left fifth toe. He has finished a 7 day course of bactrim and keflex and reports the erythema and swelling has resolved. He has healing scabs on his left and right lateral fifth toes and the medial aspect of his right first toe. He denies drainage, fevers, chills, nausea, vomiting, or malaise. His referral for podiatry has been processed; he will call today to schedule.

## 2020-05-12 NOTE — PROGRESS NOTES
Subjective:     CC: follow up diabetic toe infection    HPI:   Richard presents today with:    Diabetic ulcer of toe of left foot associated with type 2 diabetes mellitus (HCC)  The patient was seen last week for mild infection of his left fifth toe. He has finished a 7 day course of bactrim and keflex and reports the erythema and swelling has resolved. He has healing scabs on his left and right lateral fifth toes and the medial aspect of his right first toe. He denies drainage, fevers, chills, nausea, vomiting, or malaise. His referral for podiatry has been processed; he will call today to schedule.        Past Medical History:   Diagnosis Date   • Alcohol use 2/10/2016    3 per day   • Arthritis 2016    left knee   • Dental disorder     upper lower dentures   • Diabetes 2005    insulin and oral agent   • Elevated INR 7/30/2019   • Heart burn    • Hiatus hernia syndrome    • High cholesterol    • Hypertension    • Observed sleep apnea     no study done yet   • Other persistent atrial fibrillation ([facility]) 12/19/2019   • Pain     left knee   • Paroxysmal atrial fibrillation ([facility]) 2/27/2016 11/14/16-resolved now   • Pulmonary hypertension ([facility]) 3/1/2016   • Snoring    • Urinary retention 7/30/2019       Social History     Tobacco Use   • Smoking status: Never Smoker   • Smokeless tobacco: Never Used   Substance Use Topics   • Alcohol use: Yes     Alcohol/week: 1.8 oz     Types: 3 Shots of liquor per week     Frequency: 2-3 times a week     Comment: 10 per week   • Drug use: No       Current Outpatient Medications Ordered in Epic   Medication Sig Dispense Refill   • sulfamethoxazole-trimethoprim (BACTRIM) 400-80 MG Tab Take 2 Tabs by mouth 2 times a day for 7 days. 28 Tab 0   • cephALEXin (KEFLEX) 500 MG Cap Take 1 Cap by mouth 4 times a day for 7 days. 28 Cap 0   • insulin glargine (LANTUS SOLOSTAR) 100 UNIT/ML Solution Pen-injector injection Inject 28 Units as instructed every evening. 15 PEN 1   • lisinopril  "(PRINIVIL) 10 MG Tab Take 1 Tab by mouth every day. 100 Tab 1   • metformin (GLUCOPHAGE) 1000 MG tablet Take 1 Tab by mouth 2 Times a Day. 200 Tab 1   • omeprazole (PRILOSEC) 20 MG delayed-release capsule Take 1 Cap by mouth every day. 90 Cap 1   • rivaroxaban (XARELTO) 20 MG Tab tablet Take 1 Tab by mouth with dinner. 90 Tab 1   • simvastatin (ZOCOR) 20 MG Tab Take 1 Tab by mouth every evening. 100 Tab 1   • SITagliptin (JANUVIA) 100 MG Tab Take 1 Tab by mouth every day. 100 Tab 1   • gabapentin (NEURONTIN) 300 MG Cap Take 2 Caps by mouth 2 Times a Day. 360 Cap 1     No current Epic-ordered facility-administered medications on file.        Allergies:  Patient has no known allergies.    ROS:  Gen: no fevers/chills, no changes in weight  Eyes: no changes in vision  ENT: no sore throat, no hearing loss, no bloody nose  Pulm: no SOB  CV: no chest pain  GI: no nausea/vomiting, no diarrhea  : no dysuria  MSk: no myalgias  Skin: no rash  Neuro: no headaches, no numbness/tingling  Heme/Lymph: no easy bruising      Objective:       Exam:  /70 (BP Location: Left arm, Patient Position: Sitting, BP Cuff Size: Adult long)   Pulse 91   Temp 36.4 °C (97.5 °F) (Temporal)   Resp 20   Ht 1.803 m (5' 11\")   Wt 110.2 kg (243 lb)   SpO2 94%   BMI 33.89 kg/m²  Body mass index is 33.89 kg/m².    Constitutional: Alert, no distress, well-groomed  Skin: Warm, dry, good turgor, no rashes in visible areas  Eyes: Equal, round and reactive, conjunctiva clear, no ptosis  ENMT: Lips without lesions, good dentition, moist mucous membranes  Neck: Trachea midline, no obvious thyromegaly  Respiratory: Unlabored respiratory effort  Neuro: Grossly non-focal  Psych: Alert and oriented, normal affect and mood  Feet:    Left fifth toe - erythema and edema has resolved, 3X3mm scab on lateral aspect of distal toe; no purulent drainage. Right fifth toe - scab on lateral, distal aspect; no drainage, erythema, or warmth. Right first toe - small " scab on medial aspect, no drainage, erythema, or warmth    Assessment & Plan:     70 y.o. male with the following -     1. Diabetic ulcer of toe of left foot associated with type 2 diabetes mellitus, unspecified ulcer stage (HCC)  Small ulcer on left fifth toe s/p 7 day course of bactrim and keflex; infection has resolved. Persistent scab on left fifth toe, right first and fifth toes.   - Referral to podiatry placed at last visit, patient will call to make appointment today  - Recommended patient avoid cutting toenails; toenail clipping to be completed at podiatry office  - Discussed importance of good footwear with wide toe box  - Strict return and ED precautions discussed; patient verbalized understanding    Return in about 3 months (around 8/12/2020).    Please note this dictation was created using voice recognition software. I have made every reasonable attempt to correct obvious errors, but I expect there may be errors of grammar, and possibly content, that I did not discover before finalizing the note.

## 2020-07-21 ENCOUNTER — HOSPITAL ENCOUNTER (OUTPATIENT)
Dept: LAB | Facility: MEDICAL CENTER | Age: 70
End: 2020-07-21
Attending: FAMILY MEDICINE
Payer: MEDICARE

## 2020-07-21 DIAGNOSIS — E11.40 TYPE 2 DIABETES MELLITUS WITH DIABETIC NEUROPATHY, WITH LONG-TERM CURRENT USE OF INSULIN (HCC): ICD-10-CM

## 2020-07-21 DIAGNOSIS — E78.5 DYSLIPIDEMIA: ICD-10-CM

## 2020-07-21 DIAGNOSIS — Z79.4 TYPE 2 DIABETES MELLITUS WITH DIABETIC NEUROPATHY, WITH LONG-TERM CURRENT USE OF INSULIN (HCC): ICD-10-CM

## 2020-07-21 DIAGNOSIS — I10 ESSENTIAL HYPERTENSION: ICD-10-CM

## 2020-07-21 LAB
ALBUMIN SERPL BCP-MCNC: 4 G/DL (ref 3.2–4.9)
ALBUMIN/GLOB SERPL: 1.6 G/DL
ALP SERPL-CCNC: 66 U/L (ref 30–99)
ALT SERPL-CCNC: 36 U/L (ref 2–50)
ANION GAP SERPL CALC-SCNC: 12 MMOL/L (ref 7–16)
AST SERPL-CCNC: 32 U/L (ref 12–45)
BILIRUB SERPL-MCNC: 0.5 MG/DL (ref 0.1–1.5)
BUN SERPL-MCNC: 18 MG/DL (ref 8–22)
CALCIUM SERPL-MCNC: 9.2 MG/DL (ref 8.5–10.5)
CHLORIDE SERPL-SCNC: 102 MMOL/L (ref 96–112)
CHOLEST SERPL-MCNC: 100 MG/DL (ref 100–199)
CO2 SERPL-SCNC: 24 MMOL/L (ref 20–33)
CREAT SERPL-MCNC: 0.9 MG/DL (ref 0.5–1.4)
CREAT UR-MCNC: 131.77 MG/DL
EST. AVERAGE GLUCOSE BLD GHB EST-MCNC: 186 MG/DL
FASTING STATUS PATIENT QL REPORTED: NORMAL
GLOBULIN SER CALC-MCNC: 2.5 G/DL (ref 1.9–3.5)
GLUCOSE SERPL-MCNC: 139 MG/DL (ref 65–99)
HBA1C MFR BLD: 8.1 % (ref 0–5.6)
HDLC SERPL-MCNC: 41 MG/DL
LDLC SERPL CALC-MCNC: 34 MG/DL
MICROALBUMIN UR-MCNC: <1.2 MG/DL
MICROALBUMIN/CREAT UR: NORMAL MG/G (ref 0–30)
POTASSIUM SERPL-SCNC: 4.8 MMOL/L (ref 3.6–5.5)
PROT SERPL-MCNC: 6.5 G/DL (ref 6–8.2)
SODIUM SERPL-SCNC: 138 MMOL/L (ref 135–145)
TRIGL SERPL-MCNC: 124 MG/DL (ref 0–149)

## 2020-07-21 PROCEDURE — 83036 HEMOGLOBIN GLYCOSYLATED A1C: CPT

## 2020-07-21 PROCEDURE — 36415 COLL VENOUS BLD VENIPUNCTURE: CPT

## 2020-07-21 PROCEDURE — 82570 ASSAY OF URINE CREATININE: CPT

## 2020-07-21 PROCEDURE — 80053 COMPREHEN METABOLIC PANEL: CPT

## 2020-07-21 PROCEDURE — 80061 LIPID PANEL: CPT

## 2020-07-21 PROCEDURE — 82043 UR ALBUMIN QUANTITATIVE: CPT

## 2020-07-29 ENCOUNTER — OFFICE VISIT (OUTPATIENT)
Dept: MEDICAL GROUP | Facility: MEDICAL CENTER | Age: 70
End: 2020-07-29
Payer: MEDICARE

## 2020-07-29 VITALS
WEIGHT: 238.76 LBS | OXYGEN SATURATION: 96 % | RESPIRATION RATE: 20 BRPM | BODY MASS INDEX: 33.43 KG/M2 | HEIGHT: 71 IN | TEMPERATURE: 99.1 F | SYSTOLIC BLOOD PRESSURE: 122 MMHG | HEART RATE: 60 BPM | DIASTOLIC BLOOD PRESSURE: 72 MMHG

## 2020-07-29 DIAGNOSIS — Z79.4 TYPE 2 DIABETES MELLITUS WITH DIABETIC NEUROPATHY, WITH LONG-TERM CURRENT USE OF INSULIN (HCC): ICD-10-CM

## 2020-07-29 DIAGNOSIS — I10 ESSENTIAL HYPERTENSION: ICD-10-CM

## 2020-07-29 DIAGNOSIS — E11.40 TYPE 2 DIABETES MELLITUS WITH DIABETIC NEUROPATHY, WITH LONG-TERM CURRENT USE OF INSULIN (HCC): ICD-10-CM

## 2020-07-29 DIAGNOSIS — E78.2 MIXED HYPERLIPIDEMIA: ICD-10-CM

## 2020-07-29 DIAGNOSIS — M1A.0710 CHRONIC GOUT OF RIGHT FOOT, UNSPECIFIED CAUSE: ICD-10-CM

## 2020-07-29 DIAGNOSIS — I48.20 CHRONIC ATRIAL FIBRILLATION (HCC): ICD-10-CM

## 2020-07-29 PROBLEM — Z00.00 HEALTHCARE MAINTENANCE: Status: RESOLVED | Noted: 2018-03-21 | Resolved: 2020-07-29

## 2020-07-29 PROBLEM — L97.529 DIABETIC ULCER OF TOE OF LEFT FOOT ASSOCIATED WITH TYPE 2 DIABETES MELLITUS (HCC): Status: RESOLVED | Noted: 2020-05-05 | Resolved: 2020-07-29

## 2020-07-29 PROBLEM — E11.621 DIABETIC ULCER OF TOE OF LEFT FOOT ASSOCIATED WITH TYPE 2 DIABETES MELLITUS (HCC): Status: RESOLVED | Noted: 2020-05-05 | Resolved: 2020-07-29

## 2020-07-29 PROBLEM — M10.9 GOUT OF RIGHT FOOT: Status: ACTIVE | Noted: 2020-07-29

## 2020-07-29 PROCEDURE — 99214 OFFICE O/P EST MOD 30 MIN: CPT | Performed by: FAMILY MEDICINE

## 2020-07-29 RX ORDER — ALLOPURINOL 300 MG/1
300 TABLET ORAL DAILY
Qty: 90 TAB | Refills: 2 | Status: SHIPPED | OUTPATIENT
Start: 2020-07-29 | End: 2021-04-29

## 2020-07-29 ASSESSMENT — FIBROSIS 4 INDEX: FIB4 SCORE: 1.53

## 2020-07-29 NOTE — PROGRESS NOTES
"Subjective:     CC: f/u labs    HPI:   Richard presents today with:    Type 2 diabetes mellitus with diabetic neuropathy (HCC)  This is a chronic problem.  He reports compliance with Lantus 28 units nightly, metformin 1000 mg twice daily, and Januvia 100 mg daily.  He is working on improving his diet and has lost 30 pounds over the last year however reports his routine has been \"completely derailed\" by COVID19. He is no longer swimming at the pool which was his main form of exercise.  Last A1c: 6.5 (12/10/2019)--> 7.2 (5/12/20) --> 8.1 (7/21/20)  Last Microalb/Cr ratio: Within normal limits 7/21/20  Last diabetic foot exam: 2/10/20  Last retinal eye exam: Patient is due  ACEi/ARB: Lisinopril 10 mg daily   Statin: Simvastatin 20 mg daily   Aspirin: None, patient is on Xarelto for PAF       Gout of right foot  This is a chronic problem. The patient reports history of intermittent gout of right foot. He reports increased frequency of flares due to increased alcohol use. He requests refill of allopurinol 300mg daily.    Chronic atrial fibrillation (HCC)  This is a chronic problem.  The patient follows with cardiology and is on Xarelto for anticoagulation.  He denies palpitations, chest pain, shortness of breath, lightheadedness, or syncope. He requests a refill of Xarelto.    Essential hypertension  This is a chronic problem.  The patient's blood pressure is well controlled on lisinopril 10 mg daily. The patient denies chest pain, shortness of breath, headaches, vision changes, lightheadedness, dizziness, or medication side effects.      Hyperlipidemia  This is a chronic problem.  The patient reports compliance with simvastatin 20 mg nightly.    Lab Results   Component Value Date/Time    CHOLSTRLTOT 100 07/21/2020 06:48 AM    LDL 34 07/21/2020 06:48 AM    HDL 41 07/21/2020 06:48 AM    TRIGLYCERIDE 124 07/21/2020 06:48 AM             Past Medical History:   Diagnosis Date   • Alcohol use 2/10/2016    3 per day   • Arthritis " 2016    left knee   • Dental disorder     upper lower dentures   • Diabetes 2005    insulin and oral agent   • Elevated INR 7/30/2019   • Heart burn    • Hiatus hernia syndrome    • High cholesterol    • Hypertension    • Observed sleep apnea     no study done yet   • Other persistent atrial fibrillation (HCC) 12/19/2019   • Pain     left knee   • Paroxysmal atrial fibrillation (HCC) 2/27/2016 11/14/16-resolved now   • Pulmonary hypertension (HCC) 3/1/2016   • Snoring    • Urinary retention 7/30/2019       Social History     Tobacco Use   • Smoking status: Never Smoker   • Smokeless tobacco: Never Used   Substance Use Topics   • Alcohol use: Yes     Alcohol/week: 1.8 oz     Types: 3 Shots of liquor per week     Frequency: 2-3 times a week     Comment: 10 per week   • Drug use: No       Current Outpatient Medications Ordered in Epic   Medication Sig Dispense Refill   • allopurinol (ZYLOPRIM) 300 MG Tab Take 1 Tab by mouth every day. 90 Tab 2   • insulin glargine (LANTUS SOLOSTAR) 100 UNIT/ML Solution Pen-injector injection Inject 28 Units as instructed every evening. 15 PEN 1   • lisinopril (PRINIVIL) 10 MG Tab Take 1 Tab by mouth every day. 100 Tab 1   • metformin (GLUCOPHAGE) 1000 MG tablet Take 1 Tab by mouth 2 Times a Day. 200 Tab 1   • omeprazole (PRILOSEC) 20 MG delayed-release capsule Take 1 Cap by mouth every day. 90 Cap 1   • rivaroxaban (XARELTO) 20 MG Tab tablet Take 1 Tab by mouth with dinner. 90 Tab 1   • simvastatin (ZOCOR) 20 MG Tab Take 1 Tab by mouth every evening. 100 Tab 1   • SITagliptin (JANUVIA) 100 MG Tab Take 1 Tab by mouth every day. 100 Tab 1   • gabapentin (NEURONTIN) 300 MG Cap Take 2 Caps by mouth 2 Times a Day. 360 Cap 1     No current Epic-ordered facility-administered medications on file.        Allergies:  Patient has no known allergies.    Health Maintenance: patient is due for retinal screening; reminded patient to make appointment. He declines Hep B vaccination    ROS:  Gen: no  "fevers/chills, no changes in weight  Eyes: no changes in vision  ENT: no sore throat, no hearing loss, no bloody nose  Pulm: no SOB  CV: no chest pain  GI: no nausea/vomiting, no diarrhea  : no dysuria  MSk: chronic joint pain due to osteoarthritis      Objective:       Exam:  /72 (BP Location: Left arm, Patient Position: Sitting, BP Cuff Size: Adult)   Pulse 60   Temp 37.3 °C (99.1 °F) (Temporal)   Resp 20   Ht 1.803 m (5' 11\")   Wt 108.3 kg (238 lb 12.1 oz)   SpO2 96%   BMI 33.30 kg/m²  Body mass index is 33.3 kg/m².    Constitutional: Obese; Alert, no distress, well-groomed  Skin: Warm, dry, good turgor, no rashes in visible areas  Eyes: Equal, round and reactive, conjunctiva clear, no ptosis  ENMT: Lips without lesions, good dentition, moist mucous membranes  Neck: Trachea midline, no obvious thyromegaly  Respiratory: Unlabored respiratory effort  Neuro: Grossly non-focal  Psych: Alert and oriented, normal affect and mood      Assessment & Plan:     70 y.o. male with the following -     1. Type 2 diabetes mellitus with diabetic neuropathy, with long-term current use of insulin (HCC)  This is a chronic condition.  Last A1c: 6.5 (12/10/2019)--> 7.2 (5/12/20) --> 8.1 (7/21/20)  Last Microalb/Cr ratio: Within normal limits 7/21/20  Last diabetic foot exam: 2/10/20  Last retinal eye exam: Patient is due  ACEi/ARB: Lisinopril 10 mg daily   Statin: Simvastatin 20 mg daily   Aspirin: None, patient is on Xarelto for PAF   - long discussion regarding increasing HgbA1C; discussed lifestyle modifications as well as medication adjustment; the patient elects to work on lifestyle and declines any medication increases at this time  - repeat HgbA1C in 3 months    2. Chronic gout of right foot, unspecified cause  This is a chronic problem. The patient reports increased frequency of gout flares with increased alcohol use.  - counseled patient on decreasing alcohol use  - allopurinol (ZYLOPRIM) 300 MG Tab; Take 1 Tab " by mouth every day.  Dispense: 90 Tab; Refill: 2    3. Chronic atrial fibrillation (HCC)  This is a chronic problem for which the patient follows with cardiology.  He is asymptomatic.  -Continue anticoagulation with Xarelto 20 mg daily    4. Essential hypertension  This is a chronic problem.  The patient's blood pressure is at goal on lisinopril 10 mg daily.  -Continue current regimen  -Discussed the importance of diet, exercise, and weight loss    5. Mixed hyperlipidemia  This is a chronic problem.  The patient reports compliance with simvastatin 20 mg nightly.  Recent lipid panel demonstrates LDL is at goal.  -Continue current regimen      Return in about 3 months (around 10/29/2020) for HgbA1C and DMII.    Please note this dictation was created using voice recognition software. I have made every reasonable attempt to correct obvious errors, but I expect there may be errors of grammar, and possibly content, that I did not discover before finalizing the note.

## 2020-09-16 DIAGNOSIS — G62.9 NEUROPATHY: ICD-10-CM

## 2020-09-16 RX ORDER — GABAPENTIN 300 MG/1
CAPSULE ORAL
Qty: 360 CAP | Refills: 0 | Status: SHIPPED | OUTPATIENT
Start: 2020-09-16 | End: 2020-09-29 | Stop reason: SDUPTHER

## 2020-09-28 NOTE — TELEPHONE ENCOUNTER
Good morning Dr. Drummond,    Pt's wife called stating that they has been requesting to renew the following prescriptions:    Januvia 10mg  Metformin 20mg  Xarelto 20mg  Simvastatin 20mg  Lantus 100 unit    However, it seems that for some reason the pharmacy it not able to get the orders. They would like 3 months supply for each one. Please advice.    Thank you.

## 2020-09-29 DIAGNOSIS — E11.40 TYPE 2 DIABETES MELLITUS WITH DIABETIC NEUROPATHY, WITH LONG-TERM CURRENT USE OF INSULIN (HCC): ICD-10-CM

## 2020-09-29 DIAGNOSIS — E78.01 FAMILIAL HYPERCHOLESTEROLEMIA: ICD-10-CM

## 2020-09-29 DIAGNOSIS — Z79.4 TYPE 2 DIABETES MELLITUS WITH DIABETIC NEUROPATHY, WITH LONG-TERM CURRENT USE OF INSULIN (HCC): ICD-10-CM

## 2020-09-29 DIAGNOSIS — I48.20 CHRONIC ATRIAL FIBRILLATION (HCC): ICD-10-CM

## 2020-09-29 DIAGNOSIS — I10 ESSENTIAL HYPERTENSION: ICD-10-CM

## 2020-09-29 DIAGNOSIS — G62.9 NEUROPATHY: ICD-10-CM

## 2020-09-29 RX ORDER — LISINOPRIL 10 MG/1
10 TABLET ORAL DAILY
Qty: 100 TAB | Refills: 2 | Status: SHIPPED | OUTPATIENT
Start: 2020-09-29 | End: 2021-08-11

## 2020-09-29 RX ORDER — GABAPENTIN 300 MG/1
600 CAPSULE ORAL 2 TIMES DAILY
Qty: 360 CAP | Refills: 2 | Status: SHIPPED | OUTPATIENT
Start: 2020-09-29 | End: 2021-12-20

## 2020-09-29 RX ORDER — SIMVASTATIN 20 MG
20 TABLET ORAL EVERY EVENING
Qty: 100 TAB | Refills: 2 | Status: SHIPPED | OUTPATIENT
Start: 2020-09-29 | End: 2021-11-23

## 2020-09-29 RX ORDER — INSULIN GLARGINE 100 [IU]/ML
28 INJECTION, SOLUTION SUBCUTANEOUS EVERY EVENING
Qty: 15 EACH | Refills: 3 | Status: SHIPPED | OUTPATIENT
Start: 2020-09-29 | End: 2020-11-30

## 2020-10-16 ENCOUNTER — OFFICE VISIT (OUTPATIENT)
Dept: MEDICAL GROUP | Facility: MEDICAL CENTER | Age: 70
End: 2020-10-16
Payer: MEDICARE

## 2020-10-16 VITALS
OXYGEN SATURATION: 94 % | WEIGHT: 238.76 LBS | HEART RATE: 84 BPM | TEMPERATURE: 98.6 F | DIASTOLIC BLOOD PRESSURE: 68 MMHG | HEIGHT: 71 IN | BODY MASS INDEX: 33.43 KG/M2 | SYSTOLIC BLOOD PRESSURE: 102 MMHG | RESPIRATION RATE: 20 BRPM

## 2020-10-16 DIAGNOSIS — M1A.0710 CHRONIC GOUT OF RIGHT FOOT, UNSPECIFIED CAUSE: ICD-10-CM

## 2020-10-16 DIAGNOSIS — E11.40 TYPE 2 DIABETES MELLITUS WITH DIABETIC NEUROPATHY, WITH LONG-TERM CURRENT USE OF INSULIN (HCC): ICD-10-CM

## 2020-10-16 DIAGNOSIS — I48.20 CHRONIC ATRIAL FIBRILLATION (HCC): ICD-10-CM

## 2020-10-16 DIAGNOSIS — Z79.4 TYPE 2 DIABETES MELLITUS WITH DIABETIC NEUROPATHY, WITH LONG-TERM CURRENT USE OF INSULIN (HCC): ICD-10-CM

## 2020-10-16 DIAGNOSIS — M25.511 CHRONIC RIGHT SHOULDER PAIN: ICD-10-CM

## 2020-10-16 DIAGNOSIS — G89.29 CHRONIC RIGHT SHOULDER PAIN: ICD-10-CM

## 2020-10-16 DIAGNOSIS — Z23 NEED FOR VACCINATION: ICD-10-CM

## 2020-10-16 LAB
HBA1C MFR BLD: 7.1 % (ref 0–5.6)
INT CON NEG: NEGATIVE
INT CON POS: POSITIVE

## 2020-10-16 PROCEDURE — 83036 HEMOGLOBIN GLYCOSYLATED A1C: CPT | Performed by: FAMILY MEDICINE

## 2020-10-16 PROCEDURE — 90472 IMMUNIZATION ADMIN EACH ADD: CPT | Performed by: FAMILY MEDICINE

## 2020-10-16 PROCEDURE — 99214 OFFICE O/P EST MOD 30 MIN: CPT | Mod: 25 | Performed by: FAMILY MEDICINE

## 2020-10-16 PROCEDURE — G0010 ADMIN HEPATITIS B VACCINE: HCPCS | Performed by: FAMILY MEDICINE

## 2020-10-16 PROCEDURE — 90746 HEPB VACCINE 3 DOSE ADULT IM: CPT | Performed by: FAMILY MEDICINE

## 2020-10-16 PROCEDURE — 90750 HZV VACC RECOMBINANT IM: CPT | Performed by: FAMILY MEDICINE

## 2020-10-16 ASSESSMENT — FIBROSIS 4 INDEX: FIB4 SCORE: 1.53
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
## 2020-10-16 NOTE — ASSESSMENT & PLAN NOTE
This is a chronic problem.  He reports compliance with Lantus and has increased his dose from 28 unites to 32 units nightly as his morning sugars were high, metformin 1000 mg twice daily, and Januvia 100 mg daily.  He is working on improving his diet and has lost 30 pounds over the last year.  Last A1c: 6.5 (12/10/2019)--> 7.2 (5/12/20) --> 8.1 (7/21/20) --> 7.1 today  Last Microalb/Cr ratio: Within normal limits 7/21/20  Last diabetic foot exam: 2/10/20  Last retinal eye exam: Patient is due  ACEi/ARB: Lisinopril 10 mg daily   Statin: Simvastatin 20 mg daily   Aspirin: None, patient is on Xarelto for PAF
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
## 2020-10-16 NOTE — ASSESSMENT & PLAN NOTE
Intermittent gout of right foot, patient knows alcohol is a trigger and continues to drink a few margaritas now and then however has significantly decreased his alcohol use over the last year. He is on allopurinol 300mg daily.

## 2020-11-27 DIAGNOSIS — E11.40 TYPE 2 DIABETES MELLITUS WITH DIABETIC NEUROPATHY, WITH LONG-TERM CURRENT USE OF INSULIN (HCC): ICD-10-CM

## 2020-11-27 DIAGNOSIS — Z79.4 TYPE 2 DIABETES MELLITUS WITH DIABETIC NEUROPATHY, WITH LONG-TERM CURRENT USE OF INSULIN (HCC): ICD-10-CM

## 2020-11-30 RX ORDER — INSULIN GLARGINE 100 [IU]/ML
INJECTION, SOLUTION SUBCUTANEOUS
Qty: 30 ML | Refills: 0 | Status: SHIPPED | OUTPATIENT
Start: 2020-11-30 | End: 2021-03-08

## 2021-01-29 ENCOUNTER — OFFICE VISIT (OUTPATIENT)
Dept: CARDIOLOGY | Facility: MEDICAL CENTER | Age: 71
End: 2021-01-29
Payer: MEDICARE

## 2021-01-29 ENCOUNTER — TELEPHONE (OUTPATIENT)
Dept: CARDIOLOGY | Facility: MEDICAL CENTER | Age: 71
End: 2021-01-29

## 2021-01-29 VITALS
HEART RATE: 104 BPM | BODY MASS INDEX: 32.76 KG/M2 | DIASTOLIC BLOOD PRESSURE: 68 MMHG | RESPIRATION RATE: 16 BRPM | OXYGEN SATURATION: 95 % | HEIGHT: 71 IN | WEIGHT: 234 LBS | SYSTOLIC BLOOD PRESSURE: 118 MMHG

## 2021-01-29 DIAGNOSIS — Z01.810 PREOPERATIVE CARDIOVASCULAR EXAMINATION: ICD-10-CM

## 2021-01-29 DIAGNOSIS — I48.20 CHRONIC ATRIAL FIBRILLATION (HCC): ICD-10-CM

## 2021-01-29 DIAGNOSIS — I10 HYPERTENSION, ESSENTIAL: ICD-10-CM

## 2021-01-29 LAB — EKG IMPRESSION: NORMAL

## 2021-01-29 PROCEDURE — 99214 OFFICE O/P EST MOD 30 MIN: CPT | Performed by: INTERNAL MEDICINE

## 2021-01-29 PROCEDURE — 93000 ELECTROCARDIOGRAM COMPLETE: CPT | Performed by: INTERNAL MEDICINE

## 2021-01-29 RX ORDER — METOPROLOL SUCCINATE 25 MG/1
25 TABLET, EXTENDED RELEASE ORAL DAILY
Qty: 90 TAB | Refills: 3 | Status: SHIPPED | OUTPATIENT
Start: 2021-01-29 | End: 2021-08-11

## 2021-01-29 ASSESSMENT — FIBROSIS 4 INDEX: FIB4 SCORE: 1.53

## 2021-01-29 NOTE — TELEPHONE ENCOUNTER
[facility] pt doesn't necessarily need to complete heart monitor prior to proceeding with surgery, but HR needs to be under better control. Pt is going to start metoprolol today and will call us back in 2 days if HR not controlled so that we can increase dose to 50mg. Monitor is to confirm HR control. Per BE, ok to switch to 5 day Zio since we are not currently doing Holters. D/w Suzy, who will call pt to schedule for mail out zio.     Faxed BE note addressing clearance to Dr. López at [facility], [phone]. Receipt confirmed.

## 2021-01-29 NOTE — PROGRESS NOTES
CARDIOLOGY OUTPATIENT FOLLOWUP    PCP: Irene Drummond M.D.    1. Chronic atrial fibrillation (HCC)  2. Preoperative cardiovascular examination  3. Hypertension, essential    Jay has poorly controlled heart rate in atrial fibrillation but is otherwise free of cardiovascular symptoms.  I recommended adding metoprolol XL 25 mg daily and if the heart rate is not sufficiently controlled after 48 hours of therapy to increase the dose to 50 mg.  He will complete a Holter monitor in 1 week.  He is otherwise at low risk for perioperative cardiovascular complications during shoulder surgery which poses intermediate risk.  He will hold Xarelto during the perioperative procedure as needed by the surgical team-anticipate holding 48 hours presurgery and 24 hours post surgery to be sufficient.      Follow up with Wayne Mcekon M.D. in 1 year    Chief Complaint   Patient presents with   • Atrial Fibrillation       History: Richard Chery is a 70 y.o. male past medical history of chronic atrial fibrillation, diabetes, hypertension presenting for follow-up of atrial fibrillation as well as preoperative cardiovascular exam.  The patient has been free of cardiovascular symptoms over the past year.  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  He can climb up and down ladders and walk long distances with tools without any discomfort in the chest.  He has bothered by shoulder pain and for this he is planning to have right shoulder arthroscopy in the near future.  He has been adherent to Xarelto without side effects.  Home blood pressure and heart rate recordings generate average readings of around 120/70 as well as average heart rates around 90-95.    Adjunctive assessment was made after consulting with the patient's spouse     ROS:  All other systems reviewed and negative except as per the HPI    PE:  /68 (BP Location: Left arm, Patient Position: Sitting, BP Cuff Size: Adult)   Pulse  "(!) 104   Resp 16   Ht 1.803 m (5' 11\")   Wt 106 kg (234 lb)   SpO2 95%   BMI 32.64 kg/m²   Gen: Well appearing  HEENT: Symmetric face. Anicteric sclerae. Moist mucus membranes  NECK: No JVD. No lymphadenopathy  CARDIAC: Normal PMI, irregular, normal S1, S2. without murmur  VASCULATURE: Normal carotid amplitude without bruit.   RESP: Clear to auscultation bilaterally  ABD: Soft, non-tender, non-distended  EXT: No edema, no clubbing or cyanosis  SKIN: Warm and dry  NEURO: No gross deficits  PSYCH: Appropriate affect, participates in conversation    Past Medical History:   Diagnosis Date   • Alcohol use 2/10/2016    3 per day   • Arthritis 2016    left knee   • Dental disorder     upper lower dentures   • Diabetes 2005    insulin and oral agent   • Elevated INR 7/30/2019   • Heart burn    • Hiatus hernia syndrome    • High cholesterol    • Hypertension    • Observed sleep apnea     no study done yet   • Other persistent atrial fibrillation (HCC) 12/19/2019   • Pain     left knee   • Paroxysmal atrial fibrillation (HCC) 2/27/2016 11/14/16-resolved now   • Pulmonary hypertension (HCC) 3/1/2016   • Snoring    • Urinary retention 7/30/2019     No Known Allergies  Outpatient Encounter Medications as of 1/29/2021   Medication Sig Dispense Refill   • B Complex Vitamins (B COMPLEX PO) Take  by mouth 2 (two) times a day.     • metoprolol SR (TOPROL XL) 25 MG TABLET SR 24 HR Take 1 Tab by mouth every day. 90 Tab 3   • LANTUS SOLOSTAR 100 UNIT/ML Solution Pen-injector injection INJECT 32 UNITS SUBCUTANEOUSLY ONCE DAILY IN THE EVENING 30 mL 0   • gabapentin (NEURONTIN) 300 MG Cap Take 2 Caps by mouth 2 Times a Day. (Patient taking differently: Take 300 mg by mouth 2 Times a Day.) 360 Cap 2   • metformin (GLUCOPHAGE) 1000 MG tablet Take 1 Tab by mouth 2 Times a Day. 200 Tab 2   • lisinopril (PRINIVIL) 10 MG Tab Take 1 Tab by mouth every day. 100 Tab 2   • rivaroxaban (XARELTO) 20 MG Tab tablet Take 1 Tab by mouth with " dinner. 90 Tab 2   • simvastatin (ZOCOR) 20 MG Tab Take 1 Tab by mouth every evening. 100 Tab 2   • SITagliptin (JANUVIA) 100 MG Tab Take 1 Tab by mouth every day. 100 Tab 2   • allopurinol (ZYLOPRIM) 300 MG Tab Take 1 Tab by mouth every day. 90 Tab 2   • omeprazole (PRILOSEC) 20 MG delayed-release capsule Take 1 Cap by mouth every day. 90 Cap 1     No facility-administered encounter medications on file as of 1/29/2021.      Social History     Socioeconomic History   • Marital status:      Spouse name: Not on file   • Number of children: Not on file   • Years of education: Not on file   • Highest education level: Not on file   Occupational History   • Not on file   Social Needs   • Financial resource strain: Not on file   • Food insecurity     Worry: Not on file     Inability: Not on file   • Transportation needs     Medical: Not on file     Non-medical: Not on file   Tobacco Use   • Smoking status: Never Smoker   • Smokeless tobacco: Never Used   Substance and Sexual Activity   • Alcohol use: Yes     Alcohol/week: 1.8 oz     Types: 3 Shots of liquor per week     Frequency: 2-3 times a week     Comment: 10 per week   • Drug use: No   • Sexual activity: Yes     Partners: Female   Lifestyle   • Physical activity     Days per week: Not on file     Minutes per session: Not on file   • Stress: Not on file   Relationships   • Social connections     Talks on phone: Not on file     Gets together: Not on file     Attends [employer] service: Not on file     Active member of club or organization: Not on file     Attends meetings of clubs or organizations: Not on file     Relationship status: Not on file   • Intimate partner violence     Fear of current or ex partner: Not on file     Emotionally abused: Not on file     Physically abused: Not on file     Forced sexual activity: Not on file   Other Topics Concern   • Not on file   Social History Narrative   • Not on file       Studies  Lab Results   Component Value  Date/Time    CHOLSTRLTOT 100 07/21/2020 06:48 AM    LDL 34 07/21/2020 06:48 AM    HDL 41 07/21/2020 06:48 AM    TRIGLYCERIDE 124 07/21/2020 06:48 AM       Lab Results   Component Value Date/Time    SODIUM 138 07/21/2020 06:48 AM    POTASSIUM 4.8 07/21/2020 06:48 AM    CHLORIDE 102 07/21/2020 06:48 AM    CO2 24 07/21/2020 06:48 AM    GLUCOSE 139 (H) 07/21/2020 06:48 AM    BUN 18 07/21/2020 06:48 AM    CREATININE 0.90 07/21/2020 06:48 AM     Lab Results   Component Value Date/Time    ALKPHOSPHAT 66 07/21/2020 06:48 AM    ASTSGOT 32 07/21/2020 06:48 AM    ALTSGPT 36 07/21/2020 06:48 AM    TBILIRUBIN 0.5 07/21/2020 06:48 AM        For this encounter I reviewed the following medical records BMP, Lipid profile and ECG   For this encounter I directly reviewed holter/event monitor tracings.  Average heart rate in the 60s during A. fib in 2016. I otherwise agree with the interpretation in the EHR.

## 2021-02-12 ENCOUNTER — OFFICE VISIT (OUTPATIENT)
Dept: MEDICAL GROUP | Facility: MEDICAL CENTER | Age: 71
End: 2021-02-12
Payer: MEDICARE

## 2021-02-12 VITALS
SYSTOLIC BLOOD PRESSURE: 100 MMHG | HEART RATE: 99 BPM | WEIGHT: 235.23 LBS | RESPIRATION RATE: 18 BRPM | OXYGEN SATURATION: 95 % | BODY MASS INDEX: 32.93 KG/M2 | TEMPERATURE: 98.6 F | HEIGHT: 71 IN | DIASTOLIC BLOOD PRESSURE: 60 MMHG

## 2021-02-12 DIAGNOSIS — M1A.0710 CHRONIC GOUT OF RIGHT FOOT, UNSPECIFIED CAUSE: ICD-10-CM

## 2021-02-12 DIAGNOSIS — E11.40 TYPE 2 DIABETES MELLITUS WITH DIABETIC NEUROPATHY, WITH LONG-TERM CURRENT USE OF INSULIN (HCC): ICD-10-CM

## 2021-02-12 DIAGNOSIS — I48.20 CHRONIC ATRIAL FIBRILLATION (HCC): ICD-10-CM

## 2021-02-12 DIAGNOSIS — I10 ESSENTIAL HYPERTENSION: ICD-10-CM

## 2021-02-12 DIAGNOSIS — E78.2 MIXED HYPERLIPIDEMIA: ICD-10-CM

## 2021-02-12 DIAGNOSIS — Z12.5 PROSTATE CANCER SCREENING: ICD-10-CM

## 2021-02-12 DIAGNOSIS — Z79.01 CHRONIC ANTICOAGULATION: ICD-10-CM

## 2021-02-12 DIAGNOSIS — Z79.4 TYPE 2 DIABETES MELLITUS WITH DIABETIC NEUROPATHY, WITH LONG-TERM CURRENT USE OF INSULIN (HCC): ICD-10-CM

## 2021-02-12 LAB
HBA1C MFR BLD: 7.7 % (ref 0–5.6)
INT CON NEG: NEGATIVE
INT CON POS: POSITIVE

## 2021-02-12 PROCEDURE — 83036 HEMOGLOBIN GLYCOSYLATED A1C: CPT | Performed by: FAMILY MEDICINE

## 2021-02-12 PROCEDURE — 99214 OFFICE O/P EST MOD 30 MIN: CPT | Performed by: FAMILY MEDICINE

## 2021-02-12 RX ORDER — COLCHICINE 0.6 MG/1
TABLET ORAL
Qty: 3 TABLET | Refills: 6 | Status: SHIPPED | OUTPATIENT
Start: 2021-02-12 | End: 2022-11-04

## 2021-02-12 ASSESSMENT — PATIENT HEALTH QUESTIONNAIRE - PHQ9: CLINICAL INTERPRETATION OF PHQ2 SCORE: 0

## 2021-02-12 ASSESSMENT — FIBROSIS 4 INDEX: FIB4 SCORE: 1.53

## 2021-02-12 NOTE — ASSESSMENT & PLAN NOTE
This is a chronic problem.  He reports compliance with Lantus and has increased his dose from 28 unites to 32 units nightly as his morning sugars were high, metformin 1000 mg twice daily, and Januvia 100 mg daily.  He is working on improving his diet and has lost 30 pounds over the last year.  Last A1c: 6.5 (12/10/2019)--> 7.2 (5/12/20) --> 8.1 (7/21/20) --> 7.1 today --> 7.7  Last Microalb/Cr ratio: Within normal limits 7/21/20  Last diabetic foot exam: 2/10/20  Last retinal eye exam: Patient is due  ACEi/ARB: Lisinopril 10 mg daily   Statin: Simvastatin 20 mg daily   Aspirin: None, patient is on Xarelto for PAF

## 2021-02-12 NOTE — PROGRESS NOTES
Annual Health Assessment Questions:    1.  Are you currently engaging in any exercise or physical activity? Yes    2.  How would you describe your mood or emotional well-being today? good    3.  Have you had any falls in the last year? No    4.  Have you noticed any problems with your balance or had difficulty walking? Yes    5.  In the last six months have you experienced any leakage of urine? No    6. DPA/Advanced Directive: Patient has Living Will, but it is not on file. Instructed to bring in a copy to scan into their chart.      Preoperative Exam    02/12/21  Primary Care Physician: Irene Drummond M.D.    ID: Richard Chery is a 70 y.o. male who presents for a preoperative exam.     Current Outpatient Medications on File Prior to Visit   Medication Sig Dispense Refill   • B Complex Vitamins (B COMPLEX PO) Take  by mouth 2 (two) times a day.     • metoprolol SR (TOPROL XL) 25 MG TABLET SR 24 HR Take 1 Tab by mouth every day. 90 Tab 3   • LANTUS SOLOSTAR 100 UNIT/ML Solution Pen-injector injection INJECT 32 UNITS SUBCUTANEOUSLY ONCE DAILY IN THE EVENING 30 mL 0   • gabapentin (NEURONTIN) 300 MG Cap Take 2 Caps by mouth 2 Times a Day. (Patient taking differently: Take 300 mg by mouth 2 Times a Day. 300 mg once at night and once in the morning) 360 Cap 2   • metformin (GLUCOPHAGE) 1000 MG tablet Take 1 Tab by mouth 2 Times a Day. 200 Tab 2   • lisinopril (PRINIVIL) 10 MG Tab Take 1 Tab by mouth every day. 100 Tab 2   • rivaroxaban (XARELTO) 20 MG Tab tablet Take 1 Tab by mouth with dinner. 90 Tab 2   • simvastatin (ZOCOR) 20 MG Tab Take 1 Tab by mouth every evening. 100 Tab 2   • SITagliptin (JANUVIA) 100 MG Tab Take 1 Tab by mouth every day. 100 Tab 2   • allopurinol (ZYLOPRIM) 300 MG Tab Take 1 Tab by mouth every day. 90 Tab 2   • omeprazole (PRILOSEC) 20 MG delayed-release capsule Take 1 Cap by mouth every day. 90 Cap 1     No current facility-administered medications on file prior to visit.       Past  Medical History:   Diagnosis Date   • Alcohol use 2/10/2016    3 per day   • Arthritis 2016    left knee   • Dental disorder     upper lower dentures   • Diabetes 2005    insulin and oral agent   • Elevated INR 7/30/2019   • Heart burn    • Hiatus hernia syndrome    • High cholesterol    • Hypertension    • Observed sleep apnea     no study done yet   • Other persistent atrial fibrillation (HCC) 12/19/2019   • Pain     left knee   • Paroxysmal atrial fibrillation (HCC) 2/27/2016 11/14/16-resolved now   • Pulmonary hypertension (HCC) 3/1/2016   • Snoring    • Urinary retention 7/30/2019       Allergies: Patient has no known allergies.    Surgical History:  has a past surgical history that includes hand surgery (Left, 1970); cystoscopy (1986); tonsillectomy (as child); dental extraction(s) (1976); knee arthroscopy (Left, 2/18/2016); medial meniscectomy (2/18/2016); knee arthroscopy (Left, 2/27/2016); knee arthroplasty total (Left, 11/21/2016); knee replacement, total (Left, 11/2016); knee arthroplasty total (Right, 8/15/2018); and cystoscopy (N/A, 7/30/2019).    Family History: family history includes Diabetes in an other family member; Hypertension in an other family member.    Social History:  reports that he has never smoked. He has never used smokeless tobacco. He reports current alcohol use of about 1.8 oz of alcohol per week. He reports that he does not use drugs.    Planned Surgery: Right shoulder arthroscopy, [facility] Dr. López    History of Present Illness:   He is a 70 y.o. male with a PMHx including atrial fibrillation, type 2 diabetes, hyperlipidemia, obesity, hypertension, GERD, gout, sleep apnea, and osteoarthritis who presents for a preoperative exam.     Functional Capacity:   Patient reports he is able to climb up a flight of stairs without chest pain or shortness of breath      Review of Systems:  Constitutional: Negative for fever and chills  HENT: Negative for congestion.    Eyes: Negative for blurry  "vision.  Respiratory: Negative for cough and shortness of breath.    Cardiovascular: Negative for chest pain and leg swelling.   Gastrointestinal: Negative for nausea, vomiting, abdominal pain and diarrhea.   Genitourinary: Negative for dysuria and hematuria.   Skin: Negative for rash.   Neurological: Negative for dizziness, focal weakness and headaches.   Heme: Does not bruise/bleed easily.   Psychiatric/Behavioral: Negative for depression.  The patient is not nervous/anxious.      Objective:   /60 (BP Location: Left arm, Patient Position: Sitting, BP Cuff Size: Large adult)   Pulse 99   Temp 37 °C (98.6 °F) (Temporal)   Resp 18   Ht 1.803 m (5' 11\")   Wt 107 kg (235 lb 3.7 oz)   SpO2 95%   BMI 32.81 kg/m²   Physical Exam:   Constitutional: vital signs reviewed.   HEENT: normocephalic, PERRL, MMM  CV: irregularly irregular, no murmurs  Pulm; lungs clear throughout, no wheezes, crackles, or rhonchi    Monofilament testing with a 10 gram force: sensation intact: decreased bilaterally  Visual Inspection: Feet without maceration, ulcers, fissures.  Pedal pulses: intact bilaterally    Labs and Imaging:   CBC, CMP, INR, PTT, HgbA1C ordered today  ECG and Holter monitor ordered by Dr. Mckeon, patient's cardiologist     Assessment and Plan:   1. Preoperative Exam: the goal of the preoperative exam is to optimize conditions that increase perioperative morbidity and mortality. I discussed with the patient that no amount of testing or intervention prior to surgery eliminates all surgical risk.    The patient is being seen for a preoperative exam. The surgery in question is right shoulder arthoscopy. This particular surgery caries a 1-5% risk of cardiac death and nonfatal MI.     The patients Revised Cardiac Risk Index (RCRI) is 1. This corresponds to a 0.9% risk of major cardiac event.     The patient has history of sleep apnea listed in his chart - he is unsure if a sleep study has been done; he does not use a " CPAP. I have recommended the patient have a sleep study prior to surgery. He would like to defer at this time. I have recommended the patient notify his surgeon and anesthesiologist.    1. Type 2 diabetes mellitus with diabetic neuropathy, with long-term current use of insulin (HCC)  HgbA1C has increased from 7.2-->7.7 today; he admits he has been eating refined carbs and juice. He is motivated to improve his diet and eliminate juice.  - continue lantus, metformin, januvia, simvastatin, lisinopril  - repeat HgbA1C in office in 3 months  - POCT Hemoglobin A1C  - Comp Metabolic Panel; Future  - Lipid Profile; Future  - MICROALBUMIN CREAT RATIO URINE; Future  - CBC WITH DIFFERENTIAL; Future    2. Essential hypertension  This is a chronic problem, well-controlled on lisinopril 10mg daily  - Comp Metabolic Panel; Future  - Lipid Profile; Future  - MICROALBUMIN CREAT RATIO URINE; Future  - CBC WITH DIFFERENTIAL; Future    3. Chronic atrial fibrillation (HCC)  This is a chronic problem. The patient follows with Dr. Mckeon who has added metorpolol XL 25 mg daily for poorly controlled HR and ordered holter. Patient is anticoagulated with xarelto.     4. Mixed hyperlipidemia  This is a chronic problem.  Patient reports compliance with simvastatin 20 mg nightly.  Discussed dietary modification as well.    5. Prostate cancer screening  - PROSTATE SPECIFIC AG SCREENING; Future    6. Gout  This is a chronic problem.  The patient is on allopurinol 300 mg daily.  He continues to have relatively frequent gout attacks.  He would like colchicine to use PRN, we discussed risk of myalgias with concomitant use of simvastatin.  Reiterated the importance of minimizing alcohol use.  - colchicine (COLCRYS) 0.6 MG Tab; Take 1.2mg POX1, then 0.6mg PO 1h later  Dispense: 3 tablet; Refill: 6

## 2021-02-22 DIAGNOSIS — Z23 NEED FOR VACCINATION: ICD-10-CM

## 2021-02-23 ENCOUNTER — TELEPHONE (OUTPATIENT)
Dept: MEDICAL GROUP | Facility: MEDICAL CENTER | Age: 71
End: 2021-02-23

## 2021-02-23 ENCOUNTER — IMMUNIZATION (OUTPATIENT)
Dept: FAMILY PLANNING/WOMEN'S HEALTH CLINIC | Facility: IMMUNIZATION CENTER | Age: 71
End: 2021-02-23
Attending: INTERNAL MEDICINE
Payer: MEDICARE

## 2021-02-23 ENCOUNTER — HOSPITAL ENCOUNTER (EMERGENCY)
Facility: MEDICAL CENTER | Age: 71
End: 2021-02-23
Attending: EMERGENCY MEDICINE
Payer: MEDICARE

## 2021-02-23 ENCOUNTER — OFFICE VISIT (OUTPATIENT)
Dept: URGENT CARE | Facility: CLINIC | Age: 71
End: 2021-02-23
Payer: MEDICARE

## 2021-02-23 VITALS
BODY MASS INDEX: 33.61 KG/M2 | WEIGHT: 240.08 LBS | TEMPERATURE: 97 F | RESPIRATION RATE: 21 BRPM | SYSTOLIC BLOOD PRESSURE: 115 MMHG | HEART RATE: 86 BPM | DIASTOLIC BLOOD PRESSURE: 68 MMHG | HEIGHT: 71 IN | OXYGEN SATURATION: 93 %

## 2021-02-23 VITALS
WEIGHT: 242 LBS | BODY MASS INDEX: 33.88 KG/M2 | SYSTOLIC BLOOD PRESSURE: 80 MMHG | HEART RATE: 95 BPM | TEMPERATURE: 96.6 F | OXYGEN SATURATION: 97 % | HEIGHT: 71 IN | DIASTOLIC BLOOD PRESSURE: 50 MMHG | RESPIRATION RATE: 15 BRPM

## 2021-02-23 DIAGNOSIS — Z23 ENCOUNTER FOR VACCINATION: Primary | ICD-10-CM

## 2021-02-23 DIAGNOSIS — Z23 NEED FOR VACCINATION: ICD-10-CM

## 2021-02-23 DIAGNOSIS — I95.9 HYPOTENSION, UNSPECIFIED HYPOTENSION TYPE: ICD-10-CM

## 2021-02-23 DIAGNOSIS — R42 LIGHTHEADEDNESS: ICD-10-CM

## 2021-02-23 LAB
ALBUMIN SERPL BCP-MCNC: 3.9 G/DL (ref 3.2–4.9)
ALBUMIN/GLOB SERPL: 1.4 G/DL
ALP SERPL-CCNC: 98 U/L (ref 30–99)
ALT SERPL-CCNC: 45 U/L (ref 2–50)
ANION GAP SERPL CALC-SCNC: 8 MMOL/L (ref 7–16)
APTT PPP: 30.9 SEC (ref 24.7–36)
AST SERPL-CCNC: 69 U/L (ref 12–45)
BASOPHILS # BLD AUTO: 0.6 % (ref 0–1.8)
BASOPHILS # BLD: 0.05 K/UL (ref 0–0.12)
BILIRUB SERPL-MCNC: 0.4 MG/DL (ref 0.1–1.5)
BUN SERPL-MCNC: 22 MG/DL (ref 8–22)
CALCIUM SERPL-MCNC: 9.2 MG/DL (ref 8.5–10.5)
CHLORIDE SERPL-SCNC: 97 MMOL/L (ref 96–112)
CO2 SERPL-SCNC: 23 MMOL/L (ref 20–33)
CREAT SERPL-MCNC: 0.99 MG/DL (ref 0.5–1.4)
EKG IMPRESSION: NORMAL
EOSINOPHIL # BLD AUTO: 0.18 K/UL (ref 0–0.51)
EOSINOPHIL NFR BLD: 2.1 % (ref 0–6.9)
ERYTHROCYTE [DISTWIDTH] IN BLOOD BY AUTOMATED COUNT: 45 FL (ref 35.9–50)
GLOBULIN SER CALC-MCNC: 2.7 G/DL (ref 1.9–3.5)
GLUCOSE SERPL-MCNC: 158 MG/DL (ref 65–99)
HCT VFR BLD AUTO: 41.1 % (ref 42–52)
HGB BLD-MCNC: 13.8 G/DL (ref 14–18)
IMM GRANULOCYTES # BLD AUTO: 0.02 K/UL (ref 0–0.11)
IMM GRANULOCYTES NFR BLD AUTO: 0.2 % (ref 0–0.9)
INR PPP: 1.19 (ref 0.87–1.13)
LYMPHOCYTES # BLD AUTO: 4.13 K/UL (ref 1–4.8)
LYMPHOCYTES NFR BLD: 48 % (ref 22–41)
MCH RBC QN AUTO: 31.6 PG (ref 27–33)
MCHC RBC AUTO-ENTMCNC: 33.6 G/DL (ref 33.7–35.3)
MCV RBC AUTO: 94.1 FL (ref 81.4–97.8)
MONOCYTES # BLD AUTO: 0.62 K/UL (ref 0–0.85)
MONOCYTES NFR BLD AUTO: 7.2 % (ref 0–13.4)
NEUTROPHILS # BLD AUTO: 3.61 K/UL (ref 1.82–7.42)
NEUTROPHILS NFR BLD: 41.9 % (ref 44–72)
NRBC # BLD AUTO: 0 K/UL
NRBC BLD-RTO: 0 /100 WBC
PLATELET # BLD AUTO: 184 K/UL (ref 164–446)
PMV BLD AUTO: 10.7 FL (ref 9–12.9)
POTASSIUM SERPL-SCNC: 4.2 MMOL/L (ref 3.6–5.5)
PROT SERPL-MCNC: 6.6 G/DL (ref 6–8.2)
PROTHROMBIN TIME: 15.5 SEC (ref 12–14.6)
RBC # BLD AUTO: 4.37 M/UL (ref 4.7–6.1)
SODIUM SERPL-SCNC: 128 MMOL/L (ref 135–145)
TROPONIN T SERPL-MCNC: 6 NG/L (ref 6–19)
WBC # BLD AUTO: 8.6 K/UL (ref 4.8–10.8)

## 2021-02-23 PROCEDURE — 99212 OFFICE O/P EST SF 10 MIN: CPT | Performed by: PHYSICIAN ASSISTANT

## 2021-02-23 PROCEDURE — 85730 THROMBOPLASTIN TIME PARTIAL: CPT

## 2021-02-23 PROCEDURE — 85025 COMPLETE CBC W/AUTO DIFF WBC: CPT

## 2021-02-23 PROCEDURE — 0001A PFIZER SARS-COV-2 VACCINE: CPT

## 2021-02-23 PROCEDURE — 700105 HCHG RX REV CODE 258: Performed by: EMERGENCY MEDICINE

## 2021-02-23 PROCEDURE — 91300 PFIZER SARS-COV-2 VACCINE: CPT

## 2021-02-23 PROCEDURE — 93005 ELECTROCARDIOGRAM TRACING: CPT | Performed by: EMERGENCY MEDICINE

## 2021-02-23 PROCEDURE — 80053 COMPREHEN METABOLIC PANEL: CPT

## 2021-02-23 PROCEDURE — 84484 ASSAY OF TROPONIN QUANT: CPT

## 2021-02-23 PROCEDURE — 99283 EMERGENCY DEPT VISIT LOW MDM: CPT

## 2021-02-23 PROCEDURE — 85610 PROTHROMBIN TIME: CPT

## 2021-02-23 RX ORDER — SODIUM CHLORIDE 9 MG/ML
1000 INJECTION, SOLUTION INTRAVENOUS ONCE
Status: COMPLETED | OUTPATIENT
Start: 2021-02-23 | End: 2021-02-23

## 2021-02-23 RX ADMIN — SODIUM CHLORIDE 1000 ML: 9 INJECTION, SOLUTION INTRAVENOUS at 21:31

## 2021-02-23 ASSESSMENT — FIBROSIS 4 INDEX
FIB4 SCORE: 1.55
FIB4 SCORE: 1.55

## 2021-02-23 ASSESSMENT — LIFESTYLE VARIABLES
TOTAL SCORE: 0
EVER FELT BAD OR GUILTY ABOUT YOUR DRINKING: NO
TOTAL SCORE: 0
HAVE YOU EVER FELT YOU SHOULD CUT DOWN ON YOUR DRINKING: NO
CONSUMPTION TOTAL: INCOMPLETE
EVER HAD A DRINK FIRST THING IN THE MORNING TO STEADY YOUR NERVES TO GET RID OF A HANGOVER: NO
DO YOU DRINK ALCOHOL: NO
TOTAL SCORE: 0
HAVE PEOPLE ANNOYED YOU BY CRITICIZING YOUR DRINKING: NO

## 2021-02-23 ASSESSMENT — ENCOUNTER SYMPTOMS
COUGH: 0
PALPITATIONS: 0
FEVER: 0
HEADACHES: 0

## 2021-02-23 NOTE — TELEPHONE ENCOUNTER
VOICEMAIL  1. Caller Name: Richard                        Call Back Number: [phone] (home)       2. Message:     Richard had left a vm at the [facility] center,  I am just forwarding the message to you.        He would like a call back regarding his blood pressure.      Please advice.

## 2021-02-24 NOTE — ED TRIAGE NOTES
Richard Tad [facility]  71 y.o. M  Chief Complaint   Patient presents with   • Hypotension     Patient was taking medication for hypertension and tracking his blood pressures at home. his blood pressures were trending down so he went off of his medication on 2/17. His blood pressure has remained consistently low. The lowest recorded home blood pressure from today was 72/41.    • Dizziness     Started at 1PM today after he was doing work in the yard on his property. Patient denies dizziness currently.      Blood pressure improved in triage to 106/58 in triage.     Vitals:    02/23/21 1900   BP: (Abnormal) 95/60   Pulse: (Abnormal) 102   Resp: 14   Temp: 36.1 °C (97 °F)   SpO2: 97%       Triage process explained to patient, apologized for wait time, and returned to [facility].  Pt informed to notify staff of any change in condition. NAD at this time.

## 2021-02-24 NOTE — ED PROVIDER NOTES
ED Provider Note    CHIEF COMPLAINT  Chief Complaint   Patient presents with   • Hypotension     Patient was taking medication for hypertension and tracking his blood pressures at home. his blood pressures were trending down so he went off of his medication on 2/17. His blood pressure has remained consistently low. The lowest recorded home blood pressure from today was 72/41.    • Dizziness     Started at 1PM today after he was doing work in the yard on his property. Patient denies dizziness currently.        HPI  Richard Chery is a 71 y.o. male who presents for evaluation of low blood pressure.  Over the past 6 days he has noted that his blood pressure has been consistently low so he took himself off of his blood pressure medication.  Today he had some lightheadedness, currently resolved.  No other symptoms, no chest pain or shortness of breath, no focal weakness numbness or tingling, no abdominal pain, no vomiting and no diarrhea.  He was evaluated by cardiologist recently and was started on metoprolol for tachycardia but never started this medication.  He does take lisinopril which again was discontinued 6 days ago.  He is on Xarelto with a history of atrial fibrillation.  History of diabetes and hypercholesterolemia    REVIEW OF SYSTEMS  Negative for fever, rash, chest pain, dyspnea, abdominal pain, nausea, vomiting, diarrhea, headache, focal weakness, focal numbness, focal tingling, back pain. All other systems are negative.     PAST MEDICAL HISTORY  Past Medical History:   Diagnosis Date   • Alcohol use 2/10/2016    3 per day   • Arthritis 2016    left knee   • Dental disorder     upper lower dentures   • Diabetes 2005    insulin and oral agent   • Elevated INR 7/30/2019   • Heart burn    • Hiatus hernia syndrome    • High cholesterol    • Hypertension    • Observed sleep apnea     no study done yet   • Other persistent atrial fibrillation (HCC) 12/19/2019   • Pain     left knee   • Paroxysmal atrial  fibrillation (HCC) 2/27/2016 11/14/16-resolved now   • Pulmonary hypertension (HCC) 3/1/2016   • Snoring    • Urinary retention 7/30/2019       FAMILY HISTORY  Family History   Problem Relation Age of Onset   • Diabetes Other    • Hypertension Other        SOCIAL HISTORY  Social History     Tobacco Use   • Smoking status: Never Smoker   • Smokeless tobacco: Never Used   Substance Use Topics   • Alcohol use: Yes     Alcohol/week: 1.8 oz     Types: 3 Shots of liquor per week     Comment: 10 per week   • Drug use: No       SURGICAL HISTORY  Past Surgical History:   Procedure Laterality Date   • CYSTOSCOPY N/A 7/30/2019    Procedure: CYSTOSCOPY- DILATION OF STRICTURE;  Surgeon: Anthony Manning M.D.;  Location: [facility];  Service: Urology   • KNEE ARTHROPLASTY TOTAL Right 8/15/2018    Procedure: KNEE ARTHROPLASTY TOTAL;  Surgeon: Amparo James M.D.;  Location: [facility];  Service: Orthopedics   • KNEE ARTHROPLASTY TOTAL Left 11/21/2016    Procedure: KNEE ARTHROPLASTY TOTAL;  Surgeon: Amparo James M.D.;  Location: [facility];  Service:    • KNEE REPLACEMENT, TOTAL Left 11/2016    Dr. James   • KNEE ARTHROSCOPY Left 2/27/2016    Procedure: KNEE ARTHROSCOPY- I&D KNEE;  Surgeon: Corby Reyes M.D.;  Location: [facility];  Service:    • KNEE ARTHROSCOPY Left 2/18/2016    Procedure: KNEE ARTHROSCOPY, SAMUELS;  Surgeon: Marquise Cline M.D.;  Location: [facility];  Service:    • MEDIAL MENISCECTOMY  2/18/2016    Procedure: MEDIAL MENISCECTOMY - PARTIAL;  Surgeon: Marquise Cline M.D.;  Location: [facility];  Service:    • CYSTOSCOPY  1986   • DENTAL EXTRACTION(S)  1976    wisdom teeth   • HAND SURGERY Left 1970    trauma, amputation index and middle finger   • TONSILLECTOMY  as child       CURRENT MEDICATIONS  I personally reviewed the medication list in the charting documentation.     ALLERGIES  No Known Allergies    MEDICAL  "RECORD  I have reviewed patient's medical record and pertinent results are listed above.      PHYSICAL EXAM  VITAL SIGNS: BP (!) 93/62   Pulse 95   Temp 36.1 °C (97 °F) (Temporal)   Resp 14   Ht 1.803 m (5' 11\")   Wt 109 kg (240 lb 1.3 oz)   SpO2 96%   BMI 33.48 kg/m²    Constitutional: Well appearing patient in no acute distress.  Not toxic, nor ill in appearance.  HENT: Normocephalic, no evidence of acute trauma.  Eyes: No scleral icterus. Normal conjunctiva   Neck: Supple, comfortable, nonpainful range of motion.   Cardiovascular: Regular heart rate and rhythm.   Thorax & Lungs: Chest is nontender.  Lungs are clear to auscultation with good air movement bilaterally.  No wheeze, rhonchi, nor rales.   Abdomen: Soft, with no tenderness, rebound nor guarding.  No mass or pulsatile mass appreciated.  Skin: Warm, dry. No rash appreciated  Neurologic: Alert & oriented. No focal deficits observed.   Psychiatric: Normal affect appropriate for the clinical situation.    DIAGNOSTIC STUDIES / PROCEDURES    LABS/EKGs  Results for orders placed or performed during the hospital encounter of 02/23/21   CBC WITH DIFFERENTIAL   Result Value Ref Range    WBC 8.6 4.8 - 10.8 K/uL    RBC 4.37 (L) 4.70 - 6.10 M/uL    Hemoglobin 13.8 (L) 14.0 - 18.0 g/dL    Hematocrit 41.1 (L) 42.0 - 52.0 %    MCV 94.1 81.4 - 97.8 fL    MCH 31.6 27.0 - 33.0 pg    MCHC 33.6 (L) 33.7 - 35.3 g/dL    RDW 45.0 35.9 - 50.0 fL    Platelet Count 184 164 - 446 K/uL    MPV 10.7 9.0 - 12.9 fL    Neutrophils-Polys 41.90 (L) 44.00 - 72.00 %    Lymphocytes 48.00 (H) 22.00 - 41.00 %    Monocytes 7.20 0.00 - 13.40 %    Eosinophils 2.10 0.00 - 6.90 %    Basophils 0.60 0.00 - 1.80 %    Immature Granulocytes 0.20 0.00 - 0.90 %    Nucleated RBC 0.00 /100 WBC    Neutrophils (Absolute) 3.61 1.82 - 7.42 K/uL    Lymphs (Absolute) 4.13 1.00 - 4.80 K/uL    Monos (Absolute) 0.62 0.00 - 0.85 K/uL    Eos (Absolute) 0.18 0.00 - 0.51 K/uL    Baso (Absolute) 0.05 0.00 - 0.12 " K/uL    Immature Granulocytes (abs) 0.02 0.00 - 0.11 K/uL    NRBC (Absolute) 0.00 K/uL   COMP METABOLIC PANEL   Result Value Ref Range    Sodium 128 (L) 135 - 145 mmol/L    Potassium 4.2 3.6 - 5.5 mmol/L    Chloride 97 96 - 112 mmol/L    Co2 23 20 - 33 mmol/L    Anion Gap 8.0 7.0 - 16.0    Glucose 158 (H) 65 - 99 mg/dL    Bun 22 8 - 22 mg/dL    Creatinine 0.99 0.50 - 1.40 mg/dL    Calcium 9.2 8.5 - 10.5 mg/dL    AST(SGOT) 69 (H) 12 - 45 U/L    ALT(SGPT) 45 2 - 50 U/L    Alkaline Phosphatase 98 30 - 99 U/L    Total Bilirubin 0.4 0.1 - 1.5 mg/dL    Albumin 3.9 3.2 - 4.9 g/dL    Total Protein 6.6 6.0 - 8.2 g/dL    Globulin 2.7 1.9 - 3.5 g/dL    A-G Ratio 1.4 g/dL   TROPONIN   Result Value Ref Range    Troponin T 6 6 - 19 ng/L   PROTHROMBIN TIME (INR)   Result Value Ref Range    PT 15.5 (H) 12.0 - 14.6 sec    INR 1.19 (H) 0.87 - 1.13   APTT   Result Value Ref Range    APTT 30.9 24.7 - 36.0 sec   ESTIMATED GFR   Result Value Ref Range    GFR If African American >60 >60 mL/min/1.73 m 2    GFR If Non African American >60 >60 mL/min/1.73 m 2   EKG   Result Value Ref Range    Report       [facility] Emergency Dept.    Test Date:  2021  Pt Name:    ODALYS WESTON               Department: ER  MRN:        [id number]                      Room:       [facility]  Gender:     Male                         Technician: [id number]  :        1950                   Requested By:ULISES VILLAGRAN  Order #:    [id number]                    Reading MD: ULISES VILLAGRAN MD    Measurements  Intervals                                Axis  Rate:       93                           P:  KS:                                      QRS:        45  QRSD:       78                           T:          29  QT:         392  QTc:        489    Interpretive Statements  12 Lead EKG interpreted by me to show: -- Rate 93 -- Rhythm: Atrial  fibrillation  -- Axis: Normal -- QRS Intervals: Normal -- T waves: No acute changes -- ST  segments:  No acute changes -- Ectopy: None. My impression of this EKG: Atrial  fibrillation, does not indicate acute ischemia at this time.  Electronically Sig mariya On 2- 22:40:15 PST by ISIDRO BUNCH MD          COURSE & MEDICAL DECISION MAKING  I have reviewed any medical record information, laboratory studies and radiographic results as noted above.  Differential diagnoses includes: Dehydration, electrolyte abnormality, anemia, ACS    Encounter Summary: This is a 71 y.o. male with hypotension over the past 6 days, has since discontinued his lisinopril and reports ongoing hypotension.  Today his home blood pressure log reveals blood pressure consistently in the 70s and 80s systolic.  He was briefly symptomatic with some lightheadedness now resolved.  No focal neurologic complaints or findings on exam, no chest pain, really no other complaints whatsoever and states he just wants at home.  His exam is otherwise unrevealing.  Will administer IV fluids, check blood work and reevaluate ------- blood work reveals mild hyponatremia, history of the same in 2016, otherwise unrevealing, EKG is unremarkable, patient's blood pressure is significantly improved, 115/68.  He reports he feels well and is ready to go home.  Strict return instructions discussed, stable and appropriate for discharge      DISPOSITION: Discharged home in stable condition      FINAL IMPRESSION  1. Hypotension, unspecified hypotension type    2. Lightheadedness           This dictation was created using voice recognition software. The accuracy of the dictation is limited to the abilities of the software. I expect there may be some errors of grammar and possibly content. The nursing notes were reviewed and certain aspects of this information were incorporated into this note.    Electronically signed by: Isidro Bunch M.D., 2/23/2021 9:16 PM

## 2021-02-24 NOTE — TELEPHONE ENCOUNTER
Called patient to discuss blood pressures.  Patient reports his blood pressure has been lower than normal over the past few days.  He stopped taking his lisinopril on 2/17/2021.  Today his blood pressure has ranged 78-84/50s. He reports feeling very lightheaded earlier today; he states he no longer feels lightheaded.    I asked that his wife check her BP on the same cuff to evaluate the accuracy of his home blood pressure cuff.  His wife's blood pressure was 130s/80s.     Given the patients notable hypotension I have recommended he be seen in urgent care or ED tonight for further evaluation. His wife will drive him.

## 2021-02-24 NOTE — PROGRESS NOTES
Subjective:   Richard Chery is a 71 y.o. male who presents today with   Chief Complaint   Patient presents with   • Other     low blood pressure, x1wk     Other  This is a new problem. The current episode started in the past 7 days. The problem occurs constantly. The problem has been unchanged. Pertinent negatives include no chest pain, coughing, fever or headaches. Associated symptoms comments: lightheadedness. Nothing aggravates the symptoms. He has tried nothing for the symptoms. The treatment provided no relief.   Patient contacted his primary care and notified him that he has been getting blood pressures in the range of 70s to 80s over 50s.  He stopped taking his lisinopril about 6 days ago.  His blood pressure has slowly been dropping over the last 6 days until today when it got significantly low in the 80s over 50s.  He states throughout the day he has felt significantly lightheaded and didn't pass out but felt as if he could at times.    PMH:  has a past medical history of Alcohol use (2/10/2016), Arthritis (2016), Dental disorder, Diabetes (2005), Elevated INR (7/30/2019), Heart burn, Hiatus hernia syndrome, High cholesterol, Hypertension, Observed sleep apnea, Other persistent atrial fibrillation (HCC) (12/19/2019), Pain, Paroxysmal atrial fibrillation (HCC) (2/27/2016), Pulmonary hypertension (HCC) (3/1/2016), Snoring, and Urinary retention (7/30/2019).  MEDS:   Current Outpatient Medications:   •  B Complex Vitamins (B COMPLEX PO), Take  by mouth 2 (two) times a day., Disp: , Rfl:   •  metoprolol SR (TOPROL XL) 25 MG TABLET SR 24 HR, Take 1 Tab by mouth every day., Disp: 90 Tab, Rfl: 3  •  LANTUS SOLOSTAR 100 UNIT/ML Solution Pen-injector injection, INJECT 32 UNITS SUBCUTANEOUSLY ONCE DAILY IN THE EVENING, Disp: 30 mL, Rfl: 0  •  gabapentin (NEURONTIN) 300 MG Cap, Take 2 Caps by mouth 2 Times a Day. (Patient taking differently: Take 300 mg by mouth 2 Times a Day. 300 mg once at night and once in  the morning), Disp: 360 Cap, Rfl: 2  •  metformin (GLUCOPHAGE) 1000 MG tablet, Take 1 Tab by mouth 2 Times a Day., Disp: 200 Tab, Rfl: 2  •  rivaroxaban (XARELTO) 20 MG Tab tablet, Take 1 Tab by mouth with dinner., Disp: 90 Tab, Rfl: 2  •  simvastatin (ZOCOR) 20 MG Tab, Take 1 Tab by mouth every evening., Disp: 100 Tab, Rfl: 2  •  SITagliptin (JANUVIA) 100 MG Tab, Take 1 Tab by mouth every day., Disp: 100 Tab, Rfl: 2  •  allopurinol (ZYLOPRIM) 300 MG Tab, Take 1 Tab by mouth every day., Disp: 90 Tab, Rfl: 2  •  omeprazole (PRILOSEC) 20 MG delayed-release capsule, Take 1 Cap by mouth every day., Disp: 90 Cap, Rfl: 1  •  colchicine (COLCRYS) 0.6 MG Tab, Take 1.2mg POX1, then 0.6mg PO 1h later (Patient not taking: Reported on 2/23/2021), Disp: 3 tablet, Rfl: 6  •  lisinopril (PRINIVIL) 10 MG Tab, Take 1 Tab by mouth every day. (Patient not taking: Reported on 2/23/2021), Disp: 100 Tab, Rfl: 2  ALLERGIES: No Known Allergies  SURGHX:   Past Surgical History:   Procedure Laterality Date   • CYSTOSCOPY N/A 7/30/2019    Procedure: CYSTOSCOPY- DILATION OF STRICTURE;  Surgeon: Anthony Manning M.D.;  Location: [facility];  Service: Urology   • KNEE ARTHROPLASTY TOTAL Right 8/15/2018    Procedure: KNEE ARTHROPLASTY TOTAL;  Surgeon: Amparo James M.D.;  Location: [facility];  Service: Orthopedics   • KNEE ARTHROPLASTY TOTAL Left 11/21/2016    Procedure: KNEE ARTHROPLASTY TOTAL;  Surgeon: Amparo James M.D.;  Location: [facility];  Service:    • KNEE REPLACEMENT, TOTAL Left 11/2016    Dr. James   • KNEE ARTHROSCOPY Left 2/27/2016    Procedure: KNEE ARTHROSCOPY- I&D KNEE;  Surgeon: Corby Reyes M.D.;  Location: [facility];  Service:    • KNEE ARTHROSCOPY Left 2/18/2016    Procedure: KNEE ARTHROSCOPY, SAMUELS;  Surgeon: Marquise Cline M.D.;  Location: SUR[facility];  Service:    • MEDIAL MENISCECTOMY  2/18/2016    Procedure: MEDIAL MENISCECTOMY - PARTIAL;   "Surgeon: Marquise Cline M.D.;  Location: SURGERY [facility];  Service:    • CYSTOSCOPY  1986   • DENTAL EXTRACTION(S)  1976    wisdom teeth   • HAND SURGERY Left 1970    trauma, amputation index and middle finger   • TONSILLECTOMY  as child     SOCHX:  reports that he has never smoked. He has never used smokeless tobacco. He reports current alcohol use of about 1.8 oz of alcohol per week. He reports that he does not use drugs.  FH: Reviewed with patient, not pertinent to this visit.       Review of Systems   Constitutional: Negative for fever.        Lightheadedness   Respiratory: Negative for cough.    Cardiovascular: Negative for chest pain and palpitations.   Neurological: Negative for headaches.        Objective:   BP (!) 80/50 (BP Location: Right arm, Patient Position: Sitting, BP Cuff Size: Large adult)   Pulse 95   Temp 35.9 °C (96.6 °F) (Temporal)   Resp 15   Ht 1.803 m (5' 11\")   Wt 110 kg (242 lb)   SpO2 97%   BMI 33.75 kg/m²   Physical Exam  Vitals and nursing note reviewed.   Constitutional:       General: He is not in acute distress.     Appearance: Normal appearance. He is well-developed. He is not ill-appearing, toxic-appearing or diaphoretic.   HENT:      Head: Normocephalic and atraumatic.      Right Ear: Hearing normal.      Left Ear: Hearing normal.   Eyes:      Conjunctiva/sclera: Conjunctivae normal.      Pupils: Pupils are equal, round, and reactive to light.   Cardiovascular:      Rate and Rhythm: Normal rate and regular rhythm.      Heart sounds: Normal heart sounds. No murmur. No friction rub. No gallop.    Pulmonary:      Effort: Pulmonary effort is normal.      Breath sounds: No wheezing, rhonchi or rales.   Musculoskeletal:      Comments: Normal movement in all 4 extremities   Skin:     General: Skin is warm and dry.   Neurological:      Mental Status: He is alert.      Coordination: Coordination normal.   Psychiatric:         Mood and Affect: Mood normal. "       Assessment/Plan:   Assessment    1. Hypotension, unspecified hypotension type  Given patient's measured hypotension and symptoms he has been experiencing throughout today recommend he go to the ER for higher level of evaluation at this time.  Patient will have his wife take him there by private vehicle and have her drive the vehicle.  Patient agreeable to plan.    Please note that this dictation was created using voice recognition software. I have made every reasonable attempt to correct obvious errors, but I expect that there are errors of grammar and possibly content that I did not discover before finalizing the note.    Reza Mejia PA-C

## 2021-02-24 NOTE — ED NOTES
Discharge teaching and paperwork provided regarding hypotension/dehydration and all questions/concerns answered. VSS, cardiac assessment stable and PIV removed. Given information regarding plan for oupt cardiac event monitoring (appt in March). Patient discharged to the care of spouse and ambulatory out of the ED with steady gait.

## 2021-02-26 ENCOUNTER — HOSPITAL ENCOUNTER (OUTPATIENT)
Dept: LAB | Facility: MEDICAL CENTER | Age: 71
End: 2021-02-26
Attending: ANESTHESIOLOGY
Payer: MEDICARE

## 2021-02-26 LAB
COVID ORDER STATUS COVID19: NORMAL
SARS-COV-2 RNA RESP QL NAA+PROBE: NOTDETECTED
SPECIMEN SOURCE: NORMAL

## 2021-02-26 PROCEDURE — U0005 INFEC AGEN DETEC AMPLI PROBE: HCPCS

## 2021-02-26 PROCEDURE — U0003 INFECTIOUS AGENT DETECTION BY NUCLEIC ACID (DNA OR RNA); SEVERE ACUTE RESPIRATORY SYNDROME CORONAVIRUS 2 (SARS-COV-2) (CORONAVIRUS DISEASE [COVID-19]), AMPLIFIED PROBE TECHNIQUE, MAKING USE OF HIGH THROUGHPUT TECHNOLOGIES AS DESCRIBED BY CMS-2020-01-R: HCPCS

## 2021-02-26 PROCEDURE — C9803 HOPD COVID-19 SPEC COLLECT: HCPCS

## 2021-03-13 ENCOUNTER — IMMUNIZATION (OUTPATIENT)
Dept: FAMILY PLANNING/WOMEN'S HEALTH CLINIC | Facility: IMMUNIZATION CENTER | Age: 71
End: 2021-03-13
Attending: INTERNAL MEDICINE
Payer: MEDICARE

## 2021-03-13 DIAGNOSIS — Z23 ENCOUNTER FOR VACCINATION: Primary | ICD-10-CM

## 2021-03-13 PROCEDURE — 0002A PFIZER SARS-COV-2 VACCINE: CPT | Performed by: INTERNAL MEDICINE

## 2021-03-13 PROCEDURE — 91300 PFIZER SARS-COV-2 VACCINE: CPT | Performed by: INTERNAL MEDICINE

## 2021-03-27 DIAGNOSIS — K21.9 GASTROESOPHAGEAL REFLUX DISEASE WITHOUT ESOPHAGITIS: ICD-10-CM

## 2021-03-29 RX ORDER — OMEPRAZOLE 20 MG/1
CAPSULE, DELAYED RELEASE ORAL
Qty: 90 CAPSULE | Refills: 0 | Status: SHIPPED | OUTPATIENT
Start: 2021-03-29 | End: 2021-08-24

## 2021-04-02 ENCOUNTER — PATIENT MESSAGE (OUTPATIENT)
Dept: HEALTH INFORMATION MANAGEMENT | Facility: OTHER | Age: 71
End: 2021-04-02

## 2021-04-29 RX ORDER — ALLOPURINOL 300 MG/1
TABLET ORAL
Qty: 90 TABLET | Refills: 2 | Status: SHIPPED | OUTPATIENT
Start: 2021-04-29 | End: 2021-08-11

## 2021-08-02 ENCOUNTER — TELEPHONE (OUTPATIENT)
Dept: MEDICAL GROUP | Facility: MEDICAL CENTER | Age: 71
End: 2021-08-02

## 2021-08-02 NOTE — TELEPHONE ENCOUNTER
Pt. Wife Grisel called asking for lab orders including a Uric acid for his appointment next week. It looks like some was already ordered is there any you want to add on?

## 2021-08-03 NOTE — TELEPHONE ENCOUNTER
Phone Number Called: [phone] (home) [phone] (work)    Call outcome: Did not leave a detailed message. Requested patient to call back.    Message: LVM for pt. Wife to call back to inform her '' No the labs in the system should be adequate, thank you for checking. ''

## 2021-08-06 ENCOUNTER — HOSPITAL ENCOUNTER (OUTPATIENT)
Dept: LAB | Facility: MEDICAL CENTER | Age: 71
End: 2021-08-06
Attending: FAMILY MEDICINE
Payer: MEDICARE

## 2021-08-06 DIAGNOSIS — Z12.5 PROSTATE CANCER SCREENING: ICD-10-CM

## 2021-08-06 DIAGNOSIS — I10 ESSENTIAL HYPERTENSION: ICD-10-CM

## 2021-08-06 DIAGNOSIS — Z79.4 TYPE 2 DIABETES MELLITUS WITH DIABETIC NEUROPATHY, WITH LONG-TERM CURRENT USE OF INSULIN (HCC): ICD-10-CM

## 2021-08-06 DIAGNOSIS — E11.40 TYPE 2 DIABETES MELLITUS WITH DIABETIC NEUROPATHY, WITH LONG-TERM CURRENT USE OF INSULIN (HCC): ICD-10-CM

## 2021-08-06 DIAGNOSIS — Z79.01 CHRONIC ANTICOAGULATION: ICD-10-CM

## 2021-08-06 LAB
ALBUMIN SERPL BCP-MCNC: 4.3 G/DL (ref 3.2–4.9)
ALBUMIN/GLOB SERPL: 1.4 G/DL
ALP SERPL-CCNC: 89 U/L (ref 30–99)
ALT SERPL-CCNC: 28 U/L (ref 2–50)
ANION GAP SERPL CALC-SCNC: 11 MMOL/L (ref 7–16)
APTT PPP: 36.8 SEC (ref 24.7–36)
AST SERPL-CCNC: 38 U/L (ref 12–45)
BASOPHILS # BLD AUTO: 0.7 % (ref 0–1.8)
BASOPHILS # BLD: 0.04 K/UL (ref 0–0.12)
BILIRUB SERPL-MCNC: 0.6 MG/DL (ref 0.1–1.5)
BUN SERPL-MCNC: 13 MG/DL (ref 8–22)
CALCIUM SERPL-MCNC: 9.9 MG/DL (ref 8.5–10.5)
CHLORIDE SERPL-SCNC: 99 MMOL/L (ref 96–112)
CHOLEST SERPL-MCNC: 102 MG/DL (ref 100–199)
CO2 SERPL-SCNC: 27 MMOL/L (ref 20–33)
CREAT SERPL-MCNC: 0.86 MG/DL (ref 0.5–1.4)
CREAT UR-MCNC: 63.88 MG/DL
EOSINOPHIL # BLD AUTO: 0.08 K/UL (ref 0–0.51)
EOSINOPHIL NFR BLD: 1.4 % (ref 0–6.9)
ERYTHROCYTE [DISTWIDTH] IN BLOOD BY AUTOMATED COUNT: 47 FL (ref 35.9–50)
FASTING STATUS PATIENT QL REPORTED: NORMAL
GLOBULIN SER CALC-MCNC: 3.1 G/DL (ref 1.9–3.5)
GLUCOSE SERPL-MCNC: 139 MG/DL (ref 65–99)
HCT VFR BLD AUTO: 42.6 % (ref 42–52)
HDLC SERPL-MCNC: 41 MG/DL
HGB BLD-MCNC: 14.1 G/DL (ref 14–18)
IMM GRANULOCYTES # BLD AUTO: 0.01 K/UL (ref 0–0.11)
IMM GRANULOCYTES NFR BLD AUTO: 0.2 % (ref 0–0.9)
INR PPP: 1.6 (ref 0.87–1.13)
LDLC SERPL CALC-MCNC: 37 MG/DL
LYMPHOCYTES # BLD AUTO: 3.29 K/UL (ref 1–4.8)
LYMPHOCYTES NFR BLD: 56.2 % (ref 22–41)
MCH RBC QN AUTO: 31.1 PG (ref 27–33)
MCHC RBC AUTO-ENTMCNC: 33.1 G/DL (ref 33.7–35.3)
MCV RBC AUTO: 93.8 FL (ref 81.4–97.8)
MICROALBUMIN UR-MCNC: <1.2 MG/DL
MICROALBUMIN/CREAT UR: NORMAL MG/G (ref 0–30)
MONOCYTES # BLD AUTO: 0.47 K/UL (ref 0–0.85)
MONOCYTES NFR BLD AUTO: 8 % (ref 0–13.4)
NEUTROPHILS # BLD AUTO: 1.96 K/UL (ref 1.82–7.42)
NEUTROPHILS NFR BLD: 33.5 % (ref 44–72)
NRBC # BLD AUTO: 0 K/UL
NRBC BLD-RTO: 0 /100 WBC
PLATELET # BLD AUTO: 198 K/UL (ref 164–446)
PMV BLD AUTO: 10.7 FL (ref 9–12.9)
POTASSIUM SERPL-SCNC: 4.6 MMOL/L (ref 3.6–5.5)
PROT SERPL-MCNC: 7.4 G/DL (ref 6–8.2)
PROTHROMBIN TIME: 18.5 SEC (ref 12–14.6)
PSA SERPL-MCNC: 0.65 NG/ML (ref 0–4)
RBC # BLD AUTO: 4.54 M/UL (ref 4.7–6.1)
SODIUM SERPL-SCNC: 137 MMOL/L (ref 135–145)
TRIGL SERPL-MCNC: 118 MG/DL (ref 0–149)
URATE SERPL-MCNC: 4.6 MG/DL (ref 2.5–8.3)
WBC # BLD AUTO: 5.9 K/UL (ref 4.8–10.8)

## 2021-08-06 PROCEDURE — 82570 ASSAY OF URINE CREATININE: CPT

## 2021-08-06 PROCEDURE — 84153 ASSAY OF PSA TOTAL: CPT

## 2021-08-06 PROCEDURE — 80053 COMPREHEN METABOLIC PANEL: CPT

## 2021-08-06 PROCEDURE — 82043 UR ALBUMIN QUANTITATIVE: CPT

## 2021-08-06 PROCEDURE — 80061 LIPID PANEL: CPT

## 2021-08-06 PROCEDURE — 85730 THROMBOPLASTIN TIME PARTIAL: CPT

## 2021-08-06 PROCEDURE — 85025 COMPLETE CBC W/AUTO DIFF WBC: CPT

## 2021-08-06 PROCEDURE — 84550 ASSAY OF BLOOD/URIC ACID: CPT

## 2021-08-06 PROCEDURE — 36415 COLL VENOUS BLD VENIPUNCTURE: CPT

## 2021-08-06 PROCEDURE — 85610 PROTHROMBIN TIME: CPT

## 2021-08-11 ENCOUNTER — OFFICE VISIT (OUTPATIENT)
Dept: MEDICAL GROUP | Facility: MEDICAL CENTER | Age: 71
End: 2021-08-11
Payer: MEDICARE

## 2021-08-11 VITALS
DIASTOLIC BLOOD PRESSURE: 72 MMHG | HEIGHT: 71 IN | OXYGEN SATURATION: 96 % | HEART RATE: 54 BPM | RESPIRATION RATE: 14 BRPM | WEIGHT: 243.6 LBS | TEMPERATURE: 98.7 F | BODY MASS INDEX: 34.1 KG/M2 | SYSTOLIC BLOOD PRESSURE: 106 MMHG

## 2021-08-11 DIAGNOSIS — E66.09 CLASS 1 OBESITY DUE TO EXCESS CALORIES WITH SERIOUS COMORBIDITY AND BODY MASS INDEX (BMI) OF 33.0 TO 33.9 IN ADULT: ICD-10-CM

## 2021-08-11 DIAGNOSIS — E78.2 MIXED HYPERLIPIDEMIA: ICD-10-CM

## 2021-08-11 DIAGNOSIS — Z79.4 TYPE 2 DIABETES MELLITUS WITH DIABETIC NEUROPATHY, WITH LONG-TERM CURRENT USE OF INSULIN (HCC): ICD-10-CM

## 2021-08-11 DIAGNOSIS — I48.20 CHRONIC ATRIAL FIBRILLATION (HCC): ICD-10-CM

## 2021-08-11 DIAGNOSIS — I10 ESSENTIAL HYPERTENSION: ICD-10-CM

## 2021-08-11 DIAGNOSIS — E11.40 TYPE 2 DIABETES MELLITUS WITH DIABETIC NEUROPATHY, WITH LONG-TERM CURRENT USE OF INSULIN (HCC): ICD-10-CM

## 2021-08-11 DIAGNOSIS — G62.9 NEUROPATHY: ICD-10-CM

## 2021-08-11 LAB
HBA1C MFR BLD: 8.1 % (ref 0–5.6)
INT CON NEG: NEGATIVE
INT CON POS: POSITIVE

## 2021-08-11 PROCEDURE — 83036 HEMOGLOBIN GLYCOSYLATED A1C: CPT | Performed by: FAMILY MEDICINE

## 2021-08-11 PROCEDURE — 99214 OFFICE O/P EST MOD 30 MIN: CPT | Performed by: FAMILY MEDICINE

## 2021-08-11 ASSESSMENT — FIBROSIS 4 INDEX: FIB4 SCORE: 2.54

## 2021-08-11 NOTE — ASSESSMENT & PLAN NOTE
This is a chronic problem.  He reports compliance with Lantus and has increased his dose from 32 to 36 units nightly as his morning sugars were high, metformin 1000 mg twice daily, and Januvia 100 mg daily.    Last A1c: 6.5 (12/10/2019)--> 7.2 (5/12/20) --> 8.1 (7/21/20) --> 7.1 --> 7.7--> 8.1 today  Last Microalb/Cr ratio: Within normal limits 8/6/2021  Last diabetic foot exam: 2/10/20  Last retinal eye exam: Patient is due  ACEi/ARB: Previously on lisinopril, stopped due to low BP  Statin: Simvastatin 20 mg daily   Aspirin: None, patient is on Xarelto for PAF

## 2021-08-12 NOTE — ASSESSMENT & PLAN NOTE
Patient reports home blood pressures have been quite low.  Today blood pressure is 106/72.  He stopped his lisinopril over a month ago when he noted low BPs.

## 2021-08-12 NOTE — ASSESSMENT & PLAN NOTE
This is a chronic problem.  The patient reports compliance with simvastatin 20 mg nightly.    Lab Results   Component Value Date/Time    CHOLSTRLTOT 102 08/06/2021 06:20 AM    LDL 37 08/06/2021 06:20 AM    HDL 41 08/06/2021 06:20 AM    TRIGLYCERIDE 118 08/06/2021 06:20 AM

## 2021-08-12 NOTE — PROGRESS NOTES
Subjective:     CC: follow up DMII, chronic conditions    HPI:   Richard presents today with:    Type 2 diabetes mellitus with diabetic neuropathy (HCC)  This is a chronic problem.  He reports compliance with Lantus and has increased his dose from 32 to 36 units nightly as his morning sugars were high, metformin 1000 mg twice daily, and Januvia 100 mg daily.    Last A1c: 6.5 (12/10/2019)--> 7.2 (5/12/20) --> 8.1 (7/21/20) --> 7.1 --> 7.7--> 8.1 today  Last Microalb/Cr ratio: Within normal limits 8/6/2021  Last diabetic foot exam: 2/10/20  Last retinal eye exam: Patient is due  ACEi/ARB: Previously on lisinopril, stopped due to low BP  Statin: Simvastatin 20 mg daily   Aspirin: None, patient is on Xarelto for PAF       Neuropathy (HCC)  This is a chronic problem, secondary to type 2 diabetes mellitus.  Patient reports his symptoms are well controlled with gabapentin 600 mg twice daily.     Hyperlipidemia  This is a chronic problem.  The patient reports compliance with simvastatin 20 mg nightly.    Lab Results   Component Value Date/Time    CHOLSTRLTOT 102 08/06/2021 06:20 AM    LDL 37 08/06/2021 06:20 AM    HDL 41 08/06/2021 06:20 AM    TRIGLYCERIDE 118 08/06/2021 06:20 AM           Chronic atrial fibrillation (HCC)  This is a chronic problem.  The patient follows with cardiology and is on Xarelto for anticoagulation.  He denies palpitations, chest pain, shortness of breath, lightheadedness, or syncope.     Essential hypertension  Patient reports home blood pressures have been quite low.  Today blood pressure is 106/72.  He stopped his lisinopril over a month ago when he noted low BPs.      Past Medical History:   Diagnosis Date   • Alcohol use 2/10/2016    3 per day   • Arthritis 2016    left knee   • Dental disorder     upper lower dentures   • Diabetes 2005    insulin and oral agent   • Elevated INR 7/30/2019   • Heart burn    • Hiatus hernia syndrome    • High cholesterol    • Hypertension    • Observed sleep apnea      no study done yet   • Other persistent atrial fibrillation (HCC) 12/19/2019   • Pain     left knee   • Paroxysmal atrial fibrillation (HCC) 2/27/2016 11/14/16-resolved now   • Pulmonary hypertension (HCC) 3/1/2016   • Snoring    • Urinary retention 7/30/2019       Social History     Tobacco Use   • Smoking status: Never Smoker   • Smokeless tobacco: Never Used   Vaping Use   • Vaping Use: Never used   Substance Use Topics   • Alcohol use: Yes     Alcohol/week: 0.6 oz     Types: 1 Shots of liquor per week     Comment: once a week    • Drug use: No       Current Outpatient Medications Ordered in Epic   Medication Sig Dispense Refill   • LANTUS SOLOSTAR 100 UNIT/ML Solution Pen-injector injection INJECT 32 UNITS SUBCUTANEOUSLY ONCE DAILY IN THE EVENING (Patient taking differently: 36 units) 5 Each 3   • gabapentin (NEURONTIN) 300 MG Cap Take 2 Caps by mouth 2 Times a Day. (Patient taking differently: Take 300 mg by mouth 4 times a day.) 360 Cap 2   • metformin (GLUCOPHAGE) 1000 MG tablet Take 1 Tab by mouth 2 Times a Day. 200 Tab 2   • XARELTO 20 MG Tab tablet TAKE 1 TABLET BY MOUTH WITH SUPPER 90 tablet 2   • omeprazole (PRILOSEC) 20 MG delayed-release capsule Take 1 capsule by mouth once daily 90 capsule 0   • colchicine (COLCRYS) 0.6 MG Tab Take 1.2mg POX1, then 0.6mg PO 1h later 3 tablet 6   • B Complex Vitamins (B COMPLEX PO) Take  by mouth 2 (two) times a day.     • simvastatin (ZOCOR) 20 MG Tab Take 1 Tab by mouth every evening. 100 Tab 2   • SITagliptin (JANUVIA) 100 MG Tab Take 1 Tab by mouth every day. 100 Tab 2     No current Epic-ordered facility-administered medications on file.       Allergies:  Patient has no known allergies.    Health Maintenance: due for retinal screening    ROS:  Gen: no fevers/chills, no changes in weight  Eyes: no changes in vision  ENT: no sore throat, no hearing loss, no bloody nose  Pulm: no SOB  CV: no chest pain        Objective:       Exam:  /72 (BP Location: Left  "arm, Patient Position: Sitting, BP Cuff Size: Large adult)   Pulse (!) 54   Temp 37.1 °C (98.7 °F) (Temporal)   Resp 14   Ht 1.803 m (5' 11\")   Wt 110 kg (243 lb 9.6 oz)   SpO2 96%   BMI 33.98 kg/m²  Body mass index is 33.98 kg/m².    Gen: Alert and oriented, No apparent distress  Lungs: Normal effort, CTA bilaterally, no wheezes, rhonchi, or rales  CV: Regular rate and rhythm, no murmurs, rubs, or gallops      Assessment & Plan:     71 y.o. male with the following -     1. Type 2 diabetes mellitus with diabetic neuropathy, with long-term current use of insulin (HCC)  HgbA1C has increased from 7.7 to 8.1. Patient notes he has been under tremendous stress over past two months; he would like to proceed with current medications and lifestyle modification, repeat HgbA1C in 3 months.  - POCT Hemoglobin A1C    2. Neuropathy  - Continue gabapentin 600mg BID    3. Mixed hyperlipidemia  - Continue atorvastatin 20mg qhs    4. Chronic atrial fibrillation (HCC)  - Following with cardiology, continue xarelto    5. Essential hypertension  Patient reports home BPs persistently low, stopped lisinopril; given home BP and office readings recommended patient continue to hold BP medication    Return in about 3 months (around 11/11/2021).    Please note this dictation was created using voice recognition software. I have made every reasonable attempt to correct obvious errors, but I expect there may be errors of grammar, and possibly content, that I did not discover before finalizing the note.       "

## 2021-08-12 NOTE — ASSESSMENT & PLAN NOTE
This is a chronic problem, secondary to type 2 diabetes mellitus.  Patient reports his symptoms are well controlled with gabapentin 600 mg twice daily.

## 2021-08-23 DIAGNOSIS — K21.9 GASTROESOPHAGEAL REFLUX DISEASE WITHOUT ESOPHAGITIS: ICD-10-CM

## 2021-08-24 RX ORDER — OMEPRAZOLE 20 MG/1
CAPSULE, DELAYED RELEASE ORAL
Qty: 90 CAPSULE | Refills: 0 | Status: SHIPPED | OUTPATIENT
Start: 2021-08-24 | End: 2021-11-23

## 2021-08-31 DIAGNOSIS — E11.40 TYPE 2 DIABETES MELLITUS WITH DIABETIC NEUROPATHY, WITH LONG-TERM CURRENT USE OF INSULIN (HCC): ICD-10-CM

## 2021-08-31 DIAGNOSIS — Z79.4 TYPE 2 DIABETES MELLITUS WITH DIABETIC NEUROPATHY, WITH LONG-TERM CURRENT USE OF INSULIN (HCC): ICD-10-CM

## 2021-09-01 RX ORDER — INSULIN GLARGINE 100 [IU]/ML
INJECTION, SOLUTION SUBCUTANEOUS
Qty: 15 ML | Refills: 3 | Status: SHIPPED | OUTPATIENT
Start: 2021-09-01 | End: 2022-03-11

## 2021-09-06 DIAGNOSIS — E11.40 TYPE 2 DIABETES MELLITUS WITH DIABETIC NEUROPATHY, WITH LONG-TERM CURRENT USE OF INSULIN (HCC): ICD-10-CM

## 2021-09-06 DIAGNOSIS — Z79.4 TYPE 2 DIABETES MELLITUS WITH DIABETIC NEUROPATHY, WITH LONG-TERM CURRENT USE OF INSULIN (HCC): ICD-10-CM

## 2021-09-07 RX ORDER — SITAGLIPTIN 100 MG/1
TABLET, FILM COATED ORAL
Qty: 100 TABLET | Refills: 0 | Status: SHIPPED | OUTPATIENT
Start: 2021-09-07 | End: 2021-12-07

## 2021-09-23 ENCOUNTER — TELEPHONE (OUTPATIENT)
Dept: HEALTH INFORMATION MANAGEMENT | Facility: OTHER | Age: 71
End: 2021-09-23

## 2021-09-23 NOTE — TELEPHONE ENCOUNTER
Outcome: Left Message     Please transfer to Patient Outreach Team at [phone] when patient returns call.    [facility] Verified: yes    Attempt # 2

## 2021-10-12 ENCOUNTER — PATIENT MESSAGE (OUTPATIENT)
Dept: HEALTH INFORMATION MANAGEMENT | Facility: OTHER | Age: 71
End: 2021-10-12

## 2021-10-27 ENCOUNTER — OFFICE VISIT (OUTPATIENT)
Dept: MEDICAL GROUP | Facility: MEDICAL CENTER | Age: 71
End: 2021-10-27
Payer: MEDICARE

## 2021-10-27 VITALS
HEART RATE: 108 BPM | OXYGEN SATURATION: 96 % | BODY MASS INDEX: 35.42 KG/M2 | RESPIRATION RATE: 20 BRPM | HEIGHT: 70 IN | TEMPERATURE: 97.3 F | DIASTOLIC BLOOD PRESSURE: 72 MMHG | SYSTOLIC BLOOD PRESSURE: 130 MMHG | WEIGHT: 247.4 LBS

## 2021-10-27 DIAGNOSIS — E66.09 CLASS 1 OBESITY DUE TO EXCESS CALORIES WITH SERIOUS COMORBIDITY AND BODY MASS INDEX (BMI) OF 33.0 TO 33.9 IN ADULT: ICD-10-CM

## 2021-10-27 DIAGNOSIS — Z79.4 TYPE 2 DIABETES MELLITUS WITH DIABETIC NEUROPATHY, WITH LONG-TERM CURRENT USE OF INSULIN (HCC): ICD-10-CM

## 2021-10-27 DIAGNOSIS — Z23 NEED FOR VACCINATION: ICD-10-CM

## 2021-10-27 DIAGNOSIS — E11.40 TYPE 2 DIABETES MELLITUS WITH DIABETIC NEUROPATHY, WITH LONG-TERM CURRENT USE OF INSULIN (HCC): ICD-10-CM

## 2021-10-27 DIAGNOSIS — I10 ESSENTIAL HYPERTENSION: ICD-10-CM

## 2021-10-27 PROCEDURE — 99214 OFFICE O/P EST MOD 30 MIN: CPT | Mod: 25 | Performed by: FAMILY MEDICINE

## 2021-10-27 PROCEDURE — G0008 ADMIN INFLUENZA VIRUS VAC: HCPCS | Performed by: FAMILY MEDICINE

## 2021-10-27 PROCEDURE — 90662 IIV NO PRSV INCREASED AG IM: CPT | Performed by: FAMILY MEDICINE

## 2021-10-27 RX ORDER — TAMSULOSIN HYDROCHLORIDE 0.4 MG/1
CAPSULE ORAL
COMMUNITY
Start: 2021-10-03 | End: 2022-11-04 | Stop reason: SDUPTHER

## 2021-10-27 RX ORDER — DULAGLUTIDE 0.75 MG/.5ML
0.5 INJECTION, SOLUTION SUBCUTANEOUS
Qty: 1.96 ML | Refills: 3
Start: 2021-10-27 | End: 2021-12-28

## 2021-10-27 ASSESSMENT — FIBROSIS 4 INDEX: FIB4 SCORE: 2.54

## 2021-10-28 NOTE — ASSESSMENT & PLAN NOTE
Client was previously on lisinopril however blood pressure noted to be low at previous visit.  He has been off lisinopril for over 2 months.  He reports his home blood pressures are usually around 120s/70s.

## 2021-10-28 NOTE — PROGRESS NOTES
Subjective:     CC: follow up HTN, DMII    HPI:   Richard presents today with:    Essential hypertension  Client was previously on lisinopril however blood pressure noted to be low at previous visit.  He has been off lisinopril for over 2 months.  He reports his home blood pressures are usually around 120s/70s.    Type 2 diabetes mellitus with diabetic neuropathy (HCC)  This is a chronic problem.  Jay reports his blood sugar has been running high thus he has increased his Lantus from 36 units nightly to 40 units nightly.  He reports compliance with metformin 1000 mg twice daily and Januvia 100 mg daily.  Last A1c: 6.5 (12/10/2019)--> 7.2 (5/12/20) --> 8.1 (7/21/20) --> 7.1 --> 7.7--> 8.1 8/11/21  Last Microalb/Cr ratio: Within normal limits 8/6/2021  Last diabetic foot exam: 2/10/20  Last retinal eye exam: Patient is due  ACEi/ARB: Previously on lisinopril, stopped due to low BP  Statin: Simvastatin 20 mg daily   Aspirin: None, patient is on Xarelto for PAF       Class 1 obesity due to excess calories with serious comorbidity and body mass index (BMI) of 33.0 to 33.9 in adult  Richard is very frustrated as he has gained >15 pounds over the past few months and reports he has been doing physical labor for 4-5 hours daily. He thinks his diet is quite healthy however he reports eating cereal, toast, and fruit for breakfast/lunch.       Past Medical History:   Diagnosis Date   • Alcohol use 2/10/2016    3 per day   • Arthritis 2016    left knee   • Dental disorder     upper lower dentures   • Diabetes 2005    insulin and oral agent   • Elevated INR 7/30/2019   • Heart burn    • Hiatus hernia syndrome    • High cholesterol    • Hypertension    • Observed sleep apnea     no study done yet   • Other persistent atrial fibrillation (HCC) 12/19/2019   • Pain     left knee   • Paroxysmal atrial fibrillation (HCC) 2/27/2016 11/14/16-resolved now   • Pulmonary hypertension (HCC) 3/1/2016   • Snoring    • Urinary retention  7/30/2019       Social History     Tobacco Use   • Smoking status: Never Smoker   • Smokeless tobacco: Never Used   Vaping Use   • Vaping Use: Never used   Substance Use Topics   • Alcohol use: Yes     Alcohol/week: 0.6 oz     Types: 1 Shots of liquor per week     Comment: once a week    • Drug use: No       Current Outpatient Medications Ordered in Epic   Medication Sig Dispense Refill   • tamsulosin (FLOMAX) 0.4 MG capsule      • Dulaglutide (TRULICITY) 0.75 MG/0.5ML Solution Pen-injector Inject 0.5 mL under the skin every 7 days. 1.96 mL 3   • metformin (GLUCOPHAGE) 1000 MG tablet Take 1 tablet by mouth twice daily 200 Tablet 2   • JANUVIA 100 MG Tab Take 1 tablet by mouth once daily 100 Tablet 0   • LANTUS SOLOSTAR 100 UNIT/ML Solution Pen-injector injection INJECT 32 UNITS SUBCUTANEOUSLY ONCE DAILY IN THE EVENING (Patient taking differently: 40 Units.) 15 mL 3   • omeprazole (PRILOSEC) 20 MG delayed-release capsule Take 1 capsule by mouth once daily 90 Capsule 0   • XARELTO 20 MG Tab tablet TAKE 1 TABLET BY MOUTH WITH SUPPER 90 tablet 2   • colchicine (COLCRYS) 0.6 MG Tab Take 1.2mg POX1, then 0.6mg PO 1h later 3 tablet 6   • B Complex Vitamins (B COMPLEX PO) Take  by mouth 2 (two) times a day.     • gabapentin (NEURONTIN) 300 MG Cap Take 2 Caps by mouth 2 Times a Day. (Patient taking differently: Take 300 mg by mouth 4 times a day.) 360 Cap 2   • simvastatin (ZOCOR) 20 MG Tab Take 1 Tab by mouth every evening. 100 Tab 2     No current Epic-ordered facility-administered medications on file.       Allergies:  Patient has no known allergies.    Health Maintenance: Due for retinal screening    ROS:  Gen: no fevers/chills, no changes in weight  Eyes: no changes in vision  ENT: no sore throat, no hearing loss, no bloody nose  Pulm: no SOB  CV: no chest pain      Objective:       Exam:  /72 (BP Location: Left arm, Patient Position: Sitting, BP Cuff Size: Adult long)   Pulse (!) 108   Temp 36.3 °C (97.3 °F)  "(Temporal)   Resp 20   Ht 1.778 m (5' 10\")   Wt 112 kg (247 lb 6.4 oz)   SpO2 96%   BMI 35.50 kg/m²  Body mass index is 35.5 kg/m².    Constitutional: Alert, no distress, well-groomed  Skin: Warm, dry, good turgor, no rashes in visible areas  Eyes: Equal, round and reactive, conjunctiva clear, no ptosis  Respiratory: Unlabored respiratory effort  Psych: Alert and oriented, normal affect and mood    Assessment & Plan:     71 y.o. male with the following -     1. Essential hypertension  - Pt off all medications X 2 months; home BPs WNL. Encouraged patient to continue daily physical activity, dietary modification and weight loss     2. Type 2 diabetes mellitus with diabetic neuropathy, with long-term current use of insulin (HCC)  Jay is currently on Lantus 40 units nightly, Metformin 1000 mg twice daily, and Januvia 100 mg daily.  He is very frustrated with weight gain despite increasing activity.  We discussed his weight gain may be in part secondary to increasing his insulin.  We discussed decreasing his insulin back to Lantus 32 units nightly and starting Trulicity 0.75 mg q. 7 days. Patient is amenable to plan. Follow up in 1-2 months.     3. Class 1 obesity due to excess calories with serious comorbidity and body mass index (BMI) of 33.0 to 33.9 in adult  Patient frustrated regarding weight gain; recommended decreasing carbohydrate intake, increase vegetable, protein and healthy fat intake.     4. Need for vaccination  - Influenza Vaccine, High Dose (65+ Only)      Return in about 2 months (around 12/27/2021).    Please note this dictation was created using voice recognition software. I have made every reasonable attempt to correct obvious errors, but I expect there may be errors of grammar, and possibly content, that I did not discover before finalizing the note.       "

## 2021-10-28 NOTE — ASSESSMENT & PLAN NOTE
This is a chronic problem.  Jay reports his blood sugar has been running high thus he has increased his Lantus from 36 units nightly to 40 units nightly.  He reports compliance with metformin 1000 mg twice daily and Januvia 100 mg daily.  Last A1c: 6.5 (12/10/2019)--> 7.2 (5/12/20) --> 8.1 (7/21/20) --> 7.1 --> 7.7--> 8.1 8/11/21  Last Microalb/Cr ratio: Within normal limits 8/6/2021  Last diabetic foot exam: 2/10/20  Last retinal eye exam: Patient is due  ACEi/ARB: Previously on lisinopril, stopped due to low BP  Statin: Simvastatin 20 mg daily   Aspirin: None, patient is on Xarelto for PAF

## 2021-10-28 NOTE — ASSESSMENT & PLAN NOTE
Richard is very frustrated as he has gained >15 pounds over the past few months and reports he has been doing physical labor for 4-5 hours daily. He thinks his diet is quite healthy however he reports eating cereal, toast, and fruit for breakfast/lunch.

## 2021-11-23 DIAGNOSIS — E78.01 FAMILIAL HYPERCHOLESTEROLEMIA: ICD-10-CM

## 2021-11-23 DIAGNOSIS — K21.9 GASTROESOPHAGEAL REFLUX DISEASE WITHOUT ESOPHAGITIS: ICD-10-CM

## 2021-11-23 RX ORDER — OMEPRAZOLE 20 MG/1
CAPSULE, DELAYED RELEASE ORAL
Qty: 90 CAPSULE | Refills: 0 | Status: SHIPPED | OUTPATIENT
Start: 2021-11-23 | End: 2022-03-11

## 2021-11-23 RX ORDER — SIMVASTATIN 20 MG
TABLET ORAL
Qty: 100 TABLET | Refills: 0 | Status: SHIPPED | OUTPATIENT
Start: 2021-11-23 | End: 2022-03-22

## 2021-11-24 ENCOUNTER — OFFICE VISIT (OUTPATIENT)
Dept: MEDICAL GROUP | Facility: MEDICAL CENTER | Age: 71
End: 2021-11-24
Payer: MEDICARE

## 2021-11-24 VITALS
DIASTOLIC BLOOD PRESSURE: 70 MMHG | HEART RATE: 94 BPM | BODY MASS INDEX: 35.32 KG/M2 | TEMPERATURE: 97.9 F | OXYGEN SATURATION: 94 % | WEIGHT: 246.69 LBS | RESPIRATION RATE: 18 BRPM | HEIGHT: 70 IN | SYSTOLIC BLOOD PRESSURE: 110 MMHG

## 2021-11-24 DIAGNOSIS — M25.511 CHRONIC RIGHT SHOULDER PAIN: ICD-10-CM

## 2021-11-24 DIAGNOSIS — G89.29 CHRONIC RIGHT SHOULDER PAIN: ICD-10-CM

## 2021-11-24 DIAGNOSIS — K59.01 SLOW TRANSIT CONSTIPATION: ICD-10-CM

## 2021-11-24 DIAGNOSIS — M79.671 RIGHT FOOT PAIN: ICD-10-CM

## 2021-11-24 PROBLEM — M10.9 GOUT OF RIGHT FOOT: Status: RESOLVED | Noted: 2020-07-29 | Resolved: 2021-11-24

## 2021-11-24 PROCEDURE — 99214 OFFICE O/P EST MOD 30 MIN: CPT | Performed by: FAMILY MEDICINE

## 2021-11-24 ASSESSMENT — FIBROSIS 4 INDEX: FIB4 SCORE: 2.54

## 2021-11-24 NOTE — ASSESSMENT & PLAN NOTE
Richard reports sharp pain in his right foot. Pain is intermittent, he cannot identify any specific triggering factors. He reports the pain started about five years ago. He has been treated empirically for gout with minimal improvement; uric acid level 4.6. He has tried several topical creams without relief, tylenol, NSAIDS. No preceding injury he can recall. No associated swelling or redness.    He has a history of bilateral LE neuropathy which is usually well-controlled with gabapentin. He reports this pain is different.

## 2021-11-24 NOTE — PROGRESS NOTES
Subjective:     CC: right shoulder pain, right foot pain    HPI:   Richard presents today with:    Chronic right shoulder pain  S/p arthroscopic surgery at [facility] about a year ago, followed by physical therapy. He continues to have pain and limited range of motion. No numbness, tingling, or weakness in RUE. He is managing pain with tylenol 500mg q 6 hours.     Right foot pain  Richard reports sharp pain in his right foot. Pain is intermittent, he cannot identify any specific triggering factors. He reports the pain started about five years ago. He has been treated empirically for gout with minimal improvement; uric acid level 4.6. He has tried several topical creams without relief, tylenol, NSAIDS. No preceding injury he can recall. No associated swelling or redness.    He has a history of bilateral LE neuropathy which is usually well-controlled with gabapentin. He reports this pain is different.     Slow transit constipation  Richard reports intermittent constipation. He tries to eat vegetables and fruits, does use supplemental fiber occasionally. No melena or hematochezia. Colonoscopy 7/2018 demonstrated diverticulitis with 10-year recall.      Past Medical History:   Diagnosis Date   • Alcohol use 2/10/2016    3 per day   • Arthritis 2016    left knee   • Dental disorder     upper lower dentures   • Diabetes 2005    insulin and oral agent   • Elevated INR 7/30/2019   • Heart burn    • Hiatus hernia syndrome    • High cholesterol    • Hypertension    • Observed sleep apnea     no study done yet   • Other persistent atrial fibrillation (HCC) 12/19/2019   • Pain     left knee   • Paroxysmal atrial fibrillation (HCC) 2/27/2016 11/14/16-resolved now   • Pulmonary hypertension (HCC) 3/1/2016   • Snoring    • Urinary retention 7/30/2019       Social History     Tobacco Use   • Smoking status: Never Smoker   • Smokeless tobacco: Never Used   Vaping Use   • Vaping Use: Never used   Substance Use Topics   • Alcohol use: Yes      "Alcohol/week: 0.6 oz     Types: 1 Shots of liquor per week     Comment: once a week    • Drug use: No       Current Outpatient Medications Ordered in Epic   Medication Sig Dispense Refill   • omeprazole (PRILOSEC) 20 MG delayed-release capsule Take 1 capsule by mouth once daily 90 Capsule 0   • simvastatin (ZOCOR) 20 MG Tab TAKE 1 TABLET BY MOUTH ONCE DAILY IN THE EVENING 100 Tablet 0   • tamsulosin (FLOMAX) 0.4 MG capsule      • Dulaglutide (TRULICITY) 0.75 MG/0.5ML Solution Pen-injector Inject 0.5 mL under the skin every 7 days. 1.96 mL 3   • metformin (GLUCOPHAGE) 1000 MG tablet Take 1 tablet by mouth twice daily 200 Tablet 2   • JANUVIA 100 MG Tab Take 1 tablet by mouth once daily 100 Tablet 0   • LANTUS SOLOSTAR 100 UNIT/ML Solution Pen-injector injection INJECT 32 UNITS SUBCUTANEOUSLY ONCE DAILY IN THE EVENING (Patient taking differently: 40 Units.) 15 mL 3   • XARELTO 20 MG Tab tablet TAKE 1 TABLET BY MOUTH WITH SUPPER 90 tablet 2   • colchicine (COLCRYS) 0.6 MG Tab Take 1.2mg POX1, then 0.6mg PO 1h later 3 tablet 6   • B Complex Vitamins (B COMPLEX PO) Take  by mouth 2 (two) times a day.     • gabapentin (NEURONTIN) 300 MG Cap Take 2 Caps by mouth 2 Times a Day. (Patient taking differently: Take 300 mg by mouth 4 times a day.) 360 Cap 2     No current Epic-ordered facility-administered medications on file.       Allergies:  Patient has no known allergies.    Health Maintenance: Retinal is UTD, unsure where completed    ROS:  Gen: no fevers/chills, no changes in weight  Eyes: no changes in vision  ENT: no sore throat, no hearing loss, no bloody nose  Pulm: no SOB  CV: no chest pain        Objective:       Exam:  /70 (BP Location: Left arm, Patient Position: Sitting, BP Cuff Size: Large adult)   Pulse 94   Temp 36.6 °C (97.9 °F) (Temporal)   Resp 18   Ht 1.778 m (5' 10\")   Wt 112 kg (246 lb 11.1 oz)   SpO2 94%   BMI 35.40 kg/m²  Body mass index is 35.4 kg/m².    Constitutional: Alert, no distress, " well-groomed  Skin: Warm, dry, good turgor, no rashes in visible areas  Eyes: Equal, round and reactive, conjunctiva clear, no ptosis  Respiratory: Unlabored respiratory effort  Psych: Alert and oriented, normal affect and mood  Right foot: no abnormalities on inspection, 1+ DP and PT pulses, mildly tender to palpation across dorsal aspect of metatarsal bones; no edema or erythema    Assessment & Plan:     71 y.o. male with the following -     1. Chronic right shoulder pain  - Referral to Physical Therapy    2. Right foot pain  - Referral to Podiatry    3. Slow transit constipation  - recommended increasing fiber and water intake  - magnesium supplementation    Return in about 1 month (around 12/24/2021) for chronic medical conditions.    Please note this dictation was created using voice recognition software. I have made every reasonable attempt to correct obvious errors, but I expect there may be errors of grammar, and possibly content, that I did not discover before finalizing the note.

## 2021-11-24 NOTE — ASSESSMENT & PLAN NOTE
Richard reports intermittent constipation. He tries to eat vegetables and fruits, does use supplemental fiber occasionally. No melena or hematochezia. Colonoscopy 7/2018 demonstrated diverticulitis with 10-year recall.

## 2021-11-24 NOTE — ASSESSMENT & PLAN NOTE
S/p arthroscopic surgery at [facility] about a year ago, followed by physical therapy. He continues to have pain and limited range of motion. No numbness, tingling, or weakness in RUE. He is managing pain with tylenol 500mg q 6 hours.

## 2021-12-18 DIAGNOSIS — G62.9 NEUROPATHY: ICD-10-CM

## 2021-12-20 RX ORDER — GABAPENTIN 300 MG/1
300 CAPSULE ORAL 4 TIMES DAILY
Qty: 360 CAPSULE | Refills: 2 | Status: SHIPPED | OUTPATIENT
Start: 2021-12-20 | End: 2022-09-19

## 2021-12-28 ENCOUNTER — OFFICE VISIT (OUTPATIENT)
Dept: MEDICAL GROUP | Facility: MEDICAL CENTER | Age: 71
End: 2021-12-28
Payer: MEDICARE

## 2021-12-28 VITALS
BODY MASS INDEX: 34.84 KG/M2 | HEART RATE: 109 BPM | TEMPERATURE: 97.5 F | OXYGEN SATURATION: 94 % | RESPIRATION RATE: 14 BRPM | DIASTOLIC BLOOD PRESSURE: 72 MMHG | SYSTOLIC BLOOD PRESSURE: 124 MMHG | WEIGHT: 243.39 LBS | HEIGHT: 70 IN

## 2021-12-28 DIAGNOSIS — M25.511 CHRONIC RIGHT SHOULDER PAIN: ICD-10-CM

## 2021-12-28 DIAGNOSIS — E11.40 TYPE 2 DIABETES MELLITUS WITH DIABETIC NEUROPATHY, WITH LONG-TERM CURRENT USE OF INSULIN (HCC): ICD-10-CM

## 2021-12-28 DIAGNOSIS — R40.20 LOSS OF CONSCIOUSNESS (HCC): ICD-10-CM

## 2021-12-28 DIAGNOSIS — G89.29 CHRONIC RIGHT SHOULDER PAIN: ICD-10-CM

## 2021-12-28 DIAGNOSIS — Z79.4 TYPE 2 DIABETES MELLITUS WITH DIABETIC NEUROPATHY, WITH LONG-TERM CURRENT USE OF INSULIN (HCC): ICD-10-CM

## 2021-12-28 LAB
HBA1C MFR BLD: 8.6 % (ref 0–5.6)
INT CON NEG: NEGATIVE
INT CON POS: POSITIVE

## 2021-12-28 PROCEDURE — 83036 HEMOGLOBIN GLYCOSYLATED A1C: CPT | Performed by: FAMILY MEDICINE

## 2021-12-28 PROCEDURE — 99214 OFFICE O/P EST MOD 30 MIN: CPT | Performed by: FAMILY MEDICINE

## 2021-12-28 RX ORDER — DULAGLUTIDE 1.5 MG/.5ML
0.5 INJECTION, SOLUTION SUBCUTANEOUS
Qty: 2.24 ML | Refills: 6 | Status: SHIPPED | OUTPATIENT
Start: 2021-12-28 | End: 2022-07-18

## 2021-12-28 ASSESSMENT — FIBROSIS 4 INDEX: FIB4 SCORE: 2.54

## 2021-12-28 NOTE — ASSESSMENT & PLAN NOTE
S/p arthroscopic surgery at [facility] about a year ago, followed by physical therapy. At our previous visit he reported persistent pain and limited range of motion. No numbness, tingling, or weakness in RUE. Referred to PT, had two sessions and reports some improvement in pain and increased ROM.

## 2021-12-28 NOTE — ASSESSMENT & PLAN NOTE
"Richard reports syncopal episode about three weeks ago.  He reports he was walking out of the bathroom into his bedroom, the next thing he knew his wife was pounding on his chest.  His wife reports she heard a loud \"thump\" and found him on the floor in their bedroom.  Jay reports he had a significant headache, light sensitivity and intermittent diplopia following the episode which have now resolved. He did not seek medical evaluation following the episode as he reports all his symptoms improved.  He denies preceding lightheadedness, dizziness, chest pain, palpitations, headache, numbness, tingling, weakness.    He reports he is feeling \"fine\" now and does not wish to proceed with any imaging or workup at this time.  "

## 2021-12-28 NOTE — ASSESSMENT & PLAN NOTE
This is a chronic problem.  Jay reports he is doing well on lantus 30 units, trulicity .75mg q 7 days. He is also on metformin 1000 mg twice daily and Januvia 100 mg daily.  Last A1c: 6.5 (12/10/2019)--> 7.2 (5/12/20) --> 8.1 (7/21/20) --> 7.1 --> 7.7--> 8.1 8/11/21-->8.6 12/28/21  Last Microalb/Cr ratio: Within normal limits 8/6/2021  Last diabetic foot exam: 2/12/21  Last retinal eye exam: Patient is due  ACEi/ARB: Previously on lisinopril, stopped due to low BP  Statin: Simvastatin 20 mg daily   Aspirin: None, patient is on Xarelto for PAF

## 2021-12-28 NOTE — PROGRESS NOTES
"Subjective:     CC: follow up DMII, shoulder pain, LOC    HPI:   Richard presents today with:    Type 2 diabetes mellitus with diabetic neuropathy (HCC)  This is a chronic problem.  Jay reports he is doing well on lantus 30 units, trulicity .75mg q 7 days. He is also on metformin 1000 mg twice daily and Januvia 100 mg daily.  Last A1c: 6.5 (12/10/2019)--> 7.2 (5/12/20) --> 8.1 (7/21/20) --> 7.1 --> 7.7--> 8.1 8/11/21-->8.6 12/28/21  Last Microalb/Cr ratio: Within normal limits 8/6/2021  Last diabetic foot exam: 2/12/21  Last retinal eye exam: Patient is due  ACEi/ARB: Previously on lisinopril, stopped due to low BP  Statin: Simvastatin 20 mg daily   Aspirin: None, patient is on Xarelto for PAF       Chronic right shoulder pain  S/p arthroscopic surgery at [facility] about a year ago, followed by physical therapy. At our previous visit he reported persistent pain and limited range of motion. No numbness, tingling, or weakness in RUE. Referred to PT, had two sessions and reports some improvement in pain and increased ROM.    Loss of consciousness (HCC)  Richard reports syncopal episode about three weeks ago.  He reports he was walking out of the bathroom into his bedroom, the next thing he knew his wife was pounding on his chest.  His wife reports she heard a loud \"thump\" and found him on the floor in their bedroom.  Jay reports he had a significant headache, light sensitivity and intermittent diplopia following the episode which have now resolved. He did not seek medical evaluation following the episode as he reports all his symptoms improved.  He denies preceding lightheadedness, dizziness, chest pain, palpitations, headache, numbness, tingling, weakness.    He reports he is feeling \"fine\" now and does not wish to proceed with any imaging or workup at this time.      Past Medical History:   Diagnosis Date   • Alcohol use 2/10/2016    3 per day   • Arthritis 2016    left knee   • Dental disorder     upper lower dentures   • " Diabetes 2005    insulin and oral agent   • Elevated INR 7/30/2019   • Heart burn    • Hiatus hernia syndrome    • High cholesterol    • Hypertension    • Observed sleep apnea     no study done yet   • Other persistent atrial fibrillation (HCC) 12/19/2019   • Pain     left knee   • Paroxysmal atrial fibrillation (HCC) 2/27/2016 11/14/16-resolved now   • Pulmonary hypertension (HCC) 3/1/2016   • Snoring    • Urinary retention 7/30/2019       Social History     Tobacco Use   • Smoking status: Never Smoker   • Smokeless tobacco: Never Used   Vaping Use   • Vaping Use: Never used   Substance Use Topics   • Alcohol use: Yes     Alcohol/week: 0.6 oz     Types: 1 Shots of liquor per week     Comment: once a week    • Drug use: No       Current Outpatient Medications Ordered in Epic   Medication Sig Dispense Refill   • Dulaglutide (TRULICITY) 1.5 MG/0.5ML Solution Pen-injector Inject 0.5 mL under the skin every 7 days. 2.24 mL 6   • gabapentin (NEURONTIN) 300 MG Cap Take 1 Capsule by mouth 4 times a day. 360 Capsule 2   • JANUVIA 100 MG Tab Take 1 tablet by mouth once daily 100 Tablet 2   • omeprazole (PRILOSEC) 20 MG delayed-release capsule Take 1 capsule by mouth once daily 90 Capsule 0   • simvastatin (ZOCOR) 20 MG Tab TAKE 1 TABLET BY MOUTH ONCE DAILY IN THE EVENING 100 Tablet 0   • tamsulosin (FLOMAX) 0.4 MG capsule      • metformin (GLUCOPHAGE) 1000 MG tablet Take 1 tablet by mouth twice daily 200 Tablet 2   • LANTUS SOLOSTAR 100 UNIT/ML Solution Pen-injector injection INJECT 32 UNITS SUBCUTANEOUSLY ONCE DAILY IN THE EVENING (Patient taking differently: 30 Units.) 15 mL 3   • XARELTO 20 MG Tab tablet TAKE 1 TABLET BY MOUTH WITH SUPPER 90 tablet 2   • colchicine (COLCRYS) 0.6 MG Tab Take 1.2mg POX1, then 0.6mg PO 1h later 3 tablet 6   • B Complex Vitamins (B COMPLEX PO) Take  by mouth 2 (two) times a day.       No current [facility]-ordered facility-administered medications on file.       Allergies:  Patient has no known  "allergies.    Health Maintenance: Due for retinal screening    ROS:  Gen: no fevers/chills, no changes in weight  Eyes: no changes in vision  ENT: no sore throat, no hearing loss, no bloody nose  Pulm: no SOB  CV: no chest pain        Objective:       Exam:  /72 (BP Location: Right arm, Patient Position: Sitting, BP Cuff Size: Adult long)   Pulse (!) 109   Temp 36.4 °C (97.5 °F) (Temporal)   Resp 14   Ht 1.778 m (5' 10\")   Wt 110 kg (243 lb 6.2 oz)   SpO2 94%   BMI 34.92 kg/m²  Body mass index is 34.92 kg/m².    Gen: Alert and oriented, No apparent distress  Lungs: Normal effort, CTA bilaterally, no wheezes, rhonchi, or rales  CV: Irregularly irregular, no murmurs, rubs, or gallops      Assessment & Plan:     71 y.o. male with the following -     1. Type 2 diabetes mellitus with diabetic neuropathy, with long-term current use of insulin (HCC)  Unfortunately Richard's HgbA1C continues to increase. He is tolerating Trulicity well, will proceed with increasing to 1.5mg q 7 days; continue Jardiance, Metformin, and Lantus at current doses.  Jay is motivated to work on diet and exercise regimen.  Plan to repeat hemoglobin A1c in 3 months.  - POCT Hemoglobin A1C    2. Chronic right shoulder pain  Improved with PT, patient stopped PT after syncopal episode however plans to restart now all concussive symptoms have resolved.    3. Loss of consciousness (HCC)  Syncopal episode per HPI, etiology undetermined, discussed further workup however patient declines at this time. He understands he must seek emergent care if syncope recurs.    Other orders  - Dulaglutide (TRULICITY) 1.5 MG/0.5ML Solution Pen-injector; Inject 0.5 mL under the skin every 7 days.  Dispense: 2.24 mL; Refill: 6      Return in about 3 months (around 3/28/2022).    Please note this dictation was created using voice recognition software. I have made every reasonable attempt to correct obvious errors, but I expect there may be errors of grammar, and " possibly content, that I did not discover before finalizing the note.

## 2022-03-03 ENCOUNTER — PATIENT MESSAGE (OUTPATIENT)
Dept: HEALTH INFORMATION MANAGEMENT | Facility: OTHER | Age: 72
End: 2022-03-03
Payer: MEDICARE

## 2022-03-29 NOTE — PROGRESS NOTES
Annual Health Assessment Questions:    1.  Are you currently engaging in any exercise or physical activity? Yes    2.  How would you describe your mood or emotional well-being today? good    3.  Have you had any falls in the last year? No    4.  Have you noticed any problems with your balance or had difficulty walking? No    5.  In the last six months have you experienced any leakage of urine? No    6. DPA/Advanced Directive: Pt. Is currently in the process     143

## 2022-04-05 ENCOUNTER — OFFICE VISIT (OUTPATIENT)
Dept: MEDICAL GROUP | Facility: MEDICAL CENTER | Age: 72
End: 2022-04-05
Payer: MEDICARE

## 2022-04-05 VITALS
OXYGEN SATURATION: 96 % | SYSTOLIC BLOOD PRESSURE: 112 MMHG | BODY MASS INDEX: 34.97 KG/M2 | HEIGHT: 70 IN | WEIGHT: 244.27 LBS | TEMPERATURE: 97.7 F | RESPIRATION RATE: 14 BRPM | HEART RATE: 88 BPM | DIASTOLIC BLOOD PRESSURE: 78 MMHG

## 2022-04-05 DIAGNOSIS — I10 ESSENTIAL HYPERTENSION: ICD-10-CM

## 2022-04-05 DIAGNOSIS — D48.5 NEOPLASM OF UNCERTAIN BEHAVIOR OF SKIN: ICD-10-CM

## 2022-04-05 DIAGNOSIS — Z79.4 TYPE 2 DIABETES MELLITUS WITH DIABETIC NEUROPATHY, WITH LONG-TERM CURRENT USE OF INSULIN (HCC): ICD-10-CM

## 2022-04-05 DIAGNOSIS — I48.20 CHRONIC ATRIAL FIBRILLATION (HCC): ICD-10-CM

## 2022-04-05 DIAGNOSIS — E11.40 TYPE 2 DIABETES MELLITUS WITH DIABETIC NEUROPATHY, WITH LONG-TERM CURRENT USE OF INSULIN (HCC): ICD-10-CM

## 2022-04-05 DIAGNOSIS — E78.2 MIXED HYPERLIPIDEMIA: ICD-10-CM

## 2022-04-05 DIAGNOSIS — Z12.5 PROSTATE CANCER SCREENING: ICD-10-CM

## 2022-04-05 LAB
HBA1C MFR BLD: 7.6 % (ref 0–5.6)
INT CON NEG: NEGATIVE
INT CON POS: POSITIVE

## 2022-04-05 PROCEDURE — 99214 OFFICE O/P EST MOD 30 MIN: CPT | Performed by: FAMILY MEDICINE

## 2022-04-05 PROCEDURE — 83036 HEMOGLOBIN GLYCOSYLATED A1C: CPT | Performed by: FAMILY MEDICINE

## 2022-04-05 ASSESSMENT — FIBROSIS 4 INDEX: FIB4 SCORE: 2.58

## 2022-04-05 ASSESSMENT — PATIENT HEALTH QUESTIONNAIRE - PHQ9: CLINICAL INTERPRETATION OF PHQ2 SCORE: 0

## 2022-04-05 NOTE — ASSESSMENT & PLAN NOTE
Jay reports history of nonhealing lesion on his back for several months.  He reports he tries to scrape it off however the lesion remains and is pruritic.

## 2022-04-05 NOTE — ASSESSMENT & PLAN NOTE
This is a chronic problem.  Jay reports he is doing well on lantus 28 units, trulicity 1.5mg q 7 days. He is also on metformin 1000 mg twice daily and Januvia 100 mg daily.  Last A1c: 6.5 (12/10/2019)--> 7.2 (5/12/20) --> 8.1 (7/21/20) --> 7.1 --> 7.7--> 8.1 8/11/21-->8.6 12/28/21-->7.6 4/5/22  Last Microalb/Cr ratio: Within normal limits 8/6/2021  Last diabetic foot exam: 4/5/22  Last retinal eye exam: Patient is due  ACEi/ARB: Previously on lisinopril, stopped due to low BP  Statin: Simvastatin 20 mg daily   Aspirin: None, patient is on Xarelto for PAF     Monofilament testing with a 10 gram force: sensation intact: decreased bilaterally  Visual Inspection: Feet without maceration, ulcers, fissures.  Pedal pulses: decreased bilaterally

## 2022-04-05 NOTE — ASSESSMENT & PLAN NOTE
Richard has been off all antihypertensive medication for several months and reports his blood pressures have been normal.

## 2022-04-05 NOTE — PROGRESS NOTES
Subjective:     CC: follow up DMII, HgbA1C    HPI:   Richard presents today with:    Type 2 diabetes mellitus with diabetic neuropathy (HCC)  This is a chronic problem.  Jay reports he is doing well on lantus 28 units, trulicity 1.5mg q 7 days. He is also on metformin 1000 mg twice daily and Januvia 100 mg daily.  Last A1c: 6.5 (12/10/2019)--> 7.2 (5/12/20) --> 8.1 (7/21/20) --> 7.1 --> 7.7--> 8.1 8/11/21-->8.6 12/28/21-->7.6 4/5/22  Last Microalb/Cr ratio: Within normal limits 8/6/2021  Last diabetic foot exam: 4/5/22  Last retinal eye exam: Patient is due  ACEi/ARB: Previously on lisinopril, stopped due to low BP  Statin: Simvastatin 20 mg daily   Aspirin: None, patient is on Xarelto for PAF     Monofilament testing with a 10 gram force: sensation intact: decreased bilaterally  Visual Inspection: Feet without maceration, ulcers, fissures.  Pedal pulses: decreased bilaterally    Chronic atrial fibrillation (HCC)  This is a chronic problem.  The patient follows with cardiology and is on Xarelto for anticoagulation.  He denies palpitations, chest pain, shortness of breath, lightheadedness, or syncope.     Essential hypertension  Richard has been off all antihypertensive medication for several months and reports his blood pressures have been normal.     Neoplasm of uncertain behavior of skin  Jay reports history of nonhealing lesion on his back for several months.  He reports he tries to scrape it off however the lesion remains and is pruritic.      Past Medical History:   Diagnosis Date   • Alcohol use 2/10/2016    3 per day   • Arthritis 2016    left knee   • Dental disorder     upper lower dentures   • Diabetes 2005    insulin and oral agent   • Elevated INR 7/30/2019   • Heart burn    • Hiatus hernia syndrome    • High cholesterol    • Hypertension    • Observed sleep apnea     no study done yet   • Other persistent atrial fibrillation (HCC) 12/19/2019   • Pain     left knee   • Paroxysmal atrial fibrillation (HCC)  2/27/2016 11/14/16-resolved now   • Pulmonary hypertension (HCC) 3/1/2016   • Snoring    • Urinary retention 7/30/2019       Social History     Tobacco Use   • Smoking status: Never Smoker   • Smokeless tobacco: Never Used   Vaping Use   • Vaping Use: Never used   Substance Use Topics   • Alcohol use: Yes     Alcohol/week: 0.6 oz     Types: 1 Shots of liquor per week     Comment: once a week    • Drug use: No       Current Outpatient Medications Ordered in Epic   Medication Sig Dispense Refill   • XARELTO 20 MG Tab tablet TAKE 1 TABLET BY MOUTH WITH SUPPER 90 Tablet 2   • POTASSIUM PO Take  by mouth.     • simvastatin (ZOCOR) 20 MG Tab TAKE 1 TABLET BY MOUTH ONCE DAILY IN THE EVENING 100 Tablet 3   • insulin glargine (LANTUS SOLOSTAR) 100 UNIT/ML Solution Pen-injector injection Inject 30 Units under the skin every evening. (Patient taking differently: Inject 28 Units under the skin every evening.) 15 mL 6   • omeprazole (PRILOSEC) 20 MG delayed-release capsule Take 1 capsule by mouth once daily 90 Capsule 3   • Dulaglutide (TRULICITY) 1.5 MG/0.5ML Solution Pen-injector Inject 0.5 mL under the skin every 7 days. 2.24 mL 6   • gabapentin (NEURONTIN) 300 MG Cap Take 1 Capsule by mouth 4 times a day. (Patient taking differently: Take 300 mg by mouth. Two in the morning, three at night) 360 Capsule 2   • JANUVIA 100 MG Tab Take 1 tablet by mouth once daily 100 Tablet 2   • tamsulosin (FLOMAX) 0.4 MG capsule      • metformin (GLUCOPHAGE) 1000 MG tablet Take 1 tablet by mouth twice daily 200 Tablet 2   • colchicine (COLCRYS) 0.6 MG Tab Take 1.2mg POX1, then 0.6mg PO 1h later 3 tablet 6   • B Complex Vitamins (B COMPLEX PO) Take  by mouth 2 (two) times a day.       No current [facility]-ordered facility-administered medications on file.       Allergies:  Patient has no known allergies.    Health Maintenance: Due for retinal    ROS:  Gen: no fevers/chills, no changes in weight  Eyes: no changes in vision  ENT: no sore throat,  "no hearing loss, no bloody nose  Pulm: no SOB  CV: no chest pain      Objective:       Exam:  /78 (BP Location: Left arm, Patient Position: Sitting, BP Cuff Size: Large adult)   Pulse 88   Temp 36.5 °C (97.7 °F) (Temporal)   Resp 14   Ht 1.778 m (5' 10\")   Wt 111 kg (244 lb 4.3 oz)   SpO2 96%   BMI 35.05 kg/m²  Body mass index is 35.05 kg/m².    Gen: Alert and oriented, No apparent distress  Lungs: Normal effort, CTA bilaterally, no wheezes, rhonchi, or rales  CV: Regular rate and rhythm, no murmurs, rubs, or gallops  Skin:    Scaly, erythematous raised [id number] lesion mid back    Assessment & Plan:     72 y.o. male with the following -     1. Type 2 diabetes mellitus with diabetic neuropathy, with long-term current use of insulin (HCC)  Hemoglobin A1c has improved from 8.6 to 7.6.  Continue current medication regimen, dietary modification and physical activity.  - Comp Metabolic Panel; Future  - HEMOGLOBIN A1C; Future  - Lipid Profile; Future  - MICROALBUMIN CREAT RATIO URINE; Future  - POCT Hemoglobin A1C  - Diabetic Monofilament LE Exam    2. Mixed hyperlipidemia  Continue statin  - Lipid Profile; Future    3. Neoplasm of uncertain behavior of skin  - Referral to Dermatology    4. Chronic atrial fibrillation (HCC)  -Continue Xarelto    5. Essential hypertension  Patient off all antihypertensive medications as he was experiencing hypotensive episodes.  He reports his blood pressure has remained well controlled off all medication  - Comp Metabolic Panel; Future  - HEMOGLOBIN A1C; Future  - Lipid Profile; Future  - MICROALBUMIN CREAT RATIO URINE; Future  - CBC WITH DIFFERENTIAL; Future    6. Prostate cancer screening  - PROSTATE SPECIFIC AG SCREENING; Future    Other orders  - POTASSIUM PO; Take  by mouth.      Return in about 4 months (around 8/5/2022).    Please note this dictation was created using voice recognition software. I have made every reasonable attempt to correct obvious errors, but I expect " there may be errors of grammar, and possibly content, that I did not discover before finalizing the note.

## 2022-05-23 ENCOUNTER — TELEPHONE (OUTPATIENT)
Dept: MEDICAL GROUP | Facility: MEDICAL CENTER | Age: 72
End: 2022-05-23
Payer: MEDICARE

## 2022-05-23 NOTE — TELEPHONE ENCOUNTER
Phone Number Called: [phone] (home)       Call outcome: spoke with patient wife    Message: pt. Wife called stating over the last three weeks he has been getting a rash when he takes his Trulicity. She states when he switched the spots of injection and noticed irritation amd rash. Offered patient an appointment to be seen tomorrow or Wednesday at the [facility] location due to not having availability here. He declined stating he would rather wait to be seen here. Patient given an appointment for next week. Advised of Urgent Care precautions.

## 2022-06-16 ENCOUNTER — TELEPHONE (OUTPATIENT)
Dept: HEALTH INFORMATION MANAGEMENT | Facility: OTHER | Age: 72
End: 2022-06-16

## 2022-08-08 ENCOUNTER — HOSPITAL ENCOUNTER (OUTPATIENT)
Dept: LAB | Facility: MEDICAL CENTER | Age: 72
End: 2022-08-08
Attending: FAMILY MEDICINE
Payer: MEDICARE

## 2022-08-08 DIAGNOSIS — E11.40 TYPE 2 DIABETES MELLITUS WITH DIABETIC NEUROPATHY, WITH LONG-TERM CURRENT USE OF INSULIN (HCC): ICD-10-CM

## 2022-08-08 DIAGNOSIS — Z79.4 TYPE 2 DIABETES MELLITUS WITH DIABETIC NEUROPATHY, WITH LONG-TERM CURRENT USE OF INSULIN (HCC): ICD-10-CM

## 2022-08-08 DIAGNOSIS — E78.2 MIXED HYPERLIPIDEMIA: ICD-10-CM

## 2022-08-08 DIAGNOSIS — I10 ESSENTIAL HYPERTENSION: ICD-10-CM

## 2022-08-08 DIAGNOSIS — Z12.5 PROSTATE CANCER SCREENING: ICD-10-CM

## 2022-08-08 LAB
ALBUMIN SERPL BCP-MCNC: 4.6 G/DL (ref 3.2–4.9)
ALBUMIN/GLOB SERPL: 1.6 G/DL
ALP SERPL-CCNC: 82 U/L (ref 30–99)
ALT SERPL-CCNC: 14 U/L (ref 2–50)
ANION GAP SERPL CALC-SCNC: 12 MMOL/L (ref 7–16)
AST SERPL-CCNC: 18 U/L (ref 12–45)
BASOPHILS # BLD AUTO: 0.6 % (ref 0–1.8)
BASOPHILS # BLD: 0.04 K/UL (ref 0–0.12)
BILIRUB SERPL-MCNC: 0.5 MG/DL (ref 0.1–1.5)
BUN SERPL-MCNC: 14 MG/DL (ref 8–22)
CALCIUM SERPL-MCNC: 9.7 MG/DL (ref 8.5–10.5)
CHLORIDE SERPL-SCNC: 101 MMOL/L (ref 96–112)
CHOLEST SERPL-MCNC: 112 MG/DL (ref 100–199)
CO2 SERPL-SCNC: 25 MMOL/L (ref 20–33)
CREAT SERPL-MCNC: 0.83 MG/DL (ref 0.5–1.4)
CREAT UR-MCNC: 90.93 MG/DL
EOSINOPHIL # BLD AUTO: 0.07 K/UL (ref 0–0.51)
EOSINOPHIL NFR BLD: 1.1 % (ref 0–6.9)
ERYTHROCYTE [DISTWIDTH] IN BLOOD BY AUTOMATED COUNT: 46.4 FL (ref 35.9–50)
EST. AVERAGE GLUCOSE BLD GHB EST-MCNC: 166 MG/DL
FASTING STATUS PATIENT QL REPORTED: NORMAL
GFR SERPLBLD CREATININE-BSD FMLA CKD-EPI: 93 ML/MIN/1.73 M 2
GLOBULIN SER CALC-MCNC: 2.8 G/DL (ref 1.9–3.5)
GLUCOSE SERPL-MCNC: 202 MG/DL (ref 65–99)
HBA1C MFR BLD: 7.4 % (ref 4–5.6)
HCT VFR BLD AUTO: 43.4 % (ref 42–52)
HDLC SERPL-MCNC: 46 MG/DL
HGB BLD-MCNC: 14.2 G/DL (ref 14–18)
IMM GRANULOCYTES # BLD AUTO: 0.02 K/UL (ref 0–0.11)
IMM GRANULOCYTES NFR BLD AUTO: 0.3 % (ref 0–0.9)
LDLC SERPL CALC-MCNC: 34 MG/DL
LYMPHOCYTES # BLD AUTO: 2.94 K/UL (ref 1–4.8)
LYMPHOCYTES NFR BLD: 44.3 % (ref 22–41)
MCH RBC QN AUTO: 30.9 PG (ref 27–33)
MCHC RBC AUTO-ENTMCNC: 32.7 G/DL (ref 33.7–35.3)
MCV RBC AUTO: 94.3 FL (ref 81.4–97.8)
MICROALBUMIN UR-MCNC: <1.2 MG/DL
MICROALBUMIN/CREAT UR: NORMAL MG/G (ref 0–30)
MONOCYTES # BLD AUTO: 0.55 K/UL (ref 0–0.85)
MONOCYTES NFR BLD AUTO: 8.3 % (ref 0–13.4)
NEUTROPHILS # BLD AUTO: 3.01 K/UL (ref 1.82–7.42)
NEUTROPHILS NFR BLD: 45.4 % (ref 44–72)
NRBC # BLD AUTO: 0 K/UL
NRBC BLD-RTO: 0 /100 WBC
PLATELET # BLD AUTO: 205 K/UL (ref 164–446)
PMV BLD AUTO: 10.7 FL (ref 9–12.9)
POTASSIUM SERPL-SCNC: 5 MMOL/L (ref 3.6–5.5)
PROT SERPL-MCNC: 7.4 G/DL (ref 6–8.2)
PSA SERPL-MCNC: 0.68 NG/ML (ref 0–4)
RBC # BLD AUTO: 4.6 M/UL (ref 4.7–6.1)
SODIUM SERPL-SCNC: 138 MMOL/L (ref 135–145)
TRIGL SERPL-MCNC: 162 MG/DL (ref 0–149)
WBC # BLD AUTO: 6.6 K/UL (ref 4.8–10.8)

## 2022-08-08 PROCEDURE — 80053 COMPREHEN METABOLIC PANEL: CPT

## 2022-08-08 PROCEDURE — 82570 ASSAY OF URINE CREATININE: CPT

## 2022-08-08 PROCEDURE — 82043 UR ALBUMIN QUANTITATIVE: CPT

## 2022-08-08 PROCEDURE — 85025 COMPLETE CBC W/AUTO DIFF WBC: CPT

## 2022-08-08 PROCEDURE — 36415 COLL VENOUS BLD VENIPUNCTURE: CPT

## 2022-08-08 PROCEDURE — 83036 HEMOGLOBIN GLYCOSYLATED A1C: CPT

## 2022-08-08 PROCEDURE — 84153 ASSAY OF PSA TOTAL: CPT

## 2022-08-08 PROCEDURE — 80061 LIPID PANEL: CPT

## 2022-09-01 NOTE — ASSESSMENT & PLAN NOTE
Patient's blood pressure is at goal on his current medication regimen.   Perilesional Excision Additional Text (Leave Blank If You Do Not Want): The margin was drawn around the clinically apparent lesion. Incisions were then made along these lines to the appropriate tissue plane and the lesion was extirpated.

## 2022-09-18 DIAGNOSIS — G62.9 NEUROPATHY: ICD-10-CM

## 2022-09-19 RX ORDER — GABAPENTIN 300 MG/1
CAPSULE ORAL
Qty: 360 CAPSULE | Refills: 3 | Status: SHIPPED | OUTPATIENT
Start: 2022-09-19 | End: 2022-11-04 | Stop reason: SDUPTHER

## 2022-09-19 NOTE — TELEPHONE ENCOUNTER
Received request via: Pharmacy    Was the patient seen in the last year in this department? Yes    Does the patient have an active prescription (recently filled or refills available) for medication(s) requested? No    Future Appointments         Provider Department Center    11/4/2022 11:20 AM (Arrive by 11:05 AM) Irene Drummond M.D. [facility]

## 2022-10-10 DIAGNOSIS — E11.40 TYPE 2 DIABETES MELLITUS WITH DIABETIC NEUROPATHY, WITH LONG-TERM CURRENT USE OF INSULIN (HCC): ICD-10-CM

## 2022-10-10 DIAGNOSIS — Z79.4 TYPE 2 DIABETES MELLITUS WITH DIABETIC NEUROPATHY, WITH LONG-TERM CURRENT USE OF INSULIN (HCC): ICD-10-CM

## 2022-10-10 NOTE — TELEPHONE ENCOUNTER
Received request via: Patient    Was the patient seen in the last year in this department? Yes    Does the patient have an active prescription (recently filled or refills available) for medication(s) requested? Yes.   Pt is traveling out of town and forgot his script the Lantus, wondering if a emergency script can be sent to [facility] in [place].  Thanks.

## 2022-10-10 NOTE — TELEPHONE ENCOUNTER
Received request via: Pharmacy    Was the patient seen in the last year in this department? Yes    Does the patient have an active prescription (recently filled or refills available) for medication(s) requested? No    Future Appointments         Provider Department Center    11/4/2022 11:20 AM (Arrive by 11:05 AM) Irene Drummond M.D. [facility]

## 2022-10-11 RX ORDER — INSULIN GLARGINE 100 [IU]/ML
30 INJECTION, SOLUTION SUBCUTANEOUS EVERY EVENING
Qty: 15 ML | Refills: 6 | Status: SHIPPED | OUTPATIENT
Start: 2022-10-11 | End: 2022-11-04

## 2022-11-04 ENCOUNTER — OFFICE VISIT (OUTPATIENT)
Dept: MEDICAL GROUP | Facility: MEDICAL CENTER | Age: 72
End: 2022-11-04
Payer: MEDICARE

## 2022-11-04 VITALS
RESPIRATION RATE: 12 BRPM | OXYGEN SATURATION: 99 % | HEART RATE: 92 BPM | BODY MASS INDEX: 34.36 KG/M2 | DIASTOLIC BLOOD PRESSURE: 68 MMHG | TEMPERATURE: 97.7 F | SYSTOLIC BLOOD PRESSURE: 114 MMHG | WEIGHT: 240 LBS | HEIGHT: 70 IN

## 2022-11-04 DIAGNOSIS — K21.9 GASTROESOPHAGEAL REFLUX DISEASE WITHOUT ESOPHAGITIS: ICD-10-CM

## 2022-11-04 DIAGNOSIS — G62.9 NEUROPATHY: ICD-10-CM

## 2022-11-04 DIAGNOSIS — G47.30 OBSERVED SLEEP APNEA: Chronic | ICD-10-CM

## 2022-11-04 DIAGNOSIS — E78.01 FAMILIAL HYPERCHOLESTEROLEMIA: ICD-10-CM

## 2022-11-04 DIAGNOSIS — Z79.4 TYPE 2 DIABETES MELLITUS WITH DIABETIC NEUROPATHY, WITH LONG-TERM CURRENT USE OF INSULIN (HCC): ICD-10-CM

## 2022-11-04 DIAGNOSIS — I10 ESSENTIAL HYPERTENSION: ICD-10-CM

## 2022-11-04 DIAGNOSIS — I48.20 CHRONIC ATRIAL FIBRILLATION (HCC): ICD-10-CM

## 2022-11-04 DIAGNOSIS — Z23 NEED FOR VACCINATION: ICD-10-CM

## 2022-11-04 DIAGNOSIS — E11.40 TYPE 2 DIABETES MELLITUS WITH DIABETIC NEUROPATHY, WITH LONG-TERM CURRENT USE OF INSULIN (HCC): ICD-10-CM

## 2022-11-04 PROCEDURE — G0008 ADMIN INFLUENZA VIRUS VAC: HCPCS | Performed by: FAMILY MEDICINE

## 2022-11-04 PROCEDURE — 99214 OFFICE O/P EST MOD 30 MIN: CPT | Mod: 25 | Performed by: FAMILY MEDICINE

## 2022-11-04 PROCEDURE — 90662 IIV NO PRSV INCREASED AG IM: CPT | Performed by: FAMILY MEDICINE

## 2022-11-04 RX ORDER — INSULIN GLARGINE-YFGN 100 [IU]/ML
INJECTION, SOLUTION SUBCUTANEOUS
COMMUNITY
Start: 2022-10-11 | End: 2022-11-04 | Stop reason: SDUPTHER

## 2022-11-04 RX ORDER — GABAPENTIN 300 MG/1
300 CAPSULE ORAL 4 TIMES DAILY
Qty: 360 CAPSULE | Refills: 3 | Status: SHIPPED | OUTPATIENT
Start: 2022-11-04 | End: 2023-11-13 | Stop reason: SDUPTHER

## 2022-11-04 RX ORDER — TAMSULOSIN HYDROCHLORIDE 0.4 MG/1
0.4 CAPSULE ORAL DAILY
Qty: 90 CAPSULE | Refills: 3 | Status: SHIPPED | OUTPATIENT
Start: 2022-11-04 | End: 2023-11-13 | Stop reason: SDUPTHER

## 2022-11-04 RX ORDER — INSULIN GLARGINE-YFGN 100 [IU]/ML
INJECTION, SOLUTION SUBCUTANEOUS
Qty: 3 ML | Refills: 6 | Status: SHIPPED | OUTPATIENT
Start: 2022-11-04 | End: 2022-12-29

## 2022-11-04 RX ORDER — SIMVASTATIN 20 MG
20 TABLET ORAL EVERY EVENING
Qty: 100 TABLET | Refills: 3 | Status: SHIPPED | OUTPATIENT
Start: 2022-11-04 | End: 2023-11-13 | Stop reason: SDUPTHER

## 2022-11-04 RX ORDER — OMEPRAZOLE 20 MG/1
20 CAPSULE, DELAYED RELEASE ORAL DAILY
Qty: 90 CAPSULE | Refills: 3 | Status: SHIPPED | OUTPATIENT
Start: 2022-11-04 | End: 2023-11-13 | Stop reason: SDUPTHER

## 2022-11-04 RX ORDER — DULAGLUTIDE 1.5 MG/.5ML
INJECTION, SOLUTION SUBCUTANEOUS
Qty: 4 ML | Refills: 6 | Status: SHIPPED | OUTPATIENT
Start: 2022-11-04 | End: 2022-12-29 | Stop reason: DRUGHIGH

## 2022-11-04 ASSESSMENT — FIBROSIS 4 INDEX: FIB4 SCORE: 1.67

## 2022-11-04 NOTE — ASSESSMENT & PLAN NOTE
This is a chronic problem.  The patient reports compliance with simvastatin 20 mg nightly.    Lab Results   Component Value Date/Time    CHOLSTRLTOT 112 08/08/2022 06:43 AM    LDL 34 08/08/2022 06:43 AM    HDL 46 08/08/2022 06:43 AM    TRIGLYCERIDE 162 (H) 08/08/2022 06:43 AM

## 2022-11-04 NOTE — ASSESSMENT & PLAN NOTE
Richard was following yearly with cardiology, has not had an appointment in over a year. He is on Xarelto for anticoagulation.  She denies palpitations, chest pain, lightheadedness.

## 2022-11-04 NOTE — PROGRESS NOTES
Subjective:     CC: follow up    HPI:   Richard presents today with:    Type 2 diabetes mellitus with diabetic neuropathy (HCC)  This is a chronic problem.  Jay reports he is doing well on Glargine 30 units qhs, trulicity 1.5mg q 7 days. He is also on metformin 1000 mg twice daily and Januvia 100 mg daily.  Last A1c: 6.5 (12/10/2019)--> 7.2 (5/12/20) --> 8.1 (7/21/20) --> 7.1 --> 7.7--> 8.1 8/11/21-->8.6 12/28/21-->7.6 4/5/22-->7.4 8/22  Last Microalb/Cr ratio: Within normal limits 8/22  Last diabetic foot exam: 4/5/22  Last retinal eye exam: UTD  ACEi/ARB: Previously on lisinopril, stopped due to low BP  Statin: Simvastatin 20 mg daily   Aspirin: None, patient is on Xarelto for PAF         Chronic atrial fibrillation (HCC)  Richard was following yearly with cardiology, has not had an appointment in over a year. He is on Xarelto for anticoagulation.  She denies palpitations, chest pain, lightheadedness.    GERD (gastroesophageal reflux disease)  Richard is on omeprazole 20mg daily.    Hyperlipidemia  This is a chronic problem.  The patient reports compliance with simvastatin 20 mg nightly.    Lab Results   Component Value Date/Time    CHOLSTRLTOT 112 08/08/2022 06:43 AM    LDL 34 08/08/2022 06:43 AM    HDL 46 08/08/2022 06:43 AM    TRIGLYCERIDE 162 (H) 08/08/2022 06:43 AM           Observed sleep apnea  Richard does not use CPAP nor does he wish to.      Past Medical History:   Diagnosis Date    Alcohol use 2/10/2016    3 per day    Arthritis 2016    left knee    Dental disorder     upper lower dentures    Diabetes 2005    insulin and oral agent    Elevated INR 7/30/2019    Heart burn     Hiatus hernia syndrome     High cholesterol     Hypertension     Observed sleep apnea     no study done yet    Other persistent atrial fibrillation (HCC) 12/19/2019    Pain     left knee    Paroxysmal atrial fibrillation (HCC) 2/27/2016 11/14/16-resolved now    Pulmonary hypertension (HCC) 3/1/2016    Snoring     Urinary retention  "7/30/2019       Social History     Tobacco Use    Smoking status: Never    Smokeless tobacco: Never   Vaping Use    Vaping Use: Never used   Substance Use Topics    Alcohol use: Yes     Alcohol/week: 0.6 oz     Types: 1 Shots of liquor per week     Comment: once a week     Drug use: No       Current Outpatient Medications Ordered in Epic   Medication Sig Dispense Refill    Insulin Glargine-yfgn 100 UNIT/ML Solution Pen-injector INJECT 30 UNITS SUBCUTANEOUSLY IN THE EVENING 3 mL 6    SITagliptin (JANUVIA) 100 MG Tab Take 1 Tablet by mouth every day. 100 Tablet 2    Dulaglutide (TRULICITY) 1.5 MG/0.5ML Solution Pen-injector INJECT 1 SYRINGE SUBCUTANEOUSLY ONCE A WEEK 4 mL 6    simvastatin (ZOCOR) 20 MG Tab Take 1 Tablet by mouth every evening. 100 Tablet 3    tamsulosin (FLOMAX) 0.4 MG capsule Take 1 Capsule by mouth every day. 90 Capsule 3    rivaroxaban (XARELTO) 20 MG Tab tablet Take 1 Tablet by mouth with dinner. 90 Tablet 2    metformin (GLUCOPHAGE) 1000 MG tablet Take 1 Tablet by mouth 2 times a day. 200 Tablet 3    gabapentin (NEURONTIN) 300 MG Cap Take 1 Capsule by mouth 4 times a day. 360 Capsule 3    omeprazole (PRILOSEC) 20 MG delayed-release capsule Take 1 Capsule by mouth every day. 90 Capsule 3    POTASSIUM PO Take  by mouth.      B Complex Vitamins (B COMPLEX PO) Take  by mouth 2 (two) times a day.       No current [facility]-ordered facility-administered medications on file.       Allergies:  Patient has no known allergies.    Health Maintenance: flu shot administered    ROS:  Gen: no fevers/chills  Pulm: no SOB  CV: no chest pain        Objective:       Exam:  /68 (BP Location: Left arm, Patient Position: Sitting, BP Cuff Size: Adult)   Pulse 92   Temp 36.5 °C (97.7 °F) (Temporal)   Resp 12   Ht 1.778 m (5' 10\")   Wt 109 kg (240 lb)   SpO2 99%   BMI 34.44 kg/m²  Body mass index is 34.44 kg/m².    Gen: Alert and oriented, No apparent distress  Lungs: Normal effort, CTA bilaterally, no wheezes, " rhonchi, or rales  CV: Irregularly irregular, no murmurs, rubs, or gallops      Assessment & Plan:     72 y.o. male with the following -     1. Type 2 diabetes mellitus with diabetic neuropathy, with long-term current use of insulin (HCC)  - SITagliptin (JANUVIA) 100 MG Tab; Take 1 Tablet by mouth every day.  Dispense: 100 Tablet; Refill: 2  - metformin (GLUCOPHAGE) 1000 MG tablet; Take 1 Tablet by mouth 2 times a day.  Dispense: 200 Tablet; Refill: 3    2. Familial hypercholesterolemia  - simvastatin (ZOCOR) 20 MG Tab; Take 1 Tablet by mouth every evening.  Dispense: 100 Tablet; Refill: 3    3. Chronic atrial fibrillation (HCC)  Richard is on xarelto, previously on metoprolol and has not followed up with cardiology since 1/2021, recommend cardiology follow up  - rivaroxaban (XARELTO) 20 MG Tab tablet; Take 1 Tablet by mouth with dinner.  Dispense: 90 Tablet; Refill: 2    4. Essential hypertension  Previously on lisinopril, BPs low, stopped lisinopril, BP has remained WNL    5. Neuropathy  - gabapentin (NEURONTIN) 300 MG Cap; Take 1 Capsule by mouth 4 times a day.  Dispense: 360 Capsule; Refill: 3    6. Gastroesophageal reflux disease without esophagitis  - omeprazole (PRILOSEC) 20 MG delayed-release capsule; Take 1 Capsule by mouth every day.  Dispense: 90 Capsule; Refill: 3    7. Observed sleep apnea  Patient declines CPAP    8. Need for vaccination  - Influenza Vaccine, High Dose (65+ Only)    Other orders  - Insulin Glargine-yfgn 100 UNIT/ML Solution Pen-injector; INJECT 30 UNITS SUBCUTANEOUSLY IN THE EVENING  Dispense: 3 mL; Refill: 6  - Dulaglutide (TRULICITY) 1.5 MG/0.5ML Solution Pen-injector; INJECT 1 SYRINGE SUBCUTANEOUSLY ONCE A WEEK  Dispense: 4 mL; Refill: 6  - tamsulosin (FLOMAX) 0.4 MG capsule; Take 1 Capsule by mouth every day.  Dispense: 90 Capsule; Refill: 3      F/U: Richard informed I am leaving [facility], he and Grisel will consider options regarding new [facility] PCP    Please note this dictation was created  using voice recognition software. I have made every reasonable attempt to correct obvious errors, but I expect there may be errors of grammar, and possibly content, that I did not discover before finalizing the note.

## 2022-11-04 NOTE — ASSESSMENT & PLAN NOTE
This is a chronic problem.  Jay reports he is doing well on Glargine 30 units qhs, trulicity 1.5mg q 7 days. He is also on metformin 1000 mg twice daily and Januvia 100 mg daily.  Last A1c: 6.5 (12/10/2019)--> 7.2 (5/12/20) --> 8.1 (7/21/20) --> 7.1 --> 7.7--> 8.1 8/11/21-->8.6 12/28/21-->7.6 4/5/22-->7.4 8/22  Last Microalb/Cr ratio: Within normal limits 8/22  Last diabetic foot exam: 4/5/22  Last retinal eye exam: UTD  ACEi/ARB: Previously on lisinopril, stopped due to low BP  Statin: Simvastatin 20 mg daily   Aspirin: None, patient is on Xarelto for PAF

## 2022-12-13 SDOH — ECONOMIC STABILITY: INCOME INSECURITY: IN THE LAST 12 MONTHS, WAS THERE A TIME WHEN YOU WERE NOT ABLE TO PAY THE MORTGAGE OR RENT ON TIME?: NO

## 2022-12-13 SDOH — ECONOMIC STABILITY: FOOD INSECURITY: WITHIN THE PAST 12 MONTHS, THE FOOD YOU BOUGHT JUST DIDN'T LAST AND YOU DIDN'T HAVE MONEY TO GET MORE.: NEVER TRUE

## 2022-12-13 SDOH — HEALTH STABILITY: PHYSICAL HEALTH: ON AVERAGE, HOW MANY MINUTES DO YOU ENGAGE IN EXERCISE AT THIS LEVEL?: 30 MIN

## 2022-12-13 SDOH — ECONOMIC STABILITY: TRANSPORTATION INSECURITY
IN THE PAST 12 MONTHS, HAS THE LACK OF TRANSPORTATION KEPT YOU FROM MEDICAL APPOINTMENTS OR FROM GETTING MEDICATIONS?: NO

## 2022-12-13 SDOH — ECONOMIC STABILITY: INCOME INSECURITY: HOW HARD IS IT FOR YOU TO PAY FOR THE VERY BASICS LIKE FOOD, HOUSING, MEDICAL CARE, AND HEATING?: NOT HARD AT ALL

## 2022-12-13 SDOH — HEALTH STABILITY: MENTAL HEALTH
STRESS IS WHEN SOMEONE FEELS TENSE, NERVOUS, ANXIOUS, OR CAN'T SLEEP AT NIGHT BECAUSE THEIR MIND IS TROUBLED. HOW STRESSED ARE YOU?: PATIENT DECLINED

## 2022-12-13 SDOH — ECONOMIC STABILITY: HOUSING INSECURITY: IN THE LAST 12 MONTHS, HOW MANY PLACES HAVE YOU LIVED?: 1

## 2022-12-13 SDOH — ECONOMIC STABILITY: HOUSING INSECURITY
IN THE LAST 12 MONTHS, WAS THERE A TIME WHEN YOU DID NOT HAVE A STEADY PLACE TO SLEEP OR SLEPT IN A SHELTER (INCLUDING NOW)?: NO

## 2022-12-13 SDOH — ECONOMIC STABILITY: FOOD INSECURITY: WITHIN THE PAST 12 MONTHS, YOU WORRIED THAT YOUR FOOD WOULD RUN OUT BEFORE YOU GOT MONEY TO BUY MORE.: NEVER TRUE

## 2022-12-13 SDOH — HEALTH STABILITY: PHYSICAL HEALTH: ON AVERAGE, HOW MANY DAYS PER WEEK DO YOU ENGAGE IN MODERATE TO STRENUOUS EXERCISE (LIKE A BRISK WALK)?: 5 DAYS

## 2022-12-13 SDOH — ECONOMIC STABILITY: TRANSPORTATION INSECURITY
IN THE PAST 12 MONTHS, HAS LACK OF TRANSPORTATION KEPT YOU FROM MEETINGS, WORK, OR FROM GETTING THINGS NEEDED FOR DAILY LIVING?: NO

## 2022-12-13 SDOH — ECONOMIC STABILITY: TRANSPORTATION INSECURITY
IN THE PAST 12 MONTHS, HAS LACK OF RELIABLE TRANSPORTATION KEPT YOU FROM MEDICAL APPOINTMENTS, MEETINGS, WORK OR FROM GETTING THINGS NEEDED FOR DAILY LIVING?: NO

## 2022-12-13 ASSESSMENT — SOCIAL DETERMINANTS OF HEALTH (SDOH)
DO YOU BELONG TO ANY CLUBS OR ORGANIZATIONS SUCH AS CHURCH GROUPS UNIONS, FRATERNAL OR ATHLETIC GROUPS, OR SCHOOL GROUPS?: NO
HOW OFTEN DO YOU HAVE A DRINK CONTAINING ALCOHOL: 4 OR MORE TIMES A WEEK
DO YOU BELONG TO ANY CLUBS OR ORGANIZATIONS SUCH AS CHURCH GROUPS UNIONS, FRATERNAL OR ATHLETIC GROUPS, OR SCHOOL GROUPS?: NO
HOW OFTEN DO YOU ATTENT MEETINGS OF THE CLUB OR ORGANIZATION YOU BELONG TO?: NEVER
IN A TYPICAL WEEK, HOW MANY TIMES DO YOU TALK ON THE PHONE WITH FAMILY, FRIENDS, OR NEIGHBORS?: ONCE A WEEK
HOW OFTEN DO YOU ATTEND CHURCH OR RELIGIOUS SERVICES?: NEVER
HOW HARD IS IT FOR YOU TO PAY FOR THE VERY BASICS LIKE FOOD, HOUSING, MEDICAL CARE, AND HEATING?: NOT HARD AT ALL
IN A TYPICAL WEEK, HOW MANY TIMES DO YOU TALK ON THE PHONE WITH FAMILY, FRIENDS, OR NEIGHBORS?: ONCE A WEEK
HOW OFTEN DO YOU HAVE SIX OR MORE DRINKS ON ONE OCCASION: PATIENT DECLINED
HOW OFTEN DO YOU GET TOGETHER WITH FRIENDS OR RELATIVES?: ONCE A WEEK
HOW OFTEN DO YOU GET TOGETHER WITH FRIENDS OR RELATIVES?: ONCE A WEEK
HOW MANY DRINKS CONTAINING ALCOHOL DO YOU HAVE ON A TYPICAL DAY WHEN YOU ARE DRINKING: 1 OR 2
HOW OFTEN DO YOU ATTEND CHURCH OR RELIGIOUS SERVICES?: NEVER
HOW OFTEN DO YOU ATTENT MEETINGS OF THE CLUB OR ORGANIZATION YOU BELONG TO?: NEVER
WITHIN THE PAST 12 MONTHS, YOU WORRIED THAT YOUR FOOD WOULD RUN OUT BEFORE YOU GOT THE MONEY TO BUY MORE: NEVER TRUE

## 2022-12-13 ASSESSMENT — LIFESTYLE VARIABLES
AUDIT-C TOTAL SCORE: -1
HOW MANY STANDARD DRINKS CONTAINING ALCOHOL DO YOU HAVE ON A TYPICAL DAY: 1 OR 2
HOW OFTEN DO YOU HAVE SIX OR MORE DRINKS ON ONE OCCASION: PATIENT DECLINED
HOW OFTEN DO YOU HAVE A DRINK CONTAINING ALCOHOL: 4 OR MORE TIMES A WEEK
SKIP TO QUESTIONS 9-10: 0

## 2022-12-14 ENCOUNTER — OFFICE VISIT (OUTPATIENT)
Dept: MEDICAL GROUP | Facility: MEDICAL CENTER | Age: 72
End: 2022-12-14
Payer: MEDICARE

## 2022-12-14 VITALS
DIASTOLIC BLOOD PRESSURE: 66 MMHG | HEIGHT: 71 IN | OXYGEN SATURATION: 98 % | WEIGHT: 238.1 LBS | TEMPERATURE: 96.9 F | HEART RATE: 90 BPM | SYSTOLIC BLOOD PRESSURE: 108 MMHG | BODY MASS INDEX: 33.33 KG/M2 | RESPIRATION RATE: 16 BRPM

## 2022-12-14 DIAGNOSIS — E11.40 TYPE 2 DIABETES MELLITUS WITH DIABETIC NEUROPATHY, WITH LONG-TERM CURRENT USE OF INSULIN (HCC): ICD-10-CM

## 2022-12-14 DIAGNOSIS — E66.09 CLASS 1 OBESITY DUE TO EXCESS CALORIES WITH SERIOUS COMORBIDITY AND BODY MASS INDEX (BMI) OF 33.0 TO 33.9 IN ADULT: ICD-10-CM

## 2022-12-14 DIAGNOSIS — G57.61 MORTON NEUROMA, RIGHT: ICD-10-CM

## 2022-12-14 DIAGNOSIS — Z79.4 TYPE 2 DIABETES MELLITUS WITH DIABETIC NEUROPATHY, WITH LONG-TERM CURRENT USE OF INSULIN (HCC): ICD-10-CM

## 2022-12-14 DIAGNOSIS — I48.20 CHRONIC ATRIAL FIBRILLATION (HCC): ICD-10-CM

## 2022-12-14 LAB
HBA1C MFR BLD: 8.5 % (ref 0–5.6)
INT CON NEG: ABNORMAL
INT CON POS: ABNORMAL

## 2022-12-14 PROCEDURE — 99214 OFFICE O/P EST MOD 30 MIN: CPT | Performed by: BEHAVIOR ANALYST

## 2022-12-14 PROCEDURE — 83036 HEMOGLOBIN GLYCOSYLATED A1C: CPT | Performed by: BEHAVIOR ANALYST

## 2022-12-14 ASSESSMENT — FIBROSIS 4 INDEX: FIB4 SCORE: 1.67

## 2022-12-14 NOTE — PROGRESS NOTES
Subjective:     CC:    Chief Complaint   Patient presents with    Establish Care     Review labs, get another A1c        Past Medical History: Diagnoses of Type 2 diabetes mellitus with diabetic neuropathy, with long-term current use of insulin (HCC), Travis neuroma, right, Chronic atrial fibrillation (HCC), and Class 1 obesity due to excess calories with serious comorbidity and body mass index (BMI) of 33.0 to 33.9 in adult were pertinent to this visit.    HISTORY OF THE PRESENT ILLNESS: Patient is a 72 y.o. male. This pleasant patient is here today to establish care and discuss diabetes management pain in the right foot. His/her prior PCP was Dr. Drummond.    Problem   Travis Neuroma, Right    Pain and burning senation between the 3rd and 4th webspace for the past 3 years that is constant. Pain can intermittently be severe. 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  He also skis admits his ski boots or tight fitting. This is a different feeling than his neuropathy.  He states he saw a podiatrist which was unhelpful and states he was never told that this could be Travis's neuroma.      Class 1 Obesity Due to Excess Calories With Serious Comorbidity and Body Mass Index (Bmi) of 33.0 to 33.9 in Adult    Current weight: 238 LB's   weight change: Down a total of 6 pounds in 8 months  BMI: 33  Diet: Attempting to eat healthy  Exercise: Skiing regularly  -Believes that his insulin makes him gain weight which is why is not interested in increasing it today.        Type 2 Diabetes Mellitus With Diabetic Neuropathy (Hcc)    Dx 20 years ago. BS previously running 110's-130's in the morning but in the last 6-7 weeks 's-180's.   Diet- watching diet. He eats cereal for breakfast. Minimal red meats. Drinks peanut butter whiskey but has cut back.   Exercising regularly- enjoys skiing this time of year     Current medications:  Insulin: 26 unit   Biguanide: Metformin 1000mg  BID  GLP1-RA: dulaglutide 1.5mg   SGLT-2i:  none  DPP4-I: sitagliptin 100m    Last A1c: 8.5, up from 6.7 in August  Last Microalb/Cr ratio: 8/8/22, WNL  Last diabetic foot exam: 4/2022L  Last retinal eye exam:  8/2022  ACEi/ARB: unable to tolerate, low BP  Statin: simvastatin 20mg  Nightly foot checks: yes       Chronic Atrial Fibrillation (Hcc)    Seen by cardiology and tried beta-blocker therapy however could not tolerate due to low BP.  He states he never feels when he is in A. fib and is completely asymptomatic.  Takes Xarelto every day for stroke prevention     Essential Hypertension    BP is well controlled   Denies chest pain, sob, headaches, dizziness/lightheadedness.           Current Outpatient Medications   Medication Sig    Insulin Glargine-yfgn 100 UNIT/ML Solution Pen-injector INJECT 30 UNITS SUBCUTANEOUSLY IN THE EVENING    SITagliptin (JANUVIA) 100 MG Tab Take 1 Tablet by mouth every day.    Dulaglutide (TRULICITY) 1.5 MG/0.5ML Solution Pen-injector INJECT 1 SYRINGE SUBCUTANEOUSLY ONCE A WEEK    simvastatin (ZOCOR) 20 MG Tab Take 1 Tablet by mouth every evening.    tamsulosin (FLOMAX) 0.4 MG capsule Take 1 Capsule by mouth every day.    rivaroxaban (XARELTO) 20 MG Tab tablet Take 1 Tablet by mouth with dinner.    metformin (GLUCOPHAGE) 1000 MG tablet Take 1 Tablet by mouth 2 times a day.    gabapentin (NEURONTIN) 300 MG Cap Take 1 Capsule by mouth 4 times a day.    omeprazole (PRILOSEC) 20 MG delayed-release capsule Take 1 Capsule by mouth every day.    POTASSIUM PO Take  by mouth.    B Complex Vitamins (B COMPLEX PO) Take  by mouth 2 (two) times a day.        Social History     Socioeconomic History    Marital status:     Highest education level: Associate degree: occupational, technical, or vocational program   Tobacco Use    Smoking status: Never    Smokeless tobacco: Never   Vaping Use    Vaping Use: Never used   Substance and Sexual Activity    Alcohol use: Yes     Alcohol/week: 0.6 oz     " Types: 1 Shots of liquor per week     Comment: once a week     Drug use: No    Sexual activity: Yes     Partners: Female     Social Determinants of Health     Financial Resource Strain: Low Risk     Difficulty of Paying Living Expenses: Not hard at all   Food Insecurity: No Food Insecurity    Worried About Running Out of Food in the Last Year: Never true    Ran Out of Food in the Last Year: Never true   Transportation Needs: No Transportation Needs    Lack of Transportation (Medical): No    Lack of Transportation (Non-Medical): No   Physical Activity: Sufficiently Active    Days of Exercise per Week: 5 days    Minutes of Exercise per Session: 30 min   Stress: Unknown    Feeling of Stress : Patient refused   Social Connections: Socially Isolated    Frequency of Communication with Friends and Family: Once a week    Frequency of Social Gatherings with Friends and Family: Once a week    Attends Christianity Services: Never    Active Member of Clubs or Organizations: No    Attends Club or Organization Meetings: Never    Marital Status:    Housing Stability: Low Risk     Unable to Pay for Housing in the Last Year: No    Number of Places Lived in the Last Year: 1    Unstable Housing in the Last Year: No       Family History   Problem Relation Age of Onset    Diabetes Other     Hypertension Other        Health Maintenance: Completed    ROS: See HPI  ROS      Objective:     Exam: /66 (BP Location: Right arm, Patient Position: Sitting, BP Cuff Size: Adult)   Pulse 90   Temp 36.1 °C (96.9 °F) (Temporal)   Resp 16   Ht 1.803 m (5' 11\")   Wt 108 kg (238 lb 1.6 oz)   SpO2 98%   BMI 33.21 kg/m²   Body mass index is 33.21 kg/m².    Physical Exam  Constitutional:       Appearance: Normal appearance.   Eyes:      Extraocular Movements: Extraocular movements intact.   Cardiovascular:      Rate and Rhythm: Normal rate. Rhythm irregular.      Heart sounds: No murmur heard.  Pulmonary:      Effort: Pulmonary effort is " normal. No respiratory distress.      Breath sounds: Normal breath sounds.   Musculoskeletal:      Right foot: Tenderness present.        Legs:       Comments: Tenderness to palpation between the third and fourth metatarsal.  Mild edema.    Neurological:      Mental Status: He is alert.         Assessment & Plan:     72 y.o. male with the following -     Problem List Items Addressed This Visit       Chronic atrial fibrillation (HCC)     - Chronic condition, rate is stable today.   - Heart rate is irregularly irregular on exam which I believe is continued persistent A. Fib.   - Discussed the importance of continued follow-up with cardiologist.  He has not been seen by cardiology since 2019.   -Referral placed to cardiology.   -Continue Xarelto for stroke prevention         Relevant Orders    REFERRAL TO CARDIOLOGY    Type 2 diabetes mellitus with diabetic neuropathy (HCC)     - Chronic, suboptimally controlled increased A1c from 6.7 in August to 8.5 today.   - Had a detailed discussion with patient regarding optimizing diet and reducing alcohol intake.  -Suggested to increase his insulin from 26 to 30 units.  However, patient is reluctant as he says that increasing insulin has not helped in the past and that it makes him gain weight.   -I referred him to endocrinology.  In the meantime we will also have him return in 2 weeks to see our diabetes nurse educator.          Relevant Orders    Referral to Prosthetics (include with Orthotics)    Referral to Endocrinology    Class 1 obesity due to excess calories with serious comorbidity and body mass index (BMI) of 33.0 to 33.9 in adult     - Chronic, minimal improvement.   - Despite his A1c increasing his weight has actually decreased in the last 6 months.  -Continue with lifestyle modifications as well as diabetes management.          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   -Provided patient education on conservative measures to treat Travis's neuroma and provided a handout today.  -I think he would benefit from avoiding aggravating activities and orthotics given his diabetes and the Travis's neuroma.   -If conservative measures do not help we can refer him to our sports medicine physician for a steroid injection.          Relevant Orders    Referral to Prosthetics (include with Orthotics)           Return in about 1 week (around 12/21/2022) for with diabetes RN.    Please note that this dictation was created using voice recognition software. I have made every reasonable attempt to correct obvious errors, but I expect that there are errors of grammar and possibly content that I did not discover before finalizing the note.

## 2022-12-14 NOTE — ASSESSMENT & PLAN NOTE
- Chronic condition, rate is stable today.   - Heart rate is irregularly irregular on exam which I believe is continued persistent A. Fib.   - Discussed the importance of continued follow-up with cardiologist.  He has not been seen by cardiology since 2019.   -Referral placed to cardiology.   -Continue Xarelto for stroke prevention

## 2022-12-14 NOTE — ASSESSMENT & PLAN NOTE
- Chronic, suboptimally controlled increased A1c from 6.7 in August to 8.5 today.   - Had a detailed discussion with patient regarding optimizing diet and reducing alcohol intake.  -Suggested to increase his insulin from 26 to 30 units.  However, patient is reluctant as he says that increasing insulin has not helped in the past and that it makes him gain weight.   -I referred him to endocrinology.  In the meantime we will also have him return in 2 weeks to see our diabetes nurse educator.

## 2022-12-14 NOTE — ASSESSMENT & PLAN NOTE
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   -Provided patient education on conservative measures to treat Travis's neuroma and provided a handout today.  -I think he would benefit from avoiding aggravating activities and orthotics given his diabetes and the Travis's neuroma.   -If conservative measures do not help we can refer him to our sports medicine physician for a steroid injection.

## 2022-12-14 NOTE — ASSESSMENT & PLAN NOTE
- Chronic, minimal improvement.   - Despite his A1c increasing his weight has actually decreased in the last 6 months.  -Continue with lifestyle modifications as well as diabetes management.

## 2022-12-23 ENCOUNTER — OFFICE VISIT (OUTPATIENT)
Dept: CARDIOLOGY | Facility: MEDICAL CENTER | Age: 72
End: 2022-12-23
Attending: BEHAVIOR ANALYST
Payer: MEDICARE

## 2022-12-23 VITALS
DIASTOLIC BLOOD PRESSURE: 60 MMHG | RESPIRATION RATE: 18 BRPM | SYSTOLIC BLOOD PRESSURE: 116 MMHG | OXYGEN SATURATION: 96 % | BODY MASS INDEX: 33.18 KG/M2 | HEART RATE: 116 BPM | WEIGHT: 237 LBS | HEIGHT: 71 IN

## 2022-12-23 DIAGNOSIS — I48.20 CHRONIC ATRIAL FIBRILLATION (HCC): ICD-10-CM

## 2022-12-23 DIAGNOSIS — E11.40 TYPE 2 DIABETES MELLITUS WITH DIABETIC NEUROPATHY, WITH LONG-TERM CURRENT USE OF INSULIN (HCC): ICD-10-CM

## 2022-12-23 DIAGNOSIS — I25.10 CORONARY ARTERY DISEASE INVOLVING NATIVE CORONARY ARTERY OF NATIVE HEART WITHOUT ANGINA PECTORIS: ICD-10-CM

## 2022-12-23 DIAGNOSIS — E78.2 MIXED HYPERLIPIDEMIA: ICD-10-CM

## 2022-12-23 DIAGNOSIS — E66.09 CLASS 1 OBESITY DUE TO EXCESS CALORIES WITH SERIOUS COMORBIDITY AND BODY MASS INDEX (BMI) OF 33.0 TO 33.9 IN ADULT: ICD-10-CM

## 2022-12-23 DIAGNOSIS — Z79.01 CHRONIC ANTICOAGULATION: ICD-10-CM

## 2022-12-23 DIAGNOSIS — Z79.4 TYPE 2 DIABETES MELLITUS WITH DIABETIC NEUROPATHY, WITH LONG-TERM CURRENT USE OF INSULIN (HCC): ICD-10-CM

## 2022-12-23 DIAGNOSIS — G47.30 OBSERVED SLEEP APNEA: Chronic | ICD-10-CM

## 2022-12-23 LAB — EKG IMPRESSION: NORMAL

## 2022-12-23 PROCEDURE — 93000 ELECTROCARDIOGRAM COMPLETE: CPT | Performed by: INTERNAL MEDICINE

## 2022-12-23 PROCEDURE — 99214 OFFICE O/P EST MOD 30 MIN: CPT | Performed by: NURSE PRACTITIONER

## 2022-12-23 RX ORDER — INSULIN GLARGINE 100 [IU]/ML
32 INJECTION, SOLUTION SUBCUTANEOUS EVERY EVENING
COMMUNITY
Start: 2022-11-18 | End: 2023-06-05

## 2022-12-23 RX ORDER — AMOXICILLIN 500 MG/1
CAPSULE ORAL
COMMUNITY
End: 2022-12-23

## 2022-12-23 RX ORDER — METOPROLOL SUCCINATE 25 MG/1
12.5 TABLET, EXTENDED RELEASE ORAL EVERY EVENING
Qty: 50 TABLET | Refills: 3 | Status: SHIPPED | OUTPATIENT
Start: 2022-12-23 | End: 2023-03-23

## 2022-12-23 RX ORDER — SILDENAFIL 100 MG/1
100 TABLET, FILM COATED ORAL PRN
COMMUNITY

## 2022-12-23 ASSESSMENT — ENCOUNTER SYMPTOMS
MYALGIAS: 0
ORTHOPNEA: 0
COUGH: 0
PALPITATIONS: 0
PND: 0
ABDOMINAL PAIN: 0
SHORTNESS OF BREATH: 1
CLAUDICATION: 0
FEVER: 0

## 2022-12-23 ASSESSMENT — FIBROSIS 4 INDEX: FIB4 SCORE: 1.67

## 2022-12-24 NOTE — PROGRESS NOTES
Chief Complaint   Patient presents with    Atrial Fibrillation     Chronic atrial fibrillation (HCC)    Hyperlipidemia    Hypertension     Subjective     Richard Chery is a 72 y.o. male who presents today for follow up and potential surgery clearance for foot surgery.    He is a patient of Dr. Mckeon in our office. Hx of chronic afib since '16 on chronic anticoagulation, obesity with DM type II and presumed EMILY (untreated), HLD with CAD (found on CT imaging), and recent foot pain.    He is here to get clearance for potential surgery on his foot for possible he neuroma, pending imaging and work up.    He is asymptomatic to his afib, HR's up today in office.    Not treating EMILY.    Avid skier but has noticed more fatigue and shortness of breath with exertional activity lately.    He has no chest pain, edema, dizziness/lightheadedness, or palpitations.    Past Medical History:   Diagnosis Date    Alcohol use 2/10/2016    3 per day    Arthritis 2016    left knee    Dental disorder     upper lower dentures    Diabetes 2005    insulin and oral agent    Elevated INR 7/30/2019    Heart burn     Hiatus hernia syndrome     High cholesterol     Hypertension     Observed sleep apnea     no study done yet    Other persistent atrial fibrillation (HCC) 12/19/2019    Pain     left knee    Paroxysmal atrial fibrillation (HCC) 2/27/2016 11/14/16-resolved now    Pulmonary hypertension (HCC) 3/1/2016    Snoring     Urinary retention 7/30/2019     Past Surgical History:   Procedure Laterality Date    CYSTOSCOPY N/A 7/30/2019    Procedure: CYSTOSCOPY- DILATION OF STRICTURE;  Surgeon: Anthony Manning M.D.;  Location: SURGERY [facility];  Service: Urology    KNEE ARTHROPLASTY TOTAL Right 8/15/2018    Procedure: KNEE ARTHROPLASTY TOTAL;  Surgeon: Amparo James M.D.;  Location: SURGERY [facility];  Service: Orthopedics    KNEE ARTHROPLASTY TOTAL Left 11/21/2016    Procedure: KNEE ARTHROPLASTY TOTAL;  Surgeon: Amparo  ADELAIDE James;  Location: SURGERY [facility];  Service:     KNEE REPLACEMENT, TOTAL Left 11/2016    Dr. James    KNEE ARTHROSCOPY Left 2/27/2016    Procedure: KNEE ARTHROSCOPY- I&D KNEE;  Surgeon: Corby Reyes M.D.;  Location: SURGERY [facility];  Service:     KNEE ARTHROSCOPY Left 2/18/2016    Procedure: KNEE ARTHROSCOPY, SAMUELS;  Surgeon: Marquise Cline M.D.;  Location: SURGERY [facility];  Service:     MENISCECTOMY, KNEE, MEDIAL  2/18/2016    Procedure: MEDIAL MENISCECTOMY - PARTIAL;  Surgeon: Marquise Cline M.D.;  Location: SURGERY [facility];  Service:     CYSTOSCOPY  1986    DENTAL EXTRACTION(S)  1976    wisdom teeth    HAND SURGERY Left 1970    trauma, amputation index and middle finger    TONSILLECTOMY  as child     Family History   Problem Relation Age of Onset    Diabetes Other     Hypertension Other      Social History     Socioeconomic History    Marital status:      Spouse name: Not on file    Number of children: Not on file    Years of education: Not on file    Highest education level: Associate degree: occupational, technical, or vocational program   Occupational History    Not on file   Tobacco Use    Smoking status: Never    Smokeless tobacco: Never   Vaping Use    Vaping Use: Never used   Substance and Sexual Activity    Alcohol use: Yes     Alcohol/week: 0.6 oz     Types: 1 Shots of liquor per week     Comment: once a week     Drug use: No    Sexual activity: Yes     Partners: Female   Other Topics Concern    Not on file   Social History Narrative    Not on file     Social Determinants of Health     Financial Resource Strain: Low Risk     Difficulty of Paying Living Expenses: Not hard at all   Food Insecurity: No Food Insecurity    Worried About Running Out of Food in the Last Year: Never true    Ran Out of Food in the Last Year: Never true   Transportation Needs: No Transportation Needs    Lack of Transportation (Medical): No    Lack of Transportation  (Non-Medical): No   Physical Activity: Sufficiently Active    Days of Exercise per Week: 5 days    Minutes of Exercise per Session: 30 min   Stress: Unknown    Feeling of Stress : Patient refused   Social Connections: Socially Isolated    Frequency of Communication with Friends and Family: Once a week    Frequency of Social Gatherings with Friends and Family: Once a week    Attends Spiritism Services: Never    Active Member of Clubs or Organizations: No    Attends Club or Organization Meetings: Never    Marital Status:    Intimate Partner Violence: Not on file   Housing Stability: Low Risk     Unable to Pay for Housing in the Last Year: No    Number of Places Lived in the Last Year: 1    Unstable Housing in the Last Year: No     No Known Allergies  Outpatient Encounter Medications as of 12/23/2022   Medication Sig Dispense Refill    MAGNESIUM PO       amoxicillin (AMOXIL) 500 MG Cap amoxicillin 500 mg capsule   TAKE 1 CAPSULE BY MOUTH EVERY 8 HOURS      LANTUS SOLOSTAR 100 UNIT/ML Solution Pen-injector injection INJECT 30 UNITS SUBCUTANEOUSLY IN THE EVENING      mupirocin (BACTROBAN) 2 % Ointment mupirocin 2 % topical ointment   APPLY 1 GRAM TOPICALLY TWICE DAILY      sildenafil citrate (VIAGRA) 100 MG tablet sildenafil 100 mg tablet      Insulin Glargine-yfgn 100 UNIT/ML Solution Pen-injector INJECT 30 UNITS SUBCUTANEOUSLY IN THE EVENING 3 mL 6    SITagliptin (JANUVIA) 100 MG Tab Take 1 Tablet by mouth every day. 100 Tablet 2    Dulaglutide (TRULICITY) 1.5 MG/0.5ML Solution Pen-injector INJECT 1 SYRINGE SUBCUTANEOUSLY ONCE A WEEK 4 mL 6    simvastatin (ZOCOR) 20 MG Tab Take 1 Tablet by mouth every evening. 100 Tablet 3    tamsulosin (FLOMAX) 0.4 MG capsule Take 1 Capsule by mouth every day. 90 Capsule 3    rivaroxaban (XARELTO) 20 MG Tab tablet Take 1 Tablet by mouth with dinner. 90 Tablet 2    metformin (GLUCOPHAGE) 1000 MG tablet Take 1 Tablet by mouth 2 times a day. 200 Tablet 3    gabapentin (NEURONTIN)  "300 MG Cap Take 1 Capsule by mouth 4 times a day. 360 Capsule 3    omeprazole (PRILOSEC) 20 MG delayed-release capsule Take 1 Capsule by mouth every day. 90 Capsule 3    POTASSIUM PO Take  by mouth.      B Complex Vitamins (B COMPLEX PO) Take  by mouth 2 (two) times a day.       No facility-administered encounter medications on file as of 12/23/2022.     Review of Systems   Constitutional:  Positive for malaise/fatigue. Negative for fever.        Exertional   Respiratory:  Positive for shortness of breath. Negative for cough.         Exertional   Cardiovascular:  Negative for chest pain, palpitations, orthopnea, claudication, leg swelling and PND.   Gastrointestinal:  Negative for abdominal pain.   Musculoskeletal:  Negative for myalgias.        R foot pain-pending imaging and work up with podiatrist            Objective     BP (!) 0/0 (BP Location: Left arm, Patient Position: Sitting, BP Cuff Size: Adult)   Ht 1.803 m (5' 11\")   BMI 33.21 kg/m²     Physical Exam  Vitals and nursing note reviewed.   Constitutional:       Appearance: Normal appearance. He is well-developed and normal weight.   HENT:      Head: Normocephalic and atraumatic.   Neck:      Vascular: No JVD.   Cardiovascular:      Rate and Rhythm: Tachycardia present. Rhythm irregular.      Pulses: Normal pulses.      Heart sounds: Normal heart sounds.   Pulmonary:      Effort: Pulmonary effort is normal.      Breath sounds: Normal breath sounds.   Musculoskeletal:         General: Normal range of motion.   Skin:     General: Skin is warm and dry.      Capillary Refill: Capillary refill takes less than 2 seconds.   Neurological:      General: No focal deficit present.      Mental Status: He is alert and oriented to person, place, and time. Mental status is at baseline.   Psychiatric:         Mood and Affect: Mood normal.         Behavior: Behavior normal.         Thought Content: Thought content normal.         Judgment: Judgment normal.        "       Assessment & Plan     1. Type 2 diabetes mellitus with diabetic neuropathy, with long-term current use of insulin (HCC)        2. Mixed hyperlipidemia        3. Essential hypertension        4. Chronic atrial fibrillation (HCC)        5. Observed sleep apnea        6. Class 1 obesity due to excess calories with serious comorbidity and body mass index (BMI) of 33.0 to 33.9 in adult        7. Chronic anticoagulation          Medical Decision Making: Today's Assessment/Status/Plan:      1. Chronic afib on chronic anticoagulation  -not rate controlled  -recommend add back in low dose bb, toprol 12.5 mg QPM  -follow HR and BP with this change  -cont xarelto for OAC, no bleeding per patient  -last echo in '18, order echo for '23  -follow clinically    2. HLD with CAD (noted on CT)  -no angina but RIGGS new onset  -recommend echo and cardiac stress for eval  -cont statin, xarelto  -LDL goal <70 with CAD, check annual labs with PCP    3. Obesity with DM type II  -work on weight loss with diet and exercise  -follow with PCP for DM management  -consider EMILY sleep study and follow up with pulmonary if wanted    Patient is to follow up with Sandi MÁRQUEZ in 1 year with stress test, labs, and echo.    OK to clear patient for surgery if feeling well and HR controlled.

## 2022-12-24 NOTE — PATIENT INSTRUCTIONS
We will check your stress test and get annual labs with your PCP before your next annual appointment.    OK to proceed with surgery as long as you feel good.    We completed your pre-op EKG today.

## 2022-12-27 ENCOUNTER — HOSPITAL ENCOUNTER (OUTPATIENT)
Dept: CARDIOLOGY | Facility: MEDICAL CENTER | Age: 72
End: 2022-12-27
Attending: NURSE PRACTITIONER
Payer: MEDICARE

## 2022-12-27 ENCOUNTER — TELEPHONE (OUTPATIENT)
Dept: CARDIOLOGY | Facility: MEDICAL CENTER | Age: 72
End: 2022-12-27

## 2022-12-27 DIAGNOSIS — I27.21 PAH (PULMONARY ARTERY HYPERTENSION) (HCC): ICD-10-CM

## 2022-12-27 DIAGNOSIS — I48.20 CHRONIC ATRIAL FIBRILLATION (HCC): ICD-10-CM

## 2022-12-27 DIAGNOSIS — I10 HYPERTENSION, ESSENTIAL: ICD-10-CM

## 2022-12-27 DIAGNOSIS — R06.02 SOB (SHORTNESS OF BREATH): ICD-10-CM

## 2022-12-27 DIAGNOSIS — G47.33 OSA (OBSTRUCTIVE SLEEP APNEA): ICD-10-CM

## 2022-12-27 DIAGNOSIS — I25.10 CORONARY ARTERY DISEASE INVOLVING NATIVE CORONARY ARTERY OF NATIVE HEART WITHOUT ANGINA PECTORIS: ICD-10-CM

## 2022-12-27 LAB
LV EJECT FRACT  99904: 47
LV EJECT FRACT MOD 2C 99903: 47.05
LV EJECT FRACT MOD 4C 99902: 46.89
LV EJECT FRACT MOD BP 99901: 47.17

## 2022-12-27 PROCEDURE — 93306 TTE W/DOPPLER COMPLETE: CPT

## 2022-12-27 PROCEDURE — 93306 TTE W/DOPPLER COMPLETE: CPT | Mod: 26 | Performed by: INTERNAL MEDICINE

## 2022-12-27 RX ORDER — FUROSEMIDE 20 MG/1
20 TABLET ORAL DAILY
Qty: 90 TABLET | Refills: 3 | Status: SHIPPED | OUTPATIENT
Start: 2022-12-27 | End: 2023-05-11

## 2022-12-27 NOTE — TELEPHONE ENCOUNTER
Phone Number Called: [phone]    Call outcome: Spoke to patient regarding message below.    Message: Called to discuss SC recommendation. Patient has still been fatigued/RIGGS. Sent in Rx of lasix for patient. Patient BP this morning was 107/65. Discussed with patient about new labs to be drawn, OPO, and HF appointment.           ------------------------------------------------------------  Phone Number Called: [phone]    Call outcome: Did not leave a detailed message. Requested patient to call back.    Message: Called to inform patient of SC recommendations:     New onset reduced EF with chronic afib. Check on vitals and symptoms with patient.   If still having RIGGS/Fatigue-recommend start furosemide 20 mg QD and eat dietary K.      Order bnp, bmp for eval.     Recommend HF apt in January.     Can still proceed with foot surgery if vitals stable. SC  Also order OPO for probable EMILY with mild PAH noted on echo. SC

## 2022-12-27 NOTE — TELEPHONE ENCOUNTER
EZIO Andrea,    This pt's wife Grisel called and stated to call him only on his number of [phone] instead of her number listed under mobile.     Ph. [phone]      Thank you,    DEMETRIUS

## 2022-12-28 ENCOUNTER — HOSPITAL ENCOUNTER (OUTPATIENT)
Dept: LAB | Facility: MEDICAL CENTER | Age: 72
End: 2022-12-28
Attending: INTERNAL MEDICINE
Payer: MEDICARE

## 2022-12-28 ENCOUNTER — TELEPHONE (OUTPATIENT)
Dept: CARDIOLOGY | Facility: MEDICAL CENTER | Age: 72
End: 2022-12-28

## 2022-12-28 ENCOUNTER — HOSPITAL ENCOUNTER (OUTPATIENT)
Dept: LAB | Facility: MEDICAL CENTER | Age: 72
End: 2022-12-28
Attending: NURSE PRACTITIONER
Payer: MEDICARE

## 2022-12-28 DIAGNOSIS — R06.02 SOB (SHORTNESS OF BREATH): ICD-10-CM

## 2022-12-28 DIAGNOSIS — I10 HYPERTENSION, ESSENTIAL: ICD-10-CM

## 2022-12-28 LAB
ANION GAP SERPL CALC-SCNC: 14 MMOL/L (ref 7–16)
BUN SERPL-MCNC: 12 MG/DL (ref 8–22)
BUN SERPL-MCNC: 12 MG/DL (ref 8–22)
CALCIUM SERPL-MCNC: 9.6 MG/DL (ref 8.5–10.5)
CHLORIDE SERPL-SCNC: 100 MMOL/L (ref 96–112)
CO2 SERPL-SCNC: 22 MMOL/L (ref 20–33)
CREAT SERPL-MCNC: 0.69 MG/DL (ref 0.5–1.4)
CREAT SERPL-MCNC: 0.73 MG/DL (ref 0.5–1.4)
GFR SERPLBLD CREATININE-BSD FMLA CKD-EPI: 97 ML/MIN/1.73 M 2
GFR SERPLBLD CREATININE-BSD FMLA CKD-EPI: 98 ML/MIN/1.73 M 2
GLUCOSE SERPL-MCNC: 218 MG/DL (ref 65–99)
NT-PROBNP SERPL IA-MCNC: 408 PG/ML (ref 0–125)
POTASSIUM SERPL-SCNC: 4.1 MMOL/L (ref 3.6–5.5)
SODIUM SERPL-SCNC: 136 MMOL/L (ref 135–145)

## 2022-12-28 PROCEDURE — 80048 BASIC METABOLIC PNL TOTAL CA: CPT

## 2022-12-28 PROCEDURE — 82565 ASSAY OF CREATININE: CPT | Mod: XU

## 2022-12-28 PROCEDURE — 36415 COLL VENOUS BLD VENIPUNCTURE: CPT

## 2022-12-28 PROCEDURE — 84520 ASSAY OF UREA NITROGEN: CPT | Mod: XU

## 2022-12-28 PROCEDURE — 83880 ASSAY OF NATRIURETIC PEPTIDE: CPT

## 2022-12-28 NOTE — TELEPHONE ENCOUNTER
SC    Caller: Grisel Chery (spouse)    Topic/issue: Grisel states that patient's BP is good and no shortness of breath.     Callback Number: [phone]    Thank you,  -Sole BURNS

## 2022-12-29 ENCOUNTER — OFFICE VISIT (OUTPATIENT)
Dept: MEDICAL GROUP | Facility: MEDICAL CENTER | Age: 72
End: 2022-12-29
Payer: MEDICARE

## 2022-12-29 VITALS
HEIGHT: 71 IN | SYSTOLIC BLOOD PRESSURE: 104 MMHG | WEIGHT: 239.86 LBS | BODY MASS INDEX: 33.58 KG/M2 | DIASTOLIC BLOOD PRESSURE: 68 MMHG

## 2022-12-29 DIAGNOSIS — Z79.4 TYPE 2 DIABETES MELLITUS WITH DIABETIC NEUROPATHY, WITH LONG-TERM CURRENT USE OF INSULIN (HCC): ICD-10-CM

## 2022-12-29 DIAGNOSIS — E11.40 TYPE 2 DIABETES MELLITUS WITH DIABETIC NEUROPATHY, WITH LONG-TERM CURRENT USE OF INSULIN (HCC): ICD-10-CM

## 2022-12-29 DIAGNOSIS — G57.61 MORTON NEUROMA, RIGHT: ICD-10-CM

## 2022-12-29 DIAGNOSIS — I48.20 CHRONIC ATRIAL FIBRILLATION (HCC): ICD-10-CM

## 2022-12-29 PROCEDURE — 99214 OFFICE O/P EST MOD 30 MIN: CPT | Performed by: BEHAVIOR ANALYST

## 2022-12-29 RX ORDER — DULAGLUTIDE 3 MG/.5ML
0.5 INJECTION, SOLUTION SUBCUTANEOUS
Qty: 2 ML | Refills: 6 | Status: SHIPPED | OUTPATIENT
Start: 2022-12-29 | End: 2023-08-27 | Stop reason: SDUPTHER

## 2022-12-29 ASSESSMENT — FIBROSIS 4 INDEX: FIB4 SCORE: 1.67

## 2022-12-29 ASSESSMENT — ENCOUNTER SYMPTOMS
PALPITATIONS: 0
SHORTNESS OF BREATH: 0

## 2022-12-29 NOTE — PROGRESS NOTES
RN-CDE Note    Subjective:     HPI:  Richard Chery is a 72 y.o. old patient who is seen by the Diabetes Nurse Specialist today for review of his type 2 diabetes.  States he was drinking a lot of peanut butter whiskey, but has recently decreased.    Diabetes Medications:   Lantus /Semglee insulin 30 units daily  Metformin 1000 mg bid  Trulicity 1.5 mg weekly  Januvia 100 mg daily  Taking above medications as prescribed: yes  Taking daily ASA: No    Exercise: has stated that he has increased his activity level recently.    Diet: eating smaller portions.   Breakfast around 2:30 am banana and bowl of cereal  Mid morning: may eat a meal  Lunch: snacking, salad or baked potato  Dinner: pasta/salad may have meat and potatoes    Patient's body mass index is 33.45 kg/m². Exercise and nutrition counseling were performed at this visit.      Health Maintenance:   Health Maintenance Due   Topic Date Due    Annual Wellness Visit  03/28/2018    IMM HEP B VACCINE (2 of 3 - 19+ 3-dose series) 11/13/2020    IMM ZOSTER VACCINES (3 of 3) 12/11/2020    COVID-19 Vaccine (4 - Booster for Pfizer series) 12/29/2021         DM:   Last A1c:   Lab Results   Component Value Date/Time    HBA1C 8.5 (A) 12/14/2022 08:27 AM      Previous A1c was 7.4 on 8/8/22  A1C GOAL: < 7    Glucose monitoring frequency: testing one time per day around 1:30 am, states running 150-180 range.  He was running greater than 200 before he cut back on his peanut butter whiskey.     Hypoglycemic episodes: no    Last Retinal Exam: on file and up-to-date  Daily Foot Exam: Yes     Exam:  Monofilament: current    Lab Results   Component Value Date/Time    MALBCRT see below 08/08/2022 06:42 AM    MICROALBUR <1.2 08/08/2022 06:42 AM        ACR Albumin/Creatinine Ratio goal <30     HTN:   Blood pressure goal <140/<80 at goal.   Currently Rx ACE/ARB: Not Indicated     Dyslipidemia:    Lab Results   Component Value Date/Time    CHOLSTRLTOT 112 08/08/2022 06:43 AM    LDL 34  08/08/2022 06:43 AM    HDL 46 08/08/2022 06:43 AM    TRIGLYCERIDE 162 (H) 08/08/2022 06:43 AM         Currently Rx Statin: Yes     He  reports that he has never smoked. He has never used smokeless tobacco.      Plan:     Discussed and educated on:   - All medications, side effects and compliance (discussed carefully)  - Annual eye examinations at Ophthalmology  - Factors Affecting Blood Glucose Control: food and medication  - Foot Care: what to look for when checking feet every day  - HbA1C: target  - Home glucose monitoring emphasized  - Weight control and daily exercise    Recommended medication changes: DC Januvia, increase Trulicity 3 mg weekly.   Goal is A1c less than 7%, recheck in February.

## 2022-12-29 NOTE — ASSESSMENT & PLAN NOTE
Insulin: 30 units, increased from 26 unit last visit   Biguanide: Metformin 1000mg BID  GLP1-RA: dulaglutide 3mg   SGLT-2i:  None- will start if A1C is still about goal with next check.   DPP4-I: STOPPED sitagliptin 100m

## 2022-12-29 NOTE — PROGRESS NOTES
Subjective:     CC:    Chief Complaint   Patient presents with    Follow-Up    Diabetes Mellitus          HISTORY OF THE PRESENT ILLNESS:   Richard presents today with    Problem   Travis Neuroma, Right    New updates: Called the podiatrist office and has been scheduled for blood work and an MRI of his foot. He has not  seen the podiatrist yet but will after the MRI.      Interval Hx 12/14/2022: Pain and burning senation between the 3rd and 4th webspace for the past 3 years that is constant. Pain can intermittently be severe. 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  He also skis admits his ski boots or tight fitting. This is a different feeling than his neuropathy.  He states he saw a podiatrist which was unhelpful and states he was never told that this could be Travis's neuroma.    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   Diet- watching diet closer and drinking less whiskey. He eats cereal for breakfast. Minimal red meats. Drinks peanut butter whiskey but has cut back.   Exercising regularly- enjoys skiing this time of year     Current medications:  Insulin: 30 units, he decided to increase from 26 unit last visit per our discussion  Biguanide: Metformin 1000mg BID  GLP1-RA: dulaglutide 1.5mg   SGLT-2i:  none  DPP4-I: sitagliptin 100m    Last A1c: 8.5, up from 6.7 in August  Last Microalb/Cr ratio: 8/8/22, WNL  Last diabetic foot exam: 4/2022L  Last retinal eye exam:  8/2022  ACEi/ARB: unable to tolerate, low BP  Statin: simvastatin 20mg  Nightly foot checks: yes       Chronic Atrial Fibrillation (Hcc)    Saw cardiology since our last visit and is now undergoing work-up and treatment of his a-fib due to reduced EF of 45% on echo.   Was just started on 20mg lasix as well.            Current Outpatient Medications   Medication Sig    Dulaglutide (TRULICITY) 3 MG/0.5ML Solution Pen-injector Inject 0.5 mL  "under the skin every 7 days.    furosemide (LASIX) 20 MG Tab Take 1 Tablet by mouth every day.    LANTUS SOLOSTAR 100 UNIT/ML Solution Pen-injector injection INJECT 30 UNITS SUBCUTANEOUSLY IN THE EVENING    MAGNESIUM PO     sildenafil citrate (VIAGRA) 100 MG tablet sildenafil 100 mg tablet    metoprolol SR (TOPROL XL) 25 MG TABLET SR 24 HR Take 0.5 Tablets by mouth every evening.    simvastatin (ZOCOR) 20 MG Tab Take 1 Tablet by mouth every evening.    tamsulosin (FLOMAX) 0.4 MG capsule Take 1 Capsule by mouth every day.    rivaroxaban (XARELTO) 20 MG Tab tablet Take 1 Tablet by mouth with dinner.    metformin (GLUCOPHAGE) 1000 MG tablet Take 1 Tablet by mouth 2 times a day.    gabapentin (NEURONTIN) 300 MG Cap Take 1 Capsule by mouth 4 times a day.    omeprazole (PRILOSEC) 20 MG delayed-release capsule Take 1 Capsule by mouth every day.    POTASSIUM PO Take  by mouth.    B Complex Vitamins (B COMPLEX PO) Take  by mouth 2 (two) times a day.    mupirocin (BACTROBAN) 2 % Ointment mupirocin 2 % topical ointment   APPLY 1 GRAM TOPICALLY TWICE DAILY        Health Maintenance: Completed    ROS: See HPI  Review of Systems   Constitutional:  Negative for malaise/fatigue.   Respiratory:  Negative for shortness of breath.    Cardiovascular:  Negative for chest pain and palpitations.   Musculoskeletal:  Positive for joint pain.       Objective:     Exam: /68 (BP Location: Left arm, Patient Position: Sitting)   Ht 1.803 m (5' 11\")   Wt 109 kg (239 lb 13.8 oz)   BMI 33.45 kg/m²   Body mass index is 33.45 kg/m².    Physical Exam  Constitutional:       Appearance: Normal appearance.   HENT:      Head: Normocephalic and atraumatic.   Eyes:      Extraocular Movements: Extraocular movements intact.   Cardiovascular:      Rate and Rhythm: Normal rate.      Pulses: Normal pulses.   Pulmonary:      Effort: Pulmonary effort is normal. No respiratory distress.   Musculoskeletal:      Cervical back: Normal range of motion. "   Neurological:      General: No focal deficit present.      Mental Status: He is alert.       Assessment & Plan:     72 y.o. male with the following -     1. Type 2 diabetes mellitus with diabetic neuropathy, with long-term current use of insulin (HCC)  - Chronic, suboptimally controlled.  Last A1c 8.5  -Seen by the diabetes nurse specialist today prior to visit.  I agree with Daija RNs recommendations to stop Januvia and double Trulicity to 3mg/weekly.  Continue metformin 1 g twice daily and Lantus 30 units daily.   -Follow-up as scheduled with diabetes nurse specialist and 2 months.   -An SGLT2 is indicated given patient's diabetes and cardiovascular disease.  We will plan to start SGLT2 at upcoming visits dependent on A1C.   -Reiterated the above medication changes and lifestyle changes today.   - Dulaglutide (TRULICITY) 3 MG/0.5ML Solution Pen-injector; Inject 0.5 mL under the skin every 7 days.  Dispense: 2 mL; Refill: 6    2. Chronic atrial fibrillation (HCC)  Chronic, defer to cardiology.   -Did reiterate importance of starting diuretic Lasix as prescribed by cardiology.  Discussed potential side effects.    3. Travis neuroma, right  Currently undergoing work-up with podiatry, pending an MRI.         Return in about 6 months (around 6/29/2023) for DM, Hypertension.    Please note that this dictation was created using voice recognition software. I have made every reasonable attempt to correct obvious errors, but I expect that there are errors of grammar and possibly content that I did not discover before finalizing the note.

## 2023-01-03 ENCOUNTER — TELEPHONE (OUTPATIENT)
Dept: CARDIOLOGY | Facility: MEDICAL CENTER | Age: 73
End: 2023-01-03
Payer: MEDICARE

## 2023-01-03 DIAGNOSIS — I27.21 PAH (PULMONARY ARTERY HYPERTENSION) (HCC): ICD-10-CM

## 2023-01-03 DIAGNOSIS — G47.33 OSA (OBSTRUCTIVE SLEEP APNEA): ICD-10-CM

## 2023-01-04 NOTE — TELEPHONE ENCOUNTER
Phone Number Called: [phone]     Call outcome:  Spoke with patients wife    Message: Called to inform patient of lab results. Discussed cardiac PET scan coming up. Patient was wondering when he will get the overnight pulse oximetry in the mail. Explained to patient that it was just ordered on the 12/27/22. Due to the holidays and weather it should be there this week. Recommended patient calling back if they don't receive it by the end of the week.

## 2023-01-04 NOTE — TELEPHONE ENCOUNTER
SC     Caller: Grisel- Patients spouse    Topic/issue: Grisel is calling wishing to speak with someone in regards to the patients lab results. She states that he was expecting to hear from Sandi Del Valle. Please advise.     Callback Number: [phone] (home) [phone] (work)    Thank you,   Zulay WALKER

## 2023-01-11 NOTE — TELEPHONE ENCOUNTER
Phone Number Called: [phone]    Call outcome: Spoke to patient regarding message below.    Message: Called to inform patient I have resent the fax for OPO orders.

## 2023-01-11 NOTE — TELEPHONE ENCOUNTER
Caller: Lyudmila Ramos    Topic/issue:  Richard would like a call back, he still has not received his pulse oximetry meter.     Callback Number: [phone]    Thank you,   Beverley RAMESH

## 2023-01-19 ENCOUNTER — TELEPHONE (OUTPATIENT)
Dept: CARDIOLOGY | Facility: MEDICAL CENTER | Age: 73
End: 2023-01-19
Payer: MEDICARE

## 2023-01-19 NOTE — TELEPHONE ENCOUNTER
SC    Patient has 2 different requests.    1) Patient is calling and stating he still has not received his Overnight Pule Oximetry test.  Please see enc from 1/3/2023.    2) Patient would like to speak with SC as he needs a letter from SC to the airline company for a refund as he had to cancel his vacation due to waiting for the OPO test. Please call for what the letter needs.    Please call him at [phone]    Thank you,

## 2023-01-20 ENCOUNTER — PATIENT MESSAGE (OUTPATIENT)
Dept: CARDIOLOGY | Facility: MEDICAL CENTER | Age: 73
End: 2023-01-20
Payer: MEDICARE

## 2023-01-20 NOTE — TELEPHONE ENCOUNTER
To SC: Patient cancelled a flight awaiting for his OPO to get delivered because there had been an issue getting it delivered from the [employer]. He is requesting a letter from us to send to the airlines to get a refund. I wanted to clarify with you if you had ever discussed this with the patient, or even if that is something you'd want to do. Please advise, thank you!

## 2023-01-20 NOTE — TELEPHONE ENCOUNTER
BHARATHI Lai.  You Just now (12:35 PM)     Okay to write letter that we were waiting for medical testing prior to travel. SC        Letter drafted and sent to patient through [employer] with SC's notes.    [employer] message sent to patient, awaiting patient response and will follow up as needed.

## 2023-01-20 NOTE — PATIENT COMMUNICATION
MyChart message sent to patient, awaiting patient response and will follow up as needed.     New OPO request sent to [facility], as order was sent to [facility] twice with no response. Fax confirmation received.

## 2023-01-21 ENCOUNTER — HOSPITAL ENCOUNTER (OUTPATIENT)
Dept: RADIOLOGY | Facility: MEDICAL CENTER | Age: 73
End: 2023-01-21
Attending: PODIATRIST
Payer: MEDICARE

## 2023-01-21 DIAGNOSIS — D21.21 BENIGN TUMOR OF SOFT TISSUES OF LOWER LIMB, RIGHT: ICD-10-CM

## 2023-01-21 PROCEDURE — 73720 MRI LWR EXTREMITY W/O&W/DYE: CPT | Mod: RT

## 2023-01-21 PROCEDURE — 700117 HCHG RX CONTRAST REV CODE 255: Performed by: PODIATRIST

## 2023-01-21 PROCEDURE — A9579 GAD-BASE MR CONTRAST NOS,1ML: HCPCS | Performed by: PODIATRIST

## 2023-01-21 RX ADMIN — GADOTERIDOL 15 ML: 279.3 INJECTION, SOLUTION INTRAVENOUS at 17:48

## 2023-01-24 ENCOUNTER — TELEPHONE (OUTPATIENT)
Dept: CARDIOLOGY | Facility: MEDICAL CENTER | Age: 73
End: 2023-01-24
Payer: MEDICARE

## 2023-01-24 NOTE — TELEPHONE ENCOUNTER
SC    Caller: Tressa Nj    Topic/issue: Tressa called and stated they received an order from [place] over to them, but didn't received the pt's demographics page with their info.     Callback Number:     TRESSA TIAN PH. [phone]  FAX: [phone]

## 2023-01-24 NOTE — TELEPHONE ENCOUNTER
Would you be able to re-send the OPO order to [facility], looks like they didn't received all the information.

## 2023-01-24 NOTE — TELEPHONE ENCOUNTER
OPO Order was completed and faxed to:  [facility]  Phone # [phone]  Fax #: [phone]    Confirmation fax received and sent to [facility].

## 2023-01-30 ENCOUNTER — OFFICE VISIT (OUTPATIENT)
Dept: MEDICAL GROUP | Facility: MEDICAL CENTER | Age: 73
End: 2023-01-30
Payer: MEDICARE

## 2023-01-30 VITALS
HEART RATE: 94 BPM | SYSTOLIC BLOOD PRESSURE: 102 MMHG | TEMPERATURE: 97.9 F | BODY MASS INDEX: 33.35 KG/M2 | DIASTOLIC BLOOD PRESSURE: 72 MMHG | WEIGHT: 238.2 LBS | OXYGEN SATURATION: 96 % | RESPIRATION RATE: 16 BRPM | HEIGHT: 71 IN

## 2023-01-30 DIAGNOSIS — J98.01 POST-INFECTION BRONCHOSPASM: ICD-10-CM

## 2023-01-30 PROCEDURE — 99213 OFFICE O/P EST LOW 20 MIN: CPT | Performed by: BEHAVIOR ANALYST

## 2023-01-30 RX ORDER — METHYLPREDNISOLONE 4 MG/1
TABLET ORAL
Qty: 21 TABLET | Refills: 0 | Status: SHIPPED | OUTPATIENT
Start: 2023-01-30 | End: 2023-02-09

## 2023-01-30 RX ORDER — ALBUTEROL SULFATE 90 UG/1
1-2 AEROSOL, METERED RESPIRATORY (INHALATION) EVERY 4 HOURS PRN
Qty: 1 EACH | Refills: 0 | Status: SHIPPED | OUTPATIENT
Start: 2023-01-30 | End: 2023-03-23

## 2023-01-30 RX ORDER — DOXYCYCLINE 100 MG/1
100 CAPSULE ORAL 2 TIMES DAILY
Qty: 10 CAPSULE | Refills: 0 | Status: SHIPPED | OUTPATIENT
Start: 2023-01-30 | End: 2023-02-04

## 2023-01-30 RX ORDER — INSULIN GLARGINE 100 [IU]/ML
INJECTION, SOLUTION SUBCUTANEOUS EVERY EVENING
COMMUNITY
End: 2023-02-09

## 2023-01-30 ASSESSMENT — PATIENT HEALTH QUESTIONNAIRE - PHQ9: CLINICAL INTERPRETATION OF PHQ2 SCORE: 0

## 2023-01-30 ASSESSMENT — FIBROSIS 4 INDEX: FIB4 SCORE: 1.67

## 2023-02-09 ENCOUNTER — NON-PROVIDER VISIT (OUTPATIENT)
Dept: MEDICAL GROUP | Facility: MEDICAL CENTER | Age: 73
End: 2023-02-09
Payer: MEDICARE

## 2023-02-09 VITALS
WEIGHT: 235.01 LBS | SYSTOLIC BLOOD PRESSURE: 110 MMHG | BODY MASS INDEX: 32.9 KG/M2 | HEIGHT: 71 IN | DIASTOLIC BLOOD PRESSURE: 70 MMHG

## 2023-02-09 DIAGNOSIS — E11.40 TYPE 2 DIABETES MELLITUS WITH DIABETIC NEUROPATHY, WITH LONG-TERM CURRENT USE OF INSULIN (HCC): ICD-10-CM

## 2023-02-09 DIAGNOSIS — Z79.4 TYPE 2 DIABETES MELLITUS WITH DIABETIC NEUROPATHY, WITH LONG-TERM CURRENT USE OF INSULIN (HCC): ICD-10-CM

## 2023-02-09 LAB
HBA1C MFR BLD: 8.6 % (ref ?–5.8)
POCT INT CON NEG: NEGATIVE
POCT INT CON POS: POSITIVE

## 2023-02-09 PROCEDURE — 83036 HEMOGLOBIN GLYCOSYLATED A1C: CPT | Performed by: BEHAVIOR ANALYST

## 2023-02-09 PROCEDURE — 99211 OFF/OP EST MAY X REQ PHY/QHP: CPT | Performed by: BEHAVIOR ANALYST

## 2023-02-09 ASSESSMENT — FIBROSIS 4 INDEX: FIB4 SCORE: 1.67

## 2023-02-09 NOTE — PROGRESS NOTES
"RN-CDE Note    Subjective:     HPI:  Richard Chery is a 72 y.o. old patient who is seen by the Diabetes Nurse Specialist today for review of his type 2 diabetes with long term use of insulin .  At his last visit his Trulicity was increased from 1.5 mg weekly to 3 mg weekly and his Januvia was discontinued.  He states that he has not started the Trulicity 3 mg yet, as he just ran out of the Trulicity 1.5 mg and he was continuing to take the Januvia until this past week.   Changes in Health: none.     Diabetes Medications:   Trulicity 1.5 mg weekly  will start the Trulicity 3 mg this week.   Lantus/Semglee 30 units daily  Metformin 1000 mg bid  Has been taking Januvia up to this past week.     Taking daily ASA: No    Exercise:  has been skiing, walking a lot.   Diet: \"healthy\" diet  in general  Has not been drinking as much peanut butter whiskey  Patient's body mass index is 32.78 kg/m². Exercise and nutrition counseling were performed at this visit.      Health Maintenance:   Health Maintenance Due   Topic Date Due    Annual Wellness Visit  03/28/2018    IMM HEP B VACCINE (2 of 3 - 19+ 3-dose series) 11/13/2020    IMM ZOSTER VACCINES (3 of 3) 12/11/2020    COVID-19 Vaccine (4 - Booster for Pfizer series) 12/29/2021         DM:   Last A1c:   Lab Results   Component Value Date/Time    HBA1C 8.6 (A) 02/09/2023 08:21 AM      Previous A1c was 8.5 on 12/14/22  A1C GOAL: < 7    Glucose monitoring frequency: testing his blood sugar every morning.  States his blood sugars running in the 130-200 range.   States his blood sugar this morning was 180.     Hypoglycemic episodes: no    Last Retinal Exam: on file and up-to-date  Daily Foot Exam: Yes     Exam:  Monofilament: current, due April 2023    Lab Results   Component Value Date/Time    MALBCRT see below 08/08/2022 06:42 AM    MICROALBUR <1.2 08/08/2022 06:42 AM        ACR Albumin/Creatinine Ratio goal <30     HTN:   Blood pressure goal <130/<80 .   Currently Rx " ACE/ARB: Not Indicated     Dyslipidemia:    Lab Results   Component Value Date/Time    CHOLSTRLTOT 112 08/08/2022 06:43 AM    LDL 34 08/08/2022 06:43 AM    HDL 46 08/08/2022 06:43 AM    TRIGLYCERIDE 162 (H) 08/08/2022 06:43 AM         Currently Rx Statin: Yes  Simvastatin 20 mg daily    He  reports that he has never smoked. He has never used smokeless tobacco.      Plan:     Discussed and educated on:   - All medications, side effects and compliance (discussed carefully)  - Annual eye examinations at Ophthalmology  - HbA1C: target  - Home glucose monitoring emphasized  - Weight control and daily exercise    Recommended medication changes: Start the Trulicity 3 mg weekly as discussed last visit.   INcrease Lantus to 32 units.

## 2023-03-07 ENCOUNTER — PATIENT MESSAGE (OUTPATIENT)
Dept: MEDICAL GROUP | Facility: MEDICAL CENTER | Age: 73
End: 2023-03-07
Payer: MEDICARE

## 2023-03-07 DIAGNOSIS — I25.10 CORONARY ARTERY DISEASE INVOLVING NATIVE CORONARY ARTERY OF NATIVE HEART WITHOUT ANGINA PECTORIS: ICD-10-CM

## 2023-03-07 DIAGNOSIS — I48.20 CHRONIC ATRIAL FIBRILLATION (HCC): ICD-10-CM

## 2023-03-07 DIAGNOSIS — E78.2 MIXED HYPERLIPIDEMIA: ICD-10-CM

## 2023-03-08 NOTE — TELEPHONE ENCOUNTER
BHARATHI Lai.  You 1 hour ago (10:01 AM)       Get HF apt soon please. New onset rEF of 45%. I had ordered a cardiac PET they didn't change the order to NM stress, can you do this.     Can you also call the patient and get vitals, if SBP low and no HF symptoms-OK to stop furosemide for now and use PRN for leg swelling, weight gain >2 lbs overnight, or shortness of breath. SC

## 2023-03-13 ENCOUNTER — TELEPHONE (OUTPATIENT)
Dept: CARDIOLOGY | Facility: MEDICAL CENTER | Age: 73
End: 2023-03-13
Payer: MEDICARE

## 2023-03-13 DIAGNOSIS — G47.33 OSA (OBSTRUCTIVE SLEEP APNEA): ICD-10-CM

## 2023-03-13 NOTE — TELEPHONE ENCOUNTER
Phone Number Called: [phone]    Call outcome: Spoke to patient regarding message below.    Message: Called to inform patient of SC recommendations. Referral placed.     ---------------------------------------------------------------------------  ----- Message from BROOKE Lai sent at 3/13/2023 12:39 PM PDT -----  Abnormal OPO, please call patient and let them know he needs referral to sleep studies. SC

## 2023-03-17 ENCOUNTER — TELEPHONE (OUTPATIENT)
Dept: HEALTH INFORMATION MANAGEMENT | Facility: OTHER | Age: 73
End: 2023-03-17
Payer: MEDICARE

## 2023-03-21 ENCOUNTER — HOSPITAL ENCOUNTER (OUTPATIENT)
Dept: RADIOLOGY | Facility: MEDICAL CENTER | Age: 73
End: 2023-03-21
Attending: BEHAVIOR ANALYST
Payer: MEDICARE

## 2023-03-21 DIAGNOSIS — E78.2 MIXED HYPERLIPIDEMIA: ICD-10-CM

## 2023-03-21 DIAGNOSIS — I25.10 CORONARY ARTERY DISEASE INVOLVING NATIVE CORONARY ARTERY OF NATIVE HEART WITHOUT ANGINA PECTORIS: ICD-10-CM

## 2023-03-21 PROCEDURE — 78452 HT MUSCLE IMAGE SPECT MULT: CPT

## 2023-03-21 PROCEDURE — 700111 HCHG RX REV CODE 636 W/ 250 OVERRIDE (IP)

## 2023-03-21 RX ORDER — REGADENOSON 0.08 MG/ML
INJECTION, SOLUTION INTRAVENOUS
Status: COMPLETED
Start: 2023-03-21 | End: 2023-03-21

## 2023-03-21 RX ORDER — AMINOPHYLLINE 25 MG/ML
100 INJECTION, SOLUTION INTRAVENOUS
Status: DISCONTINUED | OUTPATIENT
Start: 2023-03-21 | End: 2023-03-22 | Stop reason: HOSPADM

## 2023-03-21 RX ORDER — REGADENOSON 0.08 MG/ML
0.4 INJECTION, SOLUTION INTRAVENOUS ONCE
Status: COMPLETED | OUTPATIENT
Start: 2023-03-21 | End: 2023-03-21

## 2023-03-21 RX ADMIN — REGADENOSON 0.4 MG: 0.08 INJECTION, SOLUTION INTRAVENOUS at 09:48

## 2023-03-23 ENCOUNTER — OFFICE VISIT (OUTPATIENT)
Dept: CARDIOLOGY | Facility: MEDICAL CENTER | Age: 73
End: 2023-03-23
Attending: BEHAVIOR ANALYST
Payer: MEDICARE

## 2023-03-23 VITALS
OXYGEN SATURATION: 97 % | WEIGHT: 236 LBS | DIASTOLIC BLOOD PRESSURE: 62 MMHG | HEART RATE: 82 BPM | RESPIRATION RATE: 22 BRPM | BODY MASS INDEX: 33.04 KG/M2 | SYSTOLIC BLOOD PRESSURE: 118 MMHG | HEIGHT: 71 IN

## 2023-03-23 DIAGNOSIS — G47.33 OSA (OBSTRUCTIVE SLEEP APNEA): ICD-10-CM

## 2023-03-23 DIAGNOSIS — I50.20 ACC/AHA STAGE C SYSTOLIC HEART FAILURE (HCC): ICD-10-CM

## 2023-03-23 DIAGNOSIS — Z79.899 HIGH RISK MEDICATION USE: ICD-10-CM

## 2023-03-23 DIAGNOSIS — R06.09 DYSPNEA ON EXERTION: ICD-10-CM

## 2023-03-23 DIAGNOSIS — E11.65 TYPE 2 DIABETES MELLITUS WITH HYPERGLYCEMIA, WITH LONG-TERM CURRENT USE OF INSULIN (HCC): ICD-10-CM

## 2023-03-23 DIAGNOSIS — I48.91 HYPERCOAGULABILITY DUE TO ATRIAL FIBRILLATION (HCC): ICD-10-CM

## 2023-03-23 DIAGNOSIS — Z79.01 CHRONIC ANTICOAGULATION: ICD-10-CM

## 2023-03-23 DIAGNOSIS — D68.69 HYPERCOAGULABILITY DUE TO ATRIAL FIBRILLATION (HCC): ICD-10-CM

## 2023-03-23 DIAGNOSIS — I48.11 LONGSTANDING PERSISTENT ATRIAL FIBRILLATION (HCC): ICD-10-CM

## 2023-03-23 DIAGNOSIS — Z79.4 TYPE 2 DIABETES MELLITUS WITH HYPERGLYCEMIA, WITH LONG-TERM CURRENT USE OF INSULIN (HCC): ICD-10-CM

## 2023-03-23 DIAGNOSIS — I51.89 LEFT VENTRICULAR SYSTOLIC DYSFUNCTION, NYHA CLASS 2: ICD-10-CM

## 2023-03-23 PROCEDURE — 99215 OFFICE O/P EST HI 40 MIN: CPT | Performed by: INTERNAL MEDICINE

## 2023-03-23 RX ORDER — METOPROLOL SUCCINATE 25 MG/1
25 TABLET, EXTENDED RELEASE ORAL EVERY EVENING
Qty: 90 TABLET | Refills: 3 | Status: SHIPPED | OUTPATIENT
Start: 2023-03-23 | End: 2023-10-09 | Stop reason: SDUPTHER

## 2023-03-23 RX ORDER — SACUBITRIL AND VALSARTAN 24; 26 MG/1; MG/1
1 TABLET, FILM COATED ORAL 2 TIMES DAILY
Qty: 60 TABLET | Refills: 3 | Status: SHIPPED | OUTPATIENT
Start: 2023-03-23 | End: 2023-05-11

## 2023-03-23 RX ORDER — EMPAGLIFLOZIN 10 MG/1
10 TABLET, FILM COATED ORAL DAILY
Qty: 90 TABLET | Refills: 3 | Status: SHIPPED | OUTPATIENT
Start: 2023-03-23 | End: 2023-10-09 | Stop reason: SDUPTHER

## 2023-03-23 ASSESSMENT — MINNESOTA LIVING WITH HEART FAILURE QUESTIONNAIRE (MLHF)
GIVING YOU SIDE EFFECTS FROM TREATMENTS: 0
COSTING YOU MONEY FOR MEDICAL CARE: 0
WORKING AROUND THE HOUSE OR YARD DIFFICULT: 0
FEELING LIKE A BURDEN TO FAMILY AND FRIENDS: 0
DIFFICULTY TO CONCENTRATE OR REMEMBERING THINGS: 0
WALKING ABOUT OR CLIMBING STAIRS DIFFICULT: 0
DIFFICULTY WITH RECREATIONAL PASTIMES, SPORTS, HOBBIES: 0
MAKING YOU WORRY: 0
DIFFICULTY SLEEPING WELL AT NIGHT: 0
TIRED, FATIGUED OR LOW ON ENERGY: 0
DIFFICULTY SOCIALIZING WITH FAMILY OR FRIENDS: 0
MAKING YOU FEEL DEPRESSED: 0
MAKING YOU STAY IN A HOSPITAL: 0
LOSS OF SELF CONTROL IN YOUR LIFE: 0
EATING LESS FOODS YOU LIKE: 0
TOTAL_SCORE: 0
DIFFICULTY GOING AWAY FROM HOME: 0
DIFFICULTY WITH SEXUAL ACTIVITIES: 0
SWELLING IN ANKLES OR LEGS: 0
HAVING TO SIT OR LIE DOWN DURING THE DAY: 0
MAKING YOU SHORT OF BREATH: 0
DIFFICULTY WORKING TO EARN A LIVING: 0

## 2023-03-23 ASSESSMENT — ENCOUNTER SYMPTOMS
COUGH: 0
DEPRESSION: 0
DIZZINESS: 0
MYALGIAS: 0
SHORTNESS OF BREATH: 1
BLURRED VISION: 0
DIAPHORESIS: 0
BRUISES/BLEEDS EASILY: 0
FEVER: 0
DOUBLE VISION: 0
MEMORY LOSS: 0
PALPITATIONS: 0
FALLS: 0
HEADACHES: 0
SENSORY CHANGE: 0
ABDOMINAL PAIN: 0

## 2023-03-23 ASSESSMENT — FIBROSIS 4 INDEX: FIB4 SCORE: 1.69

## 2023-03-23 NOTE — PATIENT INSTRUCTIONS
Will increase Metoprolol SR (Toprol XL) To 25 mg po daily.    Will start Entresto therapy at 24/26 mg twice a day.

## 2023-03-23 NOTE — PROGRESS NOTES
Chief Complaint   Patient presents with    Diabetes (Controlled)     F/V Dx: Type 2 diabetes mellitus with diabetic neuropathy, with long-term current use of insulin (HCC)    Atrial Fibrillation     F/V Dx: Chronic atrial fibrillation (HCC)       Eric Chery is a 73 y.o. male who presents today due to unknown reason.  Of note, patient does not know why he is seeing us today.  He does not know that he has any cardiac problems.  He initially saw one of our partners about cardiac evaluation for foot problem.  And now.  He is here without any knowledge of what is going on.  With further investigation, I did find a reason why he is referred to see us today.  He did have a transthoracic echocardiogram in December 2022 which show LV dysfunction with LVEF documented to be 45%.  He also has atrial fibrillation.  He has never been cardioverted in the past nor ablated.  He also has a history of sleep apnea for which he has not been treated at this time.    Patient does get winded upon walking up inclines or for distance. No symptoms at rest or with daily living activities.    Negative nuclear stress test. I personally interpreted the images.    Past Medical History:   Diagnosis Date    Alcohol use 2/10/2016    3 per day    Arthritis 2016    left knee    Dental disorder     upper lower dentures    Diabetes 2005    insulin and oral agent    Elevated INR 7/30/2019    Heart burn     Hiatus hernia syndrome     High cholesterol     Hypertension     Observed sleep apnea     no study done yet    Other persistent atrial fibrillation (HCC) 12/19/2019    Pain     left knee    Paroxysmal atrial fibrillation (HCC) 2/27/2016 11/14/16-resolved now    Pulmonary hypertension (HCC) 3/1/2016    Snoring     Urinary retention 7/30/2019     Past Surgical History:   Procedure Laterality Date    CYSTOSCOPY N/A 7/30/2019    Procedure: CYSTOSCOPY- DILATION OF STRICTURE;  Surgeon: Anthony Manning M.D.;  Location: SURGERY [facility];  Service: Urology    KNEE ARTHROPLASTY TOTAL Right 8/15/2018    Procedure: KNEE ARTHROPLASTY TOTAL;  Surgeon: Amparo James M.D.;  Location: SURGERY [facility];  Service: Orthopedics    KNEE ARTHROPLASTY TOTAL Left 11/21/2016    Procedure: KNEE ARTHROPLASTY TOTAL;  Surgeon: Amparo James M.D.;  Location: SURGERY [facility];  Service:     KNEE REPLACEMENT, TOTAL Left 11/2016    Dr. James    KNEE ARTHROSCOPY Left 2/27/2016    Procedure: KNEE ARTHROSCOPY- I&D KNEE;  Surgeon: Corby Reyes M.D.;  Location: [facility];  Service:     KNEE ARTHROSCOPY Left 2/18/2016    Procedure: KNEE ARTHROSCOPY, SAMUELS;  Surgeon: Marquise Cline M.D.;  Location: [facility];  Service:     MENISCECTOMY, KNEE, MEDIAL  2/18/2016    Procedure: MEDIAL MENISCECTOMY - PARTIAL;  Surgeon: Marquise Cline M.D.;  Location: [facility];  Service:     CYSTOSCOPY  1986    DENTAL EXTRACTION(S)  1976    wisdom teeth    HAND SURGERY Left 1970    trauma, amputation index and middle finger    TONSILLECTOMY  as child     Family History   Problem Relation Age of Onset    Diabetes Other     Hypertension Other      Social History     Socioeconomic History    Marital status:      Spouse name: Not on file    Number of children: Not on file    Years of education: Not on file    Highest education level: Associate degree: occupational, technical, or vocational program   Occupational History    Not on file   Tobacco Use    Smoking status: Never    Smokeless tobacco: Never   Vaping Use    Vaping Use: Never used   Substance and Sexual Activity    Alcohol use: Not Currently     Alcohol/week: 4.2 oz     Types: 7 Standard drinks or equivalent per week     Comment: a lot    Drug use: No    Sexual activity: Yes     Partners: Female   Other Topics Concern    Not on file   Social History Narrative    Not on file     Social Determinants of Health     Financial Resource Strain: Low Risk      Difficulty of Paying Living Expenses: Not hard at all   Food Insecurity: No Food Insecurity    Worried About Running Out of Food in the Last Year: Never true    Ran Out of Food in the Last Year: Never true   Transportation Needs: No Transportation Needs    Lack of Transportation (Medical): No    Lack of Transportation (Non-Medical): No   Physical Activity: Sufficiently Active    Days of Exercise per Week: 5 days    Minutes of Exercise per Session: 30 min   Stress: Unknown    Feeling of Stress : Patient refused   Social Connections: Socially Isolated    Frequency of Communication with Friends and Family: Once a week    Frequency of Social Gatherings with Friends and Family: Once a week    Attends Gnosticism Services: Never    Active Member of Clubs or Organizations: No    Attends Club or Organization Meetings: Never    Marital Status:    Intimate Partner Violence: Not on file   Housing Stability: Low Risk     Unable to Pay for Housing in the Last Year: No    Number of Places Lived in the Last Year: 1    Unstable Housing in the Last Year: No     Allergies   Allergen Reactions    Melatonin Vomiting     Outpatient Encounter Medications as of 3/23/2023   Medication Sig Dispense Refill    sacubitril-valsartan (ENTRESTO) 24-26 MG Tab Take 1 Tablet by mouth 2 times a day. 60 Tablet 3    metoprolol SR (TOPROL XL) 25 MG TABLET SR 24 HR Take 1 Tablet by mouth every evening. 90 Tablet 3    Empagliflozin (JARDIANCE) 10 MG Tab tablet Take 1 Tablet by mouth every day. 90 Tablet 3    Dulaglutide (TRULICITY) 3 MG/0.5ML Solution Pen-injector Inject 0.5 mL under the skin every 7 days. 2 mL 6    LANTUS SOLOSTAR 100 UNIT/ML Solution Pen-injector injection INJECT 30 UNITS SUBCUTANEOUSLY IN THE EVENING      MAGNESIUM PO       mupirocin (BACTROBAN) 2 % Ointment mupirocin 2 % topical ointment   APPLY 1 GRAM TOPICALLY TWICE DAILY      sildenafil citrate (VIAGRA) 100 MG tablet sildenafil 100 mg tablet      simvastatin (ZOCOR) 20 MG Tab  Take 1 Tablet by mouth every evening. 100 Tablet 3    tamsulosin (FLOMAX) 0.4 MG capsule Take 1 Capsule by mouth every day. 90 Capsule 3    rivaroxaban (XARELTO) 20 MG Tab tablet Take 1 Tablet by mouth with dinner. 90 Tablet 2    metformin (GLUCOPHAGE) 1000 MG tablet Take 1 Tablet by mouth 2 times a day. 200 Tablet 3    gabapentin (NEURONTIN) 300 MG Cap Take 1 Capsule by mouth 4 times a day. 360 Capsule 3    omeprazole (PRILOSEC) 20 MG delayed-release capsule Take 1 Capsule by mouth every day. 90 Capsule 3    POTASSIUM PO Take  by mouth.      B Complex Vitamins (B COMPLEX PO) Take  by mouth 2 (two) times a day.      albuterol 108 (90 Base) MCG/ACT Aero Soln inhalation aerosol Inhale 1-2 Puffs every four hours as needed for Shortness of Breath. (Patient not taking: Reported on 3/23/2023) 1 Each 0    furosemide (LASIX) 20 MG Tab Take 1 Tablet by mouth every day. (Patient not taking: Reported on 3/23/2023) 90 Tablet 3    [DISCONTINUED] metoprolol SR (TOPROL XL) 25 MG TABLET SR 24 HR Take 0.5 Tablets by mouth every evening. 50 Tablet 3     No facility-administered encounter medications on file as of 3/23/2023.     Review of Systems   Constitutional:  Negative for diaphoresis and fever.   HENT:  Negative for nosebleeds.    Eyes:  Negative for blurred vision and double vision.   Respiratory:  Positive for shortness of breath. Negative for cough.    Cardiovascular:  Negative for chest pain and palpitations.   Gastrointestinal:  Negative for abdominal pain.   Genitourinary:  Negative for dysuria and frequency.   Musculoskeletal:  Negative for falls and myalgias.   Skin:  Negative for rash.   Neurological:  Negative for dizziness, sensory change and headaches.   Endo/Heme/Allergies:  Does not bruise/bleed easily.   Psychiatric/Behavioral:  Negative for depression and memory loss.             Objective     /62 (BP Location: Left arm, Patient Position: Sitting, BP Cuff Size: Adult)   Pulse 82   Resp (!) 22   Ht 1.803  "m (5' 11\")   Wt 107 kg (236 lb)   SpO2 97%   BMI 32.92 kg/m²     Physical Exam  Vitals and nursing note reviewed.   Constitutional:       General: He is not in acute distress.     Appearance: He is not diaphoretic.   HENT:      Head: Normocephalic and atraumatic.      Right Ear: External ear normal.      Left Ear: External ear normal.      Nose: No congestion or rhinorrhea.   Eyes:      General:         Right eye: No discharge.         Left eye: No discharge.   Neck:      Thyroid: No thyromegaly.      Vascular: No JVD.   Cardiovascular:      Rate and Rhythm: Normal rate. Rhythm irregular.      Pulses: Normal pulses.   Pulmonary:      Effort: No respiratory distress.   Abdominal:      General: There is no distension.      Tenderness: There is no abdominal tenderness.   Musculoskeletal:         General: No swelling or tenderness.      Right lower leg: No edema.      Left lower leg: No edema.   Skin:     General: Skin is warm and dry.   Neurological:      Mental Status: He is alert and oriented to person, place, and time.      Cranial Nerves: No cranial nerve deficit.   Psychiatric:         Behavior: Behavior normal.              Assessment & Plan     1. ACC/AHA stage C systolic heart failure (HCC)  sacubitril-valsartan (ENTRESTO) 24-26 MG Tab    metoprolol SR (TOPROL XL) 25 MG TABLET SR 24 HR      2. Left ventricular systolic dysfunction, NYHA class 2  sacubitril-valsartan (ENTRESTO) 24-26 MG Tab    metoprolol SR (TOPROL XL) 25 MG TABLET SR 24 HR      3. Longstanding persistent atrial fibrillation (HCC)  metoprolol SR (TOPROL XL) 25 MG TABLET SR 24 HR      4. EMILY (obstructive sleep apnea)  Referral to Pulmonary and Sleep Medicine      5. Dyspnea on exertion  sacubitril-valsartan (ENTRESTO) 24-26 MG Tab          Medical Decision Making: Today's Assessment/Status/Plan:   Non-Ischemic Cardiomyopathy:  Chronically illed condition which requires ongoing close monitoring and treatment to improve survival rate along with " decreasing risk of clinical decompensation sudden cardiac death and hospitalization.    Today, based on physical examination findings, patient is euvolemic. No JVD, lungs are clear to auscultation, no pitting edema in bilateral lower extremities, no ascites.     Dry weight is 236 lbs.    Based on the overall clinical history and profile, patient is a good candidate for Entresto therapy (with superiority over ACE-I therapy in terms of survival benefits and prevention of future hospitalization).  Will start Entresto therapy at 24/26 mg po bid.     Will increase Metoprolol SR (Toprol XL) To 25 mg po daily.     Diuretic with Furosemide will be changed to as needed.     Aldactone antagonist is not indicated.    No indication for ICD.    Based on recent data on SGLT2 and heart failure with reduced ejection fraction, patient will be benefited from Jardiance 10 mg p.o. once a day for further reduction in mortality and hospitalization with absolute risk reduction of 5.2%.  Therefore, I will start patient on Jardiance 10 mg p.o. once a day.  Risks and benefits were explained to patient and patient has agreed to proceed.    Persistent atrial fibrillation:  Anticoagulation with Xarelto 20  mg 1x daily.  Will consider sinus restoration after EMILY is treated.  Will see sleep clinic.    Hypertension:  Optimize control using cardiomyopathy medical regimen as well.    Hyperlipidemia:  Optimize statin as within guidelines of CAD treatment as above.     Of note, during the care of this patient, I spent a significant amount of time explaining the nature of the disease process, reviewing all possible imaging studies, blood test results to patient.  The overall care of this patient require a higher level of care than usual due to the multiple comorbidities along with ongoing issues of congestive heart failure that put this patient at risk for sudden cardiac death, increased mortality, and hospitalization.  This patient also requires close  monitoring with at least monthly blood test to monitor renal function and electrolytes due to the ongoing dynamic changes of medical therapy titration protocol to ensure optimal benefits for the overall care of this patient.

## 2023-03-27 ENCOUNTER — TELEPHONE (OUTPATIENT)
Dept: CARDIOLOGY | Facility: MEDICAL CENTER | Age: 73
End: 2023-03-27
Payer: MEDICARE

## 2023-03-27 NOTE — TELEPHONE ENCOUNTER
TT    Caller: Grisel Chery (Spouse)    Topic/issue: Patient was prescribed Empagliflozin (JARDIANCE) 10 MG Tab tablet but even with insurance it is still $500. Spouse is wondering about any sort of assistance out there than can help with this cost.    Callback Number: [phone] (spouse's number)    Thank you,  -Sole BURNS

## 2023-03-29 NOTE — TELEPHONE ENCOUNTER
TT     Caller: Grisel Chery (Spouse)     Topic/issue Spouse is calling again with hopes of applying for any assistance programs that than can help with the cost of new RX: Empagliflozin (JARDIANCE) 10 MG Tab tablet   Grisel says she really feels Richard needs this RX and will go ahead and pay the high cost if no one gets ahold of her by end of today, so he can start regiment.    Callback Number: [phone] (spouse's number)     Thank you,  Obdulia MORTON

## 2023-03-31 NOTE — TELEPHONE ENCOUNTER
Patient ID: Alfredo Peres is a 67year Received signed paperwork from [facility]. Copy made and scanned through [employer]. Mailed out original form to pt's address on file per request.   

## 2023-03-31 NOTE — TELEPHONE ENCOUNTER
Called patient Spouse 3/30/2023. Discussed options for [employer] patient assistance. Patient spouse states she already picked up the 100 day supply for almost $500. This RN discussed Patient Assistance paperwork with spouse. Filled out MD portion of patient assistance paperwork and placed on [facility] desk for signature. Patient gets light headed when changing positions quickly. Discussed with patient and spouse blood  pressure control and making sure patient is drinking enough water. Reinforced to change positions slowly. Patient and wife verbalize understanding.

## 2023-04-06 ENCOUNTER — PATIENT MESSAGE (OUTPATIENT)
Dept: CARDIOLOGY | Facility: MEDICAL CENTER | Age: 73
End: 2023-04-06
Payer: MEDICARE

## 2023-04-07 NOTE — PATIENT COMMUNICATION
HK: Pt of [facility], not in office today. Forwarded earlier but have not yet heard back. Are you able to review/advise? Thx!

## 2023-04-07 NOTE — PATIENT COMMUNICATION
"Last OV: 3/23/23    Spoke w/pts wife and pt regarding HOTN and accompanying sx. States he has been taking his meds as prescribed, though holding off on Lasix as discussed in previous visit. She states his BP numbers have been running low for the last month or so. It jumps around throughout the day, however, at times it is in the 70-80's systolically and pt is intermittently symptomatic. She spoke w/a Pharmacist and they voiced concern with pts Entresto given HOTN.     This AM while sitting in the chair BP is 84/49, HR 80 and follow up 86/52, HR 88. Pt does feel slightly lightheaded when moving around. He is staying hydrated with Powerades and water and is peeing loads. A few weeks ago pt \"blacked out\" and hit his head, he did not get checked out. Wife reports she applied ice. Wife has not noted any mental status changes since the fall.     I advised to hold off on Entresto this AM (given BP readings and accompanying symptoms) until we hear back from provider. States she will remove the medication from his pill container. I reviewed ED precautions. I also encouraged her to schedule a follow up appt at nearest convenience.     TT: Please review/advise  "

## 2023-04-10 ENCOUNTER — PATIENT MESSAGE (OUTPATIENT)
Dept: CARDIOLOGY | Facility: MEDICAL CENTER | Age: 73
End: 2023-04-10
Payer: MEDICARE

## 2023-04-18 ENCOUNTER — OFFICE VISIT (OUTPATIENT)
Dept: CARDIOLOGY | Facility: MEDICAL CENTER | Age: 73
End: 2023-04-18
Attending: INTERNAL MEDICINE
Payer: MEDICARE

## 2023-04-18 DIAGNOSIS — Z71.89 ENCOUNTER FOR EDUCATION ABOUT HEART FAILURE: ICD-10-CM

## 2023-04-18 PROCEDURE — 98960 EDU&TRN PT SELF-MGMT NQHP 1: CPT | Performed by: NURSE PRACTITIONER

## 2023-04-18 PROCEDURE — 99999 PR NO CHARGE: CPT | Performed by: INTERNAL MEDICINE

## 2023-04-18 NOTE — PROGRESS NOTES
Provided patient with [facility] heart failure packet and a verbal discussion related heart failure with an emphasis on the importance of daily weights, medications, low-sodium diet, and exercise. Patient was partially responsive to education and verbalized understanding and had no questions or concerns.     Patient states he has reached the point where he just wants to live his life the way he wants. Respecting patient's viewpoint, education provided to inform patient of stages of progression and sign and symptoms to be aware of. Based on personal choices, provided patient with the tools to help prevent progression if he wishes such as monitoring salt intake and proper hydration status.     Patient did not tolerate Entresto and it was stopped. Advised he may want to restart at a half tablet Twice daily but to monitor BP two hours after so we can see how low he is getting. Based on in office BP versus reported home BP, patient is using a wrist cuff, advised it may be prudent to bring at his next follow up to compare.     Patient is also likely over consuming liquids based on report as urine is described as clear. Advised on proper hydration without over consumption to avoid unnecessary strain on heart. Urine should be a pale yellow.     Patient and spouse questions answered. Total time spent 45 min

## 2023-04-19 ENCOUNTER — HOSPITAL ENCOUNTER (OUTPATIENT)
Dept: RADIOLOGY | Facility: MEDICAL CENTER | Age: 73
End: 2023-04-19
Attending: NURSE PRACTITIONER
Payer: MEDICARE

## 2023-04-19 DIAGNOSIS — I25.10 CORONARY ARTERY DISEASE INVOLVING NATIVE CORONARY ARTERY OF NATIVE HEART WITHOUT ANGINA PECTORIS: ICD-10-CM

## 2023-04-19 DIAGNOSIS — E78.2 MIXED HYPERLIPIDEMIA: ICD-10-CM

## 2023-04-21 NOTE — PATIENT COMMUNICATION
TT: HENRY pt has stopped taking Entresto again bc they believe it is causing dizziness. Please review/advise if any changes. Thx!

## 2023-05-11 ENCOUNTER — OFFICE VISIT (OUTPATIENT)
Dept: MEDICAL GROUP | Facility: MEDICAL CENTER | Age: 73
End: 2023-05-11
Payer: MEDICARE

## 2023-05-11 VITALS
HEIGHT: 71 IN | WEIGHT: 233.25 LBS | BODY MASS INDEX: 32.65 KG/M2 | SYSTOLIC BLOOD PRESSURE: 98 MMHG | DIASTOLIC BLOOD PRESSURE: 60 MMHG

## 2023-05-11 DIAGNOSIS — E11.40 TYPE 2 DIABETES MELLITUS WITH DIABETIC NEUROPATHY, WITH LONG-TERM CURRENT USE OF INSULIN (HCC): ICD-10-CM

## 2023-05-11 DIAGNOSIS — Z79.4 TYPE 2 DIABETES MELLITUS WITH DIABETIC NEUROPATHY, WITH LONG-TERM CURRENT USE OF INSULIN (HCC): ICD-10-CM

## 2023-05-11 LAB
HBA1C MFR BLD: 6.9 % (ref ?–5.8)
POCT INT CON NEG: NEGATIVE
POCT INT CON POS: POSITIVE

## 2023-05-11 PROCEDURE — 83036 HEMOGLOBIN GLYCOSYLATED A1C: CPT | Performed by: BEHAVIOR ANALYST

## 2023-05-11 PROCEDURE — 99211 OFF/OP EST MAY X REQ PHY/QHP: CPT | Performed by: BEHAVIOR ANALYST

## 2023-05-11 ASSESSMENT — FIBROSIS 4 INDEX: FIB4 SCORE: 1.69

## 2023-05-11 NOTE — PROGRESS NOTES
"RN-CDE Note    Subjective:     HPI:  Richard Chery is a 73 y.o. old patient who is seen by the Diabetes Nurse Specialist today for review of his uncontrolled type 2 diabetes.    Changes in Health: complaining of constipation    Diabetes Medications:   Metformin 1000 mg bid  Lantus/Semglee 32 units per day  Jardiance 10 mg daily  Trulicity 3 mg weekly  Taking above medications as prescribed: yes  Taking daily ASA: No    Exercise: very active.   Diet: \"healthy\" diet  in general  Patient's body mass index is 32.53 kg/m². Exercise and nutrition counseling were performed at this visit.      Health Maintenance:   Health Maintenance Due   Topic Date Due    Annual Wellness Visit  03/28/2018    IMM HEP B VACCINE (2 of 3 - 19+ 3-dose series) 11/13/2020    IMM ZOSTER VACCINES (3 of 3) 12/11/2020    COVID-19 Vaccine (4 - Booster for Pfizer series) 12/29/2021    DIABETES MONOFILAMENT / LE EXAM  04/05/2023         DM:   Last A1c:   Lab Results   Component Value Date/Time    HBA1C 6.9 (A) 05/11/2023 08:24 AM      Previous A1c was 8.6 on 2/9/23  A1C GOAL: < 7    Glucose monitoring frequency: daily, fasting.  Running less than 130    Hypoglycemic episodes: no    Last Retinal Exam: on file and up-to-date  Daily Foot Exam:  denies problems.       Lab Results   Component Value Date/Time    MALBCRT see below 08/08/2022 06:42 AM    MICROALBUR <1.2 08/08/2022 06:42 AM        ACR Albumin/Creatinine Ratio goal <30     HTN:   Blood pressure goal <130/<80 .   Currently Rx ACE/ARB: Yes     Dyslipidemia:    Lab Results   Component Value Date/Time    CHOLSTRLTOT 112 08/08/2022 06:43 AM    LDL 34 08/08/2022 06:43 AM    HDL 46 08/08/2022 06:43 AM    TRIGLYCERIDE 162 (H) 08/08/2022 06:43 AM         Currently Rx Statin: Yes     He  reports that he has never smoked. He has never used smokeless tobacco.      Plan:   Recommended medication changes: none    "

## 2023-05-31 ENCOUNTER — TELEPHONE (OUTPATIENT)
Dept: CARDIOLOGY | Facility: MEDICAL CENTER | Age: 73
End: 2023-05-31
Payer: MEDICARE

## 2023-05-31 NOTE — TELEPHONE ENCOUNTER
Spoke with wife Grisel ( name on release of information ) Patient wife stated that she is the one helping with appointments.stated that they will try their best to have labs done prior to his appointment with To

## 2023-06-01 ENCOUNTER — HOSPITAL ENCOUNTER (OUTPATIENT)
Dept: LAB | Facility: MEDICAL CENTER | Age: 73
End: 2023-06-01
Attending: INTERNAL MEDICINE
Payer: MEDICARE

## 2023-06-01 DIAGNOSIS — I51.89 LEFT VENTRICULAR SYSTOLIC DYSFUNCTION, NYHA CLASS 2: ICD-10-CM

## 2023-06-01 DIAGNOSIS — R06.09 DYSPNEA ON EXERTION: ICD-10-CM

## 2023-06-01 DIAGNOSIS — Z79.899 HIGH RISK MEDICATION USE: ICD-10-CM

## 2023-06-01 DIAGNOSIS — I50.20 ACC/AHA STAGE C SYSTOLIC HEART FAILURE (HCC): ICD-10-CM

## 2023-06-01 LAB
ANION GAP SERPL CALC-SCNC: 15 MMOL/L (ref 7–16)
BUN SERPL-MCNC: 15 MG/DL (ref 8–22)
CALCIUM SERPL-MCNC: 9.8 MG/DL (ref 8.5–10.5)
CHLORIDE SERPL-SCNC: 99 MMOL/L (ref 96–112)
CO2 SERPL-SCNC: 24 MMOL/L (ref 20–33)
CREAT SERPL-MCNC: 0.84 MG/DL (ref 0.5–1.4)
GFR SERPLBLD CREATININE-BSD FMLA CKD-EPI: 92 ML/MIN/1.73 M 2
GLUCOSE SERPL-MCNC: 185 MG/DL (ref 65–99)
NT-PROBNP SERPL IA-MCNC: 471 PG/ML (ref 0–125)
POTASSIUM SERPL-SCNC: 4.7 MMOL/L (ref 3.6–5.5)
SODIUM SERPL-SCNC: 138 MMOL/L (ref 135–145)

## 2023-06-01 PROCEDURE — 80048 BASIC METABOLIC PNL TOTAL CA: CPT

## 2023-06-01 PROCEDURE — 36415 COLL VENOUS BLD VENIPUNCTURE: CPT

## 2023-06-01 PROCEDURE — 83880 ASSAY OF NATRIURETIC PEPTIDE: CPT

## 2023-06-05 ENCOUNTER — TELEPHONE (OUTPATIENT)
Dept: CARDIOLOGY | Facility: MEDICAL CENTER | Age: 73
End: 2023-06-05
Payer: MEDICARE

## 2023-06-05 ENCOUNTER — OFFICE VISIT (OUTPATIENT)
Dept: CARDIOLOGY | Facility: MEDICAL CENTER | Age: 73
End: 2023-06-05
Attending: INTERNAL MEDICINE
Payer: MEDICARE

## 2023-06-05 VITALS
DIASTOLIC BLOOD PRESSURE: 62 MMHG | RESPIRATION RATE: 18 BRPM | SYSTOLIC BLOOD PRESSURE: 110 MMHG | WEIGHT: 230 LBS | HEART RATE: 77 BPM | HEIGHT: 71 IN | OXYGEN SATURATION: 96 % | BODY MASS INDEX: 32.2 KG/M2

## 2023-06-05 DIAGNOSIS — I48.20 CHRONIC ATRIAL FIBRILLATION (HCC): ICD-10-CM

## 2023-06-05 DIAGNOSIS — Z79.4 TYPE 2 DIABETES MELLITUS WITH HYPERGLYCEMIA, WITH LONG-TERM CURRENT USE OF INSULIN (HCC): ICD-10-CM

## 2023-06-05 DIAGNOSIS — Z79.01 CHRONIC ANTICOAGULATION: ICD-10-CM

## 2023-06-05 DIAGNOSIS — D68.69 HYPERCOAGULABLE STATE DUE TO LONGSTANDING PERSISTENT ATRIAL FIBRILLATION (HCC): ICD-10-CM

## 2023-06-05 DIAGNOSIS — E11.65 TYPE 2 DIABETES MELLITUS WITH HYPERGLYCEMIA, WITH LONG-TERM CURRENT USE OF INSULIN (HCC): ICD-10-CM

## 2023-06-05 DIAGNOSIS — Z79.899 HIGH RISK MEDICATION USE: ICD-10-CM

## 2023-06-05 DIAGNOSIS — G47.33 OSA (OBSTRUCTIVE SLEEP APNEA): ICD-10-CM

## 2023-06-05 DIAGNOSIS — I50.20 ACC/AHA STAGE C SYSTOLIC HEART FAILURE (HCC): ICD-10-CM

## 2023-06-05 DIAGNOSIS — I48.11 HYPERCOAGULABLE STATE DUE TO LONGSTANDING PERSISTENT ATRIAL FIBRILLATION (HCC): ICD-10-CM

## 2023-06-05 DIAGNOSIS — I51.89 LEFT VENTRICULAR SYSTOLIC DYSFUNCTION, NYHA CLASS 2: ICD-10-CM

## 2023-06-05 DIAGNOSIS — R06.09 DYSPNEA ON EXERTION: ICD-10-CM

## 2023-06-05 LAB — EKG IMPRESSION: NORMAL

## 2023-06-05 PROCEDURE — 3078F DIAST BP <80 MM HG: CPT | Performed by: INTERNAL MEDICINE

## 2023-06-05 PROCEDURE — 99215 OFFICE O/P EST HI 40 MIN: CPT | Mod: 25 | Performed by: INTERNAL MEDICINE

## 2023-06-05 PROCEDURE — 99213 OFFICE O/P EST LOW 20 MIN: CPT | Performed by: INTERNAL MEDICINE

## 2023-06-05 PROCEDURE — 3074F SYST BP LT 130 MM HG: CPT | Performed by: INTERNAL MEDICINE

## 2023-06-05 PROCEDURE — 93010 ELECTROCARDIOGRAM REPORT: CPT | Performed by: INTERNAL MEDICINE

## 2023-06-05 PROCEDURE — 93005 ELECTROCARDIOGRAM TRACING: CPT | Performed by: INTERNAL MEDICINE

## 2023-06-05 RX ORDER — INSULIN GLARGINE-YFGN 100 [IU]/ML
32 INJECTION, SOLUTION SUBCUTANEOUS
COMMUNITY
Start: 2023-05-21 | End: 2023-07-03 | Stop reason: SDUPTHER

## 2023-06-05 RX ORDER — VALSARTAN 40 MG/1
40 TABLET ORAL DAILY
Qty: 30 TABLET | Refills: 3 | Status: SHIPPED | OUTPATIENT
Start: 2023-06-05 | End: 2023-06-08 | Stop reason: SDUPTHER

## 2023-06-05 ASSESSMENT — ENCOUNTER SYMPTOMS
DOUBLE VISION: 0
MYALGIAS: 0
ABDOMINAL PAIN: 0
FALLS: 0
PALPITATIONS: 0
DIAPHORESIS: 0
MEMORY LOSS: 0
DEPRESSION: 0
BLURRED VISION: 0
BRUISES/BLEEDS EASILY: 0
FEVER: 0
SHORTNESS OF BREATH: 1
HEADACHES: 0
COUGH: 0
SENSORY CHANGE: 0
DIZZINESS: 0

## 2023-06-05 ASSESSMENT — FIBROSIS 4 INDEX: FIB4 SCORE: 1.69

## 2023-06-05 NOTE — PROGRESS NOTES
Chief Complaint   Patient presents with    CHF (Systolic)     F/V Dx: ACC/AHA stage C systolic heart failure (HCC)    Atrial Fibrillation     F/V Dx: Chronic atrial fibrillation (HCC)       Subjective     Richard Chery is a 73 y.o. male who presents today due to unknown reason.  Of note, patient does not know why he is seeing us today.  He does not know that he has any cardiac problems.  He initially saw one of our partners about cardiac evaluation for foot problem.  And now.  He is here without any knowledge of what is going on.  With further investigation, I did find a reason why he is referred to see us today.  He did have a transthoracic echocardiogram in December 2022 which show LV dysfunction with LVEF documented to be 45%.  He also has atrial fibrillation.  He has never been cardioverted in the past nor ablated.  He also has a history of sleep apnea for which he has not been treated at this time.    Patient does get winded upon walking up inclines or for distance. No symptoms at rest or with daily living activities.    Negative nuclear stress test. I personally interpreted the images.    I have independently interpreted and reviewed blood tests results with patient in clinic which shows LDL level of 34, triglycerides level of 162, GFR of 92, NT pro BNP of 471, K of 4.7.      Past Medical History:   Diagnosis Date    Alcohol use 2/10/2016    3 per day    Arthritis 2016    left knee    Dental disorder     upper lower dentures    Diabetes 2005    insulin and oral agent    Elevated INR 7/30/2019    Heart burn     Hiatus hernia syndrome     High cholesterol     Hypertension     Observed sleep apnea     no study done yet    Other persistent atrial fibrillation (HCC) 12/19/2019    Pain     left knee    Paroxysmal atrial fibrillation (HCC) 2/27/2016 11/14/16-resolved now    Pulmonary hypertension (HCC) 3/1/2016    Snoring     Urinary retention 7/30/2019     Past Surgical History:   Procedure Laterality Date     CYSTOSCOPY N/A 7/30/2019    Procedure: CYSTOSCOPY- DILATION OF STRICTURE;  Surgeon: Anthony Manning M.D.;  Location: SURGERY [facility];  Service: Urology    KNEE ARTHROPLASTY TOTAL Right 8/15/2018    Procedure: KNEE ARTHROPLASTY TOTAL;  Surgeon: Amparo James M.D.;  Location: SURGERY [facility];  Service: Orthopedics    KNEE ARTHROPLASTY TOTAL Left 11/21/2016    Procedure: KNEE ARTHROPLASTY TOTAL;  Surgeon: Amparo James M.D.;  Location: SURGERY [facility];  Service:     KNEE REPLACEMENT, TOTAL Left 11/2016    Dr. James    KNEE ARTHROSCOPY Left 2/27/2016    Procedure: KNEE ARTHROSCOPY- I&D KNEE;  Surgeon: Corby Reyes M.D.;  Location: [facility];  Service:     KNEE ARTHROSCOPY Left 2/18/2016    Procedure: KNEE ARTHROSCOPY, SAMUELS;  Surgeon: Marquise Cline M.D.;  Location: [facility];  Service:     MENISCECTOMY, KNEE, MEDIAL  2/18/2016    Procedure: MEDIAL MENISCECTOMY - PARTIAL;  Surgeon: Marquise Cline M.D.;  Location: [facility];  Service:     CYSTOSCOPY  1986    DENTAL EXTRACTION(S)  1976    wisdom teeth    HAND SURGERY Left 1970    trauma, amputation index and middle finger    TONSILLECTOMY  as child     Family History   Problem Relation Age of Onset    Diabetes Other     Hypertension Other      Social History     Socioeconomic History    Marital status:      Spouse name: Not on file    Number of children: Not on file    Years of education: Not on file    Highest education level: Associate degree: occupational, technical, or vocational program   Occupational History    Not on file   Tobacco Use    Smoking status: Never    Smokeless tobacco: Never   Vaping Use    Vaping Use: Never used   Substance and Sexual Activity    Alcohol use: Yes     Alcohol/week: 4.2 - 8.4 oz     Types: 7 - 14 Standard drinks or equivalent per week     Comment: a lot    Drug use: No    Sexual activity: Yes     Partners: Female   Other Topics Concern    Not  on file   Social History Narrative    Not on file     Social Determinants of Health     Financial Resource Strain: Low Risk  (12/13/2022)    Overall Financial Resource Strain (CARDIA)     Difficulty of Paying Living Expenses: Not hard at all   Food Insecurity: No Food Insecurity (12/13/2022)    Hunger Vital Sign     Worried About Running Out of Food in the Last Year: Never true     Ran Out of Food in the Last Year: Never true   Transportation Needs: No Transportation Needs (12/13/2022)    PRAPARE - Transportation     Lack of Transportation (Medical): No     Lack of Transportation (Non-Medical): No   Physical Activity: Sufficiently Active (12/13/2022)    Exercise Vital Sign     Days of Exercise per Week: 5 days     Minutes of Exercise per Session: 30 min   Stress: Unknown (12/13/2022)    [employer] [place] [employer] - Occupational Stress Questionnaire     Feeling of Stress : Patient refused   Social Connections: Socially Isolated (12/13/2022)    Social Connection and Isolation Panel [NHANES]     Frequency of Communication with Friends and Family: Once a week     Frequency of Social Gatherings with Friends and Family: Once a week     Attends Sabianist Services: Never     Active Member of Clubs or Organizations: No     Attends Club or Organization Meetings: Never     Marital Status:    Intimate Partner Violence: Not on file   Housing Stability: Low Risk  (12/13/2022)    Housing Stability Vital Sign     Unable to Pay for Housing in the Last Year: No     Number of Places Lived in the Last Year: 1     Unstable Housing in the Last Year: No     Allergies   Allergen Reactions    Melatonin Vomiting     Outpatient Encounter Medications as of 6/5/2023   Medication Sig Dispense Refill    SEMGLEE, YFGN, 100 UNIT/ML Solution Pen-injector INJECT 30 UNITS SUBCUTANEOUSLY IN THE EVENING      valsartan (DIOVAN) 40 MG Tab Take 1 Tablet by mouth every day. 30 Tablet 3    metoprolol SR (TOPROL XL) 25 MG TABLET SR 24 HR  Take 1 Tablet by mouth every evening. 90 Tablet 3    Empagliflozin (JARDIANCE) 10 MG Tab tablet Take 1 Tablet by mouth every day. 90 Tablet 3    Dulaglutide (TRULICITY) 3 MG/0.5ML Solution Pen-injector Inject 0.5 mL under the skin every 7 days. 2 mL 6    MAGNESIUM PO       sildenafil citrate (VIAGRA) 100 MG tablet sildenafil 100 mg tablet      simvastatin (ZOCOR) 20 MG Tab Take 1 Tablet by mouth every evening. 100 Tablet 3    tamsulosin (FLOMAX) 0.4 MG capsule Take 1 Capsule by mouth every day. 90 Capsule 3    rivaroxaban (XARELTO) 20 MG Tab tablet Take 1 Tablet by mouth with dinner. 90 Tablet 2    metformin (GLUCOPHAGE) 1000 MG tablet Take 1 Tablet by mouth 2 times a day. 200 Tablet 3    gabapentin (NEURONTIN) 300 MG Cap Take 1 Capsule by mouth 4 times a day. (Patient taking differently: Take 600 mg by mouth 2 times a day.) 360 Capsule 3    omeprazole (PRILOSEC) 20 MG delayed-release capsule Take 1 Capsule by mouth every day. 90 Capsule 3    POTASSIUM PO Take  by mouth.      B Complex Vitamins (B COMPLEX PO) Take  by mouth 2 (two) times a day.      [DISCONTINUED] LANTUS SOLOSTAR 100 UNIT/ML Solution Pen-injector injection 32 Units every evening. (Patient not taking: Reported on 6/5/2023)      [DISCONTINUED] mupirocin (BACTROBAN) 2 % Ointment mupirocin 2 % topical ointment   APPLY 1 GRAM TOPICALLY TWICE DAILY (Patient not taking: Reported on 6/5/2023)       No facility-administered encounter medications on file as of 6/5/2023.     Review of Systems   Constitutional:  Negative for diaphoresis and fever.   HENT:  Negative for nosebleeds.    Eyes:  Negative for blurred vision and double vision.   Respiratory:  Positive for shortness of breath. Negative for cough.    Cardiovascular:  Negative for chest pain and palpitations.   Gastrointestinal:  Negative for abdominal pain.   Genitourinary:  Negative for dysuria and frequency.   Musculoskeletal:  Negative for falls and myalgias.   Skin:  Negative for rash.  "  Neurological:  Negative for dizziness, sensory change and headaches.   Endo/Heme/Allergies:  Does not bruise/bleed easily.   Psychiatric/Behavioral:  Negative for depression and memory loss.               Objective     /62 (BP Location: Left arm, Patient Position: Sitting, BP Cuff Size: Adult)   Pulse 77   Resp 18   Ht 1.803 m (5' 11\")   Wt 104 kg (230 lb)   SpO2 96%   BMI 32.08 kg/m²     Physical Exam  Vitals and nursing note reviewed.   Constitutional:       General: He is not in acute distress.     Appearance: He is not diaphoretic.   HENT:      Head: Normocephalic and atraumatic.      Right Ear: External ear normal.      Left Ear: External ear normal.      Nose: No congestion or rhinorrhea.   Eyes:      General:         Right eye: No discharge.         Left eye: No discharge.   Neck:      Thyroid: No thyromegaly.      Vascular: No JVD.   Cardiovascular:      Rate and Rhythm: Normal rate. Rhythm irregular.      Pulses: Normal pulses.   Pulmonary:      Effort: No respiratory distress.   Abdominal:      General: There is no distension.      Tenderness: There is no abdominal tenderness.   Musculoskeletal:         General: No swelling or tenderness.      Right lower leg: No edema.      Left lower leg: No edema.   Skin:     General: Skin is warm and dry.   Neurological:      Mental Status: He is alert and oriented to person, place, and time.      Cranial Nerves: No cranial nerve deficit.   Psychiatric:         Behavior: Behavior normal.                Assessment & Plan     1. ACC/AHA stage C systolic heart failure (HCC)  REFERRAL TO SLEEP STUDIES    valsartan (DIOVAN) 40 MG Tab      2. Left ventricular systolic dysfunction, NYHA class 2  REFERRAL TO SLEEP STUDIES    valsartan (DIOVAN) 40 MG Tab      3. Chronic atrial fibrillation (HCC)  EKG    CL-CARDIOVERSION    EC-IRENE W/O CONT      4. EMILY (obstructive sleep apnea)        5. Dyspnea on exertion  REFERRAL TO SLEEP STUDIES    valsartan (DIOVAN) 40 MG Tab "      6. Hypercoagulable state due to longstanding persistent atrial fibrillation (HCC)        7. Type 2 diabetes mellitus with hyperglycemia, with long-term current use of insulin (HCC)        8. Chronic anticoagulation        9. High risk medication use        10. PAH (pulmonary artery hypertension) ([facility])            Medical Decision Making: Today's Assessment/Status/Plan:   Non-Ischemic Cardiomyopathy:  Chronically illed condition which requires ongoing close monitoring and treatment to improve survival rate along with decreasing risk of clinical decompensation sudden cardiac death and hospitalization.    Today, based on physical examination findings, patient is euvolemic. No JVD, lungs are clear to auscultation, no pitting edema in bilateral lower extremities, no ascites.     Dry weight is 230 lbs.    Cannot tolerate ENTRESTO due to low BP. Trial of low dose Valsartan 40 mg daily.     Will continue Metoprolol SR (Toprol XL) at 25 mg po daily.     Diuretic with Furosemide changed to as needed.     Aldactone antagonist is not indicated.    No indication for ICD.    Based on recent data on SGLT2 and heart failure with reduced ejection fraction, patient will be benefited from Jardiance 10 mg p.o. once a day for further reduction in mortality and hospitalization with absolute risk reduction of 5.2%.  Therefore, I will continue patient on Jardiance 10 mg p.o. once a day.  Risks and benefits were explained to patient and patient has agreed to proceed.    Persistent atrial fibrillation:  Anticoagulation with Xarelto 20  mg 1x daily.  Trial of IRENE and cardioversion at patient's request.   Risks and benefits of transesophageal echocardiogram were explained to patient.  Patient showed good understanding.  Risks including esophageal perforation, death, bleeding, infection were clearly conveyed to patient.  Patient is willing to accept the risks and proceed with procedure.  Will refer for sleep studies in the  meantime.    Hypertension:  Optimize control using cardiomyopathy medical regimen as well.    Hyperlipidemia:  Optimize statin as within guidelines of CAD treatment as above.     Of note, during the care of this patient, I spent a significant amount of time explaining the nature of the disease process, reviewing all possible imaging studies, blood test results to patient.  The overall care of this patient require a higher level of care than usual due to the multiple comorbidities along with ongoing issues of congestive heart failure that put this patient at risk for sudden cardiac death, increased mortality, and hospitalization.  This patient also requires close monitoring with at least monthly blood test to monitor renal function and electrolytes due to the ongoing dynamic changes of medical therapy titration protocol to ensure optimal benefits for the overall care of this patient.    Opal Leonard M.D.

## 2023-06-06 PROBLEM — I48.11 HYPERCOAGULABLE STATE DUE TO LONGSTANDING PERSISTENT ATRIAL FIBRILLATION (HCC): Status: ACTIVE | Noted: 2023-06-06

## 2023-06-06 PROBLEM — D68.69 HYPERCOAGULABLE STATE DUE TO LONGSTANDING PERSISTENT ATRIAL FIBRILLATION (HCC): Status: ACTIVE | Noted: 2023-06-06

## 2023-06-06 PROBLEM — I50.20 ACC/AHA STAGE C SYSTOLIC HEART FAILURE (HCC): Status: ACTIVE | Noted: 2023-06-06

## 2023-06-06 PROBLEM — I51.89 LEFT VENTRICULAR SYSTOLIC DYSFUNCTION, NYHA CLASS 2: Status: ACTIVE | Noted: 2023-06-06

## 2023-06-06 NOTE — TELEPHONE ENCOUNTER
Gerald Garland,  Patient was seen today by TT and he has ordered a CL-CARDIOVERSION [[id number]] and EC-IRENE W/O CONT [[id number]] to be scheduled for the patient.    Thank you.  Kiana Hwang Ass't

## 2023-06-08 ENCOUNTER — TELEPHONE (OUTPATIENT)
Dept: HEALTH INFORMATION MANAGEMENT | Facility: OTHER | Age: 73
End: 2023-06-08

## 2023-06-08 DIAGNOSIS — I51.89 LEFT VENTRICULAR SYSTOLIC DYSFUNCTION, NYHA CLASS 2: ICD-10-CM

## 2023-06-08 DIAGNOSIS — I50.20 ACC/AHA STAGE C SYSTOLIC HEART FAILURE (HCC): ICD-10-CM

## 2023-06-08 DIAGNOSIS — R06.09 DYSPNEA ON EXERTION: ICD-10-CM

## 2023-06-08 RX ORDER — VALSARTAN 40 MG/1
40 TABLET ORAL DAILY
Qty: 100 TABLET | Refills: 3 | Status: SHIPPED | OUTPATIENT
Start: 2023-06-08 | End: 2023-07-03

## 2023-06-08 NOTE — TELEPHONE ENCOUNTER
Is the patient due for a refill? No- pharmacy requesting 100-day supply per insurance.       Was the patient seen the past year? Yes    Date of last office visit: 6/5/2023    Does the patient have an upcoming appointment?  Yes   If yes, When? 10/9/2023    Provider to refill:TT    Does the patients insurance require a 100 day supply?  Yes

## 2023-06-09 ENCOUNTER — TELEPHONE (OUTPATIENT)
Dept: CARDIOLOGY | Facility: MEDICAL CENTER | Age: 73
End: 2023-06-09
Payer: MEDICARE

## 2023-06-09 NOTE — TELEPHONE ENCOUNTER
Patient is scheduled for a IRENE/CV with conscious sedation on 07- with Dr. Leonard. Prep given. Patient notified to hold viagra 72 hours prior. Patient notified to hold jardiance and metformin the am of procedure. Pre-admit to call patient. FYI sent to Dr. Leonard

## 2023-06-12 ENCOUNTER — APPOINTMENT (OUTPATIENT)
Dept: ADMISSIONS | Facility: MEDICAL CENTER | Age: 73
End: 2023-06-12
Attending: INTERNAL MEDICINE
Payer: MEDICARE

## 2023-06-15 ENCOUNTER — PATIENT MESSAGE (OUTPATIENT)
Dept: MEDICAL GROUP | Facility: MEDICAL CENTER | Age: 73
End: 2023-06-15
Payer: MEDICARE

## 2023-06-15 ENCOUNTER — PRE-ADMISSION TESTING (OUTPATIENT)
Dept: ADMISSIONS | Facility: MEDICAL CENTER | Age: 73
End: 2023-06-15
Attending: INTERNAL MEDICINE
Payer: MEDICARE

## 2023-06-15 VITALS — WEIGHT: 225 LBS | HEIGHT: 71 IN | BODY MASS INDEX: 31.5 KG/M2

## 2023-06-15 DIAGNOSIS — E11.40 TYPE 2 DIABETES MELLITUS WITH DIABETIC NEUROPATHY, WITH LONG-TERM CURRENT USE OF INSULIN (HCC): ICD-10-CM

## 2023-06-15 DIAGNOSIS — Z79.4 TYPE 2 DIABETES MELLITUS WITH DIABETIC NEUROPATHY, WITH LONG-TERM CURRENT USE OF INSULIN (HCC): ICD-10-CM

## 2023-06-15 ASSESSMENT — FIBROSIS 4 INDEX: FIB4 SCORE: 1.69

## 2023-06-17 NOTE — PROGRESS NOTES
I still recommend a follow up with me for assessment but I did send a referral. Neurology is usually booked out 5-6 months.  Please let them know

## 2023-07-03 ENCOUNTER — OFFICE VISIT (OUTPATIENT)
Dept: MEDICAL GROUP | Facility: MEDICAL CENTER | Age: 73
End: 2023-07-03
Payer: MEDICARE

## 2023-07-03 ENCOUNTER — TELEPHONE (OUTPATIENT)
Dept: CARDIOLOGY | Facility: MEDICAL CENTER | Age: 73
End: 2023-07-03

## 2023-07-03 VITALS
OXYGEN SATURATION: 96 % | WEIGHT: 228 LBS | BODY MASS INDEX: 31.92 KG/M2 | DIASTOLIC BLOOD PRESSURE: 60 MMHG | HEIGHT: 71 IN | RESPIRATION RATE: 18 BRPM | HEART RATE: 95 BPM | TEMPERATURE: 97 F | SYSTOLIC BLOOD PRESSURE: 98 MMHG

## 2023-07-03 DIAGNOSIS — I50.20 ACC/AHA STAGE C SYSTOLIC HEART FAILURE (HCC): ICD-10-CM

## 2023-07-03 DIAGNOSIS — M79.671 RIGHT FOOT PAIN: ICD-10-CM

## 2023-07-03 DIAGNOSIS — E11.40 TYPE 2 DIABETES MELLITUS WITH DIABETIC NEUROPATHY (HCC): ICD-10-CM

## 2023-07-03 PROBLEM — G57.61 MORTON NEUROMA, RIGHT: Status: RESOLVED | Noted: 2022-12-14 | Resolved: 2023-07-03

## 2023-07-03 PROBLEM — I51.89 LEFT VENTRICULAR SYSTOLIC DYSFUNCTION, NYHA CLASS 2: Chronic | Status: ACTIVE | Noted: 2023-06-06

## 2023-07-03 PROCEDURE — 3078F DIAST BP <80 MM HG: CPT | Performed by: BEHAVIOR ANALYST

## 2023-07-03 PROCEDURE — 99214 OFFICE O/P EST MOD 30 MIN: CPT | Performed by: BEHAVIOR ANALYST

## 2023-07-03 PROCEDURE — 3074F SYST BP LT 130 MM HG: CPT | Performed by: BEHAVIOR ANALYST

## 2023-07-03 RX ORDER — INSULIN GLARGINE-YFGN 100 [IU]/ML
32 INJECTION, SOLUTION SUBCUTANEOUS
Qty: 15 ML | Refills: 3 | Status: SHIPPED | OUTPATIENT
Start: 2023-07-03 | End: 2023-10-17 | Stop reason: CLARIF

## 2023-07-03 ASSESSMENT — FIBROSIS 4 INDEX: FIB4 SCORE: 1.69

## 2023-07-03 NOTE — TELEPHONE ENCOUNTER
Called and notified patient's wife of JG recommendations.   Advised patient's wife to continue monitoring symptoms and blood pressure and to call us back if patients symptoms do not get better or worsen.  Patients wife verbalized understanding.

## 2023-07-03 NOTE — TELEPHONE ENCOUNTER
"BHARATHI Pedro.  You 6 minutes ago (2:36 PM)       That's fine, not much else can be held for blood pressure improvement at this time. You can remind patient that finasteride can lower blood pressure too       You  BHARATHI Pedro. 1 hour ago (1:30 PM)       To YULY- part of Tt's care team.     Called and spoke with patient's wife.   Wife is very concerned patient is extremely fatigued and sleeps constantly, Blood pressure have been low 80/60s   She states patient \"drinks water 24/7\"   Per Tt's last note  \"Cannot tolerate ENTRESTO due to low BP. Trial of low dose Valsartan 40 mg daily.\"     PCP discontinued Valsartan today.     Patient just wanted us to be aware of this medication change, Please let me know if you want to advise further.      "

## 2023-07-03 NOTE — TELEPHONE ENCOUNTER
TT    Caller: Maddi Chery (wife)    Topic/issue: MEDICATION MANAGEMENT     Grisel states that per pt PCP it was reccommended that he stop the VALSARTAN due to BP being low. Valsartan has been discontinued per PCP today. Please advise.    Thank you,  Prateek RAMIREZ    Callback Number: [phone] (home) [phone] (work)

## 2023-07-06 NOTE — PROGRESS NOTES
Subjective:     CC:    Chief Complaint   Patient presents with    Medication Problem     Dizziness           HISTORY OF THE PRESENT ILLNESS:   Richadr presents today with    Problem   Acc/Aha Stage C Systolic Heart Failure (Hcc)    Reports fatigue, lightheadedness, dizziness and lack of energy. BP was 88/63 this morning.   He has been drinking a lot of water and states that his urine is pretty light in color.   His cardiologist and heart failure specialist Dr. Leonard started him Jardiance 3/23/2023, then started Entresto shortly after. Cannot tolerate ENTRESTO due to low BP.   He was then started on just valsartan 40 mg daily 6/5/2023.        Right Foot Pain    Continues to have pain at the top of the right foot between the third and fourth interphalangeal joint.  He is very frustrated that no one can figure out the cause.  He has been to podiatry.     Occurs on and off. Has been to a podiatrist and had xray and MRI and still unsure what the problem is. Might have went to orthopedic specialist. Thought it was a he neuroma but also was not the correct diagnosis after MRI.   Taking gabapentin for neuropathy but doesn't find it helpful for this pain.   Currently taking 600mg gabapentin BID.        Type 2 Diabetes Mellitus With Diabetic Neuropathy (Hcc)    Fasting BS continues to improve in the morning.  He reports his blood sugars been running in 100-120 fasting.  Denies symptoms of hypoglycemia.  Diet- watching diet closer and drinking less whiskey. He eats cereal for breakfast. Minimal red meats. Drinks peanut butter whiskey but has cut back.   Exercising regularly- enjoys skiing this time of year     Current medications:  Insulin: 32 units  Biguanide: Metformin 1000mg BID  GLP1-RA: dulaglutide 3mg   SGLT-2i: Empagliflozin 10 mg daily      Last A1c: 5/2023:  6.9, down from 8.7  Last Microalb/Cr ratio: 8/8/22, WNL  Last diabetic foot exam: 4/2022L  Last retinal eye exam:  8/2022  ACEi/ARB: Currently taking losartan 40 mg  "daily  Statin: simvastatin 20mg  Nightly foot checks: yes           Current Outpatient Medications   Medication Sig    SEMGLEE, YFGN, 100 UNIT/ML Solution Pen-injector Inject 32 Units under the skin at bedtime.    metoprolol SR (TOPROL XL) 25 MG TABLET SR 24 HR Take 1 Tablet by mouth every evening.    Empagliflozin (JARDIANCE) 10 MG Tab tablet Take 1 Tablet by mouth every day.    Dulaglutide (TRULICITY) 3 MG/0.5ML Solution Pen-injector Inject 0.5 mL under the skin every 7 days.    MAGNESIUM PO Take  by mouth 2 times a day.    sildenafil citrate (VIAGRA) 100 MG tablet Take 100 mg by mouth as needed.    simvastatin (ZOCOR) 20 MG Tab Take 1 Tablet by mouth every evening.    tamsulosin (FLOMAX) 0.4 MG capsule Take 1 Capsule by mouth every day.    rivaroxaban (XARELTO) 20 MG Tab tablet Take 1 Tablet by mouth with dinner.    metformin (GLUCOPHAGE) 1000 MG tablet Take 1 Tablet by mouth 2 times a day.    gabapentin (NEURONTIN) 300 MG Cap Take 1 Capsule by mouth 4 times a day. (Patient taking differently: Take 600 mg by mouth 2 times a day.)    omeprazole (PRILOSEC) 20 MG delayed-release capsule Take 1 Capsule by mouth every day.    POTASSIUM PO Take 5 mg by mouth as needed.    B Complex Vitamins (B COMPLEX PO) Take  by mouth 2 (two) times a day.          ROS: See HPI        Objective:     Exam: BP 98/60 (BP Location: Left arm, Patient Position: Sitting, BP Cuff Size: Adult long)   Pulse 95   Temp 36.1 °C (97 °F) (Temporal)   Resp 18   Ht 1.803 m (5' 11\")   Wt 103 kg (228 lb)   SpO2 96%   BMI 31.80 kg/m²   Body mass index is 31.8 kg/m².    Physical Exam  Constitutional:       Appearance: Normal appearance.   Eyes:      Extraocular Movements: Extraocular movements intact.   Cardiovascular:      Rate and Rhythm: Normal rate and regular rhythm.   Pulmonary:      Effort: Pulmonary effort is normal. No respiratory distress.      Breath sounds: Normal breath sounds.   Neurological:      Mental Status: He is alert.          "       Assessment & Plan:     73 y.o. male with the following -     1. Type 2 diabetes mellitus with diabetic neuropathy (HCC)  Chronic, controlled.  Hemoglobin A1c is under 7.0%.  His symptoms do not seem to be related to hypoglycemia.  He is up-to-date with all of diabetic screenings.   We will continue the current regimen.   - Diabetic Monofilament LE Exam  - SEMGLEE, YFGN, 100 UNIT/ML Solution Pen-injector; Inject 32 Units under the skin at bedtime.  Dispense: 15 mL; Refill: 3    2. Right foot pain  -Chronic problem with continued unknown etiology despite MRI and seeing podiatry.  -Refer him to orthopedic foot and ankle specialist Dr. Aviles.   - Referral to Orthopedics    3. ACC/AHA stage C systolic heart failure (HCC)  -Chronic problem.  Having significant side effects of heart failure treatment.  Patient having significant dizziness, fatigue, and brain fog in the morning after taking his medication.  - Patient and wife could not remember when he started to have the lightheadedness and dizziness in relation to starting the Jardiance.  He definitely had dizziness with Entresto and is now continuing to have low BP and dizziness with valsartan.  -Recommended stopping valsartan and notifying his cardiologist Dr. Leonard of this change.  -Reiterated importance of keeping journal of his symptoms and medication changes as well as his blood pressure and weights.        Return in about 3 months (around 10/3/2023) for DM, A1C.    Please note that this dictation was created using voice recognition software. I have made every reasonable attempt to correct obvious errors, but I expect that there are errors of grammar and possibly content that I did not discover before finalizing the note.

## 2023-07-07 ENCOUNTER — PRE-ADMISSION TESTING (OUTPATIENT)
Dept: ADMISSIONS | Facility: MEDICAL CENTER | Age: 73
End: 2023-07-07
Attending: INTERNAL MEDICINE
Payer: MEDICARE

## 2023-07-07 DIAGNOSIS — Z01.812 PRE-OPERATIVE LABORATORY EXAMINATION: ICD-10-CM

## 2023-07-07 LAB
ANION GAP SERPL CALC-SCNC: 11 MMOL/L (ref 7–16)
BUN SERPL-MCNC: 14 MG/DL (ref 8–22)
CALCIUM SERPL-MCNC: 9.2 MG/DL (ref 8.5–10.5)
CHLORIDE SERPL-SCNC: 103 MMOL/L (ref 96–112)
CO2 SERPL-SCNC: 26 MMOL/L (ref 20–33)
CREAT SERPL-MCNC: 0.8 MG/DL (ref 0.5–1.4)
ERYTHROCYTE [DISTWIDTH] IN BLOOD BY AUTOMATED COUNT: 47.4 FL (ref 35.9–50)
GFR SERPLBLD CREATININE-BSD FMLA CKD-EPI: 94 ML/MIN/1.73 M 2
GLUCOSE SERPL-MCNC: 131 MG/DL (ref 65–99)
HCT VFR BLD AUTO: 42.7 % (ref 42–52)
HGB BLD-MCNC: 14.1 G/DL (ref 14–18)
INR PPP: 1.49 (ref 0.87–1.13)
MCH RBC QN AUTO: 31 PG (ref 27–33)
MCHC RBC AUTO-ENTMCNC: 33 G/DL (ref 32.3–36.5)
MCV RBC AUTO: 93.8 FL (ref 81.4–97.8)
PLATELET # BLD AUTO: 209 K/UL (ref 164–446)
PMV BLD AUTO: 10.3 FL (ref 9–12.9)
POTASSIUM SERPL-SCNC: 4.8 MMOL/L (ref 3.6–5.5)
PROTHROMBIN TIME: 17.8 SEC (ref 12–14.6)
RBC # BLD AUTO: 4.55 M/UL (ref 4.7–6.1)
SODIUM SERPL-SCNC: 140 MMOL/L (ref 135–145)
WBC # BLD AUTO: 5.8 K/UL (ref 4.8–10.8)

## 2023-07-07 PROCEDURE — 80048 BASIC METABOLIC PNL TOTAL CA: CPT

## 2023-07-07 PROCEDURE — 85610 PROTHROMBIN TIME: CPT

## 2023-07-07 PROCEDURE — 85027 COMPLETE CBC AUTOMATED: CPT

## 2023-07-07 PROCEDURE — 36415 COLL VENOUS BLD VENIPUNCTURE: CPT

## 2023-07-10 ENCOUNTER — HOSPITAL ENCOUNTER (OUTPATIENT)
Facility: MEDICAL CENTER | Age: 73
End: 2023-07-10
Attending: INTERNAL MEDICINE | Admitting: INTERNAL MEDICINE
Payer: MEDICARE

## 2023-07-10 ENCOUNTER — APPOINTMENT (OUTPATIENT)
Dept: CARDIOLOGY | Facility: MEDICAL CENTER | Age: 73
End: 2023-07-10
Attending: INTERNAL MEDICINE
Payer: MEDICARE

## 2023-07-10 ENCOUNTER — TELEPHONE (OUTPATIENT)
Dept: CARDIOLOGY | Facility: MEDICAL CENTER | Age: 73
End: 2023-07-10

## 2023-07-10 VITALS
DIASTOLIC BLOOD PRESSURE: 65 MMHG | BODY MASS INDEX: 32.28 KG/M2 | OXYGEN SATURATION: 95 % | HEART RATE: 70 BPM | WEIGHT: 230.6 LBS | TEMPERATURE: 97.2 F | RESPIRATION RATE: 18 BRPM | SYSTOLIC BLOOD PRESSURE: 106 MMHG | HEIGHT: 71 IN

## 2023-07-10 DIAGNOSIS — I50.20 ACC/AHA STAGE C SYSTOLIC HEART FAILURE (HCC): Chronic | ICD-10-CM

## 2023-07-10 DIAGNOSIS — I48.20 CHRONIC ATRIAL FIBRILLATION (HCC): ICD-10-CM

## 2023-07-10 LAB
EKG IMPRESSION: NORMAL
EKG IMPRESSION: NORMAL
GLUCOSE BLD STRIP.AUTO-MCNC: 146 MG/DL (ref 65–99)
INR PPP: 1.19 (ref 0.87–1.13)
PROTHROMBIN TIME: 15 SEC (ref 12–14.6)

## 2023-07-10 PROCEDURE — 93325 DOPPLER ECHO COLOR FLOW MAPG: CPT

## 2023-07-10 PROCEDURE — 82962 GLUCOSE BLOOD TEST: CPT

## 2023-07-10 PROCEDURE — 700105 HCHG RX REV CODE 258: Mod: JZ | Performed by: INTERNAL MEDICINE

## 2023-07-10 PROCEDURE — 93312 ECHO TRANSESOPHAGEAL: CPT | Mod: 26 | Performed by: INTERNAL MEDICINE

## 2023-07-10 PROCEDURE — 85610 PROTHROMBIN TIME: CPT

## 2023-07-10 PROCEDURE — 160035 HCHG PACU - 1ST 60 MINS PHASE I

## 2023-07-10 PROCEDURE — 99152 MOD SED SAME PHYS/QHP 5/>YRS: CPT

## 2023-07-10 PROCEDURE — 92960 CARDIOVERSION ELECTRIC EXT: CPT | Performed by: INTERNAL MEDICINE

## 2023-07-10 PROCEDURE — 160002 HCHG RECOVERY MINUTES (STAT)

## 2023-07-10 PROCEDURE — 93010 ELECTROCARDIOGRAM REPORT: CPT | Mod: 59,76 | Performed by: INTERNAL MEDICINE

## 2023-07-10 PROCEDURE — 700111 HCHG RX REV CODE 636 W/ 250 OVERRIDE (IP): Mod: JZ | Performed by: INTERNAL MEDICINE

## 2023-07-10 PROCEDURE — 93005 ELECTROCARDIOGRAM TRACING: CPT | Performed by: INTERNAL MEDICINE

## 2023-07-10 PROCEDURE — 160046 HCHG PACU - 1ST 60 MINS PHASE II

## 2023-07-10 PROCEDURE — 36415 COLL VENOUS BLD VENIPUNCTURE: CPT

## 2023-07-10 RX ORDER — DOCUSATE SODIUM 100 MG/1
800 CAPSULE, LIQUID FILLED ORAL 2 TIMES DAILY
COMMUNITY

## 2023-07-10 RX ORDER — SODIUM CHLORIDE 9 MG/ML
500 INJECTION, SOLUTION INTRAVENOUS
Status: DISCONTINUED | OUTPATIENT
Start: 2023-07-10 | End: 2023-07-10 | Stop reason: HOSPADM

## 2023-07-10 RX ORDER — SODIUM CHLORIDE, SODIUM LACTATE, POTASSIUM CHLORIDE, CALCIUM CHLORIDE 600; 310; 30; 20 MG/100ML; MG/100ML; MG/100ML; MG/100ML
INJECTION, SOLUTION INTRAVENOUS CONTINUOUS
Status: DISCONTINUED | OUTPATIENT
Start: 2023-07-10 | End: 2023-07-10 | Stop reason: HOSPADM

## 2023-07-10 RX ORDER — MIDAZOLAM HYDROCHLORIDE 1 MG/ML
.5-2 INJECTION INTRAMUSCULAR; INTRAVENOUS PRN
Status: DISCONTINUED | OUTPATIENT
Start: 2023-07-10 | End: 2023-07-10 | Stop reason: HOSPADM

## 2023-07-10 RX ORDER — AMIODARONE HYDROCHLORIDE 200 MG/1
200 TABLET ORAL DAILY
Qty: 60 TABLET | Refills: 11 | Status: SHIPPED | OUTPATIENT
Start: 2023-07-10 | End: 2023-10-09

## 2023-07-10 RX ADMIN — FENTANYL CITRATE 50 MCG: 50 INJECTION, SOLUTION INTRAMUSCULAR; INTRAVENOUS at 09:59

## 2023-07-10 RX ADMIN — MIDAZOLAM HYDROCHLORIDE 2 MG: 2 INJECTION, SOLUTION INTRAMUSCULAR; INTRAVENOUS at 09:59

## 2023-07-10 RX ADMIN — MIDAZOLAM HYDROCHLORIDE 1 MG: 2 INJECTION, SOLUTION INTRAMUSCULAR; INTRAVENOUS at 10:03

## 2023-07-10 RX ADMIN — SODIUM CHLORIDE, POTASSIUM CHLORIDE, SODIUM LACTATE AND CALCIUM CHLORIDE: 600; 310; 30; 20 INJECTION, SOLUTION INTRAVENOUS at 09:45

## 2023-07-10 ASSESSMENT — FIBROSIS 4 INDEX: FIB4 SCORE: 1.66

## 2023-07-10 NOTE — OR NURSING
1021: Pt arrived from OR, handoff received from anesthesiologist and RN. Patient asleep, on 8L via mask, breathing even and unlabored. Vitals stable. Appears to be in afib rhythm, rate 70-80s. EKG notified of order.     1040: EKG at bedside. Patient more awake, denies pain and nausea.     1115: Patient's wife updated on status, communicated the request for phone call from doctor To. Patient tolerating oral intake of fluids.     1130: Per doctor To, prescription for amiodarone sent to pharmacy. Will call family when able to.     1145: Report given to phase 2 RNYuliana.

## 2023-07-10 NOTE — DISCHARGE INSTRUCTIONS
Electrical Cardioversion, Care After  This sheet gives you information about how to care for yourself after your procedure. Your health care provider may also give you more specific instructions. If you have problems or questions, contact your health care provider.  What can I expect after the procedure?  After the procedure, it is common to have:  Some redness on the skin where the shocks were given.  Follow these instructions at home:  Do not drive for 24 hours if you were given a medicine to help you relax (sedative).  Take over-the-counter and prescription medicines only as told by your health care provider.  Ask your health care provider how to check your pulse. Check it often.  Rest for 48 hours after the procedure or as told by your health care provider.  Avoid or limit your caffeine use as told by your health care provider.  Contact a health care provider if:  You feel like your heart is beating too quickly or your pulse is not regular.  You have a serious muscle cramp that does not go away.  Get help right away if:  You have discomfort in your chest.  You are dizzy or you feel faint.  You have trouble breathing or you are short of breath.  Your speech is slurred.  You have trouble moving an arm or leg on one side of your body.  Your fingers or toes turn cold or blue.  This information is not intended to replace advice given to you by your health care provider. Make sure you discuss any questions you have with your health care provider.  ENDOSCOPY HOME CARE INSTRUCTIONS    IRENE - TRANSESOPHAGEAL ECHOCARDIOGRAM  1. Don't drive or drink alcohol for 24 hours. The medication you received will make you too drowsy.  2. If you begin to vomit bloody material, or develop black or bloody stools, call your doctor as soon as possible.  3. If you have any neck, chest, abdominal pain or temp of 100 degrees, call your doctor.      I acknowledge receipt and understanding of these Home Care Instructions.

## 2023-07-10 NOTE — PROCEDURES
Patient was brought to cath lab holding.  Consent was obtained. Risks and benefits of procedures were explained.    Anesthesia was used for sedation process.    Indication: persistent atrial fibrillation. To restore sinus rhythm.    Complication: none    Diagnosis: no evidence of left atrial appendage thrombus.     Cardioversion was not successful.      Opal Leonard MD.

## 2023-07-10 NOTE — OR NURSING
1151H Received patient from pacu , on a gurney alert and oriented x 4 not in any distress denies any discomfort with slight redness noted on the chest mid area . No burn skin noted.   V/s staken - stable still on afib   1200H went to the restroom to urinate ambulatory with steady gait.   1209H wife at [facility] at bedside d/c instructions given.  1210H discharge

## 2023-07-10 NOTE — PROCEDURES
Electrical Cardioversion    Date/Time: 7/10/2023 10:21 AM    Performed by: Opal Leonard M.D.  Authorized by: Opal Leonard M.D.    Consent:     Consent obtained:  Written and verbal    Consent given by:  Patient    Risks discussed:  Death, cutaneous burn, induced arrhythmia and pain    Alternatives discussed:  No treatment, rate-control medication, alternative treatment and anti-coagulation medication  Sedation:     Patient sedated: Yes      Sedation type:  Moderate (conscious) sedation    Sedation:  Versed 3mg and Fentanyl 50mcg    Sedation start:  7/10/2023 9:59 AM    Sedation end:  7/10/2023 10:09 AM    Vital signs: Vital signs monitored during sedation    Pre-procedure details:     Cardioversion basis:  Elective    Rhythm:  Atrial fibrillation    Electrode placement:  Anterior-posterior  Attempt one:     Cardioversion mode:  Synchronous    Shock (Joules):  200    Shock outcome:  No change in rhythm  Post-procedure details:     Patient status:  Awake    Patient tolerance of procedure:  Tolerated well, no immediate complications  Comments:      Opal Leonard M.D.

## 2023-07-10 NOTE — H&P
Cardiology Note:    Opal Leonard M.D.  Date & Time note created:    7/10/2023   9:37 AM         Patient ID:   Name:             Richard Chery   YOB: 1950  Age:                 73 y.o.  male   MRN:               [id number]                                                             Chief Complaint / Reason for consult:  Atrial fibrillation.    History of Present Illness:    This is a 73-year-old man with LV dysfunction and atrial fibrillation.  Patient is here for elective cardioversion.    I personally interpreted the EKG tracing which shows atrial fibrillation with controlled rate.    Review of Systems:      Constitutional: Denies fevers, Denies weight changes  Eyes: Denies changes in vision, no eye pain  Ears/Nose/Throat/Mouth: Denies nasal congestion or sore throat   Cardiovascular: no chest pain, no palpitations   Respiratory: no shortness of breath , Denies cough  Gastrointestinal/Hepatic: Denies abdominal pain, nausea, vomiting, diarrhea, constipation or GI bleeding   Genitourinary: Denies dysuria or frequency  Musculoskeletal/Rheum: Denies  joint pain and swelling   Skin: Denies rash  Neurological: Denies headache, confusion, memory loss or focal weakness/parasthesias  Psychiatric: denies mood disorder   Endocrine: Padmini thyroid problems  Heme/Oncology/Lymph Nodes: Denies enlarged lymph nodes, denies brusing or known bleeding disorder  All other systems were reviewed and are negative (AMA/CMS criteria)                Past Medical History:   Past Medical History:   Diagnosis Date    Alcohol use 02/10/2016    3 per day    Breath shortness 06/15/2023    at times    Dental disorder 06/15/2023    full upper and lower plates    Diabetes 06/15/2023    insulin and oral agent    Elevated INR 07/30/2019    Heart burn 06/15/2023    medicated    Hemorrhagic disorder (HCC) 06/15/2023    xarelto    Hiatus hernia syndrome     High cholesterol 06/15/2023    medicated    Observed sleep apnea  06/15/2023    appt coming up for sleep study    Other persistent atrial fibrillation (HCC) 12/19/2019    Pain 06/15/2023    general body aches    Paroxysmal atrial fibrillation (HCC) 06/15/2023    medicated    Pneumonia 06/15/2023    history of    Pulmonary hypertension (HCC) 03/01/2016    Snoring     Urinary retention 07/30/2019     Active Hospital Problems    Diagnosis     Chronic atrial fibrillation (HCC) [I48.20]      Priority: Medium    Left ventricular systolic dysfunction, NYHA class 2 [I51.89]        Past Surgical History:  Past Surgical History:   Procedure Laterality Date    CYSTOSCOPY N/A 7/30/2019    Procedure: CYSTOSCOPY- DILATION OF STRICTURE;  Surgeon: Anthony Manning M.D.;  Location: [facility];  Service: Urology    KNEE ARTHROPLASTY TOTAL Right 8/15/2018    Procedure: KNEE ARTHROPLASTY TOTAL;  Surgeon: Amparo James M.D.;  Location: [facility];  Service: Orthopedics    KNEE ARTHROPLASTY TOTAL Left 11/21/2016    Procedure: KNEE ARTHROPLASTY TOTAL;  Surgeon: Amparo James M.D.;  Location: [facility];  Service:     KNEE REPLACEMENT, TOTAL Left 11/2016    Dr. James    KNEE ARTHROSCOPY Left 2/27/2016    Procedure: KNEE ARTHROSCOPY- I&D KNEE;  Surgeon: Corby Reyes M.D.;  Location: [facility];  Service:     KNEE ARTHROSCOPY Left 2/18/2016    Procedure: KNEE ARTHROSCOPY, SAMUELS;  Surgeon: Marquise Cline M.D.;  Location: [facility];  Service:     MENISCECTOMY, KNEE, MEDIAL  2/18/2016    Procedure: MEDIAL MENISCECTOMY - PARTIAL;  Surgeon: Marquise Cline M.D.;  Location: [facility];  Service:     CYSTOSCOPY  1986    DENTAL EXTRACTION(S)  1976    wisdom teeth    HAND SURGERY Left 1970    trauma, amputation index and middle finger    TONSILLECTOMY  as child       Hospital Medications:    Current Facility-Administered Medications:     lidocaine (Xylocaine) 1 % injection 0.5 mL, 0.5 mL, Intradermal, Once PRN,  Opal Leonard M.D.    lactated ringers infusion, , Intravenous, Continuous, Opal Leonard M.D., Last Rate: 10 mL/hr at 07/10/23 0945, New Bag at 07/10/23 0945    Current Outpatient Medications:  Medications Prior to Admission   Medication Sig Dispense Refill Last Dose    docusate sodium (STOOL SOFTENER) 100 MG Cap Take 800 mg by mouth 2 times a day.   7/10/2023 at am    SEMGLEE, YFGN, 100 UNIT/ML Solution Pen-injector Inject 32 Units under the skin at bedtime. 15 mL 3 7/9/2023 at pm    metoprolol SR (TOPROL XL) 25 MG TABLET SR 24 HR Take 1 Tablet by mouth every evening. 90 Tablet 3 7/10/2023 at am    Empagliflozin (JARDIANCE) 10 MG Tab tablet Take 1 Tablet by mouth every day. 90 Tablet 3 7/6/2023 at held    Dulaglutide (TRULICITY) 3 MG/0.5ML Solution Pen-injector Inject 0.5 mL under the skin every 7 days. 2 mL 6 7/8/2023 at qsat    MAGNESIUM PO Take  by mouth 2 times a day.   7/10/2023 at am    sildenafil citrate (VIAGRA) 100 MG tablet Take 100 mg by mouth as needed.   couple a weeks ago    simvastatin (ZOCOR) 20 MG Tab Take 1 Tablet by mouth every evening. 100 Tablet 3 7/9/2023 at pm    tamsulosin (FLOMAX) 0.4 MG capsule Take 1 Capsule by mouth every day. 90 Capsule 3 7/10/2023 at am    rivaroxaban (XARELTO) 20 MG Tab tablet Take 1 Tablet by mouth with dinner. 90 Tablet 2 7/9/2023 at 1700    metformin (GLUCOPHAGE) 1000 MG tablet Take 1 Tablet by mouth 2 times a day. 200 Tablet 3 7/6/2023 at held    gabapentin (NEURONTIN) 300 MG Cap Take 1 Capsule by mouth 4 times a day. (Patient taking differently: Take 600 mg by mouth 2 times a day.) 360 Capsule 3 7/10/2023 at am    omeprazole (PRILOSEC) 20 MG delayed-release capsule Take 1 Capsule by mouth every day. 90 Capsule 3 7/10/2023 at am    POTASSIUM PO Take 5 mg by mouth as needed.   7/9/2023 at pm    B Complex Vitamins (B COMPLEX PO) Take  by mouth 2 (two) times a day.   7/9/2023 at pm       Medication Allergy:  Allergies   Allergen Reactions    Melatonin  Vomiting       Family History:  Family History   Problem Relation Age of Onset    Diabetes Other     Hypertension Other        Social History:  Social History     Socioeconomic History    Marital status:      Spouse name: Not on file    Number of children: Not on file    Years of education: Not on file    Highest education level: Associate degree: occupational, technical, or vocational program   Occupational History    Not on file   Tobacco Use    Smoking status: Never    Smokeless tobacco: Never   Vaping Use    Vaping Use: Never used   Substance and Sexual Activity    Alcohol use: Not Currently     Alcohol/week: 8.4 oz     Types: 14 Shots of liquor per week     Comment: 2 oz daily    Drug use: No    Sexual activity: Yes     Partners: Female   Other Topics Concern    Not on file   Social History Narrative    Not on file     Social Determinants of Health     Financial Resource Strain: Low Risk  (12/13/2022)    Overall Financial Resource Strain (CARDIA)     Difficulty of Paying Living Expenses: Not hard at all   Food Insecurity: No Food Insecurity (12/13/2022)    Hunger Vital Sign     Worried About Running Out of Food in the Last Year: Never true     Ran Out of Food in the Last Year: Never true   Transportation Needs: No Transportation Needs (12/13/2022)    PRAPARE - Transportation     Lack of Transportation (Medical): No     Lack of Transportation (Non-Medical): No   Physical Activity: Sufficiently Active (12/13/2022)    Exercise Vital Sign     Days of Exercise per Week: 5 days     Minutes of Exercise per Session: 30 min   Stress: Unknown (12/13/2022)    [employer] [place] [employer] - Occupational Stress Questionnaire     Feeling of Stress : Patient refused   Social Connections: Socially Isolated (12/13/2022)    Social Connection and Isolation Panel [NHANES]     Frequency of Communication with Friends and Family: Once a week     Frequency of Social Gatherings with Friends and Family: Once a week     " Attends Baptism Services: Never     Active Member of Clubs or Organizations: No     Attends Club or Organization Meetings: Never     Marital Status:    Intimate Partner Violence: Not on file   Housing Stability: Low Risk  (12/13/2022)    Housing Stability Vital Sign     Unable to Pay for Housing in the Last Year: No     Number of Places Lived in the Last Year: 1     Unstable Housing in the Last Year: No         Physical Exam:  Vitals/ General Appearance:   Weight/BMI: Body mass index is 32.16 kg/m².  /78   Pulse 68   Temp 36.1 °C (97 °F) (Temporal)   Resp 20   Ht 1.803 m (5' 11\")   Wt 105 kg (230 lb 9.6 oz)   SpO2 96%   Vitals:    07/10/23 0731   BP: 115/78   Pulse: 68   Resp: 20   Temp: 36.1 °C (97 °F)   TempSrc: Temporal   SpO2: 96%   Weight: 105 kg (230 lb 9.6 oz)   Height: 1.803 m (5' 11\")     Oxygen Therapy:  Pulse Oximetry: 96 %, O2 Delivery Device: None - Room Air    Constitutional:   No acute distress  HENMT:  Normocephalic, Atraumatic.  Eyes:  EOMI, No discharge.  Neck:  no JVD.  Cardiovascular:  Normal heart rate, There is presence of an irregularly irregular heartbeats. Extremities with intact distal pulses, no cyanosis, or edema.  Lungs:  No respiratory distress.  Abdomen: Soft, No tenderness, No guarding, No rebound.  Skin: No significant rash.  Neurologic: Alert & oriented x 3, No focal deficits noted, cranial nerves II through X are intact.  Psychiatric: Affect normal, Judgment normal, Mood normal.      MDM (Data Review):     Records reviewed and summarized in current documentation    Lab Data Review:  Recent Results (from the past 24 hour(s))   Prothrombin Time (INR) (plasma)    Collection Time: 07/10/23  8:03 AM   Result Value Ref Range    PT 15.0 (H) 12.0 - 14.6 sec    INR 1.19 (H) 0.87 - 1.13   POCT glucose device results    Collection Time: 07/10/23  8:12 AM   Result Value Ref Range    POC Glucose, Blood 146 (H) 65 - 99 mg/dL   ECG    Collection Time: 07/10/23  8:25 AM "   Result Value Ref Range    Report       [facility]    Test Date:  2023-07-10  Pt Name:    ODALYS WESTON               Department: [facility]  MRN:        [id number]                      [facility]  Gender:     Male                         Technician: KRISTYN  :        1950                   Requested By:TANI LEONARD  Order #:    [id number]                    Reading MD:    Measurements  Intervals                                Axis  Rate:       88                           P:          0  MI:         0                            QRS:        45  QRSD:       85                           T:          43  QT:         395  QTc:        478    Interpretive Statements  Atrial fibrillation  Abnormal R-wave progression, early transition  Borderline prolonged QT interval  Compared to ECG 2023 07:55:53  No significant changes         Imaging/Procedures Review:    Chest Xray:  Reviewed    EKG:   As in HPI.     MDM (Assessment and Plan):     Active Hospital Problems    Diagnosis     Chronic atrial fibrillation (HCC) [I48.20]      Priority: Medium    Left ventricular systolic dysfunction, NYHA class 2 [I51.89]          Risks and benefits of transesophageal echocardiogram were explained to patient.  Patient showed good understanding.  Risks including esophageal perforation, death, bleeding, infection were clearly conveyed to patient.  Patient is willing to accept the risks and proceed with procedure.  Elective cardioversion.        Tani Leonard MD.   Cardiology Inpatient Service.  [facility].  [phone].  [place], [place].

## 2023-07-12 ENCOUNTER — TELEPHONE (OUTPATIENT)
Dept: MEDICAL GROUP | Facility: MEDICAL CENTER | Age: 73
End: 2023-07-12
Payer: MEDICARE

## 2023-07-12 NOTE — TELEPHONE ENCOUNTER
Spoke to pt 7/12/23 he said he will book the appointment with DM nurse and bria when he is ready. And he said he feels great not taking the valstartan and he said he checks his BP when he feels like it and its been okay.

## 2023-07-12 NOTE — TELEPHONE ENCOUNTER
----- Message from BROOKE Rice sent at 7/3/2023 12:14 PM PDT -----  Please call patient to schedule a 3-month follow-up for diabetes w/ the diabetes nurse educator and myself.  Also, please ask for blood pressure readings and see how he is feeling since stopping the valsartan.  Thank you

## 2023-07-20 ENCOUNTER — TELEPHONE (OUTPATIENT)
Dept: CARDIOLOGY | Facility: MEDICAL CENTER | Age: 73
End: 2023-07-20

## 2023-07-20 NOTE — TELEPHONE ENCOUNTER
Patient states he could not tolerate the Amiodarone. He states it was making him dizzy and lightheaded, his BP was normal when he would experience these symptoms.  Patient states he feels good now that he is off the medication, he is going in and out of afib but patient states rate has been normal and he has no symptoms, BP has been high 90s over 50s.     Please let me know if you have any recommendations. Thank you.

## 2023-07-20 NOTE — TELEPHONE ENCOUNTER
TT    Caller: Grisel- Spouse    Topic/issue: Pt took himself off his amiodarone within the first week of coming out of the hospital.  He was getting lightheaded and dizzy. His BP is 94/57. He is eating well and no other symptoms. They will keep an eye on it.     Callback Number: [phone] (home) [phone] (work)     Thank You    Tricia PERKINS

## 2023-08-16 NOTE — PROGRESS NOTES
"----- Message from iTna Gilliland sent at 8/15/2023  4:08 PM CDT -----  Type:  Patient Returning Call    Who Called:  pt's wife Elena   Who Left Message for Patient:  Taylor   Does the patient know what this is regarding?:  yes   Best Call Back Number:  [phone]    Additional Information:  pt's wife asking for status update in regards to pt's rx  making pt "Short winded"  please advise asap thanks!      " [facility]  Progress Note  Established Patient    Subjective:   Richard Chery is a 68 y.o. male here today with a chief complaint of neuropathy. The patient is alone.     Neuropathy (HCC)  The patient has diabetic peripheral neuropathy. He is maintained on gabapentin but has been using this more frequently lately because his symptoms have worsened. He notes sharp, shooting pains in his bilateral feet, worse in the right foot. Gabapentin does help. Past TSH, Hep C and Hep B testing was normal. He has used up to 5 tablets of gabapentin per day.     Hyperlipidemia  Patient's maintained on fenofibrate. He has declined statin therapy in the past.    Essential hypertension  Patient's blood pressure is at goal on his current medication regimen.    Type 2 diabetes mellitus with diabetic neuropathy  Patient is maintained on Januvia, metformin and 32 units of Lantus at night for his diabetes. His last A1c was 7.2.     GERD (gastroesophageal reflux disease)  The patient's reflux is controlled with omeprazole, the patient requests a refill.      Current Outpatient Prescriptions on File Prior to Visit   Medication Sig Dispense Refill   • acetaminophen (TYLENOL) 500 MG Tab Take 1 Tab by mouth every 6 hours. 30 Tab 0     No current facility-administered medications on file prior to visit.        Past Medical History:   Diagnosis Date   • Alcohol use 2/10/2016    3 per day   • Arthritis 2016    left knee   • Dental disorder     upper lower dentures   • Diabetes 2005    insulin and oral agent   • Heart burn    • Hiatus hernia syndrome    • High cholesterol    • Hypertension    • Observed sleep apnea     no study done yet   • Pain     left knee   • Paroxysmal atrial fibrillation (HCC) 2/27/2016 11/14/16-resolved now   • Snoring        Allergies: Patient has no known allergies.    Surgical History:  has a past surgical history that includes hand surgery (Left, 1970); cystoscopy (1986); tonsillectomy (as child);  "dental extraction(s) (1976); knee arthroscopy (Left, 2/18/2016); medial meniscectomy (2/18/2016); knee arthroscopy (Left, 2/27/2016); knee arthroplasty total (Left, 11/21/2016); knee replacement, total (Left, 11/2016); and knee arthroplasty total (Right, 8/15/2018).    Family History: family history includes Diabetes in his unknown relative; Hypertension in his unknown relative.    Social History:  reports that he has never smoked. He has never used smokeless tobacco. He reports that he drinks about 1.8 oz of alcohol per week . He reports that he does not use drugs.    ROS: no CP or SOB.        Objective:     Vitals:    09/12/18 1316   BP: 118/56   Pulse: 76   Resp: 20   Temp: 36.3 °C (97.3 °F)   SpO2: 95%   Weight: 108.9 kg (240 lb)   Height: 1.778 m (5' 10\")       Physical Exam:  General: alert in no apparent distress.     Monofilament testing with a 10 gram force: sensation intact: intact bilaterally  Visual Inspection: Feet without maceration, ulcers, fissures.  Pedal pulses: intact bilaterally      Assessment and Plan:     1. Neuropathy (HCC)  Increase gabapentin to 600 BID, counseled on risk of sedation. PRN capsaicin.   - gabapentin (NEURONTIN) 300 MG Cap; Take 2 Caps by mouth 2 Times a Day.  Dispense: 360 Cap; Refill: 4  - capsicum (ZOSTRIX) 0.075 % topical cream; Apply a thin layer to skin QID PRN nerve pain.  Dispense: 1 Tube; Refill: 3    2. Type 2 diabetes mellitus with diabetic neuropathy, with long-term current use of insulin (HCC)  This is an established and stable problem..   - continue current regimen.     3. Essential hypertension  This is an established and stable problem..   - continue current regimen.     4. Dyslipidemia  Patient declines statin.   - fenofibrate (TRIGLIDE) 160 MG tablet; Take 1 Tab by mouth every day.  Dispense: 90 Tab; Refill: 4    5. Gastroesophageal reflux disease without esophagitis  - omeprazole (PRILOSEC) 20 MG delayed-release capsule; Take 1 Cap by mouth every day.  " Dispense: 90 Cap; Refill: 4    6. Need for vaccination  - INFLUENZA VACCINE, HIGH DOSE (65+ ONLY)          Followup: Return in about 3 months (around 12/12/2018), or if symptoms worsen or fail to improve, for DM.

## 2023-08-27 DIAGNOSIS — Z79.4 TYPE 2 DIABETES MELLITUS WITH DIABETIC NEUROPATHY, WITH LONG-TERM CURRENT USE OF INSULIN (HCC): ICD-10-CM

## 2023-08-27 DIAGNOSIS — E11.40 TYPE 2 DIABETES MELLITUS WITH DIABETIC NEUROPATHY, WITH LONG-TERM CURRENT USE OF INSULIN (HCC): ICD-10-CM

## 2023-08-30 RX ORDER — DULAGLUTIDE 3 MG/.5ML
0.5 INJECTION, SOLUTION SUBCUTANEOUS
Qty: 2 ML | Refills: 6 | Status: SHIPPED | OUTPATIENT
Start: 2023-08-30 | End: 2024-02-28

## 2023-09-20 ENCOUNTER — NON-PROVIDER VISIT (OUTPATIENT)
Dept: MEDICAL GROUP | Facility: MEDICAL CENTER | Age: 73
End: 2023-09-20
Payer: MEDICARE

## 2023-09-20 DIAGNOSIS — Z23 NEED FOR VACCINATION: ICD-10-CM

## 2023-09-20 PROCEDURE — 90662 IIV NO PRSV INCREASED AG IM: CPT | Performed by: BEHAVIOR ANALYST

## 2023-09-20 PROCEDURE — G0008 ADMIN INFLUENZA VIRUS VAC: HCPCS | Performed by: BEHAVIOR ANALYST

## 2023-09-20 NOTE — PROGRESS NOTES
"Richard Chery is a 73 y.o. male here for a non-provider visit for:   FLU    Reason for immunization: Annual Flu Vaccine   Immunization records indicate need for vaccine: Yes, confirmed with Epic  Minimum interval has been met for this vaccine: Yes  ABN completed: Yes    VIS Dated  09/20/2023 was given to patient: Yes  All IAC Questionnaire questions were answered \"No.\"    Patient tolerated injection and no adverse effects were observed or reported: Yes    Pt scheduled for next dose in series: Not Indicated    Richard Chery is a 73 y.o. male here for a non-provider visit for:   TDAP    Reason for immunization: Overdue/Provider Recommended  Immunization records indicate need for vaccine: Yes, confirmed with Epic  Minimum interval has been met for this vaccine: Yes  ABN completed: Yes    VIS Dated  09/20/2023 was given to patient: Yes  All IAC Questionnaire questions were answered \"No.\"    Patient tolerated injection and no adverse effects were observed or reported: Yes    Pt scheduled for next dose in series: Not Indicated   "

## 2023-09-20 NOTE — PROGRESS NOTES
"Richard Chery is a 73 y.o. male here for a non-provider visit for:   FLU    Reason for immunization: Annual Flu Vaccine  Immunization records indicate need for vaccine: Yes, confirmed with Epic  Minimum interval has been met for this vaccine: Yes  ABN completed: Yes    VIS Dated  09/20/2023 was given to patient: Yes  All IAC Questionnaire questions were answered \"No.\"    Patient tolerated injection and no adverse effects were observed or reported: Yes    Pt scheduled for next dose in series: Not Indicated  "

## 2023-09-25 ENCOUNTER — HOSPITAL ENCOUNTER (OUTPATIENT)
Dept: CARDIOLOGY | Facility: MEDICAL CENTER | Age: 73
End: 2023-09-25
Attending: INTERNAL MEDICINE
Payer: MEDICARE

## 2023-09-25 DIAGNOSIS — R06.09 DYSPNEA ON EXERTION: ICD-10-CM

## 2023-09-25 DIAGNOSIS — I50.20 ACC/AHA STAGE C SYSTOLIC HEART FAILURE (HCC): ICD-10-CM

## 2023-09-25 DIAGNOSIS — I51.89 LEFT VENTRICULAR SYSTOLIC DYSFUNCTION, NYHA CLASS 2: ICD-10-CM

## 2023-09-25 PROCEDURE — 93306 TTE W/DOPPLER COMPLETE: CPT

## 2023-09-25 PROCEDURE — 700117 HCHG RX CONTRAST REV CODE 255: Performed by: INTERNAL MEDICINE

## 2023-09-25 RX ADMIN — HUMAN ALBUMIN MICROSPHERES AND PERFLUTREN 3 ML: 10; .22 INJECTION, SOLUTION INTRAVENOUS at 10:15

## 2023-10-09 ENCOUNTER — OFFICE VISIT (OUTPATIENT)
Dept: CARDIOLOGY | Facility: MEDICAL CENTER | Age: 73
End: 2023-10-09
Attending: INTERNAL MEDICINE
Payer: MEDICARE

## 2023-10-09 VITALS
HEIGHT: 71 IN | DIASTOLIC BLOOD PRESSURE: 68 MMHG | WEIGHT: 225 LBS | OXYGEN SATURATION: 96 % | HEART RATE: 83 BPM | BODY MASS INDEX: 31.5 KG/M2 | RESPIRATION RATE: 15 BRPM | SYSTOLIC BLOOD PRESSURE: 114 MMHG

## 2023-10-09 DIAGNOSIS — I51.89 LEFT VENTRICULAR SYSTOLIC DYSFUNCTION, NYHA CLASS 2: ICD-10-CM

## 2023-10-09 DIAGNOSIS — I48.21 PERMANENT ATRIAL FIBRILLATION (HCC): ICD-10-CM

## 2023-10-09 DIAGNOSIS — E11.65 TYPE 2 DIABETES MELLITUS WITH HYPERGLYCEMIA, WITH LONG-TERM CURRENT USE OF INSULIN (HCC): ICD-10-CM

## 2023-10-09 DIAGNOSIS — G47.33 OSA (OBSTRUCTIVE SLEEP APNEA): ICD-10-CM

## 2023-10-09 DIAGNOSIS — Z79.4 TYPE 2 DIABETES MELLITUS WITH HYPERGLYCEMIA, WITH LONG-TERM CURRENT USE OF INSULIN (HCC): ICD-10-CM

## 2023-10-09 DIAGNOSIS — I48.11 HYPERCOAGULABLE STATE DUE TO LONGSTANDING PERSISTENT ATRIAL FIBRILLATION (HCC): ICD-10-CM

## 2023-10-09 DIAGNOSIS — I48.11 LONGSTANDING PERSISTENT ATRIAL FIBRILLATION (HCC): ICD-10-CM

## 2023-10-09 DIAGNOSIS — I48.20 CHRONIC ATRIAL FIBRILLATION (HCC): ICD-10-CM

## 2023-10-09 DIAGNOSIS — Z79.899 HIGH RISK MEDICATION USE: ICD-10-CM

## 2023-10-09 DIAGNOSIS — I50.20 ACC/AHA STAGE C SYSTOLIC HEART FAILURE (HCC): ICD-10-CM

## 2023-10-09 DIAGNOSIS — Z79.01 CHRONIC ANTICOAGULATION: ICD-10-CM

## 2023-10-09 DIAGNOSIS — D68.69 HYPERCOAGULABLE STATE DUE TO LONGSTANDING PERSISTENT ATRIAL FIBRILLATION (HCC): ICD-10-CM

## 2023-10-09 PROCEDURE — 99214 OFFICE O/P EST MOD 30 MIN: CPT | Mod: 25 | Performed by: INTERNAL MEDICINE

## 2023-10-09 PROCEDURE — 99214 OFFICE O/P EST MOD 30 MIN: CPT | Performed by: INTERNAL MEDICINE

## 2023-10-09 PROCEDURE — 3074F SYST BP LT 130 MM HG: CPT | Performed by: INTERNAL MEDICINE

## 2023-10-09 PROCEDURE — 93005 ELECTROCARDIOGRAM TRACING: CPT | Performed by: INTERNAL MEDICINE

## 2023-10-09 PROCEDURE — 3078F DIAST BP <80 MM HG: CPT | Performed by: INTERNAL MEDICINE

## 2023-10-09 RX ORDER — METOPROLOL SUCCINATE 25 MG/1
25 TABLET, EXTENDED RELEASE ORAL EVERY EVENING
Qty: 90 TABLET | Refills: 3 | Status: SHIPPED | OUTPATIENT
Start: 2023-10-09

## 2023-10-09 RX ORDER — EMPAGLIFLOZIN 10 MG/1
10 TABLET, FILM COATED ORAL DAILY
Qty: 90 TABLET | Refills: 3 | Status: SHIPPED | OUTPATIENT
Start: 2023-10-09

## 2023-10-09 ASSESSMENT — ENCOUNTER SYMPTOMS
SENSORY CHANGE: 0
BRUISES/BLEEDS EASILY: 0
FALLS: 0
COUGH: 0
BLURRED VISION: 0
FEVER: 0
DIAPHORESIS: 0
SHORTNESS OF BREATH: 1
MYALGIAS: 0
DOUBLE VISION: 0
HEADACHES: 0
MEMORY LOSS: 0
DEPRESSION: 0
PALPITATIONS: 0
DIZZINESS: 0
ABDOMINAL PAIN: 0

## 2023-10-09 ASSESSMENT — MINNESOTA LIVING WITH HEART FAILURE QUESTIONNAIRE (MLHF)
DIFFICULTY WORKING TO EARN A LIVING: 0
DIFFICULTY TO CONCENTRATE OR REMEMBERING THINGS: 0
TIRED, FATIGUED OR LOW ON ENERGY: 0
WORKING AROUND THE HOUSE OR YARD DIFFICULT: 1
LOSS OF SELF CONTROL IN YOUR LIFE: 0
DIFFICULTY SOCIALIZING WITH FAMILY OR FRIENDS: 1
MAKING YOU SHORT OF BREATH: 1
DIFFICULTY WITH RECREATIONAL PASTIMES, SPORTS, HOBBIES: 0
COSTING YOU MONEY FOR MEDICAL CARE: 0
TOTAL_SCORE: 9
SWELLING IN ANKLES OR LEGS: 0
HAVING TO SIT OR LIE DOWN DURING THE DAY: 2
EATING LESS FOODS YOU LIKE: 0
DIFFICULTY GOING AWAY FROM HOME: 1
DIFFICULTY WITH SEXUAL ACTIVITIES: 0
FEELING LIKE A BURDEN TO FAMILY AND FRIENDS: 0
DIFFICULTY SLEEPING WELL AT NIGHT: 2
MAKING YOU STAY IN A HOSPITAL: 0
WALKING ABOUT OR CLIMBING STAIRS DIFFICULT: 1
MAKING YOU WORRY: 0
GIVING YOU SIDE EFFECTS FROM TREATMENTS: 0
MAKING YOU FEEL DEPRESSED: 0

## 2023-10-09 ASSESSMENT — FIBROSIS 4 INDEX: FIB4 SCORE: 1.66

## 2023-10-09 NOTE — PROGRESS NOTES
Chief Complaint   Patient presents with    Atrial Fibrillation    Hyperlipidemia       Subjective     Richard Chery is a 73 y.o. male who presents today due to unknown reason.  Of note, patient does not know why he is seeing us today.  He does not know that he has any cardiac problems.  He initially saw one of our partners about cardiac evaluation for foot problem.  And now.  He is here without any knowledge of what is going on.  With further investigation, I did find a reason why he is referred to see us today.  He did have a transthoracic echocardiogram in December 2022 which show LV dysfunction with LVEF documented to be 45%.  He also has atrial fibrillation.  He has never been cardioverted in the past nor ablated.  He also has a history of sleep apnea for which he has not been treated at this time.    Patient does get winded upon walking up inclines or for distance. No symptoms at rest or with daily living activities.    Negative nuclear stress test. I personally interpreted the images.    I have independently interpreted and reviewed blood tests results with patient in clinic which shows LDL level of 34, triglycerides level of 162, GFR of 92, NT pro BNP of 471, K of 4.7.      Past Medical History:   Diagnosis Date    Alcohol use 02/10/2016    3 per day    Breath shortness 06/15/2023    at times    Dental disorder 06/15/2023    full upper and lower plates    Diabetes 06/15/2023    insulin and oral agent    Elevated INR 07/30/2019    Heart burn 06/15/2023    medicated    Hemorrhagic disorder (HCC) 06/15/2023    xarelto    Hiatus hernia syndrome     High cholesterol 06/15/2023    medicated    Observed sleep apnea 06/15/2023    appt coming up for sleep study    Other persistent atrial fibrillation (HCC) 12/19/2019    Pain 06/15/2023    general body aches    Paroxysmal atrial fibrillation (HCC) 06/15/2023    medicated    Pneumonia 06/15/2023    history of    Pulmonary hypertension (HCC) 03/01/2016    Snoring      Urinary retention 07/30/2019     Past Surgical History:   Procedure Laterality Date    CYSTOSCOPY N/A 7/30/2019    Procedure: CYSTOSCOPY- DILATION OF STRICTURE;  Surgeon: Anthony Manning M.D.;  Location: [facility];  Service: Urology    KNEE ARTHROPLASTY TOTAL Right 8/15/2018    Procedure: KNEE ARTHROPLASTY TOTAL;  Surgeon: Amparo James M.D.;  Location: [facility];  Service: Orthopedics    KNEE ARTHROPLASTY TOTAL Left 11/21/2016    Procedure: KNEE ARTHROPLASTY TOTAL;  Surgeon: Amparo James M.D.;  Location: [facility];  Service:     KNEE REPLACEMENT, TOTAL Left 11/2016    Dr. James    KNEE ARTHROSCOPY Left 2/27/2016    Procedure: KNEE ARTHROSCOPY- I&D KNEE;  Surgeon: Corby Reyes M.D.;  Location: [facility];  Service:     KNEE ARTHROSCOPY Left 2/18/2016    Procedure: KNEE ARTHROSCOPY, SAMUELS;  Surgeon: Marquise Cline M.D.;  Location: [facility];  Service:     MENISCECTOMY, KNEE, MEDIAL  2/18/2016    Procedure: MEDIAL MENISCECTOMY - PARTIAL;  Surgeon: Marquise Cline M.D.;  Location: [facility];  Service:     CYSTOSCOPY  1986    DENTAL EXTRACTION(S)  1976    wisdom teeth    HAND SURGERY Left 1970    trauma, amputation index and middle finger    TONSILLECTOMY  as child     Family History   Problem Relation Age of Onset    Diabetes Other     Hypertension Other      Social History     Socioeconomic History    Marital status:      Spouse name: Not on file    Number of children: Not on file    Years of education: Not on file    Highest education level: Associate degree: occupational, technical, or vocational program   Occupational History    Not on file   Tobacco Use    Smoking status: Never    Smokeless tobacco: Never   Vaping Use    Vaping Use: Never used   Substance and Sexual Activity    Alcohol use: Not Currently     Alcohol/week: 8.4 oz     Types: 14 Shots of liquor per week     Comment: 2 oz daily    Drug use:  No    Sexual activity: Yes     Partners: Female   Other Topics Concern    Not on file   Social History Narrative    Not on file     Social Determinants of Health     Financial Resource Strain: Low Risk  (12/13/2022)    Overall Financial Resource Strain (CARDIA)     Difficulty of Paying Living Expenses: Not hard at all   Food Insecurity: No Food Insecurity (12/13/2022)    Hunger Vital Sign     Worried About Running Out of Food in the Last Year: Never true     Ran Out of Food in the Last Year: Never true   Transportation Needs: No Transportation Needs (12/13/2022)    PRAPARE - Transportation     Lack of Transportation (Medical): No     Lack of Transportation (Non-Medical): No   Physical Activity: Sufficiently Active (12/13/2022)    Exercise Vital Sign     Days of Exercise per Week: 5 days     Minutes of Exercise per Session: 30 min   Stress: Unknown (12/13/2022)    [employer] - Occupational Stress Questionnaire     Feeling of Stress : Patient refused   Social Connections: Socially Isolated (12/13/2022)    Social Connection and Isolation Panel [NHANES]     Frequency of Communication with Friends and Family: Once a week     Frequency of Social Gatherings with Friends and Family: Once a week     Attends Sikhism Services: Never     Active Member of Clubs or Organizations: No     Attends Club or Organization Meetings: Never     Marital Status:    Intimate Partner Violence: Not on file   Housing Stability: Low Risk  (12/13/2022)    Housing Stability Vital Sign     Unable to Pay for Housing in the Last Year: No     Number of Places Lived in the Last Year: 1     Unstable Housing in the Last Year: No     Allergies   Allergen Reactions    Melatonin Vomiting     Outpatient Encounter Medications as of 10/9/2023   Medication Sig Dispense Refill    Dulaglutide (TRULICITY) 3 MG/0.5ML Solution Pen-injector Inject 0.5 mL under the skin every 7 days. 2 mL 6    docusate sodium (STOOL SOFTENER) 100  MG Cap Take 800 mg by mouth 2 times a day.      SEMGLADDIS, YFGN, 100 UNIT/ML Solution Pen-injector Inject 32 Units under the skin at bedtime. 15 mL 3    metoprolol SR (TOPROL XL) 25 MG TABLET SR 24 HR Take 1 Tablet by mouth every evening. 90 Tablet 3    Empagliflozin (JARDIANCE) 10 MG Tab tablet Take 1 Tablet by mouth every day. 90 Tablet 3    MAGNESIUM PO Take  by mouth 2 times a day.      sildenafil citrate (VIAGRA) 100 MG tablet Take 100 mg by mouth as needed.      simvastatin (ZOCOR) 20 MG Tab Take 1 Tablet by mouth every evening. 100 Tablet 3    tamsulosin (FLOMAX) 0.4 MG capsule Take 1 Capsule by mouth every day. 90 Capsule 3    rivaroxaban (XARELTO) 20 MG Tab tablet Take 1 Tablet by mouth with dinner. 90 Tablet 2    metformin (GLUCOPHAGE) 1000 MG tablet Take 1 Tablet by mouth 2 times a day. 200 Tablet 3    gabapentin (NEURONTIN) 300 MG Cap Take 1 Capsule by mouth 4 times a day. (Patient taking differently: Take 600 mg by mouth 2 times a day.) 360 Capsule 3    omeprazole (PRILOSEC) 20 MG delayed-release capsule Take 1 Capsule by mouth every day. 90 Capsule 3    POTASSIUM PO Take 5 mg by mouth as needed.      B Complex Vitamins (B COMPLEX PO) Take  by mouth 2 (two) times a day.      amiodarone (CORDARONE) 200 MG Tab Take 1 Tablet by mouth every day. 60 Tablet 11     No facility-administered encounter medications on file as of 10/9/2023.     Review of Systems   Constitutional:  Negative for diaphoresis and fever.   HENT:  Negative for nosebleeds.    Eyes:  Negative for blurred vision and double vision.   Respiratory:  Positive for shortness of breath. Negative for cough.    Cardiovascular:  Negative for chest pain and palpitations.   Gastrointestinal:  Negative for abdominal pain.   Genitourinary:  Negative for dysuria and frequency.   Musculoskeletal:  Negative for falls and myalgias.   Skin:  Negative for rash.   Neurological:  Negative for dizziness, sensory change and headaches.   Endo/Heme/Allergies:  Does  "not bruise/bleed easily.   Psychiatric/Behavioral:  Negative for depression and memory loss.               Objective     /68 (BP Location: Left arm, Patient Position: Sitting, BP Cuff Size: Adult)   Pulse 83   Resp 15   Ht 1.803 m (5' 11\")   Wt 102 kg (225 lb)   SpO2 96%   BMI 31.38 kg/m²     Physical Exam  Vitals and nursing note reviewed.   Constitutional:       General: He is not in acute distress.     Appearance: He is not diaphoretic.   HENT:      Head: Normocephalic and atraumatic.      Right Ear: External ear normal.      Left Ear: External ear normal.      Nose: No congestion or rhinorrhea.   Eyes:      General:         Right eye: No discharge.         Left eye: No discharge.   Neck:      Thyroid: No thyromegaly.      Vascular: No JVD.   Cardiovascular:      Rate and Rhythm: Normal rate. Rhythm irregular.      Pulses: Normal pulses.   Pulmonary:      Effort: No respiratory distress.   Abdominal:      General: There is no distension.      Tenderness: There is no abdominal tenderness.   Musculoskeletal:         General: No swelling or tenderness.      Right lower leg: No edema.      Left lower leg: No edema.   Skin:     General: Skin is warm and dry.   Neurological:      Mental Status: He is alert and oriented to person, place, and time.      Cranial Nerves: No cranial nerve deficit.   Psychiatric:         Behavior: Behavior normal.                Assessment & Plan     1. ACC/AHA stage C systolic heart failure (HCC)        2. Left ventricular systolic dysfunction, NYHA class 2        3. Hypercoagulable state due to longstanding persistent atrial fibrillation (HCC)        4. Permanent atrial fibrillation (HCC)  EKG      5. Type 2 diabetes mellitus with hyperglycemia, with long-term current use of insulin (HCC)        6. EMILY (obstructive sleep apnea)        7. Chronic anticoagulation        8. High risk medication use            Medical Decision Making: Today's Assessment/Status/Plan:   Non-Ischemic " Cardiomyopathy:  Chronically illed condition which requires ongoing close monitoring and treatment to improve survival rate along with decreasing risk of clinical decompensation sudden cardiac death and hospitalization.    Today, based on physical examination findings, patient is euvolemic. No JVD, lungs are clear to auscultation, no pitting edema in bilateral lower extremities, no ascites.     Dry weight is 225 lbs.    Cannot tolerate ENTRESTO and Valsartan due to low BP.     Will continue Metoprolol SR (Toprol XL) at 25 mg po daily.     Diuretic with Furosemide changed to as needed.     Aldactone antagonist is not indicated.    No indication for ICD.    Based on recent data on SGLT2 and heart failure with reduced ejection fraction, patient will be benefited from Jardiance 10 mg p.o. once a day for further reduction in mortality and hospitalization with absolute risk reduction of 5.2%.  Therefore, I will continue patient on Jardiance 10 mg p.o. once a day.  Risks and benefits were explained to patient and patient has agreed to proceed.    Persistent atrial fibrillation:  Anticoagulation with Xarelto 20  mg 1x daily.  Failed cardioversion.  Will refer for sleep studies in the meantime.    Hypertension:  Optimize control using cardiomyopathy medical regimen as well.    Hyperlipidemia:  Optimize statin as within guidelines of CAD treatment as above.     Of note, during the care of this patient, I spent a significant amount of time explaining the nature of the disease process, reviewing all possible imaging studies, blood test results to patient.  The overall care of this patient require a higher level of care than usual due to the multiple comorbidities along with ongoing issues of congestive heart failure that put this patient at risk for sudden cardiac death, increased mortality, and hospitalization.  This patient also requires close monitoring with at least monthly blood test to monitor renal function and  electrolytes due to the ongoing dynamic changes of medical therapy titration protocol to ensure optimal benefits for the overall care of this patient.    Opal Leonard M.D.

## 2023-10-10 LAB — EKG IMPRESSION: NORMAL

## 2023-10-10 PROCEDURE — 93010 ELECTROCARDIOGRAM REPORT: CPT | Performed by: INTERNAL MEDICINE

## 2023-10-13 ENCOUNTER — TELEPHONE (OUTPATIENT)
Dept: HEALTH INFORMATION MANAGEMENT | Facility: OTHER | Age: 73
End: 2023-10-13
Payer: MEDICARE

## 2023-10-17 ENCOUNTER — NON-PROVIDER VISIT (OUTPATIENT)
Dept: MEDICAL GROUP | Facility: MEDICAL CENTER | Age: 73
End: 2023-10-17
Payer: MEDICARE

## 2023-10-17 VITALS
WEIGHT: 227.96 LBS | DIASTOLIC BLOOD PRESSURE: 64 MMHG | SYSTOLIC BLOOD PRESSURE: 108 MMHG | BODY MASS INDEX: 31.91 KG/M2 | HEIGHT: 71 IN

## 2023-10-17 DIAGNOSIS — E11.40 TYPE 2 DIABETES MELLITUS WITH DIABETIC NEUROPATHY, WITH LONG-TERM CURRENT USE OF INSULIN (HCC): ICD-10-CM

## 2023-10-17 DIAGNOSIS — Z79.4 TYPE 2 DIABETES MELLITUS WITH DIABETIC NEUROPATHY, WITH LONG-TERM CURRENT USE OF INSULIN (HCC): ICD-10-CM

## 2023-10-17 LAB
HBA1C MFR BLD: 6.9 % (ref ?–5.8)
POCT INT CON NEG: NEGATIVE
POCT INT CON POS: POSITIVE

## 2023-10-17 PROCEDURE — 83036 HEMOGLOBIN GLYCOSYLATED A1C: CPT | Performed by: BEHAVIOR ANALYST

## 2023-10-17 PROCEDURE — 99211 OFF/OP EST MAY X REQ PHY/QHP: CPT | Performed by: BEHAVIOR ANALYST

## 2023-10-17 RX ORDER — INSULIN GLARGINE 300 U/ML
35 INJECTION, SOLUTION SUBCUTANEOUS DAILY
Qty: 4.5 ML | Refills: 6 | Status: SHIPPED | OUTPATIENT
Start: 2023-10-17 | End: 2023-11-22 | Stop reason: SDUPTHER

## 2023-10-17 ASSESSMENT — FIBROSIS 4 INDEX: FIB4 SCORE: 1.66

## 2023-10-17 NOTE — PROGRESS NOTES
"RN-CDE Note    Subjective:     HPI:  Richard Chery is a 73 y.o. old patient who is seen by the Diabetes Nurse Specialist today for review of his type 2 diabetes with long term use of insulin      Diabetes Medications:   Semglee 32 units (needs to switch insulin as pharmacy can't get Semglee)  Metformin 1000 mg bid  Jardiance 10 mg daily  Trulicity 3 mg weekly      Exercise: has been very active recently  Diet: \"healthy\" diet  in general  Patient's body mass index is 31.79 kg/m². Exercise and nutrition counseling were performed at this visit.      Health Maintenance:   Health Maintenance Due   Topic Date Due    Annual Wellness Visit  03/28/2018    Hepatitis B Vaccine (Hep B) (2 of 3 - Risk 3-dose series) 11/13/2020    Zoster (Shingles) Vaccines (3 of 3) 12/11/2020    COVID-19 Vaccine (4 - Pfizer series) 12/29/2021    Fasting Lipid Profile  08/08/2023    Diabetes: Urine Protein Screening  08/08/2023    Diabetes: Retinopathy Screening  08/22/2023    A1c Screening  11/11/2023         DM:   Last A1c:   Lab Results   Component Value Date/Time    HBA1C 6.9 (A) 10/17/2023 01:23 PM      A1C GOAL: < 7    Glucose monitoring frequency: testing daily    Hypoglycemic episodes: no    Last Retinal Exam:  has appointment next week     Exam:  Monofilament: current    Lab Results   Component Value Date/Time    MALBCRT see below 08/08/2022 06:42 AM    MICROALBUR <1.2 08/08/2022 06:42 AM        ACR Albumin/Creatinine Ratio goal <30     HTN:   Blood pressure goal <130/<80 .   Currently Rx ACE/ARB: No     Dyslipidemia:    Lab Results   Component Value Date/Time    CHOLSTRLTOT 112 08/08/2022 06:43 AM    LDL 34 08/08/2022 06:43 AM    HDL 46 08/08/2022 06:43 AM    TRIGLYCERIDE 162 (H) 08/08/2022 06:43 AM         Currently Rx Statin: Yes     He  reports that he has never smoked. He has never used smokeless tobacco.      Plan:     Discussed and educated on:   - All medications, side effects and compliance (discussed carefully)  - Annual " eye examinations at Ophthalmology  - HbA1C: target  - Home glucose monitoring emphasized  - Weight control and daily exercise    Recommended medication changes: change insulin to Toujeo since he can't get Semglee.

## 2023-11-13 DIAGNOSIS — Z79.4 TYPE 2 DIABETES MELLITUS WITH DIABETIC NEUROPATHY, WITH LONG-TERM CURRENT USE OF INSULIN (HCC): ICD-10-CM

## 2023-11-13 DIAGNOSIS — G62.9 NEUROPATHY: ICD-10-CM

## 2023-11-13 DIAGNOSIS — K21.9 GASTROESOPHAGEAL REFLUX DISEASE WITHOUT ESOPHAGITIS: ICD-10-CM

## 2023-11-13 DIAGNOSIS — E11.40 TYPE 2 DIABETES MELLITUS WITH DIABETIC NEUROPATHY, WITH LONG-TERM CURRENT USE OF INSULIN (HCC): ICD-10-CM

## 2023-11-13 DIAGNOSIS — E78.01 FAMILIAL HYPERCHOLESTEROLEMIA: ICD-10-CM

## 2023-11-13 DIAGNOSIS — I10 ESSENTIAL HYPERTENSION: ICD-10-CM

## 2023-11-13 NOTE — TELEPHONE ENCOUNTER
Received request via: Pharmacy    Was the patient seen in the last year in this department? Yes    Does the patient have an active prescription (recently filled or refills available) for medication(s) requested? No    Does the patient have custodial Plus and need 100 day supply (blood pressure, diabetes and cholesterol meds only)?      Insurance requires 100 day supply

## 2023-11-15 RX ORDER — OMEPRAZOLE 20 MG/1
20 CAPSULE, DELAYED RELEASE ORAL DAILY
Qty: 100 CAPSULE | Refills: 1 | Status: SHIPPED | OUTPATIENT
Start: 2023-11-15

## 2023-11-15 RX ORDER — SIMVASTATIN 20 MG
20 TABLET ORAL EVERY EVENING
Qty: 100 TABLET | Refills: 1 | Status: SHIPPED | OUTPATIENT
Start: 2023-11-15

## 2023-11-15 RX ORDER — GABAPENTIN 300 MG/1
300 CAPSULE ORAL 4 TIMES DAILY
Qty: 360 CAPSULE | Refills: 1 | Status: SHIPPED | OUTPATIENT
Start: 2023-11-15

## 2023-11-15 RX ORDER — TAMSULOSIN HYDROCHLORIDE 0.4 MG/1
0.4 CAPSULE ORAL DAILY
Qty: 100 CAPSULE | Refills: 1 | Status: SHIPPED | OUTPATIENT
Start: 2023-11-15

## 2023-11-15 NOTE — TELEPHONE ENCOUNTER
He is also due for an annual wellness visit.  Please have him schedule this before I go on maternity leave.  Can be before or after his next follow-up with the diabetes RN, whatever he prefers.

## 2023-11-15 NOTE — TELEPHONE ENCOUNTER
Sent [employer] message    Message: Letting him know to schedule a annual appointment

## 2023-11-22 DIAGNOSIS — E11.40 TYPE 2 DIABETES MELLITUS WITH DIABETIC NEUROPATHY, WITH LONG-TERM CURRENT USE OF INSULIN (HCC): ICD-10-CM

## 2023-11-22 DIAGNOSIS — Z79.4 TYPE 2 DIABETES MELLITUS WITH DIABETIC NEUROPATHY, WITH LONG-TERM CURRENT USE OF INSULIN (HCC): ICD-10-CM

## 2023-11-22 RX ORDER — INSULIN GLARGINE 300 U/ML
35 INJECTION, SOLUTION SUBCUTANEOUS DAILY
Qty: 4.5 ML | Refills: 6 | Status: SHIPPED | OUTPATIENT
Start: 2023-11-22 | End: 2024-01-02

## 2023-12-07 NOTE — ASSESSMENT & PLAN NOTE
"This is a chronic problem.  He reports compliance with Lantus 28 units nightly, metformin 1000 mg twice daily, and Januvia 100 mg daily.  He is working on improving his diet and has lost 30 pounds over the last year however reports his routine has been \"completely derailed\" by COVID19. He is no longer swimming at the pool which was his main form of exercise.  Last A1c: 6.5 (12/10/2019)--> 7.2 (5/12/20) --> 8.1 (7/21/20)  Last Microalb/Cr ratio: Within normal limits 7/21/20  Last diabetic foot exam: 2/10/20  Last retinal eye exam: Patient is due  ACEi/ARB: Lisinopril 10 mg daily   Statin: Simvastatin 20 mg daily   Aspirin: None, patient is on Xarelto for PAF     "
This is a chronic problem.  The patient follows with cardiology and is on Xarelto for anticoagulation.  He denies palpitations, chest pain, shortness of breath, lightheadedness, or syncope. He requests a refill of Xarelto.  
This is a chronic problem.  The patient reports compliance with simvastatin 20 mg nightly.    Lab Results   Component Value Date/Time    CHOLSTRLTOT 100 07/21/2020 06:48 AM    LDL 34 07/21/2020 06:48 AM    HDL 41 07/21/2020 06:48 AM    TRIGLYCERIDE 124 07/21/2020 06:48 AM         
This is a chronic problem.  The patient's blood pressure is well controlled on lisinopril 10 mg daily. The patient denies chest pain, shortness of breath, headaches, vision changes, lightheadedness, dizziness, or medication side effects.    
This is a chronic problem. The patient reports history of intermittent gout of right foot. He reports increased frequency of flares due to increased alcohol use. He requests refill of allopurinol 300mg daily.  
medical

## 2024-01-02 ENCOUNTER — OFFICE VISIT (OUTPATIENT)
Dept: MEDICAL GROUP | Facility: MEDICAL CENTER | Age: 74
End: 2024-01-02
Payer: MEDICARE

## 2024-01-02 VITALS
DIASTOLIC BLOOD PRESSURE: 68 MMHG | HEIGHT: 71 IN | SYSTOLIC BLOOD PRESSURE: 104 MMHG | WEIGHT: 231 LBS | BODY MASS INDEX: 32.34 KG/M2

## 2024-01-02 DIAGNOSIS — E11.40 TYPE 2 DIABETES MELLITUS WITH DIABETIC NEUROPATHY, WITH LONG-TERM CURRENT USE OF INSULIN (HCC): ICD-10-CM

## 2024-01-02 DIAGNOSIS — Z79.4 TYPE 2 DIABETES MELLITUS WITH DIABETIC NEUROPATHY, WITH LONG-TERM CURRENT USE OF INSULIN (HCC): ICD-10-CM

## 2024-01-02 DIAGNOSIS — G62.9 NEUROPATHY: ICD-10-CM

## 2024-01-02 PROCEDURE — 3078F DIAST BP <80 MM HG: CPT | Performed by: BEHAVIOR ANALYST

## 2024-01-02 PROCEDURE — 3074F SYST BP LT 130 MM HG: CPT | Performed by: BEHAVIOR ANALYST

## 2024-01-02 PROCEDURE — 99211 OFF/OP EST MAY X REQ PHY/QHP: CPT | Performed by: BEHAVIOR ANALYST

## 2024-01-02 RX ORDER — INSULIN GLARGINE 300 U/ML
45 INJECTION, SOLUTION SUBCUTANEOUS DAILY
Qty: 4.5 ML | Refills: 6 | Status: SHIPPED | OUTPATIENT
Start: 2024-01-02

## 2024-01-02 ASSESSMENT — FIBROSIS 4 INDEX: FIB4 SCORE: 1.66

## 2024-01-02 NOTE — PROGRESS NOTES
"RN-CDE Note    Subjective:     HPI:  Richard Chery is a 73 y.o. old patient who is seen by the Diabetes Nurse Specialist today for review of his controlled type 2 diabetes with long term use of insulin.    Changes in Health: denies any changes.     Diabetes Medications:   Toujeo 32 units daily  Metformin 100 mg bid  Jardiance 10 mg daily  Trulicity 3 mg weekly.       Exercise: states he is very active.   Diet: \"healthy\" diet  in general  Did eat a little more over the holidays  Patient's body mass index is unknown because there is no height or weight on file. Exercise and nutrition counseling were performed at this visit.      Health Maintenance:   Health Maintenance Due   Topic Date Due    Annual Wellness Visit  03/28/2018    Hepatitis B Vaccine (Hep B) (2 of 3 - Risk 3-dose series) 11/13/2020    Zoster (Shingles) Vaccines (3 of 3) 12/11/2020    Fasting Lipid Profile  08/08/2023    Diabetes: Urine Protein Screening  08/08/2023    COVID-19 Vaccine (4 - 2023-24 season) 09/01/2023         DM:   Last A1c:   Lab Results   Component Value Date/Time    HBA1C 6.9 (A) 10/17/2023 01:23 PM    To early for A1c will recheck at next visit.   A1C GOAL: < 7    Glucose monitoring frequency: testing one time a day, states blood sugars are running in the 130 range fasting and they used to be in the 110-120 range.     Hypoglycemic episodes: no    Last Retinal Exam: on file and up-to-date  Daily Foot Exam: Yes     Exam:  Monofilament: current    Lab Results   Component Value Date/Time    MALBCRT see below 08/08/2022 06:42 AM    MICROALBUR <1.2 08/08/2022 06:42 AM        ACR Albumin/Creatinine Ratio goal <30     HTN:   Blood pressure goal <130/<80 at goal.   Currently Rx ACE/ARB: Not Indicated     Dyslipidemia:    Lab Results   Component Value Date/Time    CHOLSTRLTOT 112 08/08/2022 06:43 AM    LDL 34 08/08/2022 06:43 AM    HDL 46 08/08/2022 06:43 AM    TRIGLYCERIDE 162 (H) 08/08/2022 06:43 AM         Currently Rx Statin: Yes "     He  reports that he has never smoked. He has never used smokeless tobacco.      Plan:     Discussed and educated on:   - All medications, side effects and compliance (discussed carefully)  - HbA1C: target  - Home glucose monitoring emphasized  - Weight control and daily exercise    Recommended medication changes: no changes

## 2024-01-11 ENCOUNTER — TELEPHONE (OUTPATIENT)
Dept: HEALTH INFORMATION MANAGEMENT | Facility: OTHER | Age: 74
End: 2024-01-11
Payer: MEDICARE

## 2024-01-22 ENCOUNTER — APPOINTMENT (OUTPATIENT)
Dept: MEDICAL GROUP | Facility: MEDICAL CENTER | Age: 74
End: 2024-01-22
Payer: MEDICARE

## 2024-02-28 DIAGNOSIS — E11.40 TYPE 2 DIABETES MELLITUS WITH DIABETIC NEUROPATHY, WITH LONG-TERM CURRENT USE OF INSULIN (HCC): ICD-10-CM

## 2024-02-28 DIAGNOSIS — Z79.4 TYPE 2 DIABETES MELLITUS WITH DIABETIC NEUROPATHY, WITH LONG-TERM CURRENT USE OF INSULIN (HCC): ICD-10-CM

## 2024-02-28 RX ORDER — DULAGLUTIDE 3 MG/.5ML
INJECTION, SOLUTION SUBCUTANEOUS
Qty: 4 ML | Refills: 0 | Status: SHIPPED | OUTPATIENT
Start: 2024-02-28 | End: 2024-03-28

## 2024-02-28 NOTE — TELEPHONE ENCOUNTER
Received request via: Pharmacy    Was the patient seen in the last year in this department? Yes    Does the patient have an active prescription (recently filled or refills available) for medication(s) requested? No    Pharmacy Name: [facility]    Does the patient have longterm Plus and need 100 day supply (blood pressure, diabetes and cholesterol meds only)? Yes, quantity updated to 100 days    Future Appointments         Provider Department Center    4/16/2024 2:00 PM (Arrive by 1:45 PM) [facility] RN [facility]

## 2024-03-05 ENCOUNTER — PATIENT MESSAGE (OUTPATIENT)
Dept: CARDIOLOGY | Facility: MEDICAL CENTER | Age: 74
End: 2024-03-05
Payer: MEDICARE

## 2024-03-06 ENCOUNTER — TELEPHONE (OUTPATIENT)
Dept: CARDIOLOGY | Facility: MEDICAL CENTER | Age: 74
End: 2024-03-06
Payer: MEDICARE

## 2024-03-06 DIAGNOSIS — I50.20 ACC/AHA STAGE C SYSTOLIC HEART FAILURE (HCC): ICD-10-CM

## 2024-03-06 DIAGNOSIS — Z79.899 HIGH RISK MEDICATION USE: ICD-10-CM

## 2024-03-06 NOTE — TELEPHONE ENCOUNTER
"Called pt in regards to [employer] message, s/w pt's wife Grisel. She states that pt's BP has started to become low again recently. Last week SBP was ranging 70-90's and pt does experience dizziness. Low BP occurs at random times. Grisel is unsure of pt's BP today as she is not currently with him but states it was 111/72 yesterday.     She did cut his Metoprolol 25 mg in half about 3 weeks ago and states that he was previously on 12.5 mg before it was increased to 25 mg and he seems to tolerate the 12.5 mg dose better then and now.     She states that pt says he drinks \"lots of water\" but she is unsure how much or if it is enough. She does not know his HR but states she believes its been in a normal range.     Advised that the pt drink 2 glasses of water and eat a small salty snack if BP < 90/60, recheck BP 30 mins after, and if it is still <90/60 go to the ER. Also encouraged monitoring water intake and consuming 64 ounces a day, as well as moving positions slowly and repeating BMP as he is due.     Informed Grisel that I will confirm w/ TT to have pt stay on Metoprolol 12.5 mg daily and then have pt monitor BP for 1-2 weeks to see if BP stabilizes back to normal. Grisel is ok with [employer] message being sent with response.     To TT, pt is experiencing hypotension, he does better on Metoprolol 12.5 mg daily, ok to stay on this dose and closely monitor BP? Any other recommendations?   "

## 2024-03-06 NOTE — TELEPHONE ENCOUNTER
ADELAIDE Evans35 minutes ago (12:57 PM)     yes   ----------------------------------------------------------------    See Brian message

## 2024-03-25 ENCOUNTER — TELEPHONE (OUTPATIENT)
Dept: HEALTH INFORMATION MANAGEMENT | Facility: OTHER | Age: 74
End: 2024-03-25

## 2024-03-28 ENCOUNTER — HOSPITAL ENCOUNTER (OUTPATIENT)
Dept: LAB | Facility: MEDICAL CENTER | Age: 74
End: 2024-03-28
Attending: INTERNAL MEDICINE
Payer: MEDICARE

## 2024-03-28 DIAGNOSIS — Z79.899 HIGH RISK MEDICATION USE: ICD-10-CM

## 2024-03-28 DIAGNOSIS — Z79.4 TYPE 2 DIABETES MELLITUS WITH DIABETIC NEUROPATHY, WITH LONG-TERM CURRENT USE OF INSULIN (HCC): ICD-10-CM

## 2024-03-28 DIAGNOSIS — E11.40 TYPE 2 DIABETES MELLITUS WITH DIABETIC NEUROPATHY, WITH LONG-TERM CURRENT USE OF INSULIN (HCC): ICD-10-CM

## 2024-03-28 DIAGNOSIS — I50.20 ACC/AHA STAGE C SYSTOLIC HEART FAILURE (HCC): ICD-10-CM

## 2024-03-28 LAB
ANION GAP SERPL CALC-SCNC: 12 MMOL/L (ref 7–16)
BUN SERPL-MCNC: 16 MG/DL (ref 8–22)
CALCIUM SERPL-MCNC: 9.3 MG/DL (ref 8.5–10.5)
CHLORIDE SERPL-SCNC: 102 MMOL/L (ref 96–112)
CO2 SERPL-SCNC: 26 MMOL/L (ref 20–33)
CREAT SERPL-MCNC: 0.91 MG/DL (ref 0.5–1.4)
GFR SERPLBLD CREATININE-BSD FMLA CKD-EPI: 89 ML/MIN/1.73 M 2
GLUCOSE SERPL-MCNC: 140 MG/DL (ref 65–99)
POTASSIUM SERPL-SCNC: 4.7 MMOL/L (ref 3.6–5.5)
SODIUM SERPL-SCNC: 140 MMOL/L (ref 135–145)

## 2024-03-28 PROCEDURE — 80048 BASIC METABOLIC PNL TOTAL CA: CPT

## 2024-03-28 PROCEDURE — 36415 COLL VENOUS BLD VENIPUNCTURE: CPT

## 2024-03-28 RX ORDER — DULAGLUTIDE 3 MG/.5ML
0.5 INJECTION, SOLUTION SUBCUTANEOUS
Qty: 4 ML | Refills: 0 | Status: SHIPPED | OUTPATIENT
Start: 2024-03-28

## 2024-03-28 NOTE — TELEPHONE ENCOUNTER
Future Appointments         Provider Department Center    4/16/2024 2:00 PM (Arrive by 1:45 PM) [facility] RN [facility]         Received request via: Pharmacy    Was the patient seen in the last year in this department? Yes    Does the patient have an active prescription (recently filled or refills available) for medication(s) requested? No    Ph[facility] Name[facility]    Does the patient have [facility] Plus and need 100 day supply (blood pressure, diabetes and cholesterol meds only)? Yes, quantity updated to 100 days

## 2024-04-16 ENCOUNTER — OFFICE VISIT (OUTPATIENT)
Dept: MEDICAL GROUP | Facility: MEDICAL CENTER | Age: 74
End: 2024-04-16
Payer: MEDICARE

## 2024-04-16 VITALS
HEIGHT: 71 IN | DIASTOLIC BLOOD PRESSURE: 64 MMHG | SYSTOLIC BLOOD PRESSURE: 118 MMHG | BODY MASS INDEX: 32.41 KG/M2 | WEIGHT: 231.5 LBS

## 2024-04-16 DIAGNOSIS — E11.40 TYPE 2 DIABETES MELLITUS WITH DIABETIC NEUROPATHY, WITH LONG-TERM CURRENT USE OF INSULIN (HCC): ICD-10-CM

## 2024-04-16 DIAGNOSIS — Z79.4 TYPE 2 DIABETES MELLITUS WITH DIABETIC NEUROPATHY, WITH LONG-TERM CURRENT USE OF INSULIN (HCC): ICD-10-CM

## 2024-04-16 LAB
HBA1C MFR BLD: 7.3 % (ref ?–5.8)
POCT INT CON NEG: NEGATIVE
POCT INT CON POS: POSITIVE

## 2024-04-16 PROCEDURE — 3074F SYST BP LT 130 MM HG: CPT

## 2024-04-16 PROCEDURE — 99211 OFF/OP EST MAY X REQ PHY/QHP: CPT

## 2024-04-16 PROCEDURE — 3078F DIAST BP <80 MM HG: CPT

## 2024-04-16 PROCEDURE — 83036 HEMOGLOBIN GLYCOSYLATED A1C: CPT

## 2024-04-16 ASSESSMENT — FIBROSIS 4 INDEX: FIB4 SCORE: 1.68

## 2024-04-16 NOTE — PROGRESS NOTES
"RN-CDE Note    Subjective:     HPI:  Richard Chery is a 74 y.o. old patient who is seen by the Diabetes Nurse Specialist today for review of his controlled type 2 diabetes with long term use of insulin.    Changes in Health: denies changes    Diabetes Medications:   Toujeo 35 units per day  Trulicity 3 mg weekly  Metformin 1000 mg bid  Jardiance 10 mg daily  Taking above medications as prescribed: yes  Taking daily ASA: No    Exercise: active around his place. .   Diet: \"healthy\" diet  in general, last couple of weeks he hasn't been eating as healthy  Patient's body mass index is 32.29 kg/m². Exercise and nutrition counseling were performed at this visit.      Health Maintenance:   Health Maintenance Due   Topic Date Due    Annual Wellness Visit  03/27/2018    Hepatitis B Vaccine (Hep B) (2 of 3 - 19+ 3-dose series) 11/13/2020    Zoster (Shingles) Vaccines (3 of 3) 12/11/2020    Fasting Lipid Profile  08/08/2023    Diabetes: Urine Protein Screening  08/08/2023    COVID-19 Vaccine (4 - 2023-24 season) 09/01/2023    A1c Screening  04/17/2024         DM:   Last A1c:   Lab Results   Component Value Date/Time    HBA1C 7.3 (A) 04/16/2024 02:22 PM      Previous A1c was 6.9 on 10/17/2023  A1C GOAL: < 7    Glucose monitoring frequency: testing on occasion.     Hypoglycemic episodes: no    Last Retinal Exam: on file and up-to-date    Exam:  Monofilament: current.     Lab Results   Component Value Date/Time    MALBCRT see below 08/08/2022 06:42 AM    MICROALBUR <1.2 08/08/2022 06:42 AM        ACR Albumin/Creatinine Ratio goal <30     HTN:   Blood pressure goal <130/<80 .   Currently Rx ACE/ARB: Not Indicated     Dyslipidemia:    Lab Results   Component Value Date/Time    CHOLSTRLTOT 112 08/08/2022 06:43 AM    LDL 34 08/08/2022 06:43 AM    HDL 46 08/08/2022 06:43 AM    TRIGLYCERIDE 162 (H) 08/08/2022 06:43 AM         Currently Rx Statin: Yes     He  reports that he has never smoked. He has never used smokeless " tobacco.      Plan:     Discussed and educated on:   - All medications, side effects and compliance (discussed carefully)  - Annual eye examinations at Ophthalmology  - HbA1C: target  - Home glucose monitoring emphasized  - Weight control and daily exercise    Recommended medication changes: none at this time.  Encouraged more activity.

## 2024-04-26 DIAGNOSIS — E11.40 TYPE 2 DIABETES MELLITUS WITH DIABETIC NEUROPATHY, WITH LONG-TERM CURRENT USE OF INSULIN (HCC): ICD-10-CM

## 2024-04-26 DIAGNOSIS — Z79.4 TYPE 2 DIABETES MELLITUS WITH DIABETIC NEUROPATHY, WITH LONG-TERM CURRENT USE OF INSULIN (HCC): ICD-10-CM

## 2024-04-26 NOTE — TELEPHONE ENCOUNTER
Received request via: Pharmacy    Was the patient seen in the last year in this department? Yes    Does the patient have an active prescription (recently filled or refills available) for medication(s) requested? No    Pharmacy Name: [facility]    Does the patient have [facility] and need 100 day supply (blood pressure, diabetes and cholesterol meds only)? Yes, quantity updated to 100 days

## 2024-05-01 RX ORDER — DULAGLUTIDE 3 MG/.5ML
0.5 INJECTION, SOLUTION SUBCUTANEOUS
Qty: 4 ML | Refills: 0 | Status: SHIPPED | OUTPATIENT
Start: 2024-05-01

## 2024-05-31 DIAGNOSIS — Z79.4 TYPE 2 DIABETES MELLITUS WITH DIABETIC NEUROPATHY, WITH LONG-TERM CURRENT USE OF INSULIN (HCC): ICD-10-CM

## 2024-05-31 DIAGNOSIS — I10 ESSENTIAL HYPERTENSION: ICD-10-CM

## 2024-05-31 DIAGNOSIS — E11.40 TYPE 2 DIABETES MELLITUS WITH DIABETIC NEUROPATHY, WITH LONG-TERM CURRENT USE OF INSULIN (HCC): ICD-10-CM

## 2024-06-05 RX ORDER — DULAGLUTIDE 3 MG/.5ML
INJECTION, SOLUTION SUBCUTANEOUS
Qty: 12 ML | Refills: 0 | Status: SHIPPED | OUTPATIENT
Start: 2024-06-05 | End: 2024-06-24 | Stop reason: SDUPTHER

## 2024-06-17 ENCOUNTER — HOSPITAL ENCOUNTER (OUTPATIENT)
Dept: LAB | Facility: MEDICAL CENTER | Age: 74
End: 2024-06-17
Attending: STUDENT IN AN ORGANIZED HEALTH CARE EDUCATION/TRAINING PROGRAM
Payer: MEDICARE

## 2024-06-17 LAB — PSA SERPL-MCNC: 0.72 NG/ML (ref 0–4)

## 2024-06-17 PROCEDURE — 36415 COLL VENOUS BLD VENIPUNCTURE: CPT

## 2024-06-17 PROCEDURE — 84153 ASSAY OF PSA TOTAL: CPT

## 2024-06-24 ENCOUNTER — OFFICE VISIT (OUTPATIENT)
Dept: MEDICAL GROUP | Facility: MEDICAL CENTER | Age: 74
End: 2024-06-24
Payer: MEDICARE

## 2024-06-24 VITALS
TEMPERATURE: 97.9 F | WEIGHT: 229.39 LBS | HEIGHT: 71 IN | SYSTOLIC BLOOD PRESSURE: 118 MMHG | HEART RATE: 98 BPM | DIASTOLIC BLOOD PRESSURE: 70 MMHG | OXYGEN SATURATION: 96 % | BODY MASS INDEX: 32.11 KG/M2

## 2024-06-24 DIAGNOSIS — G62.9 NEUROPATHY: ICD-10-CM

## 2024-06-24 DIAGNOSIS — E78.2 MIXED HYPERLIPIDEMIA: ICD-10-CM

## 2024-06-24 DIAGNOSIS — D68.69 HYPERCOAGULABLE STATE DUE TO LONGSTANDING PERSISTENT ATRIAL FIBRILLATION (HCC): ICD-10-CM

## 2024-06-24 DIAGNOSIS — I50.20 ACC/AHA STAGE C SYSTOLIC HEART FAILURE (HCC): ICD-10-CM

## 2024-06-24 DIAGNOSIS — Z79.4 TYPE 2 DIABETES MELLITUS WITH HYPERGLYCEMIA, WITH LONG-TERM CURRENT USE OF INSULIN (HCC): ICD-10-CM

## 2024-06-24 DIAGNOSIS — K21.9 GASTROESOPHAGEAL REFLUX DISEASE WITHOUT ESOPHAGITIS: ICD-10-CM

## 2024-06-24 DIAGNOSIS — R35.0 URINARY FREQUENCY: ICD-10-CM

## 2024-06-24 DIAGNOSIS — M79.671 RIGHT FOOT PAIN: Chronic | ICD-10-CM

## 2024-06-24 DIAGNOSIS — I51.89 LEFT VENTRICULAR SYSTOLIC DYSFUNCTION, NYHA CLASS 2: ICD-10-CM

## 2024-06-24 DIAGNOSIS — Z00.00 MEDICARE ANNUAL WELLNESS VISIT, SUBSEQUENT: Primary | ICD-10-CM

## 2024-06-24 DIAGNOSIS — I48.11 LONGSTANDING PERSISTENT ATRIAL FIBRILLATION (HCC): ICD-10-CM

## 2024-06-24 DIAGNOSIS — Z79.4 TYPE 2 DIABETES MELLITUS WITH DIABETIC NEUROPATHY, WITH LONG-TERM CURRENT USE OF INSULIN (HCC): ICD-10-CM

## 2024-06-24 DIAGNOSIS — I48.20 CHRONIC ATRIAL FIBRILLATION (HCC): ICD-10-CM

## 2024-06-24 DIAGNOSIS — E11.40 TYPE 2 DIABETES MELLITUS WITH DIABETIC NEUROPATHY (HCC): ICD-10-CM

## 2024-06-24 DIAGNOSIS — E11.65 TYPE 2 DIABETES MELLITUS WITH HYPERGLYCEMIA, WITH LONG-TERM CURRENT USE OF INSULIN (HCC): ICD-10-CM

## 2024-06-24 DIAGNOSIS — I48.11 HYPERCOAGULABLE STATE DUE TO LONGSTANDING PERSISTENT ATRIAL FIBRILLATION (HCC): ICD-10-CM

## 2024-06-24 DIAGNOSIS — E11.40 TYPE 2 DIABETES MELLITUS WITH DIABETIC NEUROPATHY, WITH LONG-TERM CURRENT USE OF INSULIN (HCC): ICD-10-CM

## 2024-06-24 DIAGNOSIS — E78.01 FAMILIAL HYPERCHOLESTEROLEMIA: ICD-10-CM

## 2024-06-24 PROCEDURE — G2211 COMPLEX E/M VISIT ADD ON: HCPCS | Performed by: BEHAVIOR ANALYST

## 2024-06-24 PROCEDURE — G0439 PPPS, SUBSEQ VISIT: HCPCS | Performed by: BEHAVIOR ANALYST

## 2024-06-24 RX ORDER — DULAGLUTIDE 3 MG/.5ML
3 INJECTION, SOLUTION SUBCUTANEOUS DAILY
Qty: 12 ML | Refills: 3 | Status: SHIPPED | OUTPATIENT
Start: 2024-06-24

## 2024-06-24 RX ORDER — OMEPRAZOLE 20 MG/1
20 CAPSULE, DELAYED RELEASE ORAL DAILY
Qty: 100 CAPSULE | Refills: 3 | Status: SHIPPED | OUTPATIENT
Start: 2024-06-24

## 2024-06-24 RX ORDER — INSULIN GLARGINE 300 U/ML
35 INJECTION, SOLUTION SUBCUTANEOUS DAILY
Qty: 1.5 ML | Refills: 11 | Status: SHIPPED | OUTPATIENT
Start: 2024-06-24

## 2024-06-24 RX ORDER — SIMVASTATIN 20 MG
20 TABLET ORAL EVERY EVENING
Qty: 100 TABLET | Refills: 3 | Status: SHIPPED | OUTPATIENT
Start: 2024-06-24

## 2024-06-24 RX ORDER — METOPROLOL SUCCINATE 25 MG/1
12.5 TABLET, EXTENDED RELEASE ORAL EVERY EVENING
Qty: 50 TABLET | Refills: 3 | Status: SHIPPED | OUTPATIENT
Start: 2024-06-24

## 2024-06-24 RX ORDER — EMPAGLIFLOZIN 10 MG/1
10 TABLET, FILM COATED ORAL DAILY
Qty: 100 TABLET | Refills: 3 | Status: SHIPPED | OUTPATIENT
Start: 2024-06-24

## 2024-06-24 RX ORDER — TAMSULOSIN HYDROCHLORIDE 0.4 MG/1
0.4 CAPSULE ORAL 2 TIMES DAILY
Qty: 200 CAPSULE | Refills: 3 | Status: SHIPPED | OUTPATIENT
Start: 2024-06-24

## 2024-06-24 RX ORDER — GABAPENTIN 300 MG/1
600 CAPSULE ORAL 2 TIMES DAILY
Qty: 400 CAPSULE | Refills: 3 | Status: SHIPPED | OUTPATIENT
Start: 2024-06-24

## 2024-06-24 ASSESSMENT — PATIENT HEALTH QUESTIONNAIRE - PHQ9: CLINICAL INTERPRETATION OF PHQ2 SCORE: 0

## 2024-06-24 ASSESSMENT — FIBROSIS 4 INDEX: FIB4 SCORE: 1.68

## 2024-06-24 ASSESSMENT — ACTIVITIES OF DAILY LIVING (ADL): BATHING_REQUIRES_ASSISTANCE: 0

## 2024-06-24 ASSESSMENT — ENCOUNTER SYMPTOMS: GENERAL WELL-BEING: GOOD

## 2024-06-24 NOTE — PROGRESS NOTES
Chief Complaint   Patient presents with    Medicare Annual Wellness       HPI:  Richard Chery is a 74 y.o. here for Medicare Annual Wellness Visit     History of Present Illness  The patient is a 74-year-old male established patient with a history of type 2 diabetes, heart failure, and other chronic conditions. He is accompanied by his wife.     The patient continues to consult with Daija CANELA for his diabetes management. He reports a recent increase in his A1c from 6.9 to 7.3, which he attributes to excessive consumption of peanut butter whiskey. His dietary habits have improved, with a preference for fish. His current medication regimen includes Trulicity, Jardiance, and 35 units of insulin.    The patient's cardiac health remains stable, with no reported swelling or RIGGS. He is under the care of a cardiologist, who reports his cardiac health to be satisfactory. His supply of metoprolol and Xarelto has out. His current medication regimen includes Xarelto and metoprolol.    The patient continues to experience foot pain, initially suspected to be a Travis's neuroma. Despite undergoing MRIs, CT scans, analgesics, consultations with podiatrists, and acupuncture, the pain persists. He initially suspected gout, but acid testing yielded normal results. He denies any swelling. He has attempted various footwear, including orthotics and arch supports, but to no avail. His current medication regimen includes 2 tablets of gabapentin twice daily.    The patient consulted with urology last Monday and is scheduled for an exploratory procedure to address a scar tissue issue that he has had since the age of 6.        Patient Active Problem List    Diagnosis Date Noted    Urinary frequency 06/24/2024    ACC/AHA stage C systolic heart failure (HCC) 06/06/2023    Left ventricular systolic dysfunction, NYHA class 2 06/06/2023    Hypercoagulable state due to longstanding persistent atrial fibrillation (HCC) 06/06/2023    Coronary  artery disease involving native coronary artery of native heart without angina pectoris 12/23/2022    Neoplasm of uncertain behavior of skin 04/05/2022    Loss of consciousness (HCC) 12/28/2021    Right foot pain 11/24/2021    Slow transit constipation 11/24/2021    Chronic right shoulder pain 10/16/2020    Chronic anticoagulation 12/19/2019    Sciatica of left side 12/10/2018    Neuropathy 09/12/2018    GERD (gastroesophageal reflux disease) 09/12/2018    Primary osteoarthritis of right knee 08/16/2018    Vertigo 10/30/2017    Acquired deafness of left ear 10/30/2017    Class 1 obesity due to excess calories with serious comorbidity and body mass index (BMI) of 33.0 to 33.9 in adult 10/30/2017    History of osteoarthritis 11/21/2016    Observed sleep apnea     Type 2 diabetes mellitus with diabetic neuropathy ([facility]) 03/07/2016    Chronic atrial fibrillation ([facility]) 02/27/2016    Type 2 diabetes mellitus with hyperglycemia, with long-term current use of insulin ([facility]) 11/24/2015    Hyperlipidemia 11/24/2015       Current Outpatient Medications   Medication Sig Dispense Refill    Dulaglutide (TRULICITY) 3 MG/0.5ML Solution Pen-injector Take 3 mg by mouth every day. 12 mL 3    Empagliflozin (JARDIANCE) 10 MG Tab tablet Take 1 Tablet by mouth every day. 100 Tablet 3    gabapentin (NEURONTIN) 300 MG Cap Take 2 Capsules by mouth 2 times a day. 400 Capsule 3    Insulin Glargine, 1 Unit Dial, (TOUJEO SOLOSTAR) 300 UNIT/ML Solution Pen-injector Inject 35 Units under the skin every day. 1.5 mL 11    simvastatin (ZOCOR) 20 MG Tab Take 1 Tablet by mouth every evening. 100 Tablet 3    rivaroxaban (XARELTO) 20 MG Tab tablet Take 1 Tablet by mouth with dinner. 100 Tablet 3    tamsulosin (FLOMAX) 0.4 MG capsule Take 1 Capsule by mouth 2 times a day. 200 Capsule 3    omeprazole (PRILOSEC) 20 MG delayed-release capsule Take 1 Capsule by mouth every day. 100 Capsule 3    metoprolol SR (TOPROL XL) 25 MG TABLET SR 24 HR Take 0.5 Tablets by  mouth every evening. 50 Tablet 3    metformin (GLUCOPHAGE) 1000 MG tablet Take 1 tablet by mouth twice daily 200 Tablet 3    docusate sodium (STOOL SOFTENER) 100 MG Cap Take 800 mg by mouth 2 times a day.      MAGNESIUM PO Take  by mouth 2 times a day.      sildenafil citrate (VIAGRA) 100 MG tablet Take 100 mg by mouth as needed.      POTASSIUM PO Take 5 mg by mouth as needed.      B Complex Vitamins (B COMPLEX PO) Take  by mouth 2 (two) times a day.       No current facility-administered medications for this visit.          Current supplements as per medication list.     Allergies: Melatonin    Current social contact/activities: getting together with friends     He  reports that he has never smoked. He has never used smokeless tobacco. He reports that he does not currently use alcohol after a past usage of about 8.4 oz of alcohol per week. He reports that he does not use drugs.  Counseling given: Not Answered      ROS:    Gait: Uses no assistive device  Ostomy: No  Other tubes: No  Amputations: Yes  Chronic oxygen use: No  Last eye exam: 08/2023  Wears hearing aids: No   : Reports urinary leakage during the last 6 months that has somewhat interfered with their daily activities or sleep.    Screening:  Depression Screening  Little interest or pleasure in doing things?  0 - not at all  Feeling down, depressed , or hopeless? 0 - not at all  Trouble falling or staying asleep, or sleeping too much?     Feeling tired or having little energy?     Poor appetite or overeating?     Feeling bad about yourself - or that you are a failure or have let yourself or your family down?    Trouble concentrating on things, such as reading the newspaper or watching television?    Moving or speaking so slowly that other people could have noticed.  Or the opposite - being so fidgety or restless that you have been moving around a lot more than usual?     Thoughts that you would be better off dead, or of hurting yourself?     Patient  Health Questionnaire Score:      If depressive symptoms identified deferred to follow up visit unless specifically addressed in assessment and plan.    Interpretation of PHQ-9 Total Score   Score Severity   1-4 No Depression   5-9 Mild Depression   10-14 Moderate Depression   15-19 Moderately Severe Depression   20-27 Severe Depression    Screening for Cognitive Impairment  Do you or any of your friends or family members have any concern about your memory? No  Three Minute Recall (Leader, Season, Table) 3/3    Nigel clock face with all 12 numbers and set the hands to show 10 minutes after 11.  Yes    Cognitive concerns identified deferred for follow up unless specifically addressed in assessment and plan.    Fall Risk Assessment  Has the patient had two or more falls in the last year or any fall with injury in the last year?  No    Safety Assessment  Do you always wear your seatbelt?  Yes  Any changes to home needed to function safely? No  Difficulty hearing.  No  Patient counseled about all safety risks that were identified.    Functional Assessment ADLs  Are there any barriers preventing you from cooking for yourself or meeting nutritional needs?  No.    Are there any barriers preventing you from driving safely or obtaining transportation?  No.    Are there any barriers preventing you from using a telephone or calling for help?  No    Are there any barriers preventing you from shopping?  Yes. Hates shopping   Are there any barriers preventing you from taking care of your own finances?  Yes Wife does everything   Are there any barriers preventing you from managing your medications?  Yes Wife does it   Are there any barriers preventing you from showering, bathing or dressing yourself? No    Are there any barriers preventing you from doing housework or laundry? Yes Wife does it   Are there any barriers preventing you from using the toilet?No    Are you currently engaging in any exercise or physical activity?  Yes.       Self-Assessment of Health  What is your perception of your health? Good    Do you sleep more than six hours a night? No    In the past 7 days, how much did pain keep you from doing your normal work? None    Do you spend quality time with family or friends (virtually or in person)? Yes    Do you usually eat a heart healthy diet that constists of a variety of fruits, vegetables, whole grains and fiber? Yes    Do you eat foods high in fat and/or Fast Food more than three times per week? No    How concerned are you that your medical conditions are not being well managed? Not at all    Are you worried that in the next 2 months, you may not have stable housing that you own, rent, or stay in as part of a household? No        Advance Care Planning  Do you have an Advance Directive, Living Will, Durable Power of , or POLST? Yes    Living Will     is not on file - instructed patient to bring in a copy to scan into their chart      Health Maintenance Summary            Overdue - Hepatitis B Vaccine (Hep B) (2 of 3 - 19+ 3-dose series) Overdue since 11/13/2020      10/16/2020  Imm Admin: Hepatitis B Vaccine (Adol/Adult)              Overdue - Zoster (Shingles) Vaccines (3 of 3) Overdue since 12/11/2020      10/16/2020  Imm Admin: Zoster Vaccine Recombinant (RZV) (SHINGRIX)    06/27/2016  Imm Admin: Zoster Vaccine Live (ZVL) (Zostavax) - HISTORICAL DATA    06/27/2016  Imm Admin: Zoster Vaccine Live (ZVL) (Zostavax) - HISTORICAL DATA              Ordered - Fasting Lipid Profile (Yearly) Ordered on 6/24/2024 08/08/2022  Lipid Profile    08/06/2021  Lipid Profile    07/21/2020  Lipid Profile    04/22/2019  Lipid Profile    09/05/2017  LIPID PROFILE    Only the first 5 history entries have been loaded, but more history exists.              Ordered - Diabetes: Urine Protein Screening (Yearly) Ordered on 6/24/2024 08/08/2022  MICROALBUMIN CREAT RATIO URINE    08/06/2021  MICROALBUMIN CREAT RATIO URINE     07/21/2020  MICROALBUMIN CREAT RATIO URINE    04/22/2019  MICROALBUMIN CREAT RATIO URINE    09/05/2017  MICROALBUMIN CREAT RATIO URINE    Only the first 5 history entries have been loaded, but more history exists.              Overdue - COVID-19 Vaccine (4 - 2023-24 season) Overdue since 9/1/2023 11/03/2021  Imm Admin: PFIZER PURPLE CAP SARS-COV-2 VACCINATION (12+)    03/13/2021  Imm Admin: PFIZER PURPLE CAP SARS-COV-2 VACCINATION (12+)    02/23/2021  Imm Admin: PFIZER PURPLE CAP SARS-COV-2 VACCINATION (12+)              Diabetes: Monofilament / LE Exam (Yearly) Due soon on 7/3/2024      07/03/2023  Diabetic Monofilament LE Exam    04/05/2022  SmartData: WORKFLOW - DIABETES - DIABETIC FOOT EXAM PERFORMED    04/05/2022  Diabetic Monofilament LE Exam    02/12/2021  SmartData: WORKFLOW - DIABETES - DIABETIC FOOT EXAM PERFORMED    02/12/2021  Diabetic Monofilament LE Exam    Only the first 5 history entries have been loaded, but more history exists.              A1c Screening (Every 6 Months) Next due on 10/16/2024      04/16/2024  POCT Hemoglobin A1C    10/17/2023  POCT Hemoglobin A1C    05/11/2023  POCT Hemoglobin A1C    02/09/2023  POCT Hemoglobin A1C    12/14/2022  POCT Hemoglobin A1C    Only the first 5 history entries have been loaded, but more history exists.              Diabetes: Retinopathy Screening (Yearly) Next due on 10/23/2024      10/23/2023  AMB EXTERNAL RETINAL SCREENING RESULTS    08/22/2022  REFERRAL FOR RETINAL SCREENING EXAM    08/08/2018  REFERRAL FOR RETINAL SCREENING EXAM              Ordered - SERUM CREATININE (Yearly) Ordered on 6/24/2024 03/28/2024  Basic Metabolic Panel    07/07/2023  Basic Metabolic Panel    06/01/2023  Basic Metabolic Panel    12/28/2022  Basic Metabolic Panel    12/28/2022  CREATININE    Only the first 5 history entries have been loaded, but more history exists.              Annual Wellness Visit (Yearly) Next due on 6/24/2025 06/24/2024  Level of Service:  ANNUAL WELLNESS VISIT-INCLUDES PPPS SUBSEQUE*    06/24/2024  Visit Dx: Medicare annual wellness visit, subsequent    03/27/2017  Visit Dx: Medicare annual wellness visit, initial              IMM DTaP/Tdap/Td Vaccine (2 - Td or Tdap) Next due on 3/27/2027      03/27/2017  Imm Admin: Tdap Vaccine              Colorectal Cancer Screening (Colonoscopy - Preferred) Next due on 7/12/2028 07/12/2018  REFERRAL TO GI FOR COLONOSCOPY    06/20/2008  REFERRAL TO GI FOR COLONOSCOPY              Pneumococcal Vaccine: 65+ Years (Series Information) Completed      11/08/2016  Imm Admin: Pneumococcal polysaccharide vaccine (PPSV-23)    03/01/2016  Imm Admin: Pneumococcal Conjugate Vaccine (Prevnar/PCV-13)    03/01/2016  Imm Admin: Pneumococcal Conjugate Vaccine (Prevnar/PCV-13)              Hepatitis C Screening  Tentatively Complete      08/15/2018  Hepatitis C Antibody component of INFECTIOUS DISEASE TESTING (EXPOSURE)    08/03/2018  Hepatitis C Antibody component of HEP C VIRUS ANTIBODY              Influenza Vaccine (Series Information) Completed      09/20/2023  Imm Admin: Influenza Vaccine Adult HD    11/04/2022  Imm Admin: Influenza Vaccine Adult HD    10/27/2021  Imm Admin: Influenza Vaccine Adult HD    10/15/2020  Imm Admin: Influenza Vaccine Adult HD    10/16/2019  Imm Admin: Influenza Vaccine Adult HD    Only the first 5 history entries have been loaded, but more history exists.              Hepatitis A Vaccine (Hep A) (Series Information) Aged Out      No completion history exists for this topic.              HPV Vaccines (Series Information) Aged Out      No completion history exists for this topic.              Polio Vaccine (Inactivated Polio) (Series Information) Aged Out      No completion history exists for this topic.              Meningococcal Immunization (Series Information) Aged Out      No completion history exists for this topic.                    Patient Care Team:  BROOKE Rice as  PCP - General (Nurse Practitioner Family)  Manjit Pelletier M.D. as Consulting Physician (Cardiovascular Disease (Cardiology))  Amparo James M.D. as Consulting Physician (Orthopedic Surgery)  Pantera Gorman M.D. as Consulting Physician (Ophthalmology)  Jaiden Murguia Ass't as        Social History     Tobacco Use    Smoking status: Never    Smokeless tobacco: Never   Vaping Use    Vaping status: Never Used   Substance Use Topics    Alcohol use: Not Currently     Alcohol/week: 8.4 oz     Types: 14 Shots of liquor per week     Comment: 2 oz daily    Drug use: No     Family History   Problem Relation Age of Onset    Diabetes Other     Hypertension Other      He  has a past medical history of Alcohol use (02/10/2016), Breath shortness (06/15/2023), Dental disorder (06/15/2023), Diabetes (06/15/2023), Elevated INR (07/30/2019), Heart burn (06/15/2023), Hemorrhagic disorder (HCC) (06/15/2023), Hiatus hernia syndrome, High cholesterol (06/15/2023), Observed sleep apnea (06/15/2023), Other persistent atrial fibrillation (HCC) (12/19/2019), Pain (06/15/2023), Paroxysmal atrial fibrillation (HCC) (06/15/2023), Pneumonia (06/15/2023), Pulmonary hypertension (HCC) (03/01/2016), Snoring, and Urinary retention (07/30/2019).    He has no past medical history of Arthritis or Hypertension.   Past Surgical History:   Procedure Laterality Date    CYSTOSCOPY N/A 7/30/2019    Procedure: CYSTOSCOPY- DILATION OF STRICTURE;  Surgeon: Anthony Manning M.D.;  Location: [facility];  Service: Urology    KNEE ARTHROPLASTY TOTAL Right 8/15/2018    Procedure: KNEE ARTHROPLASTY TOTAL;  Surgeon: Amprao James M.D.;  Location: SURGERY [facility];  Service: Orthopedics    KNEE ARTHROPLASTY TOTAL Left 11/21/2016    Procedure: KNEE ARTHROPLASTY TOTAL;  Surgeon: mAparo James M.D.;  Location: [facility];  Service:     KNEE REPLACEMENT, TOTAL Left 11/2016    Dr. Jacob ZAPATA  "ARTHROSCOPY Left 2/27/2016    Procedure: KNEE ARTHROSCOPY- I&D KNEE;  Surgeon: Corby Reyes M.D.;  Location: SURGERY [facility];  Service:     KNEE ARTHROSCOPY Left 2/18/2016    Procedure: KNEE ARTHROSCOPY, SAMUELS;  Surgeon: Marquise Cline M.D.;  Location: SURGERY [facility];  Service:     MENISCECTOMY, KNEE, MEDIAL  2/18/2016    Procedure: MEDIAL MENISCECTOMY - PARTIAL;  Surgeon: Marquise Cline M.D.;  Location: SURGERY [facility];  Service:     CYSTOSCOPY  1986    DENTAL EXTRACTION(S)  1976    wisdom teeth    HAND SURGERY Left 1970    trauma, amputation index and middle finger    TONSILLECTOMY  as child       Exam:   /70 (BP Location: Right arm, Patient Position: Sitting, BP Cuff Size: Adult)   Pulse 98   Temp 36.6 °C (97.9 °F) (Temporal)   Ht 1.803 m (5' 11\")   Wt 104 kg (229 lb 6.2 oz)   SpO2 96%  Body mass index is 31.99 kg/m².    Hearing good.    Dentition good  Alert, oriented in no acute distress.  Eye contact is good, speech goal directed, affect calm    Assessment and Plan. The following treatment and monitoring plan is recommended:    1. Type 2 diabetes mellitus with diabetic neuropathy, with long-term current use of insulin (HCC)  -Chronic, stable.  A1c 7.3.  Continue frequent follow-up with our diabetic nurse educator Daija CANELA.   Continue:  Toujeo 35 units per day  Trulicity 3 mg weekly  Metformin 1000 mg bid  Jardiance 10 mg daily  Taking above medications as prescribed: yes  Taking daily ASA: No  - Dulaglutide (TRULICITY) 3 MG/0.5ML Solution Pen-injector; Take 3 mg by mouth every day.  Dispense: 12 mL; Refill: 3  - Insulin Glargine, 1 Unit Dial, (TOUJEO SOLOSTAR) 300 UNIT/ML Solution Pen-injector; Inject 35 Units under the skin every day.  Dispense: 1.5 mL; Refill: 11  - MICROALBUMIN CREAT RATIO URINE; Future  - VITAMIN D,25 HYDROXY (DEFICIENCY); Future  - Comp Metabolic Panel; Future    2. Medicare annual wellness visit, subsequent  Services suggested: No services " needed at this time  Health Care Screening: Age-appropriate preventive services recommended by USPTF and ACIP covered by Medicare were discussed today. Services ordered if indicated and agreed upon by the patient.  Referrals offered: Community-based lifestyle interventions to reduce health risks and promote self-management and wellness, fall prevention, nutrition, physical activity, tobacco-use cessation, weight loss, and mental health services as per orders if indicated.    Discussion today about general wellness and lifestyle habits:    Prevent falls and reduce trip hazards; Cautioned about securing or removing rugs.  Have a working fire alarm and carbon monoxide detector;   Engage in regular physical activity and social activities       3. Mixed hyperlipidemia  Chronic, controlled.    -Counseled on dietary and lifestyle modifications.  Medications to Continue: Simvastatin 20 mg daily  - Lipid Profile; Future  - simvastatin (ZOCOR) 20 MG Tab; Take 1 Tablet by mouth every evening.  Dispense: 100 Tablet; Refill: 3    4. ACC/AHA stage C systolic heart failure (HCC)  -Chronic, stable.  Continue management with cardiology  - Empagliflozin (JARDIANCE) 10 MG Tab tablet; Take 1 Tablet by mouth every day.  Dispense: 100 Tablet; Refill: 3  - metoprolol SR (TOPROL XL) 25 MG TABLET SR 24 HR; Take 0.5 Tablets by mouth every evening.  Dispense: 50 Tablet; Refill: 3    5. Left ventricular systolic dysfunction, NYHA class 2  -Chronic, stable.  She manage with cardiology  - Empagliflozin (JARDIANCE) 10 MG Tab tablet; Take 1 Tablet by mouth every day.  Dispense: 100 Tablet; Refill: 3  - metoprolol SR (TOPROL XL) 25 MG TABLET SR 24 HR; Take 0.5 Tablets by mouth every evening.  Dispense: 50 Tablet; Refill: 3    6. Neuropathy  -Chronic, persistent.  Unknown if the right foot pain is neuropathy or arthritis.  Gabapentin is modestly helpful.  Continue:  - gabapentin (NEURONTIN) 300 MG Cap; Take 2 Capsules by mouth 2 times a day.   Dispense: 400 Capsule; Refill: 3    7. Chronic atrial fibrillation (HCC)  -See HCC Form  - rivaroxaban (XARELTO) 20 MG Tab tablet; Take 1 Tablet by mouth with dinner.  Dispense: 100 Tablet; Refill: 3  - metoprolol SR (TOPROL XL) 25 MG TABLET SR 24 HR; Take 0.5 Tablets by mouth every evening.  Dispense: 50 Tablet; Refill: 3    8. Gastroesophageal reflux disease without esophagitis  -, Stable.  Continue  - omeprazole (PRILOSEC) 20 MG delayed-release capsule; Take 1 Capsule by mouth every day.  Dispense: 100 Capsule; Refill: 3    9. Hypercoagulable state due to longstanding persistent atrial fibrillation (HCC)  -Continue monitor for signs symptoms of bleeding.    10. Right foot pain  -Etiology.  Continue gabapentin    11. Urinary frequency  -Continue management urology  - tamsulosin (FLOMAX) 0.4 MG capsule; Take 1 Capsule by mouth 2 times a day.  Dispense: 200 Capsule; Refill: 3    HCC Gap Form    Diagnosis to address: E11.65, Z79.4 - Type 2 diabetes mellitus with hyperglycemia, with long-term current use of insulin (HCC)  Assessment and plan: Chronic, stable. Continue with current defined treatment plan: see note. Follow-up at least annually.  Diagnosis: E11.40, Z79.4 - Type 2 diabetes mellitus with diabetic neuropathy, with long-term current use of insulin (HCC)  Assessment and plan: Chronic, stable. Continue with current defined treatment plan: See note. Follow-up at least annually.  Diagnosis: D68.69, I48.11 - Hypercoagulable state due to longstanding persistent atrial fibrillation (HCC)  Assessment and plan: Chronic, stable, as based on today's assessment and impact on other conditions evaluated today. Continue with current treatment plan: Continue Xarelto 20 mg daily.  follow-up with cardiology as directed, but at least annually.  Diagnosis: I50.20 - ACC/AHA stage C systolic heart failure (HCC)  Assessment and plan: Chronic, stable, as based on today's assessment and impact on other conditions evaluated today.  "Continue with current treatment plan: Continue empagliflozin, metoprolol, simvastatin.  Follow-up with cardiology as directed, but at least annually.  Diagnosis: I48.20 - Chronic atrial fibrillation (HCC)  Assessment and plan: Chronic, stable, as based on today's assessment and impact on other conditions evaluated today. Continue with current treatment plan: Continue Xarelto and metoprolol Follow-up with specialist as directed, but at least annually.  Last edited 06/24/24 16:52 PDT by RENETTA RiceRCATIE.       \"Billing : secondary to the complexity of this patient's illnesses and their interactions.  All problems listed were discussed during the office visit, medications were evaluated and complexities were discussed as well as plan for the future.\"          Follow-up: Return in about 6 months (around 12/24/2024) for chronic conditions.  "

## 2024-07-16 ENCOUNTER — OFFICE VISIT (OUTPATIENT)
Dept: MEDICAL GROUP | Facility: MEDICAL CENTER | Age: 74
End: 2024-07-16
Payer: MEDICARE

## 2024-07-16 VITALS
HEIGHT: 71 IN | SYSTOLIC BLOOD PRESSURE: 118 MMHG | WEIGHT: 228 LBS | OXYGEN SATURATION: 96 % | DIASTOLIC BLOOD PRESSURE: 72 MMHG | HEART RATE: 74 BPM | RESPIRATION RATE: 17 BRPM | BODY MASS INDEX: 31.92 KG/M2

## 2024-07-16 DIAGNOSIS — E11.40 TYPE 2 DIABETES MELLITUS WITH DIABETIC NEUROPATHY, WITH LONG-TERM CURRENT USE OF INSULIN (HCC): ICD-10-CM

## 2024-07-16 DIAGNOSIS — Z79.4 TYPE 2 DIABETES MELLITUS WITH DIABETIC NEUROPATHY, WITH LONG-TERM CURRENT USE OF INSULIN (HCC): ICD-10-CM

## 2024-07-16 LAB
HBA1C MFR BLD: 7.2 % (ref ?–5.8)
POCT INT CON NEG: NEGATIVE
POCT INT CON POS: POSITIVE

## 2024-07-16 PROCEDURE — 99211 OFF/OP EST MAY X REQ PHY/QHP: CPT

## 2024-07-16 PROCEDURE — 3078F DIAST BP <80 MM HG: CPT

## 2024-07-16 PROCEDURE — 83036 HEMOGLOBIN GLYCOSYLATED A1C: CPT

## 2024-07-16 PROCEDURE — 3074F SYST BP LT 130 MM HG: CPT

## 2024-07-16 ASSESSMENT — FIBROSIS 4 INDEX: FIB4 SCORE: 1.68

## 2024-07-22 NOTE — PROGRESS NOTES
Preoperative Exam    07/10/18  Primary Care Physician: Wayne Yuen M.D.    ID: Richard Chery is a 68 y.o. male who presents for a preoperative exam.     Type 2 diabetes mellitus with diabetic neuropathy  Hypoglycemic therapy: Metformin 1000 mg twice daily, Januvia 100 mg daily and Lantus 32 units nightly.   Last A1c: 03/21/18 is 7.1.   Aspirin: taking.   Statin: declines.   ACE: taking.   Urine Microalbumin: normal September 2017.   Monofilament Exam: normal 03/21/18.   Pneumonia vaccine: UTD.    Pulmonary hypertension (CMS-HCC)  The patient has significant pulmonary HTN and has been unwilling to see either a pulmonologist or cardiologist. Echo from 2016 RVSP was 75 mm Hg.     Paroxysmal atrial fibrillation  The patient was last seen by cardiology on 10/6/2016 where karan described paroxysmal atrial fibrillation with runs seen on Holter monitor. They recommended anticoagulation, however, the patient elected to stop it. They also recommended that he continue diltiazem but the patient elected to stop it. Today the patient is taking a full dose aspirin. He doesn't believe he has atrial fibrillation any longer. He has been counseled on the risk of stroke but continues to decline anticoagulation.     Hyperlipidemia  The patient is currently taking fenofibrate 160 mg daily. He has declines stopping this and starting a statin, despite explanation of risks.     Essential hypertension  The patient's blood pressure is at goal on lisinopril.     Anemia  Noted on past labs.       Current Outpatient Prescriptions on File Prior to Visit   Medication Sig Dispense Refill   • lisinopril (PRINIVIL) 10 MG Tab Take 1 Tab by mouth every day. 90 Tab 3   • metformin (GLUCOPHAGE) 1000 MG tablet Take 1 Tab by mouth 2 Times a Day. 180 Tab 3   • SITagliptin (JANUVIA) 100 MG Tab Take 1 Tab by mouth every day. 90 Tab 3   • gabapentin (NEURONTIN) 300 MG Cap Take 2 PO q am and 1 PO q pm 270 Cap 3   • meclizine (ANTIVERT) 25 MG Tab  Pt does require minimal to no disturbance to achieve sleep  Ok for letter of accomodation for dorm   Take 1 Tab by mouth 3 times a day as needed. 30 Tab 0   • fenofibrate (TRIGLIDE) 160 MG tablet Take 1 Tab by mouth every day. 90 Tab 3   • insulin glargine (LANTUS) 100 UNIT/ML Solution Pen-injector injection Inject 32 Units as instructed every evening. 15 mL 6   • aspirin EC (ECOTRIN) 325 MG Tablet Delayed Response Take 1 Tab by mouth every day. 40 Tab 0   • sildenafil citrate (VIAGRA) 100 MG tablet Take 1 Tab by mouth as needed for Erectile Dysfunction. 10 Tab 3   • omeprazole (PRILOSEC) 20 MG delayed-release capsule Take 20 mg by mouth every day.     • Potassium Gluconate 595 MG Cap Take 99 mg by mouth every day.       No current facility-administered medications on file prior to visit.        Past Medical History:   Diagnosis Date   • Alcohol use 2/10/2016    3 per day   • Arthritis 2016    left knee   • Dental disorder     dentures; partial lower   • Diabetes 2005    insulin and oral agent   • Heart burn    • Hiatus hernia syndrome    • High cholesterol    • Hypertension    • Observed sleep apnea     no study done yet   • Pain     left knee   • Paroxysmal atrial fibrillation (HCC) 2/27/2016 11/14/16-resolved now       Allergies: Patient has no known allergies.    Surgical History:  has a past surgical history that includes hand surgery (Left, 1970); cystoscopy (1986); tonsillectomy (as child); dental extraction(s) (1976); knee arthroscopy (Left, 2/18/2016); medial meniscectomy (2/18/2016); knee arthroscopy (Left, 2/27/2016); knee arthroplasty total (Left, 11/21/2016); and knee replacement, total (Left, 11/2016).    Family History: family history is not on file.    Social History:  reports that he has never smoked. He has never used smokeless tobacco. He reports that he drinks about 1.8 oz of alcohol per week . He reports that he does not use drugs.    Planned Surgery: Right Total Knee Replacement    History of Present Illness:   Mr. Chery is a 68 y.o. male with a PMHx including EMILY, a fib and diabetes  "who presents for a preoperative exam.     Functional Capacity:   Can walk up a flight of stairs without getting winded or short-of-breath. Able to perform > 4 METS.     Review of Systems:  No CP or SOB.     Objective:   /70   Pulse 84   Temp 36.3 °C (97.3 °F)   Resp 20   Ht 1.778 m (5' 10\")   Wt 112 kg (246 lb 14.6 oz)   SpO2 97%   BMI 35.43 kg/m²   Physical Exam:   Constitutional: vital signs reviewed.     Cardio: RRR no m/r/g.   Resp: CTAB no w/r/r.     Labs and Imaging:   CBC, CMP, UA, CXR and A1c ordered.     Assessment and Plan:     1. Preoperative Exam: the goal of the preoperative exam is to optimize conditions that increase perioperative morbidity and mortality. I discussed with the patient that no amount of testing or intervention prior to surgery eliminates all surgical risk.    The patient is being seen for a preoperative exam. The surgery in question is a right total knee replacement. This particular surgery caries a intermediate risk of cardiac death and nonfatal MI. I estimated this patient's perioperative risk of cardiac death and nonfatal MI at 1-5%. The patient states he's willing to accept this risk.     Of note, the patient has not permitted adequate treatment of his EMILY or atrial fibrillation and has significant pulmonary hypertension on 2016 echocardiogram. For these reasons, I recommended that he see cardiology before any surgery. The patient was not satisfied with this and wanted me \"just sign the paper\" clearing him for surgery. I informed him that I do not feel that this is in his best interest and have placed a cardiology consultation.     I also discussed with the patient that his age and comorbidities places him at higher risk of perioperative complications and recommended that he exhaust all non-surgical modalities before considering surgery.     2. Type 2 diabetes mellitus with diabetic neuropathy, with long-term current use of insulin (HCC)  - continue current regimen.   - " HEMOGLOBIN A1C; Future    3. Paroxysmal atrial fibrillation (HCC)  - patient has declined anticoagulation or CCB in past.   - REFERRAL TO CARDIOLOGY    4. Familial hypercholesterolemia  - patient has declined transition to statin.   - COMP METABOLIC PANEL; Future    5. Essential hypertension  - continue current regimen.   - COMP METABOLIC PANEL; Future  - URINALYSIS,CULTURE IF INDICATED; Future    6. Anemia, unspecified type  Noted on past labs.   - CBC WITH DIFFERENTIAL; Future    7. Pulmonary hypertension (HCC)  Patient has declined CPAP.   - DX-CHEST-2 VIEWS; Future  - REFERRAL TO CARDIOLOGY      Wayne Yuen M.D.   Department of Primary Care  [facility]

## 2024-08-14 ENCOUNTER — OFFICE VISIT (OUTPATIENT)
Dept: MEDICAL GROUP | Facility: MEDICAL CENTER | Age: 74
End: 2024-08-14
Payer: MEDICARE

## 2024-08-14 VITALS
BODY MASS INDEX: 31.79 KG/M2 | HEART RATE: 77 BPM | WEIGHT: 227.07 LBS | HEIGHT: 71 IN | OXYGEN SATURATION: 98 % | TEMPERATURE: 97.9 F | DIASTOLIC BLOOD PRESSURE: 70 MMHG | SYSTOLIC BLOOD PRESSURE: 120 MMHG

## 2024-08-14 DIAGNOSIS — M79.671 RIGHT FOOT PAIN: Chronic | ICD-10-CM

## 2024-08-14 DIAGNOSIS — T25.222A PARTIAL THICKNESS BURN OF LEFT FOOT, INITIAL ENCOUNTER: ICD-10-CM

## 2024-08-14 PROCEDURE — 99214 OFFICE O/P EST MOD 30 MIN: CPT | Mod: 25 | Performed by: BEHAVIOR ANALYST

## 2024-08-14 PROCEDURE — 3074F SYST BP LT 130 MM HG: CPT | Performed by: BEHAVIOR ANALYST

## 2024-08-14 PROCEDURE — 3078F DIAST BP <80 MM HG: CPT | Performed by: BEHAVIOR ANALYST

## 2024-08-14 RX ORDER — KETOROLAC TROMETHAMINE 30 MG/ML
30 INJECTION, SOLUTION INTRAMUSCULAR; INTRAVENOUS ONCE
Status: COMPLETED | OUTPATIENT
Start: 2024-08-14 | End: 2024-08-14

## 2024-08-14 RX ORDER — KETOROLAC TROMETHAMINE 15 MG/ML
15 INJECTION, SOLUTION INTRAMUSCULAR; INTRAVENOUS ONCE
Status: DISCONTINUED | OUTPATIENT
Start: 2024-08-14 | End: 2024-08-14

## 2024-08-14 RX ADMIN — KETOROLAC TROMETHAMINE 30 MG: 30 INJECTION, SOLUTION INTRAMUSCULAR; INTRAVENOUS at 10:58
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
## 2024-08-21 ENCOUNTER — NON-PROVIDER VISIT (OUTPATIENT)
Dept: WOUND CARE | Facility: MEDICAL CENTER | Age: 74
End: 2024-08-21
Attending: BEHAVIOR ANALYST
Payer: MEDICARE

## 2024-08-21 ENCOUNTER — PATIENT MESSAGE (OUTPATIENT)
Dept: MEDICAL GROUP | Facility: MEDICAL CENTER | Age: 74
End: 2024-08-21

## 2024-08-21 DIAGNOSIS — M79.671 CHRONIC FOOT PAIN, RIGHT: ICD-10-CM

## 2024-08-21 DIAGNOSIS — M25.50 CHRONIC JOINT PAIN: ICD-10-CM

## 2024-08-21 DIAGNOSIS — G89.29 CHRONIC JOINT PAIN: ICD-10-CM

## 2024-08-21 DIAGNOSIS — M79.671 RIGHT FOOT PAIN: Chronic | ICD-10-CM

## 2024-08-21 DIAGNOSIS — G89.29 CHRONIC FOOT PAIN, RIGHT: ICD-10-CM

## 2024-08-21 PROCEDURE — 99211 OFF/OP EST MAY X REQ PHY/QHP: CPT

## 2024-08-21 PROCEDURE — 97597 DBRDMT OPN WND 1ST 20 CM/<: CPT

## 2024-08-21 NOTE — PATIENT INSTRUCTIONS
-Keep your wound dressing clean, dry, and intact.     -Change your dressing eery 48 hours and if it becomes soiled, soaked, or falls off.    -Should you experience any significant changes in your wound(s), such as infection (redness, swelling, localized heat, increased pain, fever > 101 F, chills) or have any questions regarding your home care instructions, please contact the wound center at [phone]. If after hours, contact your primary care physician or go to the hospital emergency room.
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
## 2024-08-29 ENCOUNTER — OFFICE VISIT (OUTPATIENT)
Dept: WOUND CARE | Facility: MEDICAL CENTER | Age: 74
End: 2024-08-29
Attending: BEHAVIOR ANALYST
Payer: MEDICARE

## 2024-08-29 ENCOUNTER — OFFICE VISIT (OUTPATIENT)
Dept: PHYSICAL MEDICINE AND REHAB | Facility: MEDICAL CENTER | Age: 74
End: 2024-08-29
Payer: MEDICARE

## 2024-08-29 VITALS
BODY MASS INDEX: 32.13 KG/M2 | OXYGEN SATURATION: 94 % | WEIGHT: 229.5 LBS | TEMPERATURE: 97 F | HEART RATE: 86 BPM | SYSTOLIC BLOOD PRESSURE: 105 MMHG | HEIGHT: 71 IN | DIASTOLIC BLOOD PRESSURE: 68 MMHG

## 2024-08-29 VITALS
DIASTOLIC BLOOD PRESSURE: 80 MMHG | SYSTOLIC BLOOD PRESSURE: 128 MMHG | HEART RATE: 71 BPM | RESPIRATION RATE: 16 BRPM | TEMPERATURE: 97.1 F

## 2024-08-29 DIAGNOSIS — E11.42 DIABETIC PERIPHERAL NEUROPATHY ASSOCIATED WITH TYPE 2 DIABETES MELLITUS (HCC): ICD-10-CM

## 2024-08-29 DIAGNOSIS — T25.322D FULL THICKNESS BURN OF LEFT FOOT, SUBSEQUENT ENCOUNTER: ICD-10-CM

## 2024-08-29 DIAGNOSIS — M79.2 NEURALGIA OF RIGHT FOOT: ICD-10-CM

## 2024-08-29 DIAGNOSIS — M79.671 RIGHT FOOT PAIN: Chronic | ICD-10-CM

## 2024-08-29 DIAGNOSIS — G89.29 CHRONIC FOOT PAIN, RIGHT: ICD-10-CM

## 2024-08-29 DIAGNOSIS — E11.40 TYPE 2 DIABETES MELLITUS WITH DIABETIC NEUROPATHY, WITH LONG-TERM CURRENT USE OF INSULIN (HCC): Chronic | ICD-10-CM

## 2024-08-29 DIAGNOSIS — M79.671 CHRONIC FOOT PAIN, RIGHT: ICD-10-CM

## 2024-08-29 DIAGNOSIS — Z79.4 TYPE 2 DIABETES MELLITUS WITH DIABETIC NEUROPATHY, WITH LONG-TERM CURRENT USE OF INSULIN (HCC): Chronic | ICD-10-CM

## 2024-08-29 DIAGNOSIS — T25.321D FULL THICKNESS BURN OF RIGHT FOOT, SUBSEQUENT ENCOUNTER: ICD-10-CM

## 2024-08-29 PROCEDURE — 99214 OFFICE O/P EST MOD 30 MIN: CPT

## 2024-08-29 PROCEDURE — 11042 DBRDMT SUBQ TIS 1ST 20SQCM/<: CPT

## 2024-08-29 ASSESSMENT — PATIENT HEALTH QUESTIONNAIRE - PHQ9: CLINICAL INTERPRETATION OF PHQ2 SCORE: 0

## 2024-08-29 ASSESSMENT — PAIN SCALES - GENERAL: PAINLEVEL: 7=MODERATE-SEVERE PAIN

## 2024-08-29 ASSESSMENT — ENCOUNTER SYMPTOMS
CHILLS: 0
FEVER: 0

## 2024-08-29 NOTE — PROGRESS NOTES
[facility] (Physical Medicine and Rehabilitation)  Sports Medicine& Interventional Spine   New Patient Visit  Date of Service: 8/29/2024    Chief complaint:   Chief Complaint   Patient presents with    New Patient     Chronic foot pain, right        HISTORY    HPI: Richard Chery 74 y.o. male with PMHx of Type 2 DM who presents today with chronic right foot pain.     This pain has been present for the past 6 years, though it has worsened in the past 2 years.  Today is rated as 7 out of 10 in severity.  He states that the pain comes on about every third day and when it comes pain is out of 10 out of 10.  He states generally that there is no rhyme or reason to when or if the pain will occur pain is located at a very small focal area at the dorsum of the foot circumferentially covering the 2nd through 4th toes.  He states he has been to many doctors for this complaint including podiatry as well as Ortho foot and ankle.  Multiple injections were were done with no relief.  He also had a previous MRI as well as multiple x-rays of the right foot which were normal.  He is also trialed acupuncture with no relief.  He states that at times the pain will keep him awake and cause intermittent nausea.  He does not state that there are any exacerbating factors or improving factors.  He does state that at times he can place pressure on the area to help the pain.  He does have a history of diabetes, last A1c 7.1 as well as diabetic neuropathy.  He states that this specific pain which he experiences is significantly different from the numbness, tingling sensation related to his neuropathy.  He is currently on gabapentin 300 mg twice daily.          Medical records review:  I reviewed the note from the referring provider Nazia Moy, * dated 8/14/24.     Previous treatments:    Physical Therapy: Yes    Medications the patient is tried: NSAIDs and gabapentin    Previous interventions: Multiple  injections    Previous surgeries to relieve the above pain:  none    Constitutional: Negative for chills and fever.   Respiratory: Negative for cough and sputum production.    Cardiovascular: Negative for chest pain and palpitations.     PMHx:   Past Medical History:   Diagnosis Date    Alcohol use 02/10/2016    3 per day    Breath shortness 06/15/2023    at times    Dental disorder 06/15/2023    full upper and lower plates    Diabetes 06/15/2023    insulin and oral agent    Elevated INR 07/30/2019    Heart burn 06/15/2023    medicated    Hemorrhagic disorder (HCC) 06/15/2023    xarelto    Hiatus hernia syndrome     High cholesterol 06/15/2023    medicated    Observed sleep apnea 06/15/2023    appt coming up for sleep study    Other persistent atrial fibrillation (HCC) 12/19/2019    Pain 06/15/2023    general body aches    Paroxysmal atrial fibrillation (HCC) 06/15/2023    medicated    Pneumonia 06/15/2023    history of    Pulmonary hypertension ([facility]) 03/01/2016    Snoring     Urinary retention 07/30/2019       Current Outpatient Medications on File Prior to Visit   Medication Sig Dispense Refill    Dulaglutide (TRULICITY) 3 MG/0.5ML Solution Pen-injector Take 3 mg by mouth every day. 12 mL 3    Empagliflozin (JARDIANCE) 10 MG Tab tablet Take 1 Tablet by mouth every day. 100 Tablet 3    gabapentin (NEURONTIN) 300 MG Cap Take 2 Capsules by mouth 2 times a day. 400 Capsule 3    Insulin Glargine, 1 Unit Dial, (TOUJEO SOLOSTAR) 300 UNIT/ML Solution Pen-injector Inject 35 Units under the skin every day. 1.5 mL 11    simvastatin (ZOCOR) 20 MG Tab Take 1 Tablet by mouth every evening. 100 Tablet 3    rivaroxaban (XARELTO) 20 MG Tab tablet Take 1 Tablet by mouth with dinner. 100 Tablet 3    tamsulosin (FLOMAX) 0.4 MG capsule Take 1 Capsule by mouth 2 times a day. 200 Capsule 3    omeprazole (PRILOSEC) 20 MG delayed-release capsule Take 1 Capsule by mouth every day. 100 Capsule 3    metoprolol SR (TOPROL XL) 25 MG TABLET SR  24 HR Take 0.5 Tablets by mouth every evening. 50 Tablet 3    metformin (GLUCOPHAGE) 1000 MG tablet Take 1 tablet by mouth twice daily 200 Tablet 3    docusate sodium (STOOL SOFTENER) 100 MG Cap Take 800 mg by mouth 2 times a day.      MAGNESIUM PO Take  by mouth 2 times a day.      sildenafil citrate (VIAGRA) 100 MG tablet Take 100 mg by mouth as needed.      POTASSIUM PO Take 5 mg by mouth as needed.      B Complex Vitamins (B COMPLEX PO) Take  by mouth 2 (two) times a day.       No current facility-administered medications on file prior to visit.        PSHx:   Past Surgical History:   Procedure Laterality Date    CYSTOSCOPY N/A 7/30/2019    Procedure: CYSTOSCOPY- DILATION OF STRICTURE;  Surgeon: Anthony Manning M.D.;  Location: [facility];  Service: Urology    KNEE ARTHROPLASTY TOTAL Right 8/15/2018    Procedure: KNEE ARTHROPLASTY TOTAL;  Surgeon: Amparo James M.D.;  Location: [facility];  Service: Orthopedics    KNEE ARTHROPLASTY TOTAL Left 11/21/2016    Procedure: KNEE ARTHROPLASTY TOTAL;  Surgeon: Amparo James M.D.;  Location: [facility];  Service:     KNEE REPLACEMENT, TOTAL Left 11/2016    Dr. James    KNEE ARTHROSCOPY Left 2/27/2016    Procedure: KNEE ARTHROSCOPY- I&D KNEE;  Surgeon: Corby Reyes M.D.;  Location: [facility];  Service:     KNEE ARTHROSCOPY Left 2/18/2016    Procedure: KNEE ARTHROSCOPY, SAMUELS;  Surgeon: Marquise Cline M.D.;  Location: [facility];  Service:     MENISCECTOMY, KNEE, MEDIAL  2/18/2016    Procedure: MEDIAL MENISCECTOMY - PARTIAL;  Surgeon: Marquise Cline M.D.;  Location: [facility];  Service:     CYSTOSCOPY  1986    DENTAL EXTRACTION(S)  1976    wisdom teeth    HAND SURGERY Left 1970    trauma, amputation index and middle finger    TONSILLECTOMY  as child     Family history   Family History   Problem Relation Age of Onset    Diabetes Other     Hypertension Other      Medications:  reviewed on epic.   Outpatient Medications Marked as Taking for the 8/29/24 encounter (Office Visit) with Riccardo Castano D.O.   Medication Sig Dispense Refill    Dulaglutide (TRULICITY) 3 MG/0.5ML Solution Pen-injector Take 3 mg by mouth every day. 12 mL 3    Empagliflozin (JARDIANCE) 10 MG Tab tablet Take 1 Tablet by mouth every day. 100 Tablet 3    gabapentin (NEURONTIN) 300 MG Cap Take 2 Capsules by mouth 2 times a day. 400 Capsule 3    Insulin Glargine, 1 Unit Dial, (TOUJEO SOLOSTAR) 300 UNIT/ML Solution Pen-injector Inject 35 Units under the skin every day. 1.5 mL 11    simvastatin (ZOCOR) 20 MG Tab Take 1 Tablet by mouth every evening. 100 Tablet 3    rivaroxaban (XARELTO) 20 MG Tab tablet Take 1 Tablet by mouth with dinner. 100 Tablet 3    tamsulosin (FLOMAX) 0.4 MG capsule Take 1 Capsule by mouth 2 times a day. 200 Capsule 3    omeprazole (PRILOSEC) 20 MG delayed-release capsule Take 1 Capsule by mouth every day. 100 Capsule 3    metoprolol SR (TOPROL XL) 25 MG TABLET SR 24 HR Take 0.5 Tablets by mouth every evening. 50 Tablet 3    metformin (GLUCOPHAGE) 1000 MG tablet Take 1 tablet by mouth twice daily 200 Tablet 3    docusate sodium (STOOL SOFTENER) 100 MG Cap Take 800 mg by mouth 2 times a day.      MAGNESIUM PO Take  by mouth 2 times a day.      sildenafil citrate (VIAGRA) 100 MG tablet Take 100 mg by mouth as needed.      POTASSIUM PO Take 5 mg by mouth as needed.      B Complex Vitamins (B COMPLEX PO) Take  by mouth 2 (two) times a day.        Allergies:   Allergies   Allergen Reactions    Melatonin Vomiting     Social Hx:   Social History     Socioeconomic History    Marital status:      Spouse name: Not on file    Number of children: Not on file    Years of education: Not on file    Highest education level: Associate degree: occupational, technical, or vocational program   Occupational History    Not on file   Tobacco Use    Smoking status: Never    Smokeless tobacco: Never   Vaping Use     Vaping status: Never Used   Substance and Sexual Activity    Alcohol use: Not Currently     Alcohol/week: 8.4 oz     Types: 14 Shots of liquor per week     Comment: 2 oz daily    Drug use: No    Sexual activity: Yes     Partners: Female   Other Topics Concern    Not on file   Social History Narrative    Not on file     Social Determinants of Health     Financial Resource Strain: Low Risk  (12/13/2022)    Overall Financial Resource Strain (CARDIA)     Difficulty of Paying Living Expenses: Not hard at all   Food Insecurity: No Food Insecurity (12/13/2022)    Hunger Vital Sign     Worried About Running Out of Food in the Last Year: Never true     Ran Out of Food in the Last Year: Never true   Transportation Needs: No Transportation Needs (12/13/2022)    PRAPARE - Transportation     Lack of Transportation (Medical): No     Lack of Transportation (Non-Medical): No   Physical Activity: Sufficiently Active (12/13/2022)    Exercise Vital Sign     Days of Exercise per Week: 5 days     Minutes of Exercise per Session: 30 min   Stress: Unknown (12/13/2022)    [employer] - Occupational Stress Questionnaire     Feeling of Stress : Patient declined   Social Connections: Socially Isolated (12/13/2022)    Social Connection and Isolation Panel [NHANES]     Frequency of Communication with Friends and Family: Once a week     Frequency of Social Gatherings with Friends and Family: Once a week     Attends Judaism Services: Never     Active Member of Clubs or Organizations: No     Attends Club or Organization Meetings: Never     Marital Status:    Intimate Partner Violence: Not on file   Housing Stability: Low Risk  (12/13/2022)    Housing Stability Vital Sign     Unable to Pay for Housing in the Last Year: No     Number of Places Lived in the Last Year: 1     Unstable Housing in the Last Year: No         EXAMINATION     Physical Exam:   Vitals: /68 (BP Location: Right arm, Patient Position:  "Sitting, BP Cuff Size: Adult)   Pulse 86   Temp 36.1 °C (97 °F) (Temporal)   Ht 1.803 m (5' 11\")   Wt 104 kg (229 lb 8 oz)   SpO2 94%     Constitutional:   Body Habitus: Body mass index is 32.01 kg/m².  Cooperation: Fully cooperates with exam  Appearance: Well-groomed, well-nourished, not disheveled     Eyes: No scleral icterus to suggest severe liver disease, no proptosis to suggest severe hyperthyroid  ENT -no obvious auditory deficits, no obvious tongue lesions, tongue midline, no facial droop   Skin -no rashes or lesions noted   Respiratory-  breathing comfortable on room air, no audible wheezing  Cardiovascular- capillary refills less than 2 seconds. No lower extremity edema is noted.   Gastrointestinal - no obvious abdominal masses, No tenderness to palpation in the abdomen  Psychiatric- alert and oriented ×3. Normal affect.   Gait - normal gait, no use of ambulatory device, nonantalgic.     Musculoskeletal  Attention placed to right foot.  Inspection: No obvious abnormalities  Palpation: Nontender throughout the dorsum and plantar surface of the foot  Range of motion: Within normal limits for dorsiflexion, plantarflexion, inversion, eversion  Strength: 5 out of 5 in all planes including dorsiflexion, plantarflexion, inversion, eversion  Sensation intact to both light touch and pinprick  Proprioception intact        MEDICAL DECISION MAKING    Medical records review: see under HPI section.     DATA    Labs:   Lab Results   Component Value Date/Time    SODIUM 140 03/28/2024 01:06 PM    POTASSIUM 4.7 03/28/2024 01:06 PM    CHLORIDE 102 03/28/2024 01:06 PM    CO2 26 03/28/2024 01:06 PM    ANION 12.0 03/28/2024 01:06 PM    GLUCOSE 140 (H) 03/28/2024 01:06 PM    BUN 16 03/28/2024 01:06 PM    CREATININE 0.91 03/28/2024 01:06 PM    CALCIUM 9.3 03/28/2024 01:06 PM    ASTSGOT 18 08/08/2022 06:43 AM    ALTSGPT 14 08/08/2022 06:43 AM    TBILIRUBIN 0.5 08/08/2022 06:43 AM    ALBUMIN 4.6 08/08/2022 06:43 AM    " TOTPROTEIN 7.4 08/08/2022 06:43 AM    GLOBULIN 2.8 08/08/2022 06:43 AM    AGRATIO 1.6 08/08/2022 06:43 AM   ]    Lab Results   Component Value Date/Time    PROTHROMBTM 15.0 (H) 07/10/2023 08:03 AM    INR 1.19 (H) 07/10/2023 08:03 AM        Lab Results   Component Value Date/Time    WBC 5.8 07/07/2023 07:26 AM    RBC 4.55 (L) 07/07/2023 07:26 AM    HEMOGLOBIN 14.1 07/07/2023 07:26 AM    HEMATOCRIT 42.7 07/07/2023 07:26 AM    MCV 93.8 07/07/2023 07:26 AM    MCH 31.0 07/07/2023 07:26 AM    MCHC 33.0 07/07/2023 07:26 AM    MPV 10.3 07/07/2023 07:26 AM    NEUTSPOLYS 45.40 08/08/2022 06:43 AM    LYMPHOCYTES 44.30 (H) 08/08/2022 06:43 AM    MONOCYTES 8.30 08/08/2022 06:43 AM    EOSINOPHILS 1.10 08/08/2022 06:43 AM    BASOPHILS 0.60 08/08/2022 06:43 AM        Lab Results   Component Value Date/Time    HBA1C 7.2 (A) 07/16/2024 01:13 PM        Imaging:   I personally reviewed following images, these are my reads  Complete x-ray of the right foot dated 8/8/2023 shows normal alignment of all bones, no fractures, otherwise normal study     MRI of the right foot with and without contrast dated 1/21/2023 showed mild fatty atrophy throughout intrinsic muscular of the feet otherwise normal    Diagnosis   Visit Diagnoses     ICD-10-CM   1. Chronic foot pain, right  M79.671    G89.29   2. Neuralgia of right foot  M79.2   3. Diabetic peripheral neuropathy associated with type 2 diabetes mellitus (HCC)  E11.42         ASSESSMENT AND PLAN:  Richard Chery 74 y.o. male who presents today for initial evaluation of chronic right foot pain.  Workup has included multiple x-rays, recent MRI that will have all been normal.  He has had multiple injections into the feet with no improvement of his pain.  He does have a history of diabetic peripheral neuropathy, currently on gabapentin 300 twice daily.  His examination today was otherwise completely normal.  I could not reproduce his reported pain.  The presentation and focal area which she  describes the pain does not follow any specific nerve pattern.  This very well may be a abnormal presentation of his diabetic neuropathy.  Treatment options were discussed with the patient including potential spinal cord stimulation versus peripheral nerve stimulation.  An EMG of the right lower extremity was ordered to further evaluate for any focal nerve compression or specific peripheral nerve involvement.  Patient will follow-up with Dr. Mae after EMG for continued management and treatment recommendations.     Richard was seen today for new patient.    Diagnoses and all orders for this visit:    Chronic foot pain, right  -     Referral to EMG - Physiatry (PMR)    Neuralgia of right foot  -     Referral to EMG - Physiatry (PMR)    Diabetic peripheral neuropathy associated with type 2 diabetes mellitus (HCC)  -     Referral to EMG - Physiatry (PMR)          PLAN    Diagnostic workup: EMG RLE    Medications:  continue gabapentin as prescribed    Interventional program: Consider SCS vs peripheral nerve stimulation        Follow-up:Will follow up with Dr Mae after EMG for discussion of above procedures.       Please note that this dictation was created using voice recognition software. I have made every reasonable attempt to correct obvious errors but there may be errors of grammar and content that I may have overlooked prior to finalization of this note.    Riccardo Castano DO  Physical Medicine and Rehabilitation  Sports Medicine and Spine  [facility]

## 2024-08-29 NOTE — PROGRESS NOTES
Provider Encounter- Diabetic Foot Ulcer / Burn      HISTORY OF PRESENT ILLNESS  Wound History:    START OF CARE IN CLINIC: 8/21/2024    REFERRING PROVIDER: Nazia Moy      WOUND- Diabetic foot ulcer / burn   LOCATION: Left plantar forefoot, full thickness burn ~ 1% TBSA     Right plantar forefoot - resolved   HISTORY:  74M with PMHx of DM2, diabetic neuropathy, chronic right foot pain. Patient reports that he was walking outside without footwear on in early August. Due to neuropathy, could not feel the hot concrete and suffered burn b/l plantar feet. Patient was seen by PCP on 8/14 who referred patient to [facility] for wound care. Patient reports that he has chronic right foot dorsal pain, has had extensive workup with Dr. Aviles, podiatry, and reports that he has appointment with neurology. Patient was referred to [facility] for wound care.    Pertinent Medical History: See above     TOBACCO USE:   Denies ever using    Patient's problem list, allergies, and current medications reviewed and updated in Epic    Interval History:  8/29/2024: Clinic visit with Augie Elizabeth MD. Patient reports doing well, denies any acute issues. His right plantar foot burn has resolved. Left plantar burn significantly smaller, near resolution. Patient is pleased with progress of wounds. Discussed offloading, he is not interested and as wound near resolution may not be necessary. He has custom insoles < 1 year old, does not wear regularly recommend wearing more frequently for foot protection.    REVIEW OF SYSTEMS:   Review of Systems   Constitutional:  Negative for chills, fever and malaise/fatigue.   Skin:         Open wound left plantar foot  Healed wound right plantar foot       PHYSICAL EXAMINATION:   /80   Pulse 71   Temp 36.2 °C (97.1 °F) (Temporal)   Resp 16     Physical Exam  Constitutional:       General: He is not in acute distress.     Appearance: Normal appearance.   Cardiovascular:      Rate and Rhythm: Normal  rate.      Pulses: Normal pulses.      Comments: Palpable pedal pulses  Pulmonary:      Effort: Pulmonary effort is normal.   Skin:     Comments: Right plantar forefoot burn / ulcer: Resolved  Left plantar forefoot burn / ulcer: Wound measures significantly smaller, thin periwound callus. No evidence of infection.   Neurological:      Mental Status: He is alert.         Monofilament testing with a 10 gram force: sensation intact: decreased bilaterally  Visual Inspection: Feet with callus, ulcers, fissures.  Pedal pulses: intact bilaterally     WOUND ASSESSMENT  Wound 08/21/24 Diabetic Ulcer Plantar Left FOREFOOT (Active)   Wound Image    08/29/24 0830   Site Assessment Pink;Red 08/29/24 0830   Periwound Assessment Callused 08/29/24 0830   Margins Attached edges 08/29/24 0830   Closure Secondary intention 08/29/24 0830   Drainage Amount Scant 08/29/24 0830   Drainage Description Serosanguineous 08/29/24 0830   Treatments Topical Lidocaine;Cleansed;Provider debridement;Site care 08/29/24 0830   Offloading/DME Other (comment) 08/21/24 0900   Wound Cleansing Hypochlorus Acid 08/29/24 0830   Periwound Protectant Skin Protectant Wipes to Periwound 08/29/24 0830   Dressing Status Old drainage 08/29/24 0830   Dressing Changed Changed 08/29/24 0830   Dressing Cleansing/Solutions Not Applicable 08/29/24 0830   Dressing Options Petrolatum Gauze (yellow);Nonadhesive Foam;Hypafix Tape 08/29/24 0830   Dressing Change/Treatment Frequency Every 48 hrs, and As Needed 08/29/24 0830   Wound Team Following Weekly 08/29/24 0830   Non-staged Wound Description Full thickness 08/29/24 0830   Wound Length (cm) 0.5 cm 08/29/24 0830   Wound Width (cm) 0.2 cm 08/29/24 0830   Wound Depth (cm) 0.1 cm 08/29/24 0830   Wound Surface Area (cm^2) 0.1 cm^2 08/29/24 0830   Wound Volume (cm^3) 0.01 cm^3 08/29/24 0830   Post-Procedure Length (cm) 1 cm 08/29/24 0830   Post-Procedure Width (cm) 0.4 cm 08/29/24 0830   Post-Procedure Depth (cm) 0.1 cm  "08/29/24 0830   Post-Procedure Surface Area (cm^2) 0.4 cm^2 08/29/24 0830   Post-Procedure Volume (cm^3) 0.04 cm^3 08/29/24 0830   Wound Healing % 99 08/29/24 0830   Tunneling (cm) 0 cm 08/29/24 0830   Undermining (cm) 0 cm 08/29/24 0830   Wound Odor None 08/29/24 0830   Pulses Right;Left;DP;PT;1+;Other (Comments) 08/21/24 0900   Right Foot Monofilament 10-point exam (Sensate) 2/10 08/29/24 0830   Left Foot Monofilament 10-point exam (Sensate) 1/10 08/29/24 0830   Exposed Structures None 08/29/24 0830   Number of days: 8         PROCEDURE:   -2% viscous lidocaine applied topically to wound bed for approximately 5 minutes prior to debridement  -Curette used to debride wound bed and periwound callus.  Excisional debridement was performed to remove devitalized tissue until healthy, bleeding tissue was visualized.   Entire surface of wound, 0.4 cm2 debrided.  Tissue debrided into the subcutaneous layer.  Periwound callus, ~ 6 cm2, debrided to skin level, excising hyperkeratinized tissue.   -Bleeding controlled with manual pressure.    -Wound care completed by wound RN, refer to flowsheet  -Patient tolerated the procedure well, without c/o pain or discomfort.       Pertinent Labs and Diagnostics:         Labs:     A1c:   Lab Results   Component Value Date/Time    HBA1C 7.2 (A) 07/16/2024 01:13 PM          IMAGING: No results found.    VASCULAR STUDIES: No results found.    LAST  WOUND CULTURE: No results found for: \"CULTRSULT\"        ASSESSMENT AND PLAN:     1. Full thickness burn of left foot, subsequent encounter  2. Full thickness burn of right foot, subsequent encounter    8/29/2024  - Patient with thermal burn in setting of diabetic neuropathy due to walking barefoot on hot concrete  - Right foot wound resolved, left foot wound has significantly improved   - Excisional debridement was performed in clinic, medically necessary to promote wound healing.  - No evidence of infection  - Wound significantly improved " without offloading. Discussed offloading if wound worsens. He is not interested.  - Counseled that with neuropathy recommend not going barefoot and to protect feet.   Wound Care: Xeroform, foam, tape    3. Right foot pain    8/29/2024  - Patient with chronic right dorsal foot pain  - Has had extensive workup with Dr. Aviles and podiatry, reports he is awaiting evaluation from neurology  - Imaging only suggestive of osteoarthritis, may be neuropathic in origin  - Recommend wearing custom insoles / orthotics more frequently.    4. Type 2 diabetes mellitus with diabetic neuropathy, with long-term current use of insulin ([facility])    8/29/2024  - Patient's DM is controlled, A1c 7.2. Reports fasting glucose this morning 113  - Implications of loss of protective sensation (LOPS) discussed with patient- including increased risk for amputation.  Advised to check feet at least daily, moisturize feet, and to always wear protective foot wear.   - Patient has podiatry, but does his own callus and nail care. Recommend expert perform nail care due to his neuropathy to avoid patient creating new wound  - He reports that he has diabetic shoes and custom inserts that are less than 1 year old.    PATIENT EDUCATION  - Importance of tight glucose control for wound healing   - Implications of loss of protective sensation (LOPS) discussed with patient- including increased risk for amputation.  - Advised to check feet at least daily, moisturize feet, and to always wear protective foot wear.   -  Importance of offloading foot to assist with wound healing  - Advised pt not to trim nails or calluses, seek foot/nail care from podiatrist or certified foot/nail nurse  - Importance of adequate nutrition for wound healing    My total time spent caring for the patient on the day of the encounter was 30 minutes, reviewing history, assessment, counseling and education, and coordination of care.  This does not include time spent on separately billable  procedures/tests.    Please note that this note may have been created using voice recognition software. I have worked with technical experts from [facility] to optimize the interface.  I have made every reasonable attempt to correct obvious errors, but there may be errors of grammar and possibly content that I did not discover before finalizing the note.    N

## 2024-08-29 NOTE — PATIENT INSTRUCTIONS
-Keep your wound dressing clean, dry, and intact. Only change dressing if it's over saturated, soiled or falls off.     -Change your dressing if it becomes soiled, soaked, or falls off.    -Should you experience any significant changes in your wound(s), such as infection (redness, swelling, localized heat, increased pain, fever > 101 F, chills) or have any questions regarding your home care instructions, please contact the wound center at [phone]. If after hours, contact your primary care physician or go to the hospital emergency room.     If you are admitted to any hospital, you will need a new referral to come back to the wound clinic and any scheduled appointments that you already have, may be cancelled.

## 2024-09-05 ENCOUNTER — APPOINTMENT (OUTPATIENT)
Dept: WOUND CARE | Facility: MEDICAL CENTER | Age: 74
End: 2024-09-05
Attending: BEHAVIOR ANALYST
Payer: MEDICARE

## 2024-09-09 ENCOUNTER — APPOINTMENT (OUTPATIENT)
Dept: PHYSICAL MEDICINE AND REHAB | Facility: MEDICAL CENTER | Age: 74
End: 2024-09-09
Payer: MEDICARE

## 2024-09-09 ENCOUNTER — TELEPHONE (OUTPATIENT)
Dept: CARDIOLOGY | Facility: MEDICAL CENTER | Age: 74
End: 2024-09-09

## 2024-09-09 ENCOUNTER — APPOINTMENT (OUTPATIENT)
Dept: ADMISSIONS | Facility: MEDICAL CENTER | Age: 74
End: 2024-09-09
Attending: UROLOGY
Payer: MEDICARE

## 2024-09-09 VITALS
TEMPERATURE: 97.5 F | SYSTOLIC BLOOD PRESSURE: 112 MMHG | WEIGHT: 227 LBS | HEART RATE: 87 BPM | HEIGHT: 71 IN | RESPIRATION RATE: 16 BRPM | BODY MASS INDEX: 31.78 KG/M2 | DIASTOLIC BLOOD PRESSURE: 74 MMHG | OXYGEN SATURATION: 95 %

## 2024-09-09 DIAGNOSIS — E11.42 DIABETIC POLYNEUROPATHY ASSOCIATED WITH TYPE 2 DIABETES MELLITUS (HCC): ICD-10-CM

## 2024-09-09 PROCEDURE — 1126F AMNT PAIN NOTED NONE PRSNT: CPT | Performed by: STUDENT IN AN ORGANIZED HEALTH CARE EDUCATION/TRAINING PROGRAM

## 2024-09-09 PROCEDURE — 3078F DIAST BP <80 MM HG: CPT | Performed by: STUDENT IN AN ORGANIZED HEALTH CARE EDUCATION/TRAINING PROGRAM

## 2024-09-09 PROCEDURE — 95908 NRV CNDJ TST 3-4 STUDIES: CPT | Performed by: STUDENT IN AN ORGANIZED HEALTH CARE EDUCATION/TRAINING PROGRAM

## 2024-09-09 PROCEDURE — 3074F SYST BP LT 130 MM HG: CPT | Performed by: STUDENT IN AN ORGANIZED HEALTH CARE EDUCATION/TRAINING PROGRAM

## 2024-09-09 PROCEDURE — 95886 MUSC TEST DONE W/N TEST COMP: CPT | Mod: RT | Performed by: STUDENT IN AN ORGANIZED HEALTH CARE EDUCATION/TRAINING PROGRAM

## 2024-09-09 ASSESSMENT — PATIENT HEALTH QUESTIONNAIRE - PHQ9: CLINICAL INTERPRETATION OF PHQ2 SCORE: 0

## 2024-09-09 ASSESSMENT — PAIN SCALES - GENERAL: PAINLEVEL: NO PAIN

## 2024-09-09 NOTE — PROGRESS NOTES
"Electromyography Report          Name: Richard Chery    MRN: [id number]   YOB: 1950 (74 y.o.)   Sex: male   Vitals:  Vitals:    09/09/24 0817   BP: 112/74   Weight: 103 kg (227 lb)   Height: 1.803 m (5' 11\")     Body mass index is 31.66 kg/m².    Examining Physician: Riccardo Castano D.O.     Visit Diagnoses     ICD-10-CM   1. Diabetic polyneuropathy associated with type 2 diabetes mellitus (HCC)  E11.42     EMG Examination Date: 9/9/2024     Impression:      This is an abnormalstudy.   The study is diagnostic for a sensorimotor polyneuropathy, consistent with patient's known type 2 diabetes.  There is no electrodiagnostic evidence of RIGHT lower extremity large myopathy, plexopathy or radiculopathy in segments tested.     Recommendations: Consider titrating gabapentin up to max dose for continued neuropathic pain.     History:     CC: chronic right foot pain. This pain has been present for the past 6 years, though it has worsened in the past 2 years.  Today is rated as 7 out of 10 in severity.  He states that the pain comes on about every third day and when it comes pain is out of 10 out of 10.  He states generally that there is no rhyme or reason to when or if the pain will occur pain is located at a very small focal area at the dorsum of the foot circumferentially covering the 2nd through 4th toes.  He states he has been to many doctors for this complaint including podiatry as well as Ortho foot and ankle.  Multiple injections were were done with no relief.  He also had a previous MRI as well as multiple x-rays of the right foot which were normal.  He is also trialed acupuncture with no relief.  He states that at times the pain will keep him awake and cause intermittent nausea.  He does not state that there are any exacerbating factors or improving factors.  He does state that at times he can place pressure on the area to help the pain.  He does have a history of diabetes, last A1c 7.1 as well as " diabetic neuropathy.  He states that this specific pain which he experiences is significantly different from the numbness, tingling sensation related to his neuropathy.  He is currently on gabapentin 300 mg twice daily.      Relevant Medical Hx:  Thyroid disease  negative  Cancer   negative    Diabetes  positive  Autoimmune disease negative    Physical exam:    GEN: No acute distress, well nourished, well developed  HEENT: Normal cephalic, PERRLA, EOMI  EXT: No clubbing cyanosis or edema  MUSCULOSKELETAL:   No asymmetry, mass or tenderness to palpation.   Strength is 5/5 in bilateral upper and lower extremities  NEURO: Patient is alert, awake and oriented x 3, muscle tone is normal without clonus.  DTR's are symmetrical and normal in bilateral upper and lower extremities.   Diminished at BL lower extremity    Nerve Conduction Studies and Electromyography  Examination Findings:              Nerve conduction studies (NCS) and electromyography (EMG) are utilized to evaluate direct or indirect damage to the peripheral nervous system. NCS are performed to measure the nerve(s) response(s) to electrostimulation across a given nerve segment. EMG evaluates the passive and active electrical activity of the muscle(s) in question.  Muscles are innervated by specific peripheral nerves and roots. Often times, several nerves the muscle to be examined in order to determine the presence or absence of the disease process. Furthermore, nerves and muscles may need to be tested in a [id number] comparison, as well as in additional extremities, as this may be crucial in characterizing the extent of the disease process, which may be diffuse or isolated and of varying degree of severity. The extent of the neurodiagnostic exam is justified as it may help arrive to a proper diagnosis, which ultimately may contribute to better management of the patient. Therefore, the nerves to muscles examined during the study were medically necessary.      The patient tolerated testing well, without any complications. The study was done with a monopolar needle examination.   Reference values are from the [facility] normative data guidelines and Lilly related to age guidelines.     cpt 30535. Nerve conduction studies, 3-4 studies  CPT 20469. needle electromyography, complete, each extremity.     Riccardo Castano D.O.

## 2024-09-09 NOTE — LETTER
PROCEDURE/SURGERY CLEARANCE FORM      Encounter Date: 9/9/2024    Patient: Richard Chery  YOB: 1950    CARDIOLOGIST:  Opal Leonard M.D.    REFERRING DOCTOR:  No ref. provider found    Procedure/surgery: Direct Vision Internal Urethrotomy vs Urethral Balloon Dilation                                            PROCEDURE/SURGERY CLEARANCE FORM    Date: 9/9/2024   Patient Name: Richard Chery    Dear Surgeon or Proceduralist,      Thank you for your request for cardiac stratification of our mutual patient Richard Chery 1950. We have reviewed their [facility] records; and to the best of our understanding this patient has not had stenting, ablation, cardiothoracic surgery or hospitalization for cardiovascular reasons in the past 6 months.  Richard Chery has been seen within the past 18 months and is considered to have non-modifiable cardiac risk for this low-risk procedure/surgery. They may proceed from a cardiovascular standpoint and may hold their antiplatelet/anticoagulation as briefly as possible. Please have patient resume this medication when hemodynamically stable to do so.     Aspirin or Prasugrel   - hold 7 days prior to procedure/surgery, resume when hemodynamically stable      Clopidrogrel or Ticagrelor  - hold 7 days for all neurological procedures, hold 5 days prior to all other procedure/surgery,  resume when hemodynamically stable     Warfarin - hold 7 days for all neurological procedures, hold 5 days prior to all other procedure/surgery and coordinate with [facility] ([phone]) INR testing and dose management.      Pradaxa/Xarelto/Eliquis/Savesya - hold 1 day prior to procedure for low bleeding risk procedure, 2 days for high bleeding risk procedure, or consider holding 3 days or longer for patients with reduced kidney function (CrCl <30mL/min) or spinal/cranial surgeries/procedures.      If they have a mechanical heart valve, please  coordinate with [facility] ([phone]) the proper management of their anticoagulant in the periprocedural or perioperative period.      Some patients have higher risk for cardiovascular complications or holding medication. If our patient has had prior complications of holding antiplatelet or anticoagulants in the past and we have seen them after these events, we have addressed these concerns with the patient. They are at an unknown degree of increased risk for recurrent complication.  You may hold anticoagulation/antiplatelets for the procedure or surgery if the benefits of the procedure or surgery outweigh this nonmodifiable risk.      If Richard Chery 1950 has new symptoms of heart failure decompensation, unstable arrythmia, or angina please reach out and we will assess the patient.      If you have other patient-specific concerns, please feel free to reach out to the patient's cardiologist directly at [phone].                     Thank you,       [facility]       Electronically signed        MD Blanquita Leonard M.D.

## 2024-09-09 NOTE — TELEPHONE ENCOUNTER
Last OV: 10/09/2023  Proposed Surgery: Direct Vision Internal Urethrotomy vs Urethral Balloon Dilation  Surgery Date: 10/10/2024  Requesting Office Name: [facility]  Fax Number: [phone]  Preference of Location (default is surgery center unless specified by Cardiologist or MIKAL)  Prior Clearance Addressed: No      Anticoags/Antiplatelets: Xarelto  Anticoags/Antiplatelet managed by Cardiology? YES    Outstanding Cardiac Imaging : No  Stent, Cardiac Devices, or Catheterization: No  Ablation, TAVR/Valve (including open heart), Cardioversion: No  Recent Cardiac Hospitalization: No            When: N/A  History (cardiac history):   Past Medical History:   Diagnosis Date    Alcohol use 02/10/2016    3 per day    Breath shortness 06/15/2023    at times    Dental disorder 06/15/2023    full upper and lower plates    Diabetes 06/15/2023    insulin and oral agent    Elevated INR 07/30/2019    Heart burn 06/15/2023    medicated    Hemorrhagic disorder (HCC) 06/15/2023    xarelto    Hiatus hernia syndrome     High cholesterol 06/15/2023    medicated    Observed sleep apnea 06/15/2023    appt coming up for sleep study    Other persistent atrial fibrillation (HCC) 12/19/2019    Pain 06/15/2023    general body aches    Paroxysmal atrial fibrillation (HCC) 06/15/2023    medicated    Pneumonia 06/15/2023    history of    Pulmonary hypertension (HCC) 03/01/2016    Snoring     Urinary retention 07/30/2019             Surgical Clearance Letter Sent: YES   **Scan clearance request letter into [facility].**

## 2024-09-12 ENCOUNTER — OFFICE VISIT (OUTPATIENT)
Dept: WOUND CARE | Facility: MEDICAL CENTER | Age: 74
End: 2024-09-12
Attending: BEHAVIOR ANALYST
Payer: MEDICARE

## 2024-09-12 VITALS
RESPIRATION RATE: 18 BRPM | SYSTOLIC BLOOD PRESSURE: 108 MMHG | HEART RATE: 74 BPM | TEMPERATURE: 97.1 F | DIASTOLIC BLOOD PRESSURE: 76 MMHG

## 2024-09-12 DIAGNOSIS — M79.671 RIGHT FOOT PAIN: ICD-10-CM

## 2024-09-12 DIAGNOSIS — T25.322D FULL THICKNESS BURN OF LEFT FOOT, SUBSEQUENT ENCOUNTER: ICD-10-CM

## 2024-09-12 DIAGNOSIS — Z79.4 TYPE 2 DIABETES MELLITUS WITH DIABETIC NEUROPATHY, WITH LONG-TERM CURRENT USE OF INSULIN (HCC): ICD-10-CM

## 2024-09-12 DIAGNOSIS — T25.321D FULL THICKNESS BURN OF RIGHT FOOT, SUBSEQUENT ENCOUNTER: ICD-10-CM

## 2024-09-12 DIAGNOSIS — E11.40 TYPE 2 DIABETES MELLITUS WITH DIABETIC NEUROPATHY, WITH LONG-TERM CURRENT USE OF INSULIN (HCC): ICD-10-CM

## 2024-09-12 PROCEDURE — 11056 PARNG/CUTG B9 HYPRKR LES 2-4: CPT

## 2024-09-12 PROCEDURE — 3078F DIAST BP <80 MM HG: CPT | Performed by: NURSE PRACTITIONER

## 2024-09-12 PROCEDURE — 99213 OFFICE O/P EST LOW 20 MIN: CPT

## 2024-09-12 PROCEDURE — 11055 PARING/CUTG B9 HYPRKER LES 1: CPT | Performed by: NURSE PRACTITIONER

## 2024-09-12 PROCEDURE — 3074F SYST BP LT 130 MM HG: CPT | Performed by: NURSE PRACTITIONER

## 2024-09-12 PROCEDURE — 99213 OFFICE O/P EST LOW 20 MIN: CPT | Mod: 25 | Performed by: NURSE PRACTITIONER

## 2024-09-12 NOTE — PATIENT INSTRUCTIONS
-Keep your wound dressing clean, dry, and intact. Only change dressing if it's over saturated, soiled or falls off.     - Resolved wound be fragile for a few days, bathe and dry area gently, only ever regains a maximum of 80% of the tensile strength of the surrounding skin, remodeling of scar can continue for 6 months to a year. Contact PCP for a referral back her if any problems with area opening and draining again.    -Should you experience any significant changes in your wound(s), such as infection (redness, swelling, localized heat, increased pain, fever > 101 F, chills) or have any questions regarding your home care instructions, please contact the wound center at [phone]. If after hours, contact your primary care physician or go to the hospital emergency room.     If you are admitted to any hospital, you will need a new referral to come back to the wound clinic and any scheduled appointments that you already have, may be cancelled.

## 2024-09-12 NOTE — PROGRESS NOTES
Provider Encounter- Diabetic Foot Ulcer / Burn      HISTORY OF PRESENT ILLNESS  Wound History:    START OF CARE IN CLINIC: 8/21/2024    REFERRING PROVIDER: Nazia Moy      WOUND- Diabetic foot ulcer / burn   LOCATION: Left plantar forefoot, full thickness burn ~ 1% TBSA     Right plantar forefoot - resolved   HISTORY:  74M with PMHx of DM2, diabetic neuropathy, chronic right foot pain. Patient reports that he was walking outside without footwear on in early August. Due to neuropathy, could not feel the hot concrete and suffered burn b/l plantar feet. Patient was seen by PCP on 8/14 who referred patient to [facility] for wound care. Patient reports that he has chronic right foot dorsal pain, has had extensive workup with Dr. Aviles, podiatry, and reports that he has appointment with neurology. Patient was referred to [facility] for wound care.    Pertinent Medical History: See above     TOBACCO USE:   Denies ever using    Patient's problem list, allergies, and current medications reviewed and updated in Epic    Interval History:  8/29/2024: Clinic visit with Augie Elizabeth MD. Patient reports doing well, denies any acute issues. His right plantar foot burn has resolved. Left plantar burn significantly smaller, near resolution. Patient is pleased with progress of wounds. Discussed offloading, he is not interested and as wound near resolution may not be necessary. He has custom insoles < 1 year old, does not wear regularly recommend wearing more frequently for foot protection.    9/12/24: Clinic visit with Nazia MÁRQUEZ, JOHNP-BC, CWON, CFCN.  Pt denies fevers, chills, nausea, vomiting.  Blood sugar 125 today.  Plantar foot wound close to resolution with thin crust.  Patient follow-up in 2 weeks.      REVIEW OF SYSTEMS:   Unchanged from previous wound clinic assessment on 8/29/2024, except as noted in interval history above      PHYSICAL EXAMINATION:   /76   Pulse 74   Temp 36.2 °C (97.1 °F) (Temporal)    Resp 18     Physical Exam  Constitutional:       General: He is not in acute distress.     Appearance: Normal appearance.   Cardiovascular:      Rate and Rhythm: Normal rate.      Pulses: Normal pulses.      Comments: Palpable pedal pulses  Pulmonary:      Effort: Pulmonary effort is normal.   Skin:     Comments: Right plantar forefoot burn / ulcer: Resolved  Left plantar forefoot burn / ulcer: Wound improved, thin crust with callus, no evidence of infection.   Neurological:      Mental Status: He is alert.         Monofilament testing with a 10 gram force: sensation intact: decreased bilaterally  Visual Inspection: Feet with callus, ulcers, fissures.  Pedal pulses: intact bilaterally     WOUND ASSESSMENT  Wound 08/21/24 Diabetic Ulcer Plantar Left FOREFOOT (Active)   Wound Image   09/12/24 0900   Site Assessment Yellow;Dry;Callus 09/12/24 0900   Periwound Assessment Callused 09/12/24 0900   Margins Attached edges 09/12/24 0900   Closure Secondary intention 08/29/24 0830   Drainage Amount None 09/12/24 0900   Drainage Description Serosanguineous 08/29/24 0830   Treatments Cleansed;Callus removal/paring;Site care 09/12/24 0900   Offloading/DME Other (comment) 08/21/24 0900   Wound Cleansing Foam Cleanser/Washcloth 09/12/24 0900   Periwound Protectant Skin Moisturizer 09/12/24 0900   Dressing Status Old drainage 08/29/24 0830   Dressing Changed New 09/12/24 0900   Dressing Cleansing/Solutions Not Applicable 09/12/24 0900   Dressing Options Petrolatum Gauze (yellow);Nonadhesive Foam;Hypafix Tape 08/29/24 0830   Dressing Change/Treatment Frequency Every 48 hrs, and As Needed 09/12/24 0900   Wound Team Following Weekly 08/29/24 0830   Non-staged Wound Description Full thickness 08/29/24 0830   Wound Length (cm) 0.5 cm 08/29/24 0830   Wound Width (cm) 0.2 cm 08/29/24 0830   Wound Depth (cm) 0.1 cm 08/29/24 0830   Wound Surface Area (cm^2) 0.1 cm^2 08/29/24 0830   Wound Volume (cm^3) 0.01 cm^3 08/29/24 0830  "  Post-Procedure Length (cm) 1 cm 08/29/24 0830   Post-Procedure Width (cm) 0.4 cm 08/29/24 0830   Post-Procedure Depth (cm) 0.1 cm 08/29/24 0830   Post-Procedure Surface Area (cm^2) 0.4 cm^2 08/29/24 0830   Post-Procedure Volume (cm^3) 0.04 cm^3 08/29/24 0830   Wound Healing % 99 08/29/24 0830   Tunneling (cm) 0 cm 09/12/24 0900   Undermining (cm) 0 cm 09/12/24 0900   Wound Odor None 09/12/24 0900   Pulses Right;Left;DP;PT;1+;Other (Comments) 08/21/24 0900   Right Foot Monofilament 10-point exam (Sensate) 2/10 08/29/24 0830   Left Foot Monofilament 10-point exam (Sensate) 1/10 08/29/24 0830   Exposed Structures None 09/12/24 0900   Number of days: 25         PROCEDURE:   -2% viscous lidocaine applied topically to wound bed for approximately 5 minutes prior to debridement  -Scissors, forceps used to remove hyper keratinized tissue to left plantar foot.  Total area debrided less than 20 cm² to skin level.  No bleeding.  -Wound care completed by wound RN, refer to flowsheet  -Patient tolerated the procedure well, without c/o pain or discomfort.       Pertinent Labs and Diagnostics:         Labs:     A1c:   Lab Results   Component Value Date/Time    HBA1C 7.2 (A) 07/16/2024 01:13 PM          IMAGING: No results found.    VASCULAR STUDIES: No results found.    LAST  WOUND CULTURE: No results found for: \"CULTRSULT\"        ASSESSMENT AND PLAN:     1. Full thickness burn of left foot, subsequent encounter  2. Full thickness burn of right foot, subsequent encounter    9/12/2024  - Patient with thermal burn in setting of diabetic neuropathy due to walking barefoot on hot concrete  - Right foot wound resolved, left foot wound improved, close to resolution  -Callus debridement was performed in clinic, medically necessary to promote wound healing.  - No evidence of infection  - Wound significantly improved without offloading. Discussed offloading if wound worsens.  Continues to decline.  -Previously counseled that with " neuropathy recommend not going barefoot and to protect feet.  -Patient to apply emollient daily.  Examples provided.  - Follow-up in 2 weeks     Wound Care: Emollient based moisturizer    3. Right foot pain    9/12/2024  - Patient with chronic right dorsal foot pain  - Has had extensive workup with Dr. Aviles and podiatry, reports he is awaiting evaluation from neurology  - Imaging only suggestive of osteoarthritis, may be neuropathic in origin  - Recommend wearing custom insoles / orthotics more frequently.    4. Type 2 diabetes mellitus with diabetic neuropathy, with long-term current use of insulin ([facility])    9/12/2024  - Patient's DM is controlled, A1c 7.2. Reports fasting glucose this morning  125  - Implications of loss of protective sensation (LOPS) discussed with patient- including increased risk for amputation.  Advised to check feet at least daily, moisturize feet, and to always wear protective foot wear.   - Patient has podiatry, but does his own callus and nail care. Recommend expert perform nail care due to his neuropathy to avoid patient creating new wound  - He reports that he has diabetic shoes and custom inserts that are less than 1 year old.    PATIENT EDUCATION  - Importance of tight glucose control for wound healing   - Implications of loss of protective sensation (LOPS) discussed with patient- including increased risk for amputation.  - Advised to check feet at least daily, moisturize feet, and to always wear protective foot wear.   -  Importance of offloading foot to assist with wound healing  - Advised pt not to trim nails or calluses, seek foot/nail care from podiatrist or certified foot/nail nurse  - Importance of adequate nutrition for wound healing    My total time spent caring for the patient on the day of the encounter was 20 minutes, reviewing history, assessment, counseling and education, and coordination of care.  This does not include time spent on separately billable  procedures/tests.    Please note that this note may have been created using voice recognition software. I have worked with technical experts from [facility] to optimize the interface.  I have made every reasonable attempt to correct obvious errors, but there may be errors of grammar and possibly content that I did not discover before finalizing the note.    N

## 2024-09-13 ENCOUNTER — PRE-ADMISSION TESTING (OUTPATIENT)
Dept: ADMISSIONS | Facility: MEDICAL CENTER | Age: 74
End: 2024-09-13
Attending: UROLOGY
Payer: MEDICARE

## 2024-09-13 RX ORDER — ACETAMINOPHEN 500 MG
500-1000 TABLET ORAL EVERY 6 HOURS PRN
COMMUNITY

## 2024-09-13 NOTE — OR NURSING
RN tele pre admit appointment completed with spouse, Grisel:  Medication instructions given according to the Guideline for Pre-Operative Medication Management.  Copy of medication list will be given to patient on 9/24/2024 at pre testing appointment.    Preparing For Your Procedure packet reviewed with spouse, including medications, fasting and bathing guidelines.  Spouse states they received two different fasting instructions from the surgeons office and will call the office to clarify these instructions and will follow what the surgeon's office instructs.  Surgery date 10/10/2024.  Spouse verbalized and understanding of the above instructions.

## 2024-09-19 ENCOUNTER — APPOINTMENT (OUTPATIENT)
Dept: WOUND CARE | Facility: MEDICAL CENTER | Age: 74
End: 2024-09-19
Attending: BEHAVIOR ANALYST
Payer: MEDICARE

## 2024-09-24 ENCOUNTER — PRE-ADMISSION TESTING (OUTPATIENT)
Dept: ADMISSIONS | Facility: MEDICAL CENTER | Age: 74
End: 2024-09-24
Attending: UROLOGY
Payer: MEDICARE

## 2024-09-24 DIAGNOSIS — Z01.812 PRE-OPERATIVE LABORATORY EXAMINATION: ICD-10-CM

## 2024-09-24 DIAGNOSIS — Z01.810 PRE-OPERATIVE CARDIOVASCULAR EXAMINATION: ICD-10-CM

## 2024-09-24 LAB
ANION GAP SERPL CALC-SCNC: 10 MMOL/L (ref 7–16)
APPEARANCE UR: CLEAR
BASOPHILS # BLD AUTO: 0.4 % (ref 0–1.8)
BASOPHILS # BLD: 0.02 K/UL (ref 0–0.12)
BILIRUB UR QL STRIP.AUTO: NEGATIVE
BUN SERPL-MCNC: 16 MG/DL (ref 8–22)
CALCIUM SERPL-MCNC: 9.1 MG/DL (ref 8.5–10.5)
CHLORIDE SERPL-SCNC: 101 MMOL/L (ref 96–112)
CO2 SERPL-SCNC: 27 MMOL/L (ref 20–33)
COLOR UR: YELLOW
CREAT SERPL-MCNC: 0.99 MG/DL (ref 0.5–1.4)
EKG IMPRESSION: NORMAL
EOSINOPHIL # BLD AUTO: 0.04 K/UL (ref 0–0.51)
EOSINOPHIL NFR BLD: 0.9 % (ref 0–6.9)
ERYTHROCYTE [DISTWIDTH] IN BLOOD BY AUTOMATED COUNT: 49.1 FL (ref 35.9–50)
GFR SERPLBLD CREATININE-BSD FMLA CKD-EPI: 80 ML/MIN/1.73 M 2
GLUCOSE SERPL-MCNC: 214 MG/DL (ref 65–99)
GLUCOSE UR STRIP.AUTO-MCNC: >=1000 MG/DL
HCT VFR BLD AUTO: 41.4 % (ref 42–52)
HGB BLD-MCNC: 13.1 G/DL (ref 14–18)
IMM GRANULOCYTES # BLD AUTO: 0 K/UL (ref 0–0.11)
IMM GRANULOCYTES NFR BLD AUTO: 0 % (ref 0–0.9)
INR PPP: 1.44 (ref 0.87–1.13)
KETONES UR STRIP.AUTO-MCNC: NEGATIVE MG/DL
LEUKOCYTE ESTERASE UR QL STRIP.AUTO: NEGATIVE
LYMPHOCYTES # BLD AUTO: 2.34 K/UL (ref 1–4.8)
LYMPHOCYTES NFR BLD: 50 % (ref 22–41)
MCH RBC QN AUTO: 29.8 PG (ref 27–33)
MCHC RBC AUTO-ENTMCNC: 31.6 G/DL (ref 32.3–36.5)
MCV RBC AUTO: 94.3 FL (ref 81.4–97.8)
MICRO URNS: ABNORMAL
MONOCYTES # BLD AUTO: 0.4 K/UL (ref 0–0.85)
MONOCYTES NFR BLD AUTO: 8.5 % (ref 0–13.4)
NEUTROPHILS # BLD AUTO: 1.88 K/UL (ref 1.82–7.42)
NEUTROPHILS NFR BLD: 40.2 % (ref 44–72)
NITRITE UR QL STRIP.AUTO: NEGATIVE
NRBC # BLD AUTO: 0 K/UL
NRBC BLD-RTO: 0 /100 WBC (ref 0–0.2)
PH UR STRIP.AUTO: 6.5 [PH] (ref 5–8)
PLATELET # BLD AUTO: 184 K/UL (ref 164–446)
PMV BLD AUTO: 10.2 FL (ref 9–12.9)
POTASSIUM SERPL-SCNC: 5 MMOL/L (ref 3.6–5.5)
PROT UR QL STRIP: NEGATIVE MG/DL
PROTHROMBIN TIME: 17.6 SEC (ref 12–14.6)
RBC # BLD AUTO: 4.39 M/UL (ref 4.7–6.1)
RBC UR QL AUTO: NEGATIVE
SODIUM SERPL-SCNC: 138 MMOL/L (ref 135–145)
SP GR UR STRIP.AUTO: 1.03
UROBILINOGEN UR STRIP.AUTO-MCNC: 0.2 MG/DL
WBC # BLD AUTO: 4.7 K/UL (ref 4.8–10.8)

## 2024-09-24 PROCEDURE — 36415 COLL VENOUS BLD VENIPUNCTURE: CPT

## 2024-09-24 PROCEDURE — 81003 URINALYSIS AUTO W/O SCOPE: CPT

## 2024-09-24 PROCEDURE — 85025 COMPLETE CBC W/AUTO DIFF WBC: CPT

## 2024-09-24 PROCEDURE — 87086 URINE CULTURE/COLONY COUNT: CPT

## 2024-09-24 PROCEDURE — 85610 PROTHROMBIN TIME: CPT

## 2024-09-24 PROCEDURE — 93010 ELECTROCARDIOGRAM REPORT: CPT | Performed by: INTERNAL MEDICINE

## 2024-09-24 PROCEDURE — 93005 ELECTROCARDIOGRAM TRACING: CPT

## 2024-09-24 PROCEDURE — 80048 BASIC METABOLIC PNL TOTAL CA: CPT

## 2024-09-26 ENCOUNTER — APPOINTMENT (OUTPATIENT)
Dept: WOUND CARE | Facility: MEDICAL CENTER | Age: 74
End: 2024-09-26
Attending: BEHAVIOR ANALYST
Payer: MEDICARE

## 2024-09-26 LAB
BACTERIA UR CULT: NORMAL
SIGNIFICANT IND 70042: NORMAL
SITE SITE: NORMAL
SOURCE SOURCE: NORMAL

## 2024-10-09 ENCOUNTER — ANESTHESIA EVENT (OUTPATIENT)
Dept: SURGERY | Facility: MEDICAL CENTER | Age: 74
End: 2024-10-09
Payer: MEDICARE

## 2024-10-09 PROBLEM — K44.9 HIATAL HERNIA: Status: ACTIVE | Noted: 2024-10-09

## 2024-10-10 ENCOUNTER — HOSPITAL ENCOUNTER (OUTPATIENT)
Facility: MEDICAL CENTER | Age: 74
End: 2024-10-10
Attending: UROLOGY | Admitting: UROLOGY
Payer: MEDICARE

## 2024-10-10 ENCOUNTER — APPOINTMENT (OUTPATIENT)
Dept: WOUND CARE | Facility: MEDICAL CENTER | Age: 74
End: 2024-10-10
Attending: BEHAVIOR ANALYST
Payer: MEDICARE

## 2024-10-10 ENCOUNTER — APPOINTMENT (OUTPATIENT)
Dept: RADIOLOGY | Facility: MEDICAL CENTER | Age: 74
End: 2024-10-10
Attending: UROLOGY
Payer: MEDICARE

## 2024-10-10 ENCOUNTER — ANESTHESIA (OUTPATIENT)
Dept: SURGERY | Facility: MEDICAL CENTER | Age: 74
End: 2024-10-10
Payer: MEDICARE

## 2024-10-10 VITALS
OXYGEN SATURATION: 93 % | BODY MASS INDEX: 32.07 KG/M2 | WEIGHT: 229.06 LBS | RESPIRATION RATE: 16 BRPM | HEIGHT: 71 IN | TEMPERATURE: 96.7 F | DIASTOLIC BLOOD PRESSURE: 78 MMHG | HEART RATE: 80 BPM | SYSTOLIC BLOOD PRESSURE: 141 MMHG

## 2024-10-10 DIAGNOSIS — I51.89 LEFT VENTRICULAR SYSTOLIC DYSFUNCTION, NYHA CLASS 2: ICD-10-CM

## 2024-10-10 DIAGNOSIS — I48.20 CHRONIC ATRIAL FIBRILLATION (HCC): ICD-10-CM

## 2024-10-10 DIAGNOSIS — I50.20 ACC/AHA STAGE C SYSTOLIC HEART FAILURE (HCC): ICD-10-CM

## 2024-10-10 LAB — GLUCOSE BLD STRIP.AUTO-MCNC: 112 MG/DL (ref 65–99)

## 2024-10-10 PROCEDURE — C1726 CATH, BAL DIL, NON-VASCULAR: HCPCS | Performed by: UROLOGY

## 2024-10-10 PROCEDURE — 160009 HCHG ANES TIME/MIN: Performed by: UROLOGY

## 2024-10-10 PROCEDURE — A9270 NON-COVERED ITEM OR SERVICE: HCPCS | Performed by: UROLOGY

## 2024-10-10 PROCEDURE — 110371 HCHG SHELL REV 272: Performed by: UROLOGY

## 2024-10-10 PROCEDURE — 82962 GLUCOSE BLOOD TEST: CPT

## 2024-10-10 PROCEDURE — 502240 HCHG MISC OR SUPPLY RC 0272: Performed by: UROLOGY

## 2024-10-10 PROCEDURE — 700102 HCHG RX REV CODE 250 W/ 637 OVERRIDE(OP): Performed by: STUDENT IN AN ORGANIZED HEALTH CARE EDUCATION/TRAINING PROGRAM

## 2024-10-10 PROCEDURE — 160025 RECOVERY II MINUTES (STATS): Performed by: UROLOGY

## 2024-10-10 PROCEDURE — 160048 HCHG OR STATISTICAL LEVEL 1-5: Performed by: UROLOGY

## 2024-10-10 PROCEDURE — 160002 HCHG RECOVERY MINUTES (STAT): Performed by: UROLOGY

## 2024-10-10 PROCEDURE — 700105 HCHG RX REV CODE 258: Performed by: UROLOGY

## 2024-10-10 PROCEDURE — 700111 HCHG RX REV CODE 636 W/ 250 OVERRIDE (IP): Performed by: STUDENT IN AN ORGANIZED HEALTH CARE EDUCATION/TRAINING PROGRAM

## 2024-10-10 PROCEDURE — C1729 CATH, DRAINAGE: HCPCS | Performed by: UROLOGY

## 2024-10-10 PROCEDURE — 700101 HCHG RX REV CODE 250: Performed by: STUDENT IN AN ORGANIZED HEALTH CARE EDUCATION/TRAINING PROGRAM

## 2024-10-10 PROCEDURE — 160039 HCHG SURGERY MINUTES - EA ADDL 1 MIN LEVEL 3: Performed by: UROLOGY

## 2024-10-10 PROCEDURE — 700102 HCHG RX REV CODE 250 W/ 637 OVERRIDE(OP): Performed by: UROLOGY

## 2024-10-10 PROCEDURE — 160035 HCHG PACU - 1ST 60 MINS PHASE I: Performed by: UROLOGY

## 2024-10-10 PROCEDURE — A9270 NON-COVERED ITEM OR SERVICE: HCPCS | Performed by: STUDENT IN AN ORGANIZED HEALTH CARE EDUCATION/TRAINING PROGRAM

## 2024-10-10 PROCEDURE — 160028 HCHG SURGERY MINUTES - 1ST 30 MINS LEVEL 3: Performed by: UROLOGY

## 2024-10-10 PROCEDURE — C1769 GUIDE WIRE: HCPCS | Performed by: UROLOGY

## 2024-10-10 PROCEDURE — 160046 HCHG PACU - 1ST 60 MINS PHASE II: Performed by: UROLOGY

## 2024-10-10 RX ORDER — HYDROMORPHONE HYDROCHLORIDE 1 MG/ML
0.1 INJECTION, SOLUTION INTRAMUSCULAR; INTRAVENOUS; SUBCUTANEOUS
Status: DISCONTINUED | OUTPATIENT
Start: 2024-10-10 | End: 2024-10-10 | Stop reason: HOSPADM

## 2024-10-10 RX ORDER — OXYCODONE HCL 5 MG/5 ML
10 SOLUTION, ORAL ORAL
Status: DISCONTINUED | OUTPATIENT
Start: 2024-10-10 | End: 2024-10-10 | Stop reason: HOSPADM

## 2024-10-10 RX ORDER — SODIUM CHLORIDE, SODIUM LACTATE, POTASSIUM CHLORIDE, CALCIUM CHLORIDE 600; 310; 30; 20 MG/100ML; MG/100ML; MG/100ML; MG/100ML
INJECTION, SOLUTION INTRAVENOUS CONTINUOUS
Status: DISCONTINUED | OUTPATIENT
Start: 2024-10-10 | End: 2024-10-10 | Stop reason: HOSPADM

## 2024-10-10 RX ORDER — PHENAZOPYRIDINE HYDROCHLORIDE 100 MG/1
100 TABLET, FILM COATED ORAL ONCE
Status: DISCONTINUED | OUTPATIENT
Start: 2024-10-10 | End: 2024-10-10 | Stop reason: HOSPADM

## 2024-10-10 RX ORDER — CEFAZOLIN SODIUM 2 G/100ML
INJECTION, SOLUTION INTRAVENOUS PRN
Status: DISCONTINUED | OUTPATIENT
Start: 2024-10-10 | End: 2024-10-10 | Stop reason: SURG

## 2024-10-10 RX ORDER — HYDROMORPHONE HYDROCHLORIDE 1 MG/ML
0.4 INJECTION, SOLUTION INTRAMUSCULAR; INTRAVENOUS; SUBCUTANEOUS
Status: DISCONTINUED | OUTPATIENT
Start: 2024-10-10 | End: 2024-10-10 | Stop reason: HOSPADM

## 2024-10-10 RX ORDER — HYDROMORPHONE HYDROCHLORIDE 1 MG/ML
0.2 INJECTION, SOLUTION INTRAMUSCULAR; INTRAVENOUS; SUBCUTANEOUS
Status: DISCONTINUED | OUTPATIENT
Start: 2024-10-10 | End: 2024-10-10 | Stop reason: HOSPADM

## 2024-10-10 RX ORDER — OXYCODONE HCL 5 MG/5 ML
5 SOLUTION, ORAL ORAL
Status: DISCONTINUED | OUTPATIENT
Start: 2024-10-10 | End: 2024-10-10 | Stop reason: HOSPADM

## 2024-10-10 RX ORDER — ONDANSETRON 2 MG/ML
4 INJECTION INTRAMUSCULAR; INTRAVENOUS
Status: DISCONTINUED | OUTPATIENT
Start: 2024-10-10 | End: 2024-10-10 | Stop reason: HOSPADM

## 2024-10-10 RX ORDER — ALBUTEROL SULFATE 5 MG/ML
2.5 SOLUTION RESPIRATORY (INHALATION)
Status: DISCONTINUED | OUTPATIENT
Start: 2024-10-10 | End: 2024-10-10 | Stop reason: HOSPADM

## 2024-10-10 RX ORDER — PHENAZOPYRIDINE HYDROCHLORIDE 100 MG/1
200 TABLET, FILM COATED ORAL
Status: DISCONTINUED | OUTPATIENT
Start: 2024-10-10 | End: 2024-10-10 | Stop reason: HOSPADM

## 2024-10-10 RX ORDER — PHENYLEPHRINE HYDROCHLORIDE 10 MG/ML
INJECTION, SOLUTION INTRAMUSCULAR; INTRAVENOUS; SUBCUTANEOUS PRN
Status: DISCONTINUED | OUTPATIENT
Start: 2024-10-10 | End: 2024-10-10 | Stop reason: SURG

## 2024-10-10 RX ORDER — LABETALOL HYDROCHLORIDE 5 MG/ML
5 INJECTION, SOLUTION INTRAVENOUS
Status: DISCONTINUED | OUTPATIENT
Start: 2024-10-10 | End: 2024-10-10 | Stop reason: HOSPADM

## 2024-10-10 RX ORDER — DEXTROSE MONOHYDRATE 25 G/50ML
25 INJECTION, SOLUTION INTRAVENOUS
Status: DISCONTINUED | OUTPATIENT
Start: 2024-10-10 | End: 2024-10-10 | Stop reason: HOSPADM

## 2024-10-10 RX ORDER — HYDRALAZINE HYDROCHLORIDE 20 MG/ML
5 INJECTION INTRAMUSCULAR; INTRAVENOUS
Status: DISCONTINUED | OUTPATIENT
Start: 2024-10-10 | End: 2024-10-10 | Stop reason: HOSPADM

## 2024-10-10 RX ORDER — LIDOCAINE HYDROCHLORIDE 20 MG/ML
INJECTION, SOLUTION EPIDURAL; INFILTRATION; INTRACAUDAL; PERINEURAL PRN
Status: DISCONTINUED | OUTPATIENT
Start: 2024-10-10 | End: 2024-10-10 | Stop reason: SURG

## 2024-10-10 RX ORDER — INSULIN LISPRO 100 [IU]/ML
2-9 INJECTION, SOLUTION INTRAVENOUS; SUBCUTANEOUS EVERY 6 HOURS
Status: DISCONTINUED | OUTPATIENT
Start: 2024-10-10 | End: 2024-10-10 | Stop reason: HOSPADM

## 2024-10-10 RX ORDER — EPHEDRINE SULFATE 50 MG/ML
5 INJECTION, SOLUTION INTRAVENOUS
Status: DISCONTINUED | OUTPATIENT
Start: 2024-10-10 | End: 2024-10-10 | Stop reason: HOSPADM

## 2024-10-10 RX ORDER — DEXAMETHASONE SODIUM PHOSPHATE 4 MG/ML
INJECTION, SOLUTION INTRA-ARTICULAR; INTRALESIONAL; INTRAMUSCULAR; INTRAVENOUS; SOFT TISSUE PRN
Status: DISCONTINUED | OUTPATIENT
Start: 2024-10-10 | End: 2024-10-10 | Stop reason: SURG

## 2024-10-10 RX ORDER — ESMOLOL HYDROCHLORIDE 10 MG/ML
INJECTION INTRAVENOUS PRN
Status: DISCONTINUED | OUTPATIENT
Start: 2024-10-10 | End: 2024-10-10 | Stop reason: SURG

## 2024-10-10 RX ORDER — ACETAMINOPHEN 500 MG
1000 TABLET ORAL ONCE
Status: COMPLETED | OUTPATIENT
Start: 2024-10-10 | End: 2024-10-10

## 2024-10-10 RX ADMIN — PROPOFOL 50 MG: 10 INJECTION, EMULSION INTRAVENOUS at 16:17

## 2024-10-10 RX ADMIN — PHENYLEPHRINE HYDROCHLORIDE 100 MCG: 10 INJECTION INTRAVENOUS at 16:41

## 2024-10-10 RX ADMIN — DEXAMETHASONE SODIUM PHOSPHATE 4 MG: 4 INJECTION INTRA-ARTICULAR; INTRALESIONAL; INTRAMUSCULAR; INTRAVENOUS; SOFT TISSUE at 16:25

## 2024-10-10 RX ADMIN — PROPOFOL 25 MG: 10 INJECTION, EMULSION INTRAVENOUS at 16:24

## 2024-10-10 RX ADMIN — PHENYLEPHRINE HYDROCHLORIDE 100 MCG: 10 INJECTION INTRAVENOUS at 16:35

## 2024-10-10 RX ADMIN — ESMOLOL HYDROCHLORIDE 20 MG: 100 INJECTION, SOLUTION INTRAVENOUS at 16:23

## 2024-10-10 RX ADMIN — LIDOCAINE HYDROCHLORIDE 50 MG: 20 INJECTION, SOLUTION EPIDURAL; INFILTRATION; INTRACAUDAL at 16:16

## 2024-10-10 RX ADMIN — SUGAMMADEX 200 MG: 100 INJECTION, SOLUTION INTRAVENOUS at 16:52

## 2024-10-10 RX ADMIN — FENTANYL CITRATE 50 MCG: 50 INJECTION, SOLUTION INTRAMUSCULAR; INTRAVENOUS at 16:16

## 2024-10-10 RX ADMIN — CEFAZOLIN SODIUM 2 G: 2 INJECTION, SOLUTION INTRAVENOUS at 16:25

## 2024-10-10 RX ADMIN — PHENYLEPHRINE HYDROCHLORIDE 100 MCG: 10 INJECTION INTRAVENOUS at 16:46

## 2024-10-10 RX ADMIN — ROCURONIUM BROMIDE 50 MG: 10 INJECTION, SOLUTION INTRAVENOUS at 16:16

## 2024-10-10 RX ADMIN — PHENAZOPYRIDINE 200 MG: 100 TABLET ORAL at 17:27

## 2024-10-10 RX ADMIN — SODIUM CHLORIDE, POTASSIUM CHLORIDE, SODIUM LACTATE AND CALCIUM CHLORIDE: 600; 310; 30; 20 INJECTION, SOLUTION INTRAVENOUS at 14:30

## 2024-10-10 RX ADMIN — LIDOCAINE HYDROCHLORIDE 15 MG: 20 INJECTION, SOLUTION EPIDURAL; INFILTRATION; INTRACAUDAL at 16:24

## 2024-10-10 RX ADMIN — LIDOCAINE HYDROCHLORIDE 25 MG: 20 INJECTION, SOLUTION EPIDURAL; INFILTRATION; INTRACAUDAL at 16:17

## 2024-10-10 RX ADMIN — PROPOFOL 100 MG: 10 INJECTION, EMULSION INTRAVENOUS at 16:16

## 2024-10-10 RX ADMIN — ACETAMINOPHEN 1000 MG: 500 TABLET ORAL at 13:50

## 2024-10-10 ASSESSMENT — PAIN DESCRIPTION - PAIN TYPE
TYPE: ACUTE PAIN
TYPE: ACUTE PAIN;SURGICAL PAIN
TYPE: ACUTE PAIN

## 2024-10-10 ASSESSMENT — PAIN SCALES - GENERAL: PAIN_LEVEL: 2

## 2024-12-09 ENCOUNTER — TELEPHONE (OUTPATIENT)
Dept: MEDICAL GROUP | Facility: MEDICAL CENTER | Age: 74
End: 2024-12-09
Payer: MEDICARE

## 2024-12-09 DIAGNOSIS — I51.89 LEFT VENTRICULAR SYSTOLIC DYSFUNCTION, NYHA CLASS 2: ICD-10-CM

## 2024-12-09 DIAGNOSIS — I50.20 ACC/AHA STAGE C SYSTOLIC HEART FAILURE (HCC): ICD-10-CM

## 2024-12-09 RX ORDER — EMPAGLIFLOZIN 10 MG/1
10 TABLET, FILM COATED ORAL DAILY
Qty: 100 TABLET | Refills: 3 | Status: SHIPPED | OUTPATIENT
Start: 2024-12-09

## 2024-12-09 NOTE — TELEPHONE ENCOUNTER
He should still have refills at [facility] but I went ahead and approved for more anyway.   Belkys- please let him know. Thank you

## 2024-12-13 ENCOUNTER — OFFICE VISIT (OUTPATIENT)
Dept: MEDICAL GROUP | Facility: MEDICAL CENTER | Age: 74
End: 2024-12-13
Payer: MEDICARE

## 2024-12-13 VITALS
OXYGEN SATURATION: 95 % | HEIGHT: 71 IN | BODY MASS INDEX: 32.19 KG/M2 | DIASTOLIC BLOOD PRESSURE: 68 MMHG | SYSTOLIC BLOOD PRESSURE: 124 MMHG | WEIGHT: 229.94 LBS | TEMPERATURE: 97.4 F | HEART RATE: 100 BPM

## 2024-12-13 DIAGNOSIS — E78.2 MIXED HYPERLIPIDEMIA: ICD-10-CM

## 2024-12-13 DIAGNOSIS — L98.9 SKIN LESION OF LEFT ARM: ICD-10-CM

## 2024-12-13 DIAGNOSIS — Z12.83 SKIN CANCER SCREENING: ICD-10-CM

## 2024-12-13 DIAGNOSIS — I48.20 CHRONIC ATRIAL FIBRILLATION (HCC): ICD-10-CM

## 2024-12-13 DIAGNOSIS — E11.40 TYPE 2 DIABETES MELLITUS WITH DIABETIC NEUROPATHY, WITH LONG-TERM CURRENT USE OF INSULIN (HCC): Chronic | ICD-10-CM

## 2024-12-13 DIAGNOSIS — G62.9 NEUROPATHY: ICD-10-CM

## 2024-12-13 DIAGNOSIS — Z79.4 TYPE 2 DIABETES MELLITUS WITH DIABETIC NEUROPATHY, WITH LONG-TERM CURRENT USE OF INSULIN (HCC): Chronic | ICD-10-CM

## 2024-12-13 DIAGNOSIS — I51.89 LEFT VENTRICULAR SYSTOLIC DYSFUNCTION, NYHA CLASS 2: ICD-10-CM

## 2024-12-13 LAB
HBA1C MFR BLD: 7.1 % (ref ?–5.8)
POCT INT CON NEG: NEGATIVE
POCT INT CON POS: POSITIVE

## 2024-12-13 PROCEDURE — 3078F DIAST BP <80 MM HG: CPT | Performed by: BEHAVIOR ANALYST

## 2024-12-13 PROCEDURE — 99214 OFFICE O/P EST MOD 30 MIN: CPT | Performed by: BEHAVIOR ANALYST

## 2024-12-13 PROCEDURE — 3074F SYST BP LT 130 MM HG: CPT | Performed by: BEHAVIOR ANALYST

## 2024-12-13 PROCEDURE — 83036 HEMOGLOBIN GLYCOSYLATED A1C: CPT | Performed by: BEHAVIOR ANALYST

## 2024-12-13 RX ORDER — ROSUVASTATIN CALCIUM 10 MG/1
10 TABLET, COATED ORAL EVERY EVENING
Qty: 100 TABLET | Refills: 3 | Status: SHIPPED | OUTPATIENT
Start: 2024-12-13

## 2024-12-13 RX ORDER — GABAPENTIN 300 MG/1
CAPSULE ORAL
Qty: 500 CAPSULE | Refills: 3 | Status: SHIPPED | OUTPATIENT
Start: 2024-12-13

## 2024-12-13 RX ORDER — METOPROLOL SUCCINATE 25 MG/1
25 TABLET, EXTENDED RELEASE ORAL EVERY EVENING
Qty: 100 TABLET | Refills: 3 | Status: SHIPPED | OUTPATIENT
Start: 2024-12-13

## 2024-12-13 NOTE — PROGRESS NOTES
Subjective:     CC:    Chief Complaint   Patient presents with    Follow-Up          HISTORY OF THE PRESENT ILLNESS:   Richard presents today with his wife who helps manage his medical care.    -Patient continues to avoid sugary beverages and sweets as much as possible to manage his diabetes. Fasting 's-130's    -Patient reports continued intermittent pain in the right foot that is unexplained after several consults with specialists.  It was recently recommended by orthopedic doctor to increase his gabapentin in the evening from 600 to 900 mg and to continue the 600 mg morning dose.  He believes this is helping.     -He denies any worsening lower extremity swelling,, shortness of breath, lightheadedness, or palpitations.  He stopped taking metoprolol a while back he believes due to lightheadedness.  At the time, he was also taking Entresto for his heart failure.    No problems updated.    Current Outpatient Medications   Medication Sig    rosuvastatin (CRESTOR) 10 MG Tab Take 1 Tablet by mouth every evening.    metoprolol SR (TOPROL XL) 25 MG TABLET SR 24 HR Take 1 Tablet by mouth every evening.    gabapentin (NEURONTIN) 300 MG Cap Take 2 Capsules by mouth every morning AND 3 Capsules every evening.    Empagliflozin (JARDIANCE) 10 MG Tab tablet Take 1 Tablet by mouth every day.    Sennosides-Docusate Sodium (STOOL SOFTENER & LAXATIVE PO) Take 1 Tablet by mouth 2 times a day.       acetaminophen (TYLENOL) 500 MG Tab Take 500-1,000 mg by mouth every 6 hours as needed for Moderate Pain.       Dulaglutide (TRULICITY) 3 MG/0.5ML Solution Pen-injector Take 3 mg by mouth every day.    Insulin Glargine, 1 Unit Dial, (TOUJEO SOLOSTAR) 300 UNIT/ML Solution Pen-injector Inject 35 Units under the skin every day.    rivaroxaban (XARELTO) 20 MG Tab tablet Take 1 Tablet by mouth with dinner.    tamsulosin (FLOMAX) 0.4 MG capsule Take 1 Capsule by mouth 2 times a day.    omeprazole (PRILOSEC) 20 MG delayed-release capsule Take 1  "Capsule by mouth every day.    metformin (GLUCOPHAGE) 1000 MG tablet Take 1 tablet by mouth twice daily    docusate sodium (STOOL SOFTENER) 100 MG Cap Take 100 mg by mouth 2 times a day.       MAGNESIUM PO Take 1 Tablet by mouth 2 times a day.       sildenafil citrate (VIAGRA) 100 MG tablet Take 100 mg by mouth as needed for Erectile Dysfunction.       POTASSIUM PO Take 5 mg by mouth as needed.       **MEDICATION INSTRUCTIONS FOR SURGERY/PROCEDURE SCHEDULED FOR 10/10/2024   DO NOT TAKE 7 DAYS PRIOR TO SURGERY    B Complex Vitamins (B COMPLEX PO) Take 1 Tablet by mouth 2 (two) times a day.             ROS: See HPI  ROS      Objective:     Exam: /68 (BP Location: Right arm, Patient Position: Sitting, BP Cuff Size: Adult)   Pulse 100   Temp 36.3 °C (97.4 °F) (Temporal)   Ht 1.803 m (5' 11\")   Wt 104 kg (229 lb 15 oz)   SpO2 95%   BMI 32.07 kg/m²   Body mass index is 32.07 kg/m².    Physical Exam  Constitutional:       Appearance: Normal appearance.   Eyes:      Extraocular Movements: Extraocular movements intact.   Cardiovascular:      Rate and Rhythm: Tachycardia present. Rhythm irregular.   Pulmonary:      Effort: Pulmonary effort is normal. No respiratory distress.      Breath sounds: Normal breath sounds.   Neurological:      Mental Status: He is alert.       Lab Results   Component Value Date/Time    HBA1C 7.1 (A) 12/13/2024 1504    AVGLUC 166 08/08/2022 0643               Assessment & Plan:     74 y.o. male with the following -     1. Type 2 diabetes mellitus with diabetic neuropathy, with long-term current use of insulin ([facility])  - Chronic, controlled.  Hemoglobin A1c is at  7.1%.   Plan: Continue metformin 1000 mg twice daily, continue insulin glargine 35, Trulicity 3 mg units weekly  Recommended :  - Annual eye exam  - Regular foot exam  - dietary modification - low carbohydrate diet   - regular exercise, weight loss  - Educated patient on meal planning, ideal carb controlled diet, exercise and " weight loss  - Discussed prevention and management of hypoglycemia  - Side effects of all medications discussed with patient    - POCT  A1C  - Dulaglutide (TRULICITY) 3 MG/0.5ML Solution Auto-injector; Inject 3 mg under the skin every 7 days.  Dispense: 7 mL; Refill: 3    2. Skin lesion of left arm  - Referral to Dermatology    3. Skin cancer screening  - Referral to Dermatology    5. Left ventricular systolic dysfunction, NYHA class 2  - metoprolol SR (TOPROL XL) 25 MG TABLET SR 24 HR; Take 1 Tablet by mouth every evening.  Dispense: 100 Tablet; Refill: 3    6. Chronic atrial fibrillation (HCC)  -Chronic, undertreated.  Heart rate is at 100 at rest today and is irregular.  -Recommend that he restart metoprolol 25 mg daily and follow-up with cardiology ASAP.  -Continue Xarelto 20 mg daily  - metoprolol SR (TOPROL XL) 25 MG TABLET SR 24 HR; Take 1 Tablet by mouth every evening.  Dispense: 100 Tablet; Refill: 3    7. Neuropathy  -Chronic problem.  -Continue:  - gabapentin (NEURONTIN) 300 MG Cap; Take 2 Capsules by mouth every morning AND 3 Capsules every evening.  Dispense: 500 Capsule; Refill: 3    8. Mixed hyperlipidemia  Chronic, controlled.    -Counseled on dietary and lifestyle modifications.  Medications to Continue:   - rosuvastatin (CRESTOR) 10 MG Tab; Take 1 Tablet by mouth every evening.  Dispense: 100 Tablet; Refill: 3        Return in about 6 months (around 6/13/2025) for Medicare AWV.    Please note that this dictation was created using voice recognition software. I have made every reasonable attempt to correct obvious errors, but I expect that there are errors of grammar and possibly content that I did not discover before finalizing the note.

## 2024-12-24 RX ORDER — DULAGLUTIDE 3 MG/.5ML
3 INJECTION, SOLUTION SUBCUTANEOUS
Qty: 7 ML | Refills: 3 | Status: SHIPPED | OUTPATIENT
Start: 2024-12-24

## 2025-01-30 ENCOUNTER — HOSPITAL ENCOUNTER (OUTPATIENT)
Dept: LAB | Facility: MEDICAL CENTER | Age: 75
End: 2025-01-30
Attending: BEHAVIOR ANALYST
Payer: MEDICARE

## 2025-01-30 DIAGNOSIS — Z79.4 TYPE 2 DIABETES MELLITUS WITH DIABETIC NEUROPATHY, WITH LONG-TERM CURRENT USE OF INSULIN (HCC): ICD-10-CM

## 2025-01-30 DIAGNOSIS — G89.29 CHRONIC JOINT PAIN: ICD-10-CM

## 2025-01-30 DIAGNOSIS — E78.2 MIXED HYPERLIPIDEMIA: ICD-10-CM

## 2025-01-30 DIAGNOSIS — E11.40 TYPE 2 DIABETES MELLITUS WITH DIABETIC NEUROPATHY, WITH LONG-TERM CURRENT USE OF INSULIN (HCC): ICD-10-CM

## 2025-01-30 DIAGNOSIS — M25.50 CHRONIC JOINT PAIN: ICD-10-CM

## 2025-01-30 LAB
25(OH)D3 SERPL-MCNC: 29 NG/ML (ref 30–100)
ALBUMIN SERPL BCP-MCNC: 4.2 G/DL (ref 3.2–4.9)
ALBUMIN/GLOB SERPL: 1.6 G/DL
ALP SERPL-CCNC: 59 U/L (ref 30–99)
ALT SERPL-CCNC: 15 U/L (ref 2–50)
ANION GAP SERPL CALC-SCNC: 10 MMOL/L (ref 7–16)
AST SERPL-CCNC: 25 U/L (ref 12–45)
BILIRUB SERPL-MCNC: 0.4 MG/DL (ref 0.1–1.5)
BUN SERPL-MCNC: 20 MG/DL (ref 8–22)
CALCIUM ALBUM COR SERPL-MCNC: 9.1 MG/DL (ref 8.5–10.5)
CALCIUM SERPL-MCNC: 9.3 MG/DL (ref 8.5–10.5)
CHLORIDE SERPL-SCNC: 104 MMOL/L (ref 96–112)
CHOLEST SERPL-MCNC: 98 MG/DL (ref 100–199)
CO2 SERPL-SCNC: 26 MMOL/L (ref 20–33)
CREAT SERPL-MCNC: 0.93 MG/DL (ref 0.5–1.4)
CREAT UR-MCNC: 152 MG/DL
CRP SERPL HS-MCNC: <0.3 MG/DL (ref 0–0.75)
ERYTHROCYTE [SEDIMENTATION RATE] IN BLOOD BY WESTERGREN METHOD: 8 MM/HOUR (ref 0–20)
FASTING STATUS PATIENT QL REPORTED: NORMAL
GFR SERPLBLD CREATININE-BSD FMLA CKD-EPI: 86 ML/MIN/1.73 M 2
GLOBULIN SER CALC-MCNC: 2.7 G/DL (ref 1.9–3.5)
GLUCOSE SERPL-MCNC: 111 MG/DL (ref 65–99)
HDLC SERPL-MCNC: 45 MG/DL
LDLC SERPL CALC-MCNC: 28 MG/DL
MICROALBUMIN UR-MCNC: <1.2 MG/DL
MICROALBUMIN/CREAT UR: NORMAL MG/G (ref 0–30)
POTASSIUM SERPL-SCNC: 4.6 MMOL/L (ref 3.6–5.5)
PROT SERPL-MCNC: 6.9 G/DL (ref 6–8.2)
RHEUMATOID FACT SER IA-ACNC: 12 IU/ML (ref 0–14)
SODIUM SERPL-SCNC: 140 MMOL/L (ref 135–145)
TRIGL SERPL-MCNC: 123 MG/DL (ref 0–149)
URATE SERPL-MCNC: 5.7 MG/DL (ref 2.5–8.3)

## 2025-01-30 PROCEDURE — 85652 RBC SED RATE AUTOMATED: CPT

## 2025-01-30 PROCEDURE — 82306 VITAMIN D 25 HYDROXY: CPT

## 2025-01-30 PROCEDURE — 80053 COMPREHEN METABOLIC PANEL: CPT

## 2025-01-30 PROCEDURE — 36415 COLL VENOUS BLD VENIPUNCTURE: CPT

## 2025-01-30 PROCEDURE — 80061 LIPID PANEL: CPT

## 2025-01-30 PROCEDURE — 86431 RHEUMATOID FACTOR QUANT: CPT

## 2025-01-30 PROCEDURE — 86200 CCP ANTIBODY: CPT

## 2025-01-30 PROCEDURE — 86140 C-REACTIVE PROTEIN: CPT

## 2025-01-30 PROCEDURE — 84550 ASSAY OF BLOOD/URIC ACID: CPT

## 2025-01-30 PROCEDURE — 82570 ASSAY OF URINE CREATININE: CPT

## 2025-01-30 PROCEDURE — 82043 UR ALBUMIN QUANTITATIVE: CPT

## 2025-02-01 LAB — CCP IGA+IGG SERPL IA-ACNC: 6 UNITS (ref 0–19)

## 2025-02-26 ENCOUNTER — OFFICE VISIT (OUTPATIENT)
Dept: DERMATOLOGY | Facility: IMAGING CENTER | Age: 75
End: 2025-02-26
Payer: MEDICARE

## 2025-02-26 DIAGNOSIS — D18.01 CHERRY ANGIOMA: ICD-10-CM

## 2025-02-26 DIAGNOSIS — L81.4 LENTIGINES: ICD-10-CM

## 2025-02-26 DIAGNOSIS — Z12.83 SKIN CANCER SCREENING: ICD-10-CM

## 2025-02-26 DIAGNOSIS — L82.1 SK (SEBORRHEIC KERATOSIS): ICD-10-CM

## 2025-02-26 DIAGNOSIS — L57.0 ACTINIC KERATOSIS: ICD-10-CM

## 2025-02-26 DIAGNOSIS — D22.9 MULTIPLE NEVI: ICD-10-CM

## 2025-02-26 DIAGNOSIS — D48.9 NEOPLASM OF UNCERTAIN BEHAVIOR: ICD-10-CM

## 2025-02-26 NOTE — PROGRESS NOTES
DERMATOLOGY NOTE  NEW VISIT       Chief complaint: Establish Care (HAFSA) and Skin Lesion     HPI/location: Left anterior shoulder  Time present: x1yr  Painful lesion: No  Itching lesion: Yes  Enlarging lesion: Yes      History of skin cancer: No  History of precancers/actinic keratoses: No  History of biopsies:No  History of blistering/severe sunburns:Yes, Details: Ski  Family history of skin cancer:No  Family history of atypical moles:No      Allergies   Allergen Reactions    Melatonin Vomiting        MEDICATIONS:  Medications relevant to specialty reviewed.     REVIEW OF SYSTEMS:   Positive for skin (see HPI)  Negative for fevers and chills       EXAM:  There were no vitals taken for this visit.  Constitutional: Well-developed, well-nourished, and in no distress.     A focused skin exam was performed including the affected areas of the upper body--head/scalp, face, neck, trunk and BUEs. Notable findings on exam today listed below and/or in assessment/plan.     Approx. 1.7 by 1.2 cm pink papule with rolled  border and telangiectasia  Approx. 7 mm pink pearly papule to R cheek  Numerous ill-defined erythematous gritty/scaly papules over the forehead, bilateral cheek, temples and bilateral forearms  -sun exposed skin of trunk and b/l upper extremities and face with scattered clinically benign light brown reticulated macules all of which were morphologically similar and none of which were suspicious for skin cancer today on exam  Several scattered 1-3mm bright red macules and thin papules on the face and trunk and BUEs  Several tan light brown skin-colored stuck-on waxy papules scattered on the trunk and BUEs  Multiple tan light brown skin-colored macules papules scattered over the trunk and upper extremities--All with benign-appearing pigment network patterns on dermoscopy      IMPRESSION / PLAN:    1. Neoplasm of uncertain behavior  Procedure Note   Procedure: Biopsy by shave technique  Location: R cheek  Size: as  "noted in exam  Preoperative diagnosis:NMSC vs other  Risks, benefits and alternatives of procedure discussed, verbal consent obtained for photo (see chart) and written informed consent obtained for procedure. Time out completed. Area of biopsy prepped with alcohol. Anesthesia with 1% lidocaine with epinephrine administered with 30 gauge needle. Shave biopsy of the site performed. Hemostasis achieved with pressure and aluminum chloride. Vaseline applied to wound with bandage. Patient tolerated procedure well and there were no complications. The specimen was sent to the pathology lab by the staff. Wound care was discussed.    Procedure Note   Procedure: Biopsy by shave technique  Location: LUE  Size: as noted in exam  Preoperative diagnosis:NMSC vs other  Risks, benefits and alternatives of procedure discussed, verbal consent obtained for photo (see chart) and written informed consent obtained for procedure. Time out completed. Area of biopsy prepped with alcohol. Anesthesia with 1% lidocaine with epinephrine administered with 30 gauge needle. Shave biopsy of the site performed. Hemostasis achieved with pressure and aluminum chloride. Vaseline applied to wound with bandage. Patient tolerated procedure well and there were no complications. The specimen was sent to the pathology lab by the staff. Wound care was discussed.      2. Actinic keratosis  - NMSC/\"pre-cancer\" education/counseling   CRYOTHERAPY:  Risks (including, but not limited to: skin discoloration, redness, blister, blood blister, recurrence, need for further treatment, infection, scar) and benefits of cryotherapy discussed. Patient verbally agreed to proceed with treatment. 1 cryotherapy freeze thaw cycles of 10 seconds were applied to 12 lesions on face and forearms as noted on exam with cryac. Patient tolerated procedure well. Aftercare instructions given--no specific care needed unless irritated during healing process, can apply Vaseline with small " band-aid if needed.      3. Lentigines  - Benign-appearing nature of lesions discussed during exam.   - Advised to continue to monitor for any return to clinic for new or concerning changes.      4. Cherry angioma  - Benign-appearing nature of lesions discussed during exam.   - Advised to continue to monitor for any return to clinic for new or concerning changes.      5. SK (seborrheic keratosis)  - Benign-appearing nature of lesions discussed during exam.   - Advised to continue to monitor for any return to clinic for new or concerning changes.      6. Multiple nevi  - Benign-appearing nature of lesions discussed during exam.   - Advised to continue to monitor for any return to clinic for new or concerning changes.  - ABCDE's of melanoma discussed/handout given      7. Skin cancer screening  Skin cancer education  discussed importance of sun protective clothing, eyewear in addition to the use of broad spectrum sunscreen with SPF 30 or greater, as well as need for reapplication ~every 2 hours when exposed to UVR/handout given  discussed importance following up for any new or changing lesions as noted in handout given, but every 6 months exams in clinic in the setting of dermatologic history  ABCDE's of melanoma discussed/handout given        Discussed risks associated with LN2 and shave bx, Patient verbalized understanding and agrees with plan regarding the above              Please note that this dictation was created using voice recognition software. I have made every reasonable attempt to correct obvious errors, but I expect that there are errors of grammar and possibly content that I did not discover before finalizing the note.      Return to clinic in: Return in about 6 weeks (around 4/9/2025) for AK follow up, Pending Bx results. and as needed for any new or changing skin lesions.

## 2025-03-04 ENCOUNTER — TELEPHONE (OUTPATIENT)
Dept: DERMATOLOGY | Facility: IMAGING CENTER | Age: 75
End: 2025-03-04
Payer: MEDICARE

## 2025-03-04 DIAGNOSIS — C44.619 BASAL CELL CARCINOMA OF UPPER ARM, LEFT: ICD-10-CM

## 2025-03-04 DIAGNOSIS — C44.319 BASAL CELL CARCINOMA OF FOREHEAD: ICD-10-CM

## 2025-03-04 NOTE — TELEPHONE ENCOUNTER
Spoke with pt regarding results, BCC on LUE and R cheek, rec. MOHS for both--MOHS appropriate for LUE due to size and type (infiltrative), pt agreeable, referral placed, please process  Thank you

## 2025-03-20 ENCOUNTER — PATIENT MESSAGE (OUTPATIENT)
Dept: HEALTH INFORMATION MANAGEMENT | Facility: OTHER | Age: 75
End: 2025-03-20

## 2025-04-09 ENCOUNTER — OFFICE VISIT (OUTPATIENT)
Dept: DERMATOLOGY | Facility: IMAGING CENTER | Age: 75
End: 2025-04-09
Payer: MEDICARE

## 2025-04-09 DIAGNOSIS — Z85.828 HISTORY OF BASAL CELL CARCINOMA (BCC): ICD-10-CM

## 2025-04-09 DIAGNOSIS — L57.0 ACTINIC KERATOSIS: ICD-10-CM

## 2025-04-09 PROCEDURE — 17003 DESTRUCT PREMALG LES 2-14: CPT | Performed by: NURSE PRACTITIONER

## 2025-04-09 PROCEDURE — 17000 DESTRUCT PREMALG LESION: CPT | Performed by: NURSE PRACTITIONER

## 2025-04-09 PROCEDURE — 99212 OFFICE O/P EST SF 10 MIN: CPT | Mod: 25 | Performed by: NURSE PRACTITIONER

## 2025-04-09 NOTE — PROGRESS NOTES
DERMATOLOGY NOTE  FOLLOW UP VISIT       Chief complaint: Follow-Up (ak)     Pt here for AK Fv, previously txd w/ ln2   Discussed biopsy results      History of skin cancer: Yes, Details: MOHS sched. Next week, Rt cheek , lt shoulder      History of precancers/actinic keratoses: Yes, Details: arms, face  History of biopsies:No  History of blistering/severe sunburns:Yes, Details: Ski  Family history of skin cancer:No  Family history of atypical moles:No      Allergies   Allergen Reactions    Melatonin Vomiting        MEDICATIONS:  Medications relevant to specialty reviewed.     REVIEW OF SYSTEMS:   Positive for skin (see HPI)  Negative for fevers and chills       EXAM:  There were no vitals taken for this visit.  Constitutional: Well-developed, well-nourished, and in no distress.     A focused skin exam was performed including the affected areas of the face and BUEs. Notable findings on exam today listed below and/or in assessment/plan.     Few remaining ill-defined erythematous gritty/scaly papules over the forehead and bilateral forearms  Well healed shave bx site, R temple and LUE, evidence of remaining lesion on lateral edge of LUE bx site    IMPRESSION / PLAN:    1. Actinic keratosis  CRYOTHERAPY:  Risks (including, but not limited to: skin discoloration, redness, blister, blood blister, recurrence, need for further treatment, infection, scar) and benefits of cryotherapy discussed. Patient verbally agreed to proceed with treatment. 1 cryotherapy freeze thaw cycles of 10 seconds were applied to 7 lesions on areas as noted on exam with cryac. Patient tolerated procedure well. Aftercare instructions given--no specific care needed unless irritated during healing process, can apply Vaseline with small band-aid if needed.      2. History of basal cell carcinoma (BCC)  Again discussed  results of bx's done on face and LUE--both BCC and was referred to MOHS--due to significant personal circumstances, pt is going to  cancel appt with [facility], he may consider rescheduling in the future. Pt understands risks of not treating  Advised follow up in 5 months, follow up sooner if needed      Pt understands risks associated with LN2, Patient verbalized understanding and agrees with plan regarding the above          Please note that this dictation was created using voice recognition software. I have made every reasonable attempt to correct obvious errors, but I expect that there are errors of grammar and possibly content that I did not discover before finalizing the note.      Return to clinic in: Return in about 5 months (around 9/9/2025) for HAFSA. and as needed for any new or changing skin lesions.        [Left] : left shoulder [FreeTextEntry1] : There is some inferior HH spurring.  There is slight narrowing inferiorly. There is AC degenerative changes with an ossicle. There is a posterior calcium.  [FreeTextEntry5] : There is a Type I-II acromion with a lateral acromial spur.

## 2025-04-14 NOTE — Clinical Note
REFERRAL APPROVAL NOTICE         Sent on April 14, 2025                   Richard Chery  [address]  [place] [address]                   Dear Mr. Chery,    After a careful review of the medical information and benefit coverage, [facility] has processed your referral. See below for additional details.    If applicable, you must be actively enrolled with your insurance for coverage of the authorized service. If you have any questions regarding your coverage, please contact your insurance directly.    REFERRAL INFORMATION   Referral #:  [id number]  Referred-To Provider    Referred-By Provider:  Dermatology    BROOKE Soto   [facility]      [facility]  [address]  [place] [address]  [phone] [address]  [place] [address]  [phone]    Referral Start Date:  03/04/2025  Referral End Date:   03/04/2026             SCHEDULING  If you do not already have an appointment, please call [phone] to make an appointment.     MORE INFORMATION  If you do not already have a [facility] [employer] account, sign up at: [email or web address]  You can access your medical information, make appointments, see lab results, billing information, and more.  If you have questions regarding this referral, please contact  the [facility] Referrals department at:             [phone]. Monday - Friday 8:00AM - 5:00PM.     Sincerely,    [facility]

## 2025-04-17 DIAGNOSIS — G62.9 NEUROPATHY: ICD-10-CM

## 2025-04-17 NOTE — TELEPHONE ENCOUNTER
Received request via: Patient    Was the patient seen in the last year in this department? Yes    Does the patient have an active prescription (recently filled or refills available) for medication(s) requested? No    Does the patient have intermediate Plus and need 100-day supply? (This applies to ALL medications) Yes, quantity updated to 100 days

## 2025-04-18 RX ORDER — GABAPENTIN 300 MG/1
CAPSULE ORAL
Qty: 500 CAPSULE | Refills: 3 | Status: SHIPPED | OUTPATIENT
Start: 2025-04-18

## 2025-05-25 DIAGNOSIS — Z79.4 TYPE 2 DIABETES MELLITUS WITH DIABETIC NEUROPATHY, WITH LONG-TERM CURRENT USE OF INSULIN (HCC): Chronic | ICD-10-CM

## 2025-05-25 DIAGNOSIS — E11.40 TYPE 2 DIABETES MELLITUS WITH DIABETIC NEUROPATHY, WITH LONG-TERM CURRENT USE OF INSULIN (HCC): Chronic | ICD-10-CM

## 2025-05-27 RX ORDER — DULAGLUTIDE 3 MG/.5ML
INJECTION, SOLUTION SUBCUTANEOUS
Qty: 15 ML | Refills: 3 | Status: SHIPPED | OUTPATIENT
Start: 2025-05-27

## 2025-06-26 ENCOUNTER — OFFICE VISIT (OUTPATIENT)
Dept: MEDICAL GROUP | Facility: MEDICAL CENTER | Age: 75
End: 2025-06-26
Payer: MEDICARE

## 2025-06-26 VITALS
OXYGEN SATURATION: 93 % | HEART RATE: 79 BPM | BODY MASS INDEX: 31.92 KG/M2 | WEIGHT: 228 LBS | TEMPERATURE: 97.2 F | SYSTOLIC BLOOD PRESSURE: 124 MMHG | HEIGHT: 71 IN | DIASTOLIC BLOOD PRESSURE: 72 MMHG

## 2025-06-26 DIAGNOSIS — Z79.4 TYPE 2 DIABETES MELLITUS WITH DIABETIC NEUROPATHY, WITH LONG-TERM CURRENT USE OF INSULIN (HCC): ICD-10-CM

## 2025-06-26 DIAGNOSIS — Z00.00 MEDICARE ANNUAL WELLNESS VISIT, SUBSEQUENT: Primary | ICD-10-CM

## 2025-06-26 DIAGNOSIS — I51.89 LEFT VENTRICULAR SYSTOLIC DYSFUNCTION, NYHA CLASS 2: Chronic | ICD-10-CM

## 2025-06-26 DIAGNOSIS — R79.89 LOW VITAMIN D LEVEL: ICD-10-CM

## 2025-06-26 DIAGNOSIS — M79.671 RIGHT FOOT PAIN: Chronic | ICD-10-CM

## 2025-06-26 DIAGNOSIS — K21.9 GASTROESOPHAGEAL REFLUX DISEASE, UNSPECIFIED WHETHER ESOPHAGITIS PRESENT: ICD-10-CM

## 2025-06-26 DIAGNOSIS — D68.69 HYPERCOAGULABLE STATE DUE TO LONGSTANDING PERSISTENT ATRIAL FIBRILLATION (HCC): ICD-10-CM

## 2025-06-26 DIAGNOSIS — I48.11 HYPERCOAGULABLE STATE DUE TO LONGSTANDING PERSISTENT ATRIAL FIBRILLATION (HCC): ICD-10-CM

## 2025-06-26 DIAGNOSIS — Z79.4 TYPE 2 DIABETES MELLITUS WITH HYPERGLYCEMIA, WITH LONG-TERM CURRENT USE OF INSULIN (HCC): ICD-10-CM

## 2025-06-26 DIAGNOSIS — I48.20 CHRONIC ATRIAL FIBRILLATION (HCC): ICD-10-CM

## 2025-06-26 DIAGNOSIS — I10 ESSENTIAL HYPERTENSION: ICD-10-CM

## 2025-06-26 DIAGNOSIS — E11.40 TYPE 2 DIABETES MELLITUS WITH DIABETIC NEUROPATHY, WITH LONG-TERM CURRENT USE OF INSULIN (HCC): ICD-10-CM

## 2025-06-26 DIAGNOSIS — R35.0 URINARY FREQUENCY: ICD-10-CM

## 2025-06-26 DIAGNOSIS — E11.65 TYPE 2 DIABETES MELLITUS WITH HYPERGLYCEMIA, WITH LONG-TERM CURRENT USE OF INSULIN (HCC): ICD-10-CM

## 2025-06-26 DIAGNOSIS — E78.2 MIXED HYPERLIPIDEMIA: ICD-10-CM

## 2025-06-26 DIAGNOSIS — I50.20 ACC/AHA STAGE C SYSTOLIC HEART FAILURE (HCC): Chronic | ICD-10-CM

## 2025-06-26 PROBLEM — G62.9 NEUROPATHY: Status: RESOLVED | Noted: 2018-09-12 | Resolved: 2025-06-26

## 2025-06-26 PROBLEM — R40.20 LOSS OF CONSCIOUSNESS (HCC): Status: RESOLVED | Noted: 2021-12-28 | Resolved: 2025-06-26

## 2025-06-26 LAB
HBA1C MFR BLD: 6.8 % (ref ?–5.8)
POCT INT CON NEG: NEGATIVE
POCT INT CON POS: POSITIVE

## 2025-06-26 RX ORDER — TAMSULOSIN HYDROCHLORIDE 0.4 MG/1
0.4 CAPSULE ORAL 2 TIMES DAILY
Qty: 200 CAPSULE | Refills: 3 | Status: SHIPPED | OUTPATIENT
Start: 2025-06-26

## 2025-06-26 RX ORDER — INSULIN GLARGINE 300 U/ML
35 INJECTION, SOLUTION SUBCUTANEOUS DAILY
Qty: 1.5 ML | Refills: 11 | Status: SHIPPED | OUTPATIENT
Start: 2025-06-26

## 2025-06-26 ASSESSMENT — ACTIVITIES OF DAILY LIVING (ADL): BATHING_REQUIRES_ASSISTANCE: 0

## 2025-06-26 ASSESSMENT — ENCOUNTER SYMPTOMS: GENERAL WELL-BEING: GOOD

## 2025-06-26 ASSESSMENT — PATIENT HEALTH QUESTIONNAIRE - PHQ9: CLINICAL INTERPRETATION OF PHQ2 SCORE: 0

## 2025-06-26 ASSESSMENT — FIBROSIS 4 INDEX: FIB4 SCORE: 2.6

## 2025-06-26 NOTE — PROGRESS NOTES
Chief Complaint   Patient presents with    Medicare Annual Wellness       HPI:  Richard Chery is a 75 y.o. here for Medicare Annual Wellness Visit     Continues to have throbbing and burning in the     Problem   Type 2 Diabetes Mellitus With Diabetic Neuropathy, With Long-Term Current Use of Insulin (Hcc)   Right Foot Pain    Continues to have throbbing pain at the top of the right foot between the third and fourth interphalangeal joint different than his neuropathy.  He is very frustrated that no one can figure out the cause.  He has tried acupuncture, went to orthopedics, podiatry, pain management.   Tried orthotics.     Occurs on and off- about every other day. Has xray and MRI and still unsure what the problem is. Thought it was a he neuroma but also was not the correct diagnosis after MRI.   Taking gabapentin for neuropathy but doesn't find it helpful for this pain.   Currently taking gabapentin for neuropathy. Declines other pain medicine rx for cymbalta          Type 2 Diabetes Mellitus With Diabetic Neuropathy (Hcc)    Fasting BS continues to improve in the morning.   Denies symptoms of hypoglycemia.  Diet- watching diet closer and drinking less whiskey.  Exercising regularly    Current medications:  Insulin: 32 units  Biguanide: Metformin 1000mg BID  GLP1-RA: dulaglutide 3mg   SGLT-2i: Empagliflozin 10 mg daily      Last A1c: 6/2025: 6.8; 5/2023:  6.9, down from 8.7  Fasting blood sugars: have been in the 90's but more recently higher in the 140's   Last Microalb/Cr ratio: up to date  Last diabetic foot exam: up to date  Last retinal eye exam:  last month- requesting records  Statin: rosuvastatin 20mg  Nightly foot checks: yes       Loss of Consciousness (Hcc) (Resolved)   Neuropathy (Resolved)         Patient Active Problem List    Diagnosis Date Noted    Type 2 diabetes mellitus with diabetic neuropathy, with long-term current use of insulin (HCC) 06/26/2025    Hiatal hernia 10/09/2024     Urinary frequency 06/24/2024    ACC/AHA stage C systolic heart failure (HCC) 06/06/2023    Left ventricular systolic dysfunction, NYHA class 2 06/06/2023    Hypercoagulable state due to longstanding persistent atrial fibrillation ([facility]) 06/06/2023    Coronary artery disease involving native coronary artery of native heart without angina pectoris 12/23/2022    Neoplasm of uncertain behavior of skin 04/05/2022    Right foot pain 11/24/2021    Slow transit constipation 11/24/2021    Chronic right shoulder pain 10/16/2020    Chronic anticoagulation 12/19/2019    Sciatica of left side 12/10/2018    GERD (gastroesophageal reflux disease) 09/12/2018    Primary osteoarthritis of right knee 08/16/2018    Vertigo 10/30/2017    Acquired deafness of left ear 10/30/2017    Class 1 obesity due to excess calories with serious comorbidity and body mass index (BMI) of 33.0 to 33.9 in adult 10/30/2017    History of osteoarthritis 11/21/2016    Observed sleep apnea     Type 2 diabetes mellitus with diabetic neuropathy ([facility]) 03/07/2016    Chronic atrial fibrillation ([facility]) 02/27/2016    Type 2 diabetes mellitus with hyperglycemia, with long-term current use of insulin ([facility]) 11/24/2015    Hyperlipidemia 11/24/2015       Current Medications[1]       Current supplements as per medication list.     Allergies: Melatonin    Current social contact/activities: Spend time family and friends     He  reports that he has never smoked. He has never used smokeless tobacco. He reports that he does not currently use alcohol after a past usage of about 4.2 oz of alcohol per week. He reports that he does not use drugs.  Counseling given: Not Answered      ROS:    Gait: Uses no assistive device  Ostomy: No  Other tubes: No  Amputations: Yes  Chronic oxygen use: No  Last eye exam: couple months ago.   Wears hearing aids: No , has them but does not use them.   : Reports urinary leakage during the last 6 months that has somewhat interfered with their daily  activities or sleep.    Screening:  Depression Screening  Little interest or pleasure in doing things?  0 - not at all  Feeling down, depressed , or hopeless? 0 - not at all  Trouble falling or staying asleep, or sleeping too much?     Feeling tired or having little energy?     Poor appetite or overeating?     Feeling bad about yourself - or that you are a failure or have let yourself or your family down?    Trouble concentrating on things, such as reading the newspaper or watching television?    Moving or speaking so slowly that other people could have noticed.  Or the opposite - being so fidgety or restless that you have been moving around a lot more than usual?     Thoughts that you would be better off dead, or of hurting yourself?     Patient Health Questionnaire Score:      If depressive symptoms identified deferred to follow up visit unless specifically addressed in assessment and plan.    Interpretation of PHQ-9 Total Score   Score Severity   1-4 No Depression   5-9 Mild Depression   10-14 Moderate Depression   15-19 Moderately Severe Depression   20-27 Severe Depression    Screening for Cognitive Impairment  Do you or any of your friends or family members have any concern about your memory? No  Three Minute Recall (Village, Kitchen, Baby) 3/3    Nigel clock face with all 12 numbers and set the hands to show 10 minutes past 11.  Yes    Cognitive concerns identified deferred for follow up unless specifically addressed in assessment and plan.    Fall Risk Assessment  Has the patient had two or more falls in the last year or any fall with injury in the last year?  No    Safety Assessment  Do you always wear your seatbelt?  No  Any changes to home needed to function safely? No  Difficulty hearing.  No  Patient counseled about all safety risks that were identified.    Functional Assessment ADLs  Are there any barriers preventing you from cooking for yourself or meeting nutritional needs?  No.    Are there any  barriers preventing you from driving safely or obtaining transportation?  No.    Are there any barriers preventing you from using a telephone or calling for help?  No    Are there any barriers preventing you from shopping?  No.    Are there any barriers preventing you from taking care of your own finances?  No    Are there any barriers preventing you from managing your medications?  No    Are there any barriers preventing you from showering, bathing or dressing yourself? No    Are there any barriers preventing you from doing housework or laundry? No    Are there any barriers preventing you from using the toilet?No    Are you currently engaging in any exercise or physical activity?  Yes.      Self-Assessment of Health  What is your perception of your health? Good    Do you sleep more than six hours a night? Yes Sometimes   In the past 7 days, how much did pain keep you from doing your normal work? None    Do you spend quality time with family or friends (virtually or in person)? Yes    Do you usually eat a heart healthy diet that constists of a variety of fruits, vegetables, whole grains and fiber? Yes Sometimes   Do you eat foods high in fat and/or Fast Food more than three times per week? No    How concerned are you that your medical conditions are not being well managed? Not at all    Are you worried that in the next 2 months, you may not have stable housing that you own, rent, or stay in as part of a household? No        Advance Care Planning  Do you have an Advance Directive, Living Will, Durable Power of , or POLST? Yes  Advance Directive Living Will     is not on file - instructed patient to bring in a copy to scan into their chart      Health Maintenance Summary            Current Care Gaps       Hepatitis B Vaccine (Hep B) (2 of 3 - 19+ 3-dose series) Overdue since 11/13/2020      10/16/2020  Imm Admin: Hepatitis B Vaccine (Adol/Adult)              Zoster (Shingles) Vaccines (3 of 3) Overdue since  12/11/2020      10/16/2020  Imm Admin: Zoster Vaccine Recombinant (RZV) (SHINGRIX)    06/27/2016  Imm Admin: Zoster Vaccine Live (ZVL) (Zostavax) - HISTORICAL DATA    06/27/2016  Imm Admin: Zoster Vaccine Live (ZVL) (Zostavax) - HISTORICAL DATA              COVID-19 Vaccine (4 - 2024-25 season) Overdue since 9/1/2024 11/03/2021  Imm Admin: PFIZER PURPLE CAP SARS-COV-2 VACCINATION (12+)    03/13/2021  Imm Admin: PFIZER PURPLE CAP SARS-COV-2 VACCINATION (12+)    02/23/2021  Imm Admin: PFIZER PURPLE CAP SARS-COV-2 VACCINATION (12+)              Diabetes: Retinopathy Screening (Yearly) Overdue since 10/23/2024      10/23/2023  AMB EXTERNAL RETINAL SCREENING RESULTS    08/22/2022  REFERRAL FOR RETINAL SCREENING EXAM    08/08/2018  REFERRAL FOR RETINAL SCREENING EXAM                      Needs Review       Hepatitis C Screening  Tentatively Complete      08/15/2018  Hepatitis C Antibody component of INFECTIOUS DISEASE TESTING (EXPOSURE)    08/03/2018  Hepatitis C Antibody component of HEP C VIRUS ANTIBODY              A1c Screening (Every 6 Months) Tentatively due on 12/26/2025 06/26/2025  POCT  A1C    12/13/2024  POCT  A1C    07/16/2024  POCT Hemoglobin A1C    04/16/2024  POCT Hemoglobin A1C    10/17/2023  POCT Hemoglobin A1C     Only the first 5 history entries have been loaded, but more history exists.            Fasting Lipid Profile (Yearly) Tentatively due on 1/30/2026 06/26/2025  Order placed for Lipid Profile by Nazia Moy, RAMYA.[email or web address].    01/30/2025  Lipid Profile    08/08/2022  Lipid Profile    08/06/2021  Lipid Profile    07/21/2020  Lipid Profile      Only the first 5 history entries have been loaded, but more history exists.                      Awaiting Completion       Diabetes: Urine Protein Screening (Yearly) Order placed this encounter      06/26/2025  Order placed for MICROALBUMIN CREAT RATIO URINE by RAMYA Rice.P.R.N.    01/30/2025  MICROALBUMIN CREAT RATIO URINE     08/08/2022  MICROALBUMIN CREAT RATIO URINE    08/06/2021  MICROALBUMIN CREAT RATIO URINE    07/21/2020  MICROALBUMIN CREAT RATIO URINE      Only the first 5 history entries have been loaded, but more history exists.              SERUM CREATININE (Yearly) Order placed this encounter      06/26/2025  Order placed for Comp Metabolic Panel by BROOKE Rice    01/30/2025  Comp Metabolic Panel    09/24/2024  Basic Metabolic Panel    03/28/2024  Basic Metabolic Panel    07/07/2023  Basic Metabolic Panel      Only the first 5 history entries have been loaded, but more history exists.                      Upcoming       Diabetes: Monofilament / LE Exam (Yearly) Next due on 9/12/2025 09/12/2024  SmartData: WORKFLOW - DIABETES - DIABETIC FOOT EXAM PERFORMED    08/29/2024  SmartData: WORKFLOW - DIABETES - DIABETIC FOOT EXAM PERFORMED    07/16/2024  SmartData: WORKFLOW - DIABETES - DIABETIC FOOT EXAM PERFORMED    07/03/2023  Diabetic Monofilament LE Exam    04/05/2022  SmartData: WORKFLOW - DIABETES - DIABETIC FOOT EXAM PERFORMED      Only the first 5 history entries have been loaded, but more history exists.              Annual Wellness Visit (Yearly) Next due on 6/26/2026 06/26/2025  Visit Dx: Medicare annual wellness visit, subsequent    06/26/2025  Level of Service: ME ANNUAL WELLNESS VISIT-INCLUDES PPPS SUBSEQUE*    06/24/2024  Visit Dx: Medicare annual wellness visit, subsequent    06/24/2024  Level of Service: ANNUAL WELLNESS VISIT-INCLUDES PPPS SUBSEQUE*    03/27/2017  Visit Dx: Medicare annual wellness visit, initial      Only the first 5 history entries have been loaded, but more history exists.              IMM DTaP/Tdap/Td Vaccine (2 - Td or Tdap) Next due on 3/27/2027      03/27/2017  Imm Admin: Tdap Vaccine              Colorectal Cancer Screening (Colonoscopy - Preferred) Next due on 7/12/2028 07/12/2018  REFERRAL TO GI FOR COLONOSCOPY    06/20/2008  REFERRAL TO GI FOR COLONOSCOPY                       Completed or No Longer Recommended       Influenza Vaccine (Series Information) Completed      10/16/2024  Outside Immunization: Influenza Tri, Adj    09/20/2023  Imm Admin: Influenza Vaccine Adult HD    11/04/2022  Imm Admin: Influenza Vaccine Adult HD    10/27/2021  Imm Admin: Influenza Vaccine Adult HD    10/15/2020  Imm Admin: Influenza Vaccine Adult HD      Only the first 5 history entries have been loaded, but more history exists.              Pneumococcal Vaccine: 50+ Years (Series Information) Completed      11/08/2016  Imm Admin: Pneumococcal polysaccharide vaccine (PPSV-23)    03/01/2016  Imm Admin: Pneumococcal Conjugate Vaccine (Prevnar/PCV-13)    03/01/2016  Imm Admin: Pneumococcal Conjugate Vaccine (Prevnar/PCV-13)              Hepatitis A Vaccine (Hep A) (Series Information) Aged Out      No completion history exists for this topic.              HPV Vaccines (Series Information) Aged Out     No completion history exists for this topic.              Polio Vaccine (Inactivated Polio) (Series Information) Aged Out     No completion history exists for this topic.              Meningococcal Immunization (Series Information) Aged Out     No completion history exists for this topic.              Meningococcal B Vaccine (Series Information) Aged Out     No completion history exists for this topic.                            Patient Care Team:  BROOKE Rice as PCP - General (Nurse Practitioner Family)  Manjit Pelletier M.D. as Consulting Physician (Cardiovascular Disease (Cardiology))  Amparo James M.D. as Consulting Physician (Orthopedic Surgery)  Pantera Gorman M.D. (Inactive) as Consulting Physician (Ophthalmology)  Jaiden Murguia Ass't as        Social History[2]  Family History   Problem Relation Age of Onset    Diabetes Other     Hypertension Other     Diabetes Father         Passed     He  has a past medical history of  "Alcohol use (02/10/2016), Breath shortness (06/15/2023), Cataract (8/8), Dental disorder (06/15/2023), Diabetes (06/15/2023), Elevated INR (07/30/2019), Heart burn (06/15/2023), Hemorrhagic disorder (HCC) (06/15/2023), Hiatus hernia syndrome, High cholesterol (06/15/2023), Observed sleep apnea (06/15/2023), Other persistent atrial fibrillation (HCC) (12/19/2019), Pain (06/15/2023), Paroxysmal atrial fibrillation (HCC) (06/15/2023), Pneumonia (06/15/2023), Pulmonary hypertension (HCC) (03/01/2016), Sleep apnea (09/13/2024), Snoring, Urinary incontinence (12/23), and Urinary retention (07/30/2019).    He has no past medical history of Acute nasopharyngitis, Anesthesia, Anginal syndrome (HCC), Arthritis, Asthma, Blood clotting disorder (HCC), Bowel habit changes, Bronchitis, Cancer (HCC), Carcinoma in situ of respiratory system, Congestive heart failure (HCC), Continuous ambulatory peritoneal dialysis status (HCC), Coughing blood, Dialysis patient (HCC), Disorder of thyroid, Emphysema of lung (HCC), Glaucoma, Gynecological disorder, Heart murmur, Heart valve disease, Hepatitis A, Hepatitis B, Hepatitis C, Hypertension, Indigestion, Infectious disease, Jaundice, Myocardial infarct (HCC), Pacemaker, Pregnant, Psychiatric problem, Renal disorder, Rheumatic fever, Seizure (HCC), Stroke (HCC), Tuberculosis, or Urinary bladder disorder.   Past Surgical History[3]    Exam:   /72 (BP Location: Left arm, Patient Position: Sitting, BP Cuff Size: Adult)   Pulse 79   Temp 36.2 °C (97.2 °F) (Temporal)   Ht 1.803 m (5' 11\")   Wt 103 kg (228 lb)   SpO2 93%  Body mass index is 31.8 kg/m².    Hearing good.    Dentition good  Alert, oriented in no acute distress.  Eye contact is good, speech goal directed, affect calm    Assessment and Plan. The following treatment and monitoring plan is recommended:    1. Medicare annual wellness visit, subsequent    2. Type 2 diabetes mellitus with diabetic neuropathy, with long-term " current use of insulin (HCC)  - see HCC gap form below  - POCT  A1C  - Insulin Glargine, 1 Unit Dial, (TOUJEO SOLOSTAR) 300 UNIT/ML Solution Pen-injector; Inject 35 Units under the skin every day.  Dispense: 1.5 mL; Refill: 11  - metformin (GLUCOPHAGE) 1000 MG tablet; Take 1 Tablet by mouth 2 times a day.  Dispense: 200 Tablet; Refill: 3  - HEMOGLOBIN A1C; Future  - MICROALBUMIN CREAT RATIO URINE; Future    3. Right foot pain  - chronic, persistent  - declines referral to any other specialist   Currently taking gabapentin for neuropathy. Declines other pain medicine rx. Discussed cymbalta- pt declined     4. Essential hypertension  Chronic, controlled.    -Mediterranean and low sodium diet. Avoid tobacco products and alcohol. Get at least 150 minutes of moderate aerobic activity per week.   Medications to continue: Metoprolol 25 mg daily    5. Chronic atrial fibrillation (HCC)  - - see HCC gap form below  - rivaroxaban (XARELTO) 20 MG Tab tablet; Take 1 Tablet by mouth with dinner.  Dispense: 100 Tablet; Refill: 3  - Comp Metabolic Panel; Future  - EC-ECHOCARDIOGRAM COMPLETE W/O CONT; Future    6. Urinary frequency  -Chronic, stable.  Continue:  - tamsulosin (FLOMAX) 0.4 MG capsule; Take 1 Capsule by mouth 2 times a day.  Dispense: 200 Capsule; Refill: 3    7. ACC/AHA stage C systolic heart failure (HCC)  - see HCC gap form below  - EC-ECHOCARDIOGRAM COMPLETE W/O CONT; Future    8. Left ventricular systolic dysfunction, NYHA class 2  - see HCC gap form below  - EC-ECHOCARDIOGRAM COMPLETE W/O CONT; Future    9. Mixed hyperlipidemia  Chronic, controlled.    -Counseled on dietary and lifestyle modifications.  Medications to Continue: Rosuvastatin 20 mg daily  - Lipid Profile; Future    10. Gastroesophageal reflux disease, unspecified whether esophagitis present  - Chronic, currently resolved  - Recommended attempt to taper omeprazole to see if this is needed.     11. Hypercoagulable state due to longstanding persistent  atrial fibrillation (HCC)  - continue xarelto for stroke prevention.     12. Low vitamin D level  Continue Vit D supplement   - VITAMIN D,25 HYDROXY (DEFICIENCY); Future    13. Type 2 diabetes mellitus with hyperglycemia, with long-term current use of insulin (HCC)  - see HCC gap form below  - metformin (GLUCOPHAGE) 1000 MG tablet; Take 1 Tablet by mouth 2 times a day.  Dispense: 200 Tablet; Refill: 3    HCC Gap Form    Diagnosis to address: I50.20 - ACC/AHA stage C systolic heart failure (HCC)  Assessment and plan: Chronic, stable.  No signs of fluid volume overload. continue with current defined treatment plan: Metoprolol 25 mg daily, rosuvastatin 10 daily, empagliflozin 10 mg daily.  Patient hesitant about continuing to see cardiology . update echocardiogram and will determine if cardiology follow-up is necessary ,.  follow-up at least annually.  Diagnosis: I48.20 - Chronic atrial fibrillation (HCC)  Assessment and plan: Chronic, stable. Continue with current defined treatment plan:   continue metoprolol 25 mg daily, Xarelto 20 mg daily .  Obtain follow up echo. Pt has not followed up with cardiology as recommended. Follow-up at least annually.  Diagnosis: E11.40, Z79.4 - Type 2 diabetes mellitus with diabetic neuropathy, with long-term current use of insulin (HCC)  Assessment and plan: Chronic, stable. Continue with current defined treatment plan: continue diabetes control, continue gabapentin 600mg morning and 900 mg evening. Follow-up at least annually.  Diagnosis: E11.65, Z79.4 - Type 2 diabetes mellitus with hyperglycemia, with long-term current use of insulin (HCC)  Assessment and plan: Chronic, stable. Continue with current defined treatment plan: continue long acting insulin 35 units daily, metformin 1000mg BID, jardiance 10mg daily dulaglutide 3mg weekly. Follow-up at least annually.  Last edited 06/26/25 11:18 PDT by BHARATHI Rice.          Services suggested: No services needed at this  time  Health Care Screening: Age-appropriate preventive services recommended by USPTF and ACIP covered by Medicare were discussed today. Services ordered if indicated and agreed upon by the patient.  Referrals offered: Community-based lifestyle interventions to reduce health risks and promote self-management and wellness, fall prevention, nutrition, physical activity, tobacco-use cessation, weight loss, and mental health services as per orders if indicated.    Discussion today about general wellness and lifestyle habits:    Prevent falls and reduce trip hazards; Cautioned about securing or removing rugs.  Have a working fire alarm and carbon monoxide detector;   Engage in regular physical activity and social activities     Follow-up: Return in about 6 months (around 12/26/2025).         [1]   Current Outpatient Medications   Medication Sig Dispense Refill    Insulin Glargine, 1 Unit Dial, (TOUJEO SOLOSTAR) 300 UNIT/ML Solution Pen-injector Inject 35 Units under the skin every day. 1.5 mL 11    metformin (GLUCOPHAGE) 1000 MG tablet Take 1 Tablet by mouth 2 times a day. 200 Tablet 3    rivaroxaban (XARELTO) 20 MG Tab tablet Take 1 Tablet by mouth with dinner. 100 Tablet 3    tamsulosin (FLOMAX) 0.4 MG capsule Take 1 Capsule by mouth 2 times a day. 200 Capsule 3    Dulaglutide (TRULICITY) 3 MG/0.5ML Solution Auto-injector INJECT 3 MG SUBCUTANEOUSLY  ONCE A WEEK 15 mL 3    gabapentin (NEURONTIN) 300 MG Cap Take 2 Capsules by mouth every morning AND 3 Capsules every evening. 500 Capsule 3    rosuvastatin (CRESTOR) 10 MG Tab Take 1 Tablet by mouth every evening. 100 Tablet 3    metoprolol SR (TOPROL XL) 25 MG TABLET SR 24 HR Take 1 Tablet by mouth every evening. 100 Tablet 3    Empagliflozin (JARDIANCE) 10 MG Tab tablet Take 1 Tablet by mouth every day. 100 Tablet 3    Sennosides-Docusate Sodium (STOOL SOFTENER & LAXATIVE PO) Take 1 Tablet by mouth 2 times a day.         acetaminophen (TYLENOL) 500 MG Tab Take 500-1,000  mg by mouth every 6 hours as needed for Moderate Pain.         omeprazole (PRILOSEC) 20 MG delayed-release capsule Take 1 Capsule by mouth every day. 100 Capsule 3    docusate sodium (STOOL SOFTENER) 100 MG Cap Take 100 mg by mouth 2 times a day.         MAGNESIUM PO Take 1 Tablet by mouth 2 times a day.         sildenafil citrate (VIAGRA) 100 MG tablet Take 100 mg by mouth as needed for Erectile Dysfunction.         POTASSIUM PO Take 5 mg by mouth as needed.       **MEDICATION INSTRUCTIONS FOR SURGERY/PROCEDURE SCHEDULED FOR 10/10/2024   DO NOT TAKE 7 DAYS PRIOR TO SURGERY      B Complex Vitamins (B COMPLEX PO) Take 1 Tablet by mouth 2 (two) times a day.          No current facility-administered medications for this visit.   [2]   Social History  Tobacco Use    Smoking status: Never    Smokeless tobacco: Never   Vaping Use    Vaping status: Never Used   Substance Use Topics    Alcohol use: Not Currently     Alcohol/week: 4.2 oz     Types: 7 Shots of liquor per week     Comment: Not lately    Drug use: No   [3]   Past Surgical History:  Procedure Laterality Date    MD CYSTOSCOPY,DIR VIS INT URETHROTOMY N/A 10/10/2024    Procedure: DIRECT VISION INTERNAL URETHROTOMY AND URETHRAL  DILATION;  Surgeon: Deniz Resendez M.D.;  Location: [facility];  Service: Urology    CYSTOSCOPY N/A 07/30/2019    Procedure: CYSTOSCOPY- DILATION OF STRICTURE;  Surgeon: Anthony Manning M.D.;  Location: [facility];  Service: Urology    KNEE ARTHROPLASTY TOTAL Right 08/15/2018    Procedure: KNEE ARTHROPLASTY TOTAL;  Surgeon: Amparo James M.D.;  Location: [facility];  Service: Orthopedics    KNEE ARTHROPLASTY TOTAL Left 11/21/2016    Procedure: KNEE ARTHROPLASTY TOTAL;  Surgeon: Amparo James M.D.;  Location: [facility];  Service:     KNEE REPLACEMENT, TOTAL Left 11/01/2016    Dr. James    KNEE ARTHROSCOPY Left 02/27/2016    Procedure: KNEE ARTHROSCOPY- I&D KNEE;  Surgeon: Corby Reyes,  M.D.;  Location: [facility];  Service:     KNEE ARTHROSCOPY Left 02/18/2016    Procedure: KNEE ARTHROSCOPY, SAMUELS;  Surgeon: Marquise Cline M.D.;  Location: [facility];  Service:     MENISCECTOMY, KNEE, MEDIAL  02/18/2016    Procedure: MEDIAL MENISCECTOMY - PARTIAL;  Surgeon: Marquise Cline M.D.;  Location: [facility];  Service:     CYSTOSCOPY  01/01/1986    DENTAL EXTRACTION(S)  01/01/1976    wisdom teeth    HAND SURGERY Left 01/01/1970    trauma, amputation index and middle finger    TONSILLECTOMY  as child

## 2025-06-26 NOTE — LETTER
[facility]  RENETTA RiceRGoldieN.  [address]  [place] [address]  Fax: [phone]   Authorization for Release/Disclosure of   Protected Health Information   Name: RICHARD WESTON : 1950 SSN: [id number]   Address: [address] [address] Phone:    [phone] (home)    I authorize the entity listed below to release/disclose the PHI below to:   [facility]/RENETTA RiceRANTON and BROOKE Rice   Provider or Entity Name:  [facility] [address] [place] [address], [facility], [facility]   Phone:      Fax:     Reason for request: continuity of care   Information to be released:    [  ] LAST COLONOSCOPY,  including any PATH REPORT and follow-up  [  ] LAST FIT/COLOGUARD RESULT [  ] LAST DEXA  [  ] LAST MAMMOGRAM  [  ] LAST PAP  [  ] LAST LABS [ XXX ] RETINA EXAM REPORT  [  ] IMMUNIZATION RECORDS  [  ] Release all info      [  ] Check here and initial the line next to each item to release ALL health information INCLUDING  _____ Care and treatment for drug and / or alcohol abuse  _____ HIV testing, infection status, or AIDS  _____ Genetic Testing    DATES OF SERVICE OR TIME PERIOD TO BE DISCLOSED: _____________  I understand and acknowledge that:  * This Authorization may be revoked at any time by you in writing, except if your health information has already been used or disclosed.  * Your health information that will be used or disclosed as a result of you signing this authorization could be re-disclosed by the recipient. If this occurs, your re-disclosed health information may no longer be protected by State or Federal laws.  * You may refuse to sign this Authorization. Your refusal will not affect your ability to obtain treatment.  * This Authorization becomes effective upon signing and will  on (date) __________.      If no date is indicated, this Authorization will  one (1) year from the signature date.    Name: Richard Weston  Signature: Date:    6/26/2025     PLEASE FAX REQUESTED RECORDS BACK TO: [phone]

## 2025-07-17 NOTE — Clinical Note
[facility]ra [facility]   [facility] [address]    July 17, 2025    Member Name: Richard Chery   Member Number: [id number]   Reference Number: [id number]   Approved Services: Echos and EKG   Approved Service Dates: 07/17/2025 - 11/14/2025   Requesting Provider: Nazia Moy   Requested Provider: [facility]     Dear Richard Chery:    The following medical service(s) requested by Nazia Moy have been approved:    Procedure Code Procedure Code Name Requested Quantity Approved Quantity Status   73499 (CPT®) RI ECHO HEART XTHORACIC,COMPLETE W DOPPLER 1 1 Authorized       Approved Quantity means the number of visits approved for medication treatments and/or medical services.    The services should be provided by [facility] no later than 11/14/2025. Please contact the provider listed below with any questions. Your plan benefit may require a deductible, co-payment, or co-insurance for these services.    Provider Information:  [facility]  [phone]    Important Note for Members:    This authorization does not guarantee that [place] [employer] or [facility] [employer] will pay for your care. Payment depends on your plan details, if you're eligible for coverage on the day you receive care, and whether the service follows plan rules. These include things such as reviewing if the service is medically necessary. We recommend reviewing your Evidence of Coverage before receiving any care.    Important Note for Providers:    Payment by [facility] or [facility] for these services is subject to the terms of the Evidence of Coverage or Summary Plan Description, eligibility at the time of service, standard processing procedures and confirmation of benefit coverage. All claims are subject to standard processing procedures, including but not limited to coding edits, medical necessity determinations, and other plan policies in  effect at the time of claim adjudication. Providers are required to follow all [facility] Administrative Guidelines and requirements.    For any questions or additional information, please contact Customer Service:    [place] Toll Free: [phone]  TTY users dial: [phone]   Call Center Hours:  Oct 1 - Mar 31, Mon - Fri 7 AM to 8 PM PST  Oct 1 - Mar 31, Sat - Sun 8 AM to 8 PM PST  Apr 1 - Sep 30, Mon - Fri 7 AM to 8 PM PST   Office Hours: Mon - Fri 8 AM to 5 PM PST   E-mail: [email or web address]   Website:  [email or web address]      This information is available for free in other languages. Please contact Customer Service at the phone number above for more information. [employer] complies with applicable Federal civil rights laws and does not discriminate on the basis of race, color, national origin, age, disability or sex.    Sincerely,     Healthcare Utilization Management Department     Cc: [facility]   Nazia Moy    Multi-Language Insert  Multi- Services  English: We have free  services to answer any questions you may have about our health or drug plan.  To get an , just call us at [phone].  Someone who speaks English/Language can help you.  This is a free service.  Divehi: Tenemos servicios de intérprete sin costo alguno  para responder cualquier pregunta que pueda tener sobre nuestro plan de peewee o medicamentos. Para hablar con un intérprete, por favor llame al [phone]. Alguien que hable español le podrá ayudar. Breanna es un servicio gratuito.  Chinese Mandarin: ?????????????????????????????? ???????????????? [phone]????????????????? ?????????  Chinese Cantonese: ?????????????????????????????? ????????????? [phone]???????????????????? ????????  Tagalog:  Mayroon kaming libreng serbisyo sa pagsasaling-wika upang masagot ang anumang mga katanungan ninyo hinggil sa aming  todd gayle.  Zeenatapple santiago ng taginezLyudmilaandi mckeon  [phone]. Calderon martin apple fultonstephenie seun Bro.  Pete melvin king.  Citizen of [place]:  Nous proposons aleja services gratuits d'interprétation pour répondre à toutes caitlyn questions relatives à notre régime de santé ou d'assurance-médicaments. Pour accéder au service d'interprétation, il vous suffit de nous appeler au [phone]. Un interlocuteur parPacifica Hospital Of The Valleys pourra vous aider. Ce service est gratuit.  Latvian:  Elayne vail có d?ch v? thông d?ch mi?n phí ð? tr? l?i các câu h?i v? chýõng s?c kh?e và chýõng trình thu?c men. N?u quí v? c?n thông d?ch viên chayo g?i [phone] s? có nhân viên nói ti?ng Vi?t giúp ð? quí v?. Ðây là d?ch v? mi?n phí .  Peruvian:  Unser kosCox Bransonser DolmetschersMercy Health Anderson Hospitalice beantRehabilitation Institute of Michigan Ihren Fragen zu unserem Gesundheits- und Arzneimittelplan. Unsere Dolmetscher erreichen Sie [phone]. Man marcy Yael pruitt auCampbell County Memorial Hospital - [facility]. Dieser Service ist kostenlos.  Danish:  ??? ?? ?? ?? ?? ??? ?? ??? ?? ???? ?? ?? ???? ???? ????. ?? ???? ????? ?? [phone] ??? ??? ????.  ???? ?? ???? ?? ?? ????. ? ???? ??? ?????.   Albanian: Åñëè ó âàñ âîçíèêíóò âîïðîñû îòíîñèòåëüíî ñòðàõîâîãî èëè ìåäèêàìåíòíîãî ïëàíà, âû ìîæåòå âîñïîëüçîâàòüñÿ íàøèìè áåñïëàòíûìè óñëóãàìè ïåðåâîä÷èêîâ. ×òîáû âîñïîëüçîâàòüñÿ óñëóãàìè ïåðåâîä÷èêà, ïîçâîíèòå íàì ïî òåëåôîíó [phone]. Âàì îêàæåò ïîìîùü ñîòðóäíèê, êîòîðûé ãîâîðèò ïî-póññêè. Äàííàÿ óñëóãà áåñïëàòíàÿ.  Turkish: ÅääÇ äÞÏã ÎÏãÇÊ ÇáãÊÑÌã ÇáÝæÑí ÇáãÌÇäíÉ ááÅÌÇÈÉ Úä Ãí ÃÓÆáÉ ÊÊÚáÞ ÈÇáÕÍÉ Ãæ ÌÏæá ÇáÃÏæíÉ áÏíäÇ. ááÍÕæá Úáì ãÊÑÌã ÝæÑí¡ áíÓ Úáíß Óæì ÇáÇÊÕÇá ÈäÇ Úáì [phone] . ÓíÞæã ÔÎÕ ãÇ íÊÍÏË ÇáÚÑÈíÉ ÈãÓÇÚÏÊß. åÐå ÎÏãÉ ãÌÇäíÉ.  Pablo: ????? ????????? ?? ??? ?? ????? ?? ???? ??? ???? ???? ?? ?????? ?? ???? ???? ?? ??? ????? ??? ????? ???????? ?????? ?????? ???. ?? ???????? ??????? ???? ?? ???, ?? ???? [phone] ?? ??? ????. ??? ???????  ?? ?????? ????? ?? ???? ??? ?? ???? ??. ?? ?? ????? ???? ??.   Romanian:  È disponibile un servizio di interpretariato gratuito per rispondere a eventuali domande sul nostro piano sanitario e farmaceutico. Per un interprete, contattare il yuliet [phone]. Un nostro incaricato chandler parla Italianovi fornirà l'assistenza necessaria. È un servizio gratuito.  Portugués:  Dispomos de serviços de interpretação gratuitos para responder a qualquer questão que tenha acerca do nosso plano de saúde ou de medicação. Para obter um intérprete, contacte-nos através do número [phone]. Irá encontrar alguém que fale o idioma  Português para o ajudar. Breanna serviço é gratuito.  Sol[place] Islander Creole:  Nou genyen sèvis entèprèt gratis josé miguel reponn tout kesyon ou ta genyen konsènan plan medikal oswa dwòg nou an.  José Miguel jwenn yon sher, jis rele nou nan [phone]. Yon moun ki pale Kreyòl kapab tor w.  Sa a se yon sèvis ki gratis.  Polish:  Umo¿liwiamy bezp³atne skorzystanie z us³ug t³umacza ustnego, który pomo¿e w uzyskaniu odpowiedzi na temat planu zdrowotnego lub dawkowania leków. Maria Eugenia skorzystaæ z pomocy t³umacza znaj¹cego isaac clemons¿y zadzwoniæ pod numer [phone]. Ta us³uga jest bezp³atna.  Frisian: ????? ??????? ????????????????????? ??????????????????????????????????[phone] ???????????????? ? ????????????????? ?????

## 2025-07-19 DIAGNOSIS — K21.9 GASTROESOPHAGEAL REFLUX DISEASE WITHOUT ESOPHAGITIS: ICD-10-CM

## 2025-07-21 RX ORDER — OMEPRAZOLE 20 MG/1
20 CAPSULE, DELAYED RELEASE ORAL DAILY
Qty: 100 CAPSULE | Refills: 1 | Status: SHIPPED | OUTPATIENT
Start: 2025-07-21

## 2025-07-21 NOTE — TELEPHONE ENCOUNTER
Received request via: Pharmacy    Was the patient seen in the last year in this department? Yes    Does the patient have an active prescription (recently filled or refills available) for medication(s) requested? No    Pharmacy Name: [facility]     Does the patient have [facility] Plus and need 100-day supply? (This applies to ALL medications) Yes, quantity updated to 100 days

## 2025-07-29 ENCOUNTER — HOSPITAL ENCOUNTER (OUTPATIENT)
Dept: CARDIOLOGY | Facility: MEDICAL CENTER | Age: 75
End: 2025-07-29
Attending: BEHAVIOR ANALYST
Payer: MEDICARE

## 2025-07-29 DIAGNOSIS — I50.20 ACC/AHA STAGE C SYSTOLIC HEART FAILURE (HCC): Chronic | ICD-10-CM

## 2025-07-29 DIAGNOSIS — I48.20 CHRONIC ATRIAL FIBRILLATION (HCC): ICD-10-CM

## 2025-07-29 DIAGNOSIS — I51.89 LEFT VENTRICULAR SYSTOLIC DYSFUNCTION, NYHA CLASS 2: Chronic | ICD-10-CM

## 2025-07-29 LAB
LV EJECT FRACT  99904: 60
LV EJECT FRACT MOD 2C 99903: 59.66
LV EJECT FRACT MOD 4C 99902: 58.06
LV EJECT FRACT MOD BP 99901: 58.85

## 2025-07-29 PROCEDURE — 93306 TTE W/DOPPLER COMPLETE: CPT

## 2025-07-30 ENCOUNTER — RESULTS FOLLOW-UP (OUTPATIENT)
Dept: MEDICAL GROUP | Facility: MEDICAL CENTER | Age: 75
End: 2025-07-30
Payer: MEDICARE

## (undated) DEVICE — SUTURE 2-0 VICRYL PLUS CT-1 - 8 X 18 INCH(12/BX)

## (undated) DEVICE — KIT ROOM DECONTAMINATION

## (undated) DEVICE — MASK ANESTHESIA ADULT  - (100/CA)

## (undated) DEVICE — GLOVE BIOGEL SZ 7 SURGICAL PF LTX - (50PR/BX 4BX/CA)

## (undated) DEVICE — WATER IRRIGATION STERILE 1000ML (12EA/CA)

## (undated) DEVICE — Device

## (undated) DEVICE — BAG, SPONGE COUNT 50600

## (undated) DEVICE — SUCTION INSTRUMENT YANKAUER BULBOUS TIP W/O VENT (50EA/CA)

## (undated) DEVICE — SET EXTENSION WITH 2 PORTS (48EA/CA) ***PART #2C8610 IS A SUBSTITUTE*****

## (undated) DEVICE — CANISTER SUCTION RIGID RED 1500CC (40EA/CA)

## (undated) DEVICE — BANDAGE ELASTIC 6 IN X 5 YDS - LATEX FREE (10/BX)

## (undated) DEVICE — HANDPIECE 10FT INTPLS SCT PLS IRRIGATION HAND CONTROL SET (6/PK)

## (undated) DEVICE — SODIUM CHL. IRRIGATION 0.9% 3000ML (4EA/CA 65CA/PF)

## (undated) DEVICE — GLOVE BIOGEL PI INDICATOR SZ 7.5 SURGICAL PF LF -(50/BX 4BX/CA)

## (undated) DEVICE — SET IRRIGATION CYSTOSCOPY Y-TYPE L81 IN (20EA/CA)

## (undated) DEVICE — BAG DRAINAGE URINARY CLOSED 2000ML (20EA/CA)

## (undated) DEVICE — HEAD HOLDER JUNIOR/ADULT

## (undated) DEVICE — SODIUM CHL IRRIGATION 0.9% 1000ML (12EA/CA)

## (undated) DEVICE — KIT ANESTHESIA W/CIRCUIT & 3/LT BAG W/FILTER (20EA/CA)

## (undated) DEVICE — NEPTUNE 4 PORT MANIFOLD - (20/PK)

## (undated) DEVICE — SUTURE 1 VICRYL PLUS CTX - 8 X 18 INCH (12/BX)

## (undated) DEVICE — JELLY, KY 2 0Z STERILE

## (undated) DEVICE — GOWN SURGICAL X-LARGE ULTRA - FILM-REINFORCED (20/CA)

## (undated) DEVICE — LACTATED RINGERS INJ 1000 ML - (14EA/CA 60CA/PF)

## (undated) DEVICE — SLEEVE VASO DVT COMPRESSION CALF MED - (10PR/CA)

## (undated) DEVICE — WIRE GUIDE SENSOR DUAL FLEX - 5/BX

## (undated) DEVICE — MASK, LARYNGEAL AIRWAY #4

## (undated) DEVICE — BLOCK

## (undated) DEVICE — SET LEADWIRE 5 LEAD BEDSIDE DISPOSABLE ECG (1SET OF 5/EA)

## (undated) DEVICE — STOCKINETTE IMPERVIOUS 12X48 - STERILELF (10/CA)"

## (undated) DEVICE — COVER FOOT UNIVERSAL DISP. - (25EA/CA)

## (undated) DEVICE — KNIFE COLD SACHSE STRAIGHT STERILE (6EA/PK)

## (undated) DEVICE — CONNECTOR HOSE NEPTUNE FOR CYSTO ROOM

## (undated) DEVICE — ELECTRODE DUAL RETURN W/ CORD - (50/PK)

## (undated) DEVICE — BLADE SAGITTAL SYSTEM 18MM

## (undated) DEVICE — WATER IRRIG. STER. 1500 ML - (9/CA)

## (undated) DEVICE — MIXER BONE CEMENT REVOLUTION - W/FEMORAL PRESSURIZER (6/CA)

## (undated) DEVICE — SPONGE GAUZESTER 4 X 4 4PLY - (128PK/CA)

## (undated) DEVICE — GLOVE BIOGEL INDICATOR SZ 7SURGICAL PF LTX - (50/BX 4BX/CA)

## (undated) DEVICE — TUBING CLEARLINK DUO-VENT - C-FLO (48EA/CA)

## (undated) DEVICE — CANISTER SUCTION 3000ML MECHANICAL FILTER AUTO SHUTOFF MEDI-VAC NONSTERILE LF DISP (40EA/CA)

## (undated) DEVICE — DRESSING AQUACEL AG ADVANTAGE 3.5 X 10" (10EA/BX)"

## (undated) DEVICE — GOWN SURGEONS X-LARGE - DISP. (30/CA)

## (undated) DEVICE — GLOVE BIOGEL SZ 7.5 SURGICAL PF LTX - (50PR/BX 4BX/CA)

## (undated) DEVICE — GOWN WARMING STANDARD FLEX - (30/CA)

## (undated) DEVICE — DRESSING POST OP BORDER 4 X 10 (5EA/BX)

## (undated) DEVICE — PROTECTOR ULNA NERVE - (36PR/CA)

## (undated) DEVICE — SENSOR OXIMETER ADULT SPO2 RD SET (20EA/BX)

## (undated) DEVICE — PACK CYSTO III (2EA/CA)

## (undated) DEVICE — GLOVE BIOGEL PI INDICATOR SZ 6.0 SURGICAL PF LF -(200PR/CA)

## (undated) DEVICE — SENSOR SPO2 NEO LNCS ADHESIVE (20/BX) SEE USER NOTES

## (undated) DEVICE — TOURNIQUET CUFF 34 X 4 ONE PORT DISP - STERILE (10/BX)

## (undated) DEVICE — GLOVE BIOGEL INDICATOR SZ 6.5 SURGICAL PF LTX - (50PR/BX 4BX/CA)

## (undated) DEVICE — DEVICE INFLATION DIGITAL BLUE DIAMOND (5EA/BX)

## (undated) DEVICE — TIP INTPLS HFLO ML ORFC BTRY - (12/CS)  FOR SURGILAV

## (undated) DEVICE — TUBE CONNECT SUCTION CLEAR 120 X 1/4" (50EA/CA)"

## (undated) DEVICE — GLOVE BIOGEL INDICATOR SZ 8 SURGICAL PF LTX - (50/BX 4BX/CA)

## (undated) DEVICE — PACK TOTAL KNEE  (1/CA)

## (undated) DEVICE — INFLATOR ENCORE DEVICE 20CC

## (undated) DEVICE — SUTURE GENERAL

## (undated) DEVICE — NEEDLE W/FACET TIP DULL VERSION W/STIMULATION CABLE SONOPLEX 21G X 4 (10/EA)"

## (undated) DEVICE — CHLORAPREP 26 ML APPLICATOR - ORANGE TINT(25/CA)

## (undated) DEVICE — SLEEVE, VASO, THIGH, MED

## (undated) DEVICE — GLOVE BIOGEL INDICATOR SZ 7.5 SURGICAL PF LTX - (50PR/BX 4BX/CA)

## (undated) DEVICE — GLOVE, LITE (PAIR)

## (undated) DEVICE — LENS/HOOD FOR SPACESUIT - (32/PK) PEEL AWAY FACE

## (undated) DEVICE — CATH, COUNCIL TIP 16 FR

## (undated) DEVICE — HUMID-VENT HEAT AND MOISTURE EXCHANGE- (50/BX)

## (undated) DEVICE — WATER IRRIG. STER 3000 ML - (4/CA)

## (undated) DEVICE — PAD UNIVERSAL MULTI USE (1/EA)

## (undated) DEVICE — SUTURE 3-0 MONOCRYL PLUS PS-1 - 27 INCH (36/BX)

## (undated) DEVICE — BANDAGE ELASTIC STERILE VELCRO 6 X 5 YDS (25EA/CA)

## (undated) DEVICE — TUBE CONNECTING SUCTION - CLEAR PLASTIC STERILE 72 IN (50EA/CA)

## (undated) DEVICE — ELECTRODE 850 FOAM ADHESIVE - HYDROGEL RADIOTRNSPRNT (50/PK)

## (undated) DEVICE — PACK CYSTOSCOPY - (14/CA)

## (undated) DEVICE — GLOVE BIOGEL SZ 8 SURGICAL PF LTX - (50PR/BX 4BX/CA)

## (undated) DEVICE — GLOVE BIOGEL SZ 6.5 SURGICAL PF LTX (50PR/BX 4BX/CA)

## (undated) DEVICE — BLADE SAGITTAL 34MM